# Patient Record
Sex: FEMALE | Race: WHITE | Employment: OTHER | URBAN - METROPOLITAN AREA
[De-identification: names, ages, dates, MRNs, and addresses within clinical notes are randomized per-mention and may not be internally consistent; named-entity substitution may affect disease eponyms.]

---

## 2019-10-16 ENCOUNTER — HOSPITAL ENCOUNTER (OUTPATIENT)
Dept: GENERAL RADIOLOGY | Age: 79
Discharge: HOME | End: 2019-10-18
Payer: COMMERCIAL

## 2019-10-16 ENCOUNTER — HOSPITAL ENCOUNTER (OUTPATIENT)
Age: 79
Discharge: HOME | End: 2019-10-18
Payer: COMMERCIAL

## 2019-10-16 DIAGNOSIS — M54.6 THORACIC BACK PAIN, UNSPECIFIED BACK PAIN LATERALITY, UNSPECIFIED CHRONICITY: ICD-10-CM

## 2019-10-16 DIAGNOSIS — M62.830 MUSCLE SPASM OF BACK: ICD-10-CM

## 2019-10-16 PROCEDURE — 72110 X-RAY EXAM L-2 SPINE 4/>VWS: CPT

## 2019-10-16 PROCEDURE — 72072 X-RAY EXAM THORAC SPINE 3VWS: CPT

## 2020-01-01 ENCOUNTER — APPOINTMENT (OUTPATIENT)
Dept: CT IMAGING | Age: 80
DRG: 177 | End: 2020-01-01
Payer: COMMERCIAL

## 2020-01-01 ENCOUNTER — OUTSIDE SERVICES (OUTPATIENT)
Dept: FAMILY MEDICINE CLINIC | Age: 80
End: 2020-01-01
Payer: COMMERCIAL

## 2020-01-01 ENCOUNTER — APPOINTMENT (OUTPATIENT)
Dept: GENERAL RADIOLOGY | Age: 80
DRG: 177 | End: 2020-01-01
Payer: COMMERCIAL

## 2020-01-01 ENCOUNTER — HOSPITAL ENCOUNTER (INPATIENT)
Age: 80
LOS: 5 days | Discharge: NURSING, REHAB OR LONG-TERM CARE | DRG: 177 | End: 2021-01-04
Attending: EMERGENCY MEDICINE | Admitting: INTERNAL MEDICINE
Payer: COMMERCIAL

## 2020-01-01 DIAGNOSIS — U07.1 ACUTE RESPIRATORY FAILURE DUE TO COVID-19 (HCC): Primary | ICD-10-CM

## 2020-01-01 DIAGNOSIS — R79.1 ELEVATED INR: ICD-10-CM

## 2020-01-01 DIAGNOSIS — E87.0 HYPERNATREMIA: ICD-10-CM

## 2020-01-01 DIAGNOSIS — N28.9 ACUTE RENAL INSUFFICIENCY: ICD-10-CM

## 2020-01-01 DIAGNOSIS — J96.00 ACUTE RESPIRATORY FAILURE DUE TO COVID-19 (HCC): Primary | ICD-10-CM

## 2020-01-01 LAB
ALBUMIN SERPL-MCNC: 3.1 G/DL (ref 3.5–5.2)
ALBUMIN SERPL-MCNC: 3.2 G/DL (ref 3.5–5.2)
ALBUMIN SERPL-MCNC: 3.4 G/DL (ref 3.5–5.2)
ALP BLD-CCNC: 107 U/L (ref 35–104)
ALP BLD-CCNC: 109 U/L (ref 35–104)
ALP BLD-CCNC: 109 U/L (ref 35–104)
ALT SERPL-CCNC: 18 U/L (ref 0–32)
AMORPHOUS: ABNORMAL
ANION GAP SERPL CALCULATED.3IONS-SCNC: 10 MMOL/L (ref 7–16)
ANION GAP SERPL CALCULATED.3IONS-SCNC: 11 MMOL/L (ref 7–16)
ANION GAP SERPL CALCULATED.3IONS-SCNC: 11 MMOL/L (ref 7–16)
AST SERPL-CCNC: 15 U/L (ref 0–31)
AST SERPL-CCNC: 15 U/L (ref 0–31)
AST SERPL-CCNC: 18 U/L (ref 0–31)
B.E.: 3 MMOL/L (ref -3–3)
BACTERIA: ABNORMAL /HPF
BASOPHILS ABSOLUTE: 0.01 E9/L (ref 0–0.2)
BASOPHILS ABSOLUTE: 0.01 E9/L (ref 0–0.2)
BASOPHILS ABSOLUTE: 0.02 E9/L (ref 0–0.2)
BASOPHILS RELATIVE PERCENT: 0.2 % (ref 0–2)
BASOPHILS RELATIVE PERCENT: 0.3 % (ref 0–2)
BASOPHILS RELATIVE PERCENT: 0.4 % (ref 0–2)
BILIRUB SERPL-MCNC: 0.4 MG/DL (ref 0–1.2)
BILIRUB SERPL-MCNC: 0.6 MG/DL (ref 0–1.2)
BILIRUB SERPL-MCNC: 0.6 MG/DL (ref 0–1.2)
BILIRUBIN URINE: NEGATIVE
BLOOD, URINE: ABNORMAL
BUN BLDV-MCNC: 64 MG/DL (ref 8–23)
BUN BLDV-MCNC: 78 MG/DL (ref 8–23)
BUN BLDV-MCNC: 79 MG/DL (ref 8–23)
C-REACTIVE PROTEIN: 16.2 MG/DL (ref 0–0.4)
CALCIUM SERPL-MCNC: 9.5 MG/DL (ref 8.6–10.2)
CALCIUM SERPL-MCNC: 9.8 MG/DL (ref 8.6–10.2)
CALCIUM SERPL-MCNC: 9.8 MG/DL (ref 8.6–10.2)
CHLORIDE BLD-SCNC: 113 MMOL/L (ref 98–107)
CHLORIDE BLD-SCNC: 114 MMOL/L (ref 98–107)
CHLORIDE BLD-SCNC: 115 MMOL/L (ref 98–107)
CLARITY: ABNORMAL
CO2: 28 MMOL/L (ref 22–29)
CO2: 28 MMOL/L (ref 22–29)
CO2: 30 MMOL/L (ref 22–29)
COHB: 0.8 % (ref 0–1.5)
COLOR: YELLOW
CREAT SERPL-MCNC: 1.8 MG/DL (ref 0.5–1)
CREAT SERPL-MCNC: 2.1 MG/DL (ref 0.5–1)
CREAT SERPL-MCNC: 2.2 MG/DL (ref 0.5–1)
CRITICAL: ABNORMAL
D DIMER: 545 NG/ML DDU
DATE ANALYZED: ABNORMAL
DATE OF COLLECTION: ABNORMAL
EKG ATRIAL RATE: 80 BPM
EKG P AXIS: 41 DEGREES
EKG P-R INTERVAL: 174 MS
EKG Q-T INTERVAL: 452 MS
EKG QRS DURATION: 174 MS
EKG QTC CALCULATION (BAZETT): 521 MS
EKG R AXIS: -43 DEGREES
EKG T AXIS: 122 DEGREES
EKG VENTRICULAR RATE: 80 BPM
EOSINOPHILS ABSOLUTE: 0.01 E9/L (ref 0.05–0.5)
EOSINOPHILS ABSOLUTE: 0.15 E9/L (ref 0.05–0.5)
EOSINOPHILS ABSOLUTE: 0.24 E9/L (ref 0.05–0.5)
EOSINOPHILS RELATIVE PERCENT: 0.2 % (ref 0–6)
EOSINOPHILS RELATIVE PERCENT: 2.9 % (ref 0–6)
EOSINOPHILS RELATIVE PERCENT: 6.4 % (ref 0–6)
EPITHELIAL CELLS, UA: ABNORMAL /HPF
FERRITIN: 400 NG/ML
FIBRINOGEN: >700 MG/DL (ref 225–540)
GFR AFRICAN AMERICAN: 26
GFR AFRICAN AMERICAN: 27
GFR AFRICAN AMERICAN: 33
GFR NON-AFRICAN AMERICAN: 21 ML/MIN/1.73
GFR NON-AFRICAN AMERICAN: 23 ML/MIN/1.73
GFR NON-AFRICAN AMERICAN: 27 ML/MIN/1.73
GLUCOSE BLD-MCNC: 128 MG/DL (ref 74–99)
GLUCOSE BLD-MCNC: 142 MG/DL (ref 74–99)
GLUCOSE BLD-MCNC: 164 MG/DL (ref 74–99)
GLUCOSE URINE: NEGATIVE MG/DL
HCO3: 27.3 MMOL/L (ref 22–26)
HCT VFR BLD CALC: 40.8 % (ref 34–48)
HCT VFR BLD CALC: 42.6 % (ref 34–48)
HCT VFR BLD CALC: 42.7 % (ref 34–48)
HEMOGLOBIN: 12.5 G/DL (ref 11.5–15.5)
HEMOGLOBIN: 12.9 G/DL (ref 11.5–15.5)
HEMOGLOBIN: 13.1 G/DL (ref 11.5–15.5)
HHB: 1.8 % (ref 0–5)
IMMATURE GRANULOCYTES #: 0.04 E9/L
IMMATURE GRANULOCYTES #: 0.05 E9/L
IMMATURE GRANULOCYTES #: 0.08 E9/L
IMMATURE GRANULOCYTES %: 1 % (ref 0–5)
IMMATURE GRANULOCYTES %: 1.3 % (ref 0–5)
IMMATURE GRANULOCYTES %: 1.5 % (ref 0–5)
INR BLD: 2.1
KETONES, URINE: NEGATIVE MG/DL
LAB: ABNORMAL
LACTATE DEHYDROGENASE: 167 U/L (ref 135–214)
LACTIC ACID, SEPSIS: 1.3 MMOL/L (ref 0.5–1.9)
LEUKOCYTE ESTERASE, URINE: NEGATIVE
LIPASE: 149 U/L (ref 13–60)
LYMPHOCYTES ABSOLUTE: 0.22 E9/L (ref 1.5–4)
LYMPHOCYTES ABSOLUTE: 0.26 E9/L (ref 1.5–4)
LYMPHOCYTES ABSOLUTE: 0.52 E9/L (ref 1.5–4)
LYMPHOCYTES RELATIVE PERCENT: 10 % (ref 20–42)
LYMPHOCYTES RELATIVE PERCENT: 5.9 % (ref 20–42)
LYMPHOCYTES RELATIVE PERCENT: 6.5 % (ref 20–42)
Lab: ABNORMAL
MAGNESIUM: 3.2 MG/DL (ref 1.6–2.6)
MCH RBC QN AUTO: 30.6 PG (ref 26–35)
MCH RBC QN AUTO: 30.6 PG (ref 26–35)
MCH RBC QN AUTO: 30.9 PG (ref 26–35)
MCHC RBC AUTO-ENTMCNC: 30.2 % (ref 32–34.5)
MCHC RBC AUTO-ENTMCNC: 30.6 % (ref 32–34.5)
MCHC RBC AUTO-ENTMCNC: 30.8 % (ref 32–34.5)
MCV RBC AUTO: 100 FL (ref 80–99.9)
MCV RBC AUTO: 100.5 FL (ref 80–99.9)
MCV RBC AUTO: 101.2 FL (ref 80–99.9)
METER GLUCOSE: 123 MG/DL (ref 74–99)
METHB: 0.2 % (ref 0–1.5)
MODE: ABNORMAL
MONOCYTES ABSOLUTE: 0.24 E9/L (ref 0.1–0.95)
MONOCYTES ABSOLUTE: 0.36 E9/L (ref 0.1–0.95)
MONOCYTES ABSOLUTE: 0.59 E9/L (ref 0.1–0.95)
MONOCYTES RELATIVE PERCENT: 11.3 % (ref 2–12)
MONOCYTES RELATIVE PERCENT: 6 % (ref 2–12)
MONOCYTES RELATIVE PERCENT: 9.6 % (ref 2–12)
NEUTROPHILS ABSOLUTE: 2.86 E9/L (ref 1.8–7.3)
NEUTROPHILS ABSOLUTE: 3.46 E9/L (ref 1.8–7.3)
NEUTROPHILS ABSOLUTE: 3.85 E9/L (ref 1.8–7.3)
NEUTROPHILS RELATIVE PERCENT: 73.9 % (ref 43–80)
NEUTROPHILS RELATIVE PERCENT: 76.5 % (ref 43–80)
NEUTROPHILS RELATIVE PERCENT: 86.1 % (ref 43–80)
NITRITE, URINE: NEGATIVE
O2 CONTENT: 18.6 ML/DL
O2 SATURATION: 98.2 % (ref 92–98.5)
O2HB: 97.2 % (ref 94–97)
OPERATOR ID: 3114
OVALOCYTES: ABNORMAL
PATIENT TEMP: 37 C
PCO2: 40.6 MMHG (ref 35–45)
PDW BLD-RTO: 14.9 FL (ref 11.5–15)
PDW BLD-RTO: 15.3 FL (ref 11.5–15)
PDW BLD-RTO: 15.3 FL (ref 11.5–15)
PH BLOOD GAS: 7.45 (ref 7.35–7.45)
PH UA: 5.5 (ref 5–9)
PLATELET # BLD: 183 E9/L (ref 130–450)
PLATELET # BLD: 184 E9/L (ref 130–450)
PLATELET # BLD: 193 E9/L (ref 130–450)
PMV BLD AUTO: 11.7 FL (ref 7–12)
PMV BLD AUTO: 11.8 FL (ref 7–12)
PMV BLD AUTO: 12 FL (ref 7–12)
PO2: 112.7 MMHG (ref 75–100)
POIKILOCYTES: ABNORMAL
POLYCHROMASIA: ABNORMAL
POTASSIUM REFLEX MAGNESIUM: 3.9 MMOL/L (ref 3.5–5)
POTASSIUM REFLEX MAGNESIUM: 3.9 MMOL/L (ref 3.5–5)
POTASSIUM SERPL-SCNC: 3.3 MMOL/L (ref 3.5–5)
PRO-BNP: 2968 PG/ML (ref 0–450)
PROCALCITONIN: 0.51 NG/ML (ref 0–0.08)
PROTEIN UA: ABNORMAL MG/DL
PROTHROMBIN TIME: 24.4 SEC (ref 9.3–12.4)
RBC # BLD: 4.08 E12/L (ref 3.5–5.5)
RBC # BLD: 4.22 E12/L (ref 3.5–5.5)
RBC # BLD: 4.24 E12/L (ref 3.5–5.5)
RBC UA: ABNORMAL /HPF (ref 0–2)
SARS-COV-2, NAAT: DETECTED
SODIUM BLD-SCNC: 151 MMOL/L (ref 132–146)
SODIUM BLD-SCNC: 153 MMOL/L (ref 132–146)
SODIUM BLD-SCNC: 156 MMOL/L (ref 132–146)
SOURCE, BLOOD GAS: ABNORMAL
SPECIFIC GRAVITY UA: 1.02 (ref 1–1.03)
THB: 13.5 G/DL (ref 11.5–16.5)
TIME ANALYZED: 638
TOTAL PROTEIN: 6.7 G/DL (ref 6.4–8.3)
TOTAL PROTEIN: 7 G/DL (ref 6.4–8.3)
TOTAL PROTEIN: 7.1 G/DL (ref 6.4–8.3)
TROPONIN: 0.03 NG/ML (ref 0–0.03)
TSH SERPL DL<=0.05 MIU/L-ACNC: 0.25 UIU/ML (ref 0.27–4.2)
UROBILINOGEN, URINE: 0.2 E.U./DL
WBC # BLD: 3.7 E9/L (ref 4.5–11.5)
WBC # BLD: 4 E9/L (ref 4.5–11.5)
WBC # BLD: 5.2 E9/L (ref 4.5–11.5)
WBC UA: ABNORMAL /HPF (ref 0–5)

## 2020-01-01 PROCEDURE — 99309 SBSQ NF CARE MODERATE MDM 30: CPT | Performed by: NURSE PRACTITIONER

## 2020-01-01 PROCEDURE — 85610 PROTHROMBIN TIME: CPT

## 2020-01-01 PROCEDURE — 97161 PT EVAL LOW COMPLEX 20 MIN: CPT

## 2020-01-01 PROCEDURE — 2500000003 HC RX 250 WO HCPCS: Performed by: NURSE PRACTITIONER

## 2020-01-01 PROCEDURE — 86140 C-REACTIVE PROTEIN: CPT

## 2020-01-01 PROCEDURE — 36415 COLL VENOUS BLD VENIPUNCTURE: CPT

## 2020-01-01 PROCEDURE — 82962 GLUCOSE BLOOD TEST: CPT

## 2020-01-01 PROCEDURE — 85025 COMPLETE CBC W/AUTO DIFF WBC: CPT

## 2020-01-01 PROCEDURE — 84145 PROCALCITONIN (PCT): CPT

## 2020-01-01 PROCEDURE — 83735 ASSAY OF MAGNESIUM: CPT

## 2020-01-01 PROCEDURE — 74176 CT ABD & PELVIS W/O CONTRAST: CPT

## 2020-01-01 PROCEDURE — 93005 ELECTROCARDIOGRAM TRACING: CPT | Performed by: STUDENT IN AN ORGANIZED HEALTH CARE EDUCATION/TRAINING PROGRAM

## 2020-01-01 PROCEDURE — 83880 ASSAY OF NATRIURETIC PEPTIDE: CPT

## 2020-01-01 PROCEDURE — 82805 BLOOD GASES W/O2 SATURATION: CPT

## 2020-01-01 PROCEDURE — 80053 COMPREHEN METABOLIC PANEL: CPT

## 2020-01-01 PROCEDURE — 71250 CT THORAX DX C-: CPT

## 2020-01-01 PROCEDURE — XW033E5 INTRODUCTION OF REMDESIVIR ANTI-INFECTIVE INTO PERIPHERAL VEIN, PERCUTANEOUS APPROACH, NEW TECHNOLOGY GROUP 5: ICD-10-PCS | Performed by: INTERNAL MEDICINE

## 2020-01-01 PROCEDURE — 96374 THER/PROPH/DIAG INJ IV PUSH: CPT

## 2020-01-01 PROCEDURE — 85384 FIBRINOGEN ACTIVITY: CPT

## 2020-01-01 PROCEDURE — 6370000000 HC RX 637 (ALT 250 FOR IP): Performed by: INTERNAL MEDICINE

## 2020-01-01 PROCEDURE — 99285 EMERGENCY DEPT VISIT HI MDM: CPT

## 2020-01-01 PROCEDURE — 2060000000 HC ICU INTERMEDIATE R&B

## 2020-01-01 PROCEDURE — 85378 FIBRIN DEGRADE SEMIQUANT: CPT

## 2020-01-01 PROCEDURE — 97530 THERAPEUTIC ACTIVITIES: CPT

## 2020-01-01 PROCEDURE — 2700000000 HC OXYGEN THERAPY PER DAY

## 2020-01-01 PROCEDURE — 84443 ASSAY THYROID STIM HORMONE: CPT

## 2020-01-01 PROCEDURE — 2580000003 HC RX 258: Performed by: NURSE PRACTITIONER

## 2020-01-01 PROCEDURE — 2580000003 HC RX 258: Performed by: INTERNAL MEDICINE

## 2020-01-01 PROCEDURE — 6370000000 HC RX 637 (ALT 250 FOR IP): Performed by: NURSE PRACTITIONER

## 2020-01-01 PROCEDURE — 6360000002 HC RX W HCPCS: Performed by: INTERNAL MEDICINE

## 2020-01-01 PROCEDURE — 81001 URINALYSIS AUTO W/SCOPE: CPT

## 2020-01-01 PROCEDURE — 87088 URINE BACTERIA CULTURE: CPT

## 2020-01-01 PROCEDURE — 87040 BLOOD CULTURE FOR BACTERIA: CPT

## 2020-01-01 PROCEDURE — 6370000000 HC RX 637 (ALT 250 FOR IP): Performed by: STUDENT IN AN ORGANIZED HEALTH CARE EDUCATION/TRAINING PROGRAM

## 2020-01-01 PROCEDURE — 6360000002 HC RX W HCPCS: Performed by: STUDENT IN AN ORGANIZED HEALTH CARE EDUCATION/TRAINING PROGRAM

## 2020-01-01 PROCEDURE — 83690 ASSAY OF LIPASE: CPT

## 2020-01-01 PROCEDURE — 83605 ASSAY OF LACTIC ACID: CPT

## 2020-01-01 PROCEDURE — 83615 LACTATE (LD) (LDH) ENZYME: CPT

## 2020-01-01 PROCEDURE — 71045 X-RAY EXAM CHEST 1 VIEW: CPT

## 2020-01-01 PROCEDURE — 51701 INSERT BLADDER CATHETER: CPT

## 2020-01-01 PROCEDURE — 82728 ASSAY OF FERRITIN: CPT

## 2020-01-01 PROCEDURE — U0002 COVID-19 LAB TEST NON-CDC: HCPCS

## 2020-01-01 PROCEDURE — 2580000003 HC RX 258: Performed by: STUDENT IN AN ORGANIZED HEALTH CARE EDUCATION/TRAINING PROGRAM

## 2020-01-01 PROCEDURE — 84484 ASSAY OF TROPONIN QUANT: CPT

## 2020-01-01 PROCEDURE — 6360000002 HC RX W HCPCS: Performed by: NURSE PRACTITIONER

## 2020-01-01 PROCEDURE — 70450 CT HEAD/BRAIN W/O DYE: CPT

## 2020-01-01 RX ORDER — B-COMPLEX WITH VITAMIN C
TABLET ORAL DAILY
COMMUNITY

## 2020-01-01 RX ORDER — HYDROCODONE BITARTRATE AND ACETAMINOPHEN 10; 325 MG/1; MG/1
1 TABLET ORAL EVERY 4 HOURS PRN
COMMUNITY

## 2020-01-01 RX ORDER — PREDNISONE 20 MG/1
20 TABLET ORAL 2 TIMES DAILY
Status: ON HOLD | COMMUNITY
End: 2021-01-01 | Stop reason: HOSPADM

## 2020-01-01 RX ORDER — DOXYCYCLINE HYCLATE 100 MG/1
100 CAPSULE ORAL EVERY 12 HOURS SCHEDULED
Status: DISCONTINUED | OUTPATIENT
Start: 2020-01-01 | End: 2021-01-01 | Stop reason: HOSPADM

## 2020-01-01 RX ORDER — GABAPENTIN 100 MG/1
100 CAPSULE ORAL 2 TIMES DAILY
Status: DISCONTINUED | OUTPATIENT
Start: 2020-01-01 | End: 2021-01-01 | Stop reason: HOSPADM

## 2020-01-01 RX ORDER — M-VIT,TX,IRON,MINS/CALC/FOLIC 27MG-0.4MG
1 TABLET ORAL DAILY
Status: DISCONTINUED | OUTPATIENT
Start: 2020-01-01 | End: 2021-01-01 | Stop reason: HOSPADM

## 2020-01-01 RX ORDER — LENALIDOMIDE 10 MG/1
10 CAPSULE ORAL DAILY
COMMUNITY

## 2020-01-01 RX ORDER — DEXTROSE MONOHYDRATE 50 MG/ML
INJECTION, SOLUTION INTRAVENOUS CONTINUOUS
Status: DISCONTINUED | OUTPATIENT
Start: 2020-01-01 | End: 2021-01-01

## 2020-01-01 RX ORDER — ZINC SULFATE 50(220)MG
50 CAPSULE ORAL DAILY
Status: DISCONTINUED | OUTPATIENT
Start: 2020-01-01 | End: 2021-01-01 | Stop reason: HOSPADM

## 2020-01-01 RX ORDER — ONDANSETRON HYDROCHLORIDE 8 MG/1
8 TABLET, FILM COATED ORAL EVERY 8 HOURS PRN
COMMUNITY

## 2020-01-01 RX ORDER — ASCORBIC ACID 500 MG
500 TABLET ORAL DAILY
Status: DISCONTINUED | OUTPATIENT
Start: 2020-01-01 | End: 2020-01-01

## 2020-01-01 RX ORDER — IPRATROPIUM BROMIDE AND ALBUTEROL SULFATE 2.5; .5 MG/3ML; MG/3ML
1 SOLUTION RESPIRATORY (INHALATION) EVERY 4 HOURS
COMMUNITY

## 2020-01-01 RX ORDER — 0.9 % SODIUM CHLORIDE 0.9 %
30 INTRAVENOUS SOLUTION INTRAVENOUS PRN
Status: DISCONTINUED | OUTPATIENT
Start: 2020-01-01 | End: 2021-01-01 | Stop reason: HOSPADM

## 2020-01-01 RX ORDER — DEXAMETHASONE SODIUM PHOSPHATE 10 MG/ML
6 INJECTION, SOLUTION INTRAMUSCULAR; INTRAVENOUS ONCE
Status: COMPLETED | OUTPATIENT
Start: 2020-01-01 | End: 2020-01-01

## 2020-01-01 RX ORDER — POTASSIUM CHLORIDE 20 MEQ/1
20 TABLET, EXTENDED RELEASE ORAL ONCE
Status: COMPLETED | OUTPATIENT
Start: 2020-01-01 | End: 2020-01-01

## 2020-01-01 RX ORDER — FUROSEMIDE 20 MG/1
40 TABLET ORAL DAILY
COMMUNITY

## 2020-01-01 RX ORDER — M-VIT,TX,IRON,MINS/CALC/FOLIC 27MG-0.4MG
1 TABLET ORAL DAILY
COMMUNITY

## 2020-01-01 RX ORDER — HEPARIN SODIUM 10000 [USP'U]/ML
5000 INJECTION, SOLUTION INTRAVENOUS; SUBCUTANEOUS EVERY 8 HOURS
Status: DISCONTINUED | OUTPATIENT
Start: 2020-01-01 | End: 2020-01-01

## 2020-01-01 RX ORDER — ACETAMINOPHEN 325 MG/1
650 TABLET ORAL EVERY 6 HOURS PRN
Status: DISCONTINUED | OUTPATIENT
Start: 2020-01-01 | End: 2021-01-01 | Stop reason: HOSPADM

## 2020-01-01 RX ORDER — SODIUM CHLORIDE 0.9 % (FLUSH) 0.9 %
10 SYRINGE (ML) INJECTION PRN
Status: DISCONTINUED | OUTPATIENT
Start: 2020-01-01 | End: 2021-01-01 | Stop reason: HOSPADM

## 2020-01-01 RX ORDER — POLYETHYLENE GLYCOL 3350 17 G/17G
17 POWDER, FOR SOLUTION ORAL DAILY PRN
Status: DISCONTINUED | OUTPATIENT
Start: 2020-01-01 | End: 2021-01-01 | Stop reason: HOSPADM

## 2020-01-01 RX ORDER — ACETAMINOPHEN 500 MG
1000 TABLET ORAL ONCE
Status: COMPLETED | OUTPATIENT
Start: 2020-01-01 | End: 2020-01-01

## 2020-01-01 RX ORDER — BENZONATATE 100 MG/1
100 CAPSULE ORAL EVERY 8 HOURS PRN
Status: DISCONTINUED | OUTPATIENT
Start: 2020-01-01 | End: 2021-01-01 | Stop reason: HOSPADM

## 2020-01-01 RX ORDER — GABAPENTIN 100 MG/1
100 CAPSULE ORAL 2 TIMES DAILY
COMMUNITY

## 2020-01-01 RX ORDER — ASCORBIC ACID 500 MG
500 TABLET ORAL 2 TIMES DAILY
Status: DISCONTINUED | OUTPATIENT
Start: 2020-01-01 | End: 2021-01-01 | Stop reason: HOSPADM

## 2020-01-01 RX ORDER — DEXAMETHASONE SODIUM PHOSPHATE 4 MG/ML
6 INJECTION, SOLUTION INTRA-ARTICULAR; INTRALESIONAL; INTRAMUSCULAR; INTRAVENOUS; SOFT TISSUE DAILY
Status: DISCONTINUED | OUTPATIENT
Start: 2020-01-01 | End: 2021-01-01 | Stop reason: HOSPADM

## 2020-01-01 RX ORDER — ACETAMINOPHEN 650 MG/1
650 SUPPOSITORY RECTAL EVERY 6 HOURS PRN
Status: DISCONTINUED | OUTPATIENT
Start: 2020-01-01 | End: 2021-01-01 | Stop reason: HOSPADM

## 2020-01-01 RX ORDER — LENALIDOMIDE 10 MG/1
10 CAPSULE ORAL NIGHTLY
Status: DISCONTINUED | OUTPATIENT
Start: 2020-01-01 | End: 2020-01-01

## 2020-01-01 RX ORDER — SODIUM CHLORIDE 9 MG/ML
INJECTION, SOLUTION INTRAVENOUS CONTINUOUS
Status: DISCONTINUED | OUTPATIENT
Start: 2020-01-01 | End: 2020-01-01

## 2020-01-01 RX ORDER — SODIUM CHLORIDE 450 MG/100ML
INJECTION, SOLUTION INTRAVENOUS ONCE
Status: COMPLETED | OUTPATIENT
Start: 2020-01-01 | End: 2020-01-01

## 2020-01-01 RX ORDER — DEXAMETHASONE SODIUM PHOSPHATE 10 MG/ML
6 INJECTION, SOLUTION INTRAMUSCULAR; INTRAVENOUS DAILY
Status: DISCONTINUED | OUTPATIENT
Start: 2020-01-01 | End: 2020-01-01 | Stop reason: SDUPTHER

## 2020-01-01 RX ORDER — ALBUTEROL SULFATE 90 UG/1
2 AEROSOL, METERED RESPIRATORY (INHALATION) EVERY 6 HOURS
Status: DISCONTINUED | OUTPATIENT
Start: 2020-01-01 | End: 2021-01-01 | Stop reason: HOSPADM

## 2020-01-01 RX ORDER — HYDROCODONE BITARTRATE AND ACETAMINOPHEN 10; 325 MG/1; MG/1
1 TABLET ORAL EVERY 4 HOURS PRN
Status: DISCONTINUED | OUTPATIENT
Start: 2020-01-01 | End: 2021-01-01 | Stop reason: HOSPADM

## 2020-01-01 RX ORDER — SODIUM CHLORIDE 0.9 % (FLUSH) 0.9 %
10 SYRINGE (ML) INJECTION EVERY 12 HOURS SCHEDULED
Status: DISCONTINUED | OUTPATIENT
Start: 2020-01-01 | End: 2021-01-01 | Stop reason: HOSPADM

## 2020-01-01 RX ADMIN — WATER 1 G: 1 INJECTION INTRAMUSCULAR; INTRAVENOUS; SUBCUTANEOUS at 22:19

## 2020-01-01 RX ADMIN — OXYCODONE HYDROCHLORIDE AND ACETAMINOPHEN 500 MG: 500 TABLET ORAL at 22:07

## 2020-01-01 RX ADMIN — APIXABAN 2.5 MG: 2.5 TABLET, FILM COATED ORAL at 10:04

## 2020-01-01 RX ADMIN — REMDESIVIR 200 MG: 100 INJECTION, POWDER, LYOPHILIZED, FOR SOLUTION INTRAVENOUS at 15:58

## 2020-01-01 RX ADMIN — ANORECTAL OINTMENT: 15.7; .44; 24; 20.6 OINTMENT TOPICAL at 22:32

## 2020-01-01 RX ADMIN — APIXABAN 2.5 MG: 2.5 TABLET, FILM COATED ORAL at 22:07

## 2020-01-01 RX ADMIN — APIXABAN 2.5 MG: 2.5 TABLET, FILM COATED ORAL at 23:35

## 2020-01-01 RX ADMIN — SODIUM CHLORIDE: 4.5 INJECTION, SOLUTION INTRAVENOUS at 11:52

## 2020-01-01 RX ADMIN — DEXAMETHASONE SODIUM PHOSPHATE 6 MG: 10 INJECTION, SOLUTION INTRAMUSCULAR; INTRAVENOUS at 06:43

## 2020-01-01 RX ADMIN — DEXAMETHASONE SODIUM PHOSPHATE 6 MG: 4 INJECTION, SOLUTION INTRAMUSCULAR; INTRAVENOUS at 10:04

## 2020-01-01 RX ADMIN — POTASSIUM CHLORIDE 20 MEQ: 20 TABLET, EXTENDED RELEASE ORAL at 11:53

## 2020-01-01 RX ADMIN — HYDROCODONE BITARTRATE AND ACETAMINOPHEN 1 TABLET: 10; 325 TABLET ORAL at 12:49

## 2020-01-01 RX ADMIN — GABAPENTIN 100 MG: 100 CAPSULE ORAL at 22:07

## 2020-01-01 RX ADMIN — DEXTROSE MONOHYDRATE: 50 INJECTION, SOLUTION INTRAVENOUS at 17:11

## 2020-01-01 RX ADMIN — DOXYCYCLINE HYCLATE 100 MG: 100 CAPSULE ORAL at 22:07

## 2020-01-01 RX ADMIN — Medication 10 ML: at 10:05

## 2020-01-01 RX ADMIN — DEXTROSE MONOHYDRATE: 50 INJECTION, SOLUTION INTRAVENOUS at 16:32

## 2020-01-01 RX ADMIN — Medication 10 ML: at 22:19

## 2020-01-01 RX ADMIN — HYDROCODONE BITARTRATE AND ACETAMINOPHEN 1 TABLET: 10; 325 TABLET ORAL at 22:07

## 2020-01-01 RX ADMIN — REMDESIVIR 100 MG: 100 INJECTION, POWDER, LYOPHILIZED, FOR SOLUTION INTRAVENOUS at 14:23

## 2020-01-01 RX ADMIN — ZINC SULFATE 220 MG (50 MG) CAPSULE 50 MG: CAPSULE at 10:04

## 2020-01-01 RX ADMIN — GABAPENTIN 100 MG: 100 CAPSULE ORAL at 23:34

## 2020-01-01 RX ADMIN — HYDROCODONE BITARTRATE AND ACETAMINOPHEN 1 TABLET: 10; 325 TABLET ORAL at 23:34

## 2020-01-01 RX ADMIN — OXYCODONE HYDROCHLORIDE AND ACETAMINOPHEN 500 MG: 500 TABLET ORAL at 10:04

## 2020-01-01 RX ADMIN — GABAPENTIN 100 MG: 100 CAPSULE ORAL at 10:04

## 2020-01-01 RX ADMIN — ACETAMINOPHEN 1000 MG: 500 TABLET ORAL at 06:43

## 2020-01-01 RX ADMIN — MULTIPLE VITAMINS W/ MINERALS TAB 1 TABLET: TAB at 10:04

## 2020-01-01 ASSESSMENT — PAIN DESCRIPTION - DESCRIPTORS
DESCRIPTORS: ACHING
DESCRIPTORS: ACHING

## 2020-01-01 ASSESSMENT — PAIN SCALES - GENERAL
PAINLEVEL_OUTOF10: 0
PAINLEVEL_OUTOF10: 7
PAINLEVEL_OUTOF10: 5

## 2020-01-01 ASSESSMENT — PAIN DESCRIPTION - LOCATION
LOCATION: GENERALIZED

## 2020-01-01 ASSESSMENT — PAIN - FUNCTIONAL ASSESSMENT: PAIN_FUNCTIONAL_ASSESSMENT: PREVENTS OR INTERFERES SOME ACTIVE ACTIVITIES AND ADLS

## 2020-01-01 ASSESSMENT — PAIN DESCRIPTION - PROGRESSION: CLINICAL_PROGRESSION: NOT CHANGED

## 2020-01-01 ASSESSMENT — PAIN DESCRIPTION - PAIN TYPE
TYPE: CHRONIC PAIN
TYPE: CHRONIC PAIN

## 2020-01-01 ASSESSMENT — PAIN DESCRIPTION - FREQUENCY
FREQUENCY: CONTINUOUS
FREQUENCY: CONTINUOUS

## 2020-01-01 ASSESSMENT — PAIN DESCRIPTION - ONSET: ONSET: ON-GOING

## 2020-12-15 PROBLEM — C90.00 MYELOMA (HCC): Status: ACTIVE | Noted: 2020-01-01

## 2020-12-15 PROBLEM — R60.0 LOCALIZED EDEMA: Status: ACTIVE | Noted: 2020-01-01

## 2020-12-15 PROBLEM — I10 ESSENTIAL HYPERTENSION, BENIGN: Status: ACTIVE | Noted: 2020-01-01

## 2020-12-15 PROBLEM — D69.6 THROMBOCYTOPENIA, UNSPECIFIED (HCC): Status: ACTIVE | Noted: 2020-01-01

## 2020-12-15 NOTE — PROGRESS NOTES
12/15/2020    Yvette Stewart  1940    This resident is being seen today for a follow-up evaluation visit. 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 .  She is a 78 y.o. female resident who is being seen today for a follow-up evaluation visit with staff indicating that she has been fairly noncompliant with respect to her chemotherapy agents. She does follow with the department of oncology with Dr. Jabari Marques and indicates that this will further be discussed with her at her next examination which is scheduled for tomorrow. It is of note to mention she had COVID-19 testing completed this morning with which she was negative. Currently at this time she denies any complaints of pain, any headaches, any sore throat, coughing, or shortness of breath. No nausea, vomiting, constipation or diarrhea. No fever, or chills. No recent falls or syncopal events. Objective     Vital Signs: Pressure 120/70 pulse 74 respiration 16 temperature 98.3 oxygen saturation 98% weight of 157 pounds    Physical examination:Skin is essentially warm and dry. HEENT unremarkable. Neck is supple. Heart regular rate and rhythm. No rubs, gallops or murmurs noted. Lungs are clear to auscultation. No evidence of rhonchi, rales, or wheezing. Abdomen is soft, supple and non-tender. Bowel sounds are noted x4 quadrants. No rigidity, guarding or rebound tenderness. Negative Hernandez's, negative McBurney's, negative Srikanth's. Extremities; no true pitting edema. Pulses are adequate. No clubbing  or no cyanosis noted. Neurologically she  is alert and oriented x3. No evidence of paralysis or paresthesias noted.     Diagnoses and all orders for this visit: Multiple myeloma not having achieved remission ([facility] [address]    Essential hypertension, benign    Localized edema    Thrombocytopenia, unspecified (HCC)    Anemia, unspecified type      Plan: Plan of care was discussed with the healthcare team with meds and labs reviewed. Again COVID-19 testing was negative and she is without complaints in this regard. The biggest concern being with respect to her cancer and her not adherence to her medical regimen. She will follow up with Dr. Matti Aparicio tomorrow regarding this issue. Furthermore she will maintain the benefits of her vitamin C in addition to her zinc for prophylactic measures regarding the concern with the recent pandemic. She will also maintain the benefits of isolation for precautionary measures. We will continue forth with her current medical regimen with the plan of care as otherwise indicated at this time and continue to track her intakes, monitor her weights and behaviors, and see her routinely and as needed with further orders forthcoming. MONCHO BARTLETT, APRN - CNP      *Note was creating using voice recognition software.   The document was reviewed however grammatical errors may exist.

## 2020-12-29 NOTE — PROGRESS NOTES
12/29/2020    Yvette Stewart  1940    This resident is being seen today for an acute evaluation visit. 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 She is a 78 y.o. female resident who is being seen today for an acute evaluation visit regarding the recent diagnosis of COVID-19, as of December 20 of 2020. On today's evaluation visit she does not appear to feel well overall. She states that she has been sore all over with generalized myalgias and has complaints of abdominal pain. She does indicate that she had a pretty significant sore throat for the course of several days but indicates that she has been drinking her vinegar water with essential resolution. She is excited that she is actually able to eat and drink. She does state that she has had the loss of taste and smell however she indicates that this has been an issue since a very young age. She denies a current cough or shortness of breath, nausea or vomiting, constipation or diarrhea, fever or chills, recent falls or syncopal events.   Objective     Vital Signs: 120/70 pulse 74 respiration 16 temperature 98.1 O2 98% weight of 157  Pounds Physical examination:Skin is essentially warm and dry. HEENT unremarkable. Neck is supple. Heart regular rate and rhythm. No rubs, gallops or murmurs noted. Lungs are clear to auscultation. No evidence of rhonchi, rales, or wheezing. Abdomen is soft, supple tenderness throughout. Bowel sounds are noted x4 quadrants. No rigidity, guarding or rebound tenderness. Negative Hernandez's, negative McBurney's, negative Srikanth's. Extremities; no true pitting edema. Pulses are adequate. No clubbing  or no cyanosis noted. Neurologically she  is alert and oriented x3. No evidence of paralysis or paresthesias noted.     Diagnoses and all orders for this visit:    COVID-19    Acute viral pharyngitis    Generalized abdominal pain    Myalgia    Essential hypertension, benign Plan: Plan of care was discussed with the healthcare team with meds and labs reviewed. She will continue forth with her current regimen. Of note to mention that this resident has been noncompliant with respect to her medical regimen. Also over the course of the past week she has been asked to go to the hospital setting because staffing was concerned about her status but she did adamantly refuse. This point she will continue forth with the zinc and the vitamin C as she has been fairly compliant with them. She was scheduled to be seen in conjunction with hematology oncology with Dr. Tip Dejesus which has been placed on hold for this week due to her diagnosis of Covid. We will continue with close observation with respect to this resident as I do feel that she has had a significant decline over the course of the past 2 weeks. She has had recommendations per Dr. Tip Dejesus for no further labs here at the facility. I am concerned however that she may become dehydrated given the fact that she had a pretty significant sore throat and was not eating or drinking much. I will therefore continue to monitor her closely especially with respect to her appetite and track her intakes, monitor her weights and behaviors, and see her routinely and as needed with further orders forthcoming. MONCHO BARTLETT, APRN - CNP      *Note was creating using voice recognition software.   The document was reviewed however grammatical errors may exist.

## 2020-12-30 PROBLEM — U07.1 PNEUMONIA DUE TO COVID-19 VIRUS: Status: ACTIVE | Noted: 2020-01-01

## 2020-12-30 PROBLEM — J12.82 PNEUMONIA DUE TO COVID-19 VIRUS: Status: ACTIVE | Noted: 2020-01-01

## 2020-12-30 PROBLEM — E87.0 HYPERNATREMIA: Status: ACTIVE | Noted: 2020-01-01

## 2020-12-30 PROBLEM — E87.6 HYPOKALEMIA: Status: ACTIVE | Noted: 2020-01-01

## 2020-12-30 PROBLEM — I10 ESSENTIAL HYPERTENSION: Status: ACTIVE | Noted: 2020-01-01

## 2020-12-30 PROBLEM — J96.02 ACUTE RESPIRATORY FAILURE WITH HYPOXIA AND HYPERCAPNIA (HCC): Status: ACTIVE | Noted: 2020-01-01

## 2020-12-30 PROBLEM — E83.41 HYPERMAGNESEMIA: Status: ACTIVE | Noted: 2020-01-01

## 2020-12-30 PROBLEM — J96.01 ACUTE RESPIRATORY FAILURE WITH HYPOXIA AND HYPERCAPNIA (HCC): Status: ACTIVE | Noted: 2020-01-01

## 2020-12-30 PROBLEM — R79.89 ELEVATED SERUM CREATININE: Status: ACTIVE | Noted: 2020-01-01

## 2020-12-30 NOTE — CARE COORDINATION
Social Work/Transition of Care:    Pt presents from [address] spoke with [employer] for the facility who stated pt is a bedhold and can return to facility no precert required. Assigned SW/CM will follow.     Electronically signed by Taurus Luque on 07/21/8957 at 12:54 PM

## 2020-12-30 NOTE — ED PROVIDER NOTES
78-year-old female with history of multiple myeloma and HTN coming from assisted living facility for acute respiratory distress and hypoxia with confusion. As per nursing facility patient is usually ANO x3. She has become acutely more altered, alert to self and place only. He states that the patient was hypoxic at the facility. She tested positive for COVID 12/20  She is not usually on oxygen. States that she was saturating at 72%. As per EMS they placed her on a 15 L nonrebreather and have the patient saturating 95%. No alleviating or exacerbating factors. Patient has not taken any medications or measures from the assisted living facility or an route to the facility. Review of systems unable to be obtained as patient is altered at this time. Review of Systems   Unable to perform ROS: Mental status change        Physical Exam  Constitutional:       Appearance: She is well-developed and normal weight. HENT:      Head: Normocephalic. Eyes:      Extraocular Movements: Extraocular movements intact. Neck:      Musculoskeletal: Normal range of motion and neck supple. Cardiovascular:      Rate and Rhythm: Normal rate and regular rhythm. Pulmonary:      Effort: Tachypnea, accessory muscle usage and respiratory distress present. Breath sounds: Examination of the right-lower field reveals decreased breath sounds and rales. Examination of the left-lower field reveals decreased breath sounds and rales. Decreased breath sounds and rales present. Abdominal:      Palpations: Abdomen is soft. Tenderness: There is abdominal tenderness. Comments: Epigastric tenderness to palpation   Musculoskeletal:      Right lower leg: Edema present. Left lower leg: Edema present. Comments: 1+ pitting edema BLE   Skin:     General: Skin is warm and dry. Neurological:      General: No focal deficit present. Mental Status: She is alert. Cranial Nerves: No cranial nerve deficit. - 15.0 fL    Platelets 267 953 - 979 E9/L    MPV 11.7 7.0 - 12.0 fL    Neutrophils % 73.9 43.0 - 80.0 %    Immature Granulocytes % 1.5 0.0 - 5.0 %    Lymphocytes % 10.0 (L) 20.0 - 42.0 %    Monocytes % 11.3 2.0 - 12.0 %    Eosinophils % 2.9 0.0 - 6.0 %    Basophils % 0.4 0.0 - 2.0 %    Neutrophils Absolute 3.85 1.80 - 7.30 E9/L    Immature Granulocytes # 0.08 E9/L    Lymphocytes Absolute 0.52 (L) 1.50 - 4.00 E9/L    Monocytes Absolute 0.59 0.10 - 0.95 E9/L    Eosinophils Absolute 0.15 0.05 - 0.50 E9/L    Basophils Absolute 0.02 0.00 - 0.20 E9/L    Polychromasia 1+     Poikilocytes 1+     Ovalocytes 1+    Comprehensive Metabolic Panel   Result Value Ref Range    Sodium 156 (H) 132 - 146 mmol/L    Potassium 3.3 (L) 3.5 - 5.0 mmol/L    Chloride 115 (H) 98 - 107 mmol/L    CO2 30 (H) 22 - 29 mmol/L    Anion Gap 11 7 - 16 mmol/L    Glucose 128 (H) 74 - 99 mg/dL    BUN 64 (H) 8 - 23 mg/dL    CREATININE 2.1 (H) 0.5 - 1.0 mg/dL    GFR Non-African American 23 >=60 mL/min/1.73    GFR African American 27     Calcium 9.8 8.6 - 10.2 mg/dL    Total Protein 7.0 6.4 - 8.3 g/dL    Alb 3.4 (L) 3.5 - 5.2 g/dL    Total Bilirubin 0.6 0.0 - 1.2 mg/dL    Alkaline Phosphatase 107 (H) 35 - 104 U/L    ALT 18 0 - 32 U/L    AST 15 0 - 31 U/L   Magnesium   Result Value Ref Range    Magnesium 3.2 (H) 1.6 - 2.6 mg/dL   Troponin   Result Value Ref Range    Troponin 0.03 0.00 - 0.03 ng/mL   Lactate, Sepsis   Result Value Ref Range    Lactic Acid, Sepsis 1.3 0.5 - 1.9 mmol/L   Urinalysis, reflex to microscopic   Result Value Ref Range    Color, UA Yellow Straw/Yellow    Clarity, UA SL CLOUDY Clear    Glucose, Ur Negative Negative mg/dL    Bilirubin Urine Negative Negative    Ketones, Urine Negative Negative mg/dL    Specific Gravity, UA 1.020 1.005 - 1.030    Blood, Urine LARGE (A) Negative    pH, UA 5.5 5.0 - 9.0    Protein, UA TRACE Negative mg/dL    Urobilinogen, Urine 0.2 <2.0 E.U./dL    Nitrite, Urine Negative Negative    Leukocyte Esterase, Urine Negative Negative   Protime-INR   Result Value Ref Range    Protime 24.4 (H) 9.3 - 12.4 sec    INR 2.1    Brain Natriuretic Peptide   Result Value Ref Range    Pro-BNP 2,968 (H) 0 - 450 pg/mL   TSH without Reflex   Result Value Ref Range    TSH 0.253 (L) 0.270 - 4.200 uIU/mL   COVID-19   Result Value Ref Range    SARS-CoV-2, NAAT DETECTED (A) Not Detected   Blood Gas, Arterial   Result Value Ref Range    Date Analyzed 20201230     Time Analyzed 0638     Source: Blood Arterial     pH, Blood Gas 7.445 7.350 - 7.450    PCO2 40.6 35.0 - 45.0 mmHg    PO2 112.7 (H) 75.0 - 100.0 mmHg    HCO3 27.3 (H) 22.0 - 26.0 mmol/L    B.E. 3.0 -3.0 - 3.0 mmol/L    O2 Sat 98.2 92.0 - 98.5 %    O2Hb 97.2 (H) 94.0 - 97.0 %    COHb 0.8 0.0 - 1.5 %    MetHb 0.2 0.0 - 1.5 %    O2 Content 18.6 mL/dL    HHb 1.8 0.0 - 5.0 %    tHb (est) 13.5 11.5 - 16.5 g/dL    Mode NRB 15L     Date Of Collection      Time Collected      Pt Temp 37.0 C     ID [id number]     Lab [id number]     Critical(s) Notified .  No Critical Values    Lipase   Result Value Ref Range    Lipase 149 (H) 13 - 60 U/L   Microscopic Urinalysis   Result Value Ref Range    WBC, UA 2-5 0 - 5 /HPF    RBC, UA 10-20 (A) 0 - 2 /HPF    Epithelial Cells, UA MODERATE /HPF    Bacteria, UA FEW (A) None Seen /HPF    Amorphous, UA FEW    Lactate dehydrogenase   Result Value Ref Range     135 - 214 U/L   D-Dimer, Quantitative   Result Value Ref Range    D-Dimer, Quant 545 ng/mL DDU   Fibrinogen   Result Value Ref Range    Fibrinogen >700 (H) 225 - 540 mg/dL   CBC Auto Differential   Result Value Ref Range    WBC 4.0 (L) 4.5 - 11.5 E9/L    RBC 4.24 3.50 - 5.50 E12/L    Hemoglobin 13.1 11.5 - 15.5 g/dL    Hematocrit 42.6 34.0 - 48.0 %    .5 (H) 80.0 - 99.9 fL    MCH 30.9 26.0 - 35.0 pg    MCHC 30.8 (L) 32.0 - 34.5 %    RDW 15.3 (H) 11.5 - 15.0 fL    Platelets 518 719 - 192 E9/L    MPV 11.8 7.0 - 12.0 fL   Comprehensive Metabolic Panel w/ Reflex to MG   Result Value Ref Range Sodium 153 (H) 132 - 146 mmol/L    Potassium reflex Magnesium 3.9 3.5 - 5.0 mmol/L    Chloride 114 (H) 98 - 107 mmol/L    CO2 28 22 - 29 mmol/L    Anion Gap 11 7 - 16 mmol/L    Glucose 164 (H) 74 - 99 mg/dL    BUN 78 (H) 8 - 23 mg/dL    CREATININE 2.2 (H) 0.5 - 1.0 mg/dL    GFR Non-African American 21 >=60 mL/min/1.73    GFR African American 26     Calcium 9.5 8.6 - 10.2 mg/dL    Total Protein 7.1 6.4 - 8.3 g/dL    Alb 3.2 (L) 3.5 - 5.2 g/dL    Total Bilirubin 0.6 0.0 - 1.2 mg/dL    Alkaline Phosphatase 109 (H) 35 - 104 U/L    ALT 18 0 - 32 U/L    AST 15 0 - 31 U/L   POCT Glucose   Result Value Ref Range    Meter Glucose 123 (H) 74 - 99 mg/dL   EKG 12 Lead   Result Value Ref Range    Ventricular Rate 80 BPM    Atrial Rate 80 BPM    P-R Interval 174 ms    QRS Duration 174 ms    Q-T Interval 452 ms    QTc Calculation (Bazett) 521 ms    P Axis 41 degrees    R Axis -43 degrees    T Axis 122 degrees       RADIOLOGY:  CT Head WO Contrast   Final Result   1. There is no acute intracranial abnormality. Specifically, there is no   intracranial hemorrhage. 2. Atrophy and periventricular leukomalacia,   3. Diffuse marrow changes involving the calvarium with multiple tiny punctate   lesions. Multiple myeloma cannot be excluded. Please correlate with   laboratory values. CT CHEST WO CONTRAST   Final Result   1. Multifocal bilateral ground-glass and semi solid pulmonary infiltrates   consistent with pneumonia and worrisome for COVID-19 pneumonia. 2. Diffuse bony demineralization. 3. The upper abdomen is unremarkable. CT ABDOMEN PELVIS WO CONTRAST Additional Contrast? None   Final Result   1. There is no acute intra-abdominal or intrapelvic pathology. Specifically,   there are no findings of bowel obstruction, free air or inflammatory bowel   disease. 2. Multifocal bilateral lower lobe pulmonary infiltrates. 3. Sigmoid diverticulosis. There are no findings of diverticulitis.       XR CHEST PORTABLE   Final Result   Bilateral interstitial scarring/infiltrates as described          EKG   Rate: 80  Rhythm: Sinus  Interpretation: left bundle branch block  Comparison: no previous EKG available      ------------------------- NURSING NOTES AND VITALS REVIEWED ---------------------------  Date / Time Roomed:  12/30/2020  5:36 AM  ED Bed Assignment:  [id number]    The nursing notes within the ED encounter and vital signs as below have been reviewed. Patient Vitals for the past 24 hrs:   BP Temp Temp src Pulse Resp SpO2   12/30/20 1400 (!) 170/78 97 °F (36.1 °C) Oral 63 18 93 %   12/30/20 1157 128/70 98.2 °F (36.8 °C) Oral 76 16 94 %   12/30/20 0923 137/75 98 °F (36.7 °C) Oral 64 16 97 %   12/30/20 0558 121/75 100 °F (37.8 °C) Oral 80 18 94 %       Oxygen Saturation Interpretation: Improved after treatment    ------------------------------------------ PROGRESS NOTES ------------------------------------------  I have spoken with the patient and discussed todays results, in addition to providing specific details for the plan of care and counseling regarding the diagnosis and prognosis. Their questions are answered at this time and they are agreeable with the plan of admission.    --------------------------------- ADDITIONAL PROVIDER NOTES ---------------------------------  Consultations:  See ED course above. This patient's ED course included: a personal history and physicial examination, re-evaluation prior to disposition, multiple bedside re-evaluations, IV medications, cardiac monitoring and continuous pulse oximetry    This patient has remained hemodynamically stable during their ED course. Diagnosis:  1. Acute respiratory failure due to COVID-19 ([address]    2. Hypernatremia    3. Acute renal insufficiency    4. Elevated INR        Disposition:  Patient's disposition: Admit to telemetry  Patient's condition is stable.             Linnette Hull MD  Resident  12/30/20 1701       Linnette Hull MD  Resident  12/30/20

## 2020-12-30 NOTE — PROGRESS NOTES
Spoke with Nurse at [facility]. Pt ended her revlimid on 12/29. Also verified code status and pt is a full code.

## 2020-12-30 NOTE — ED NOTES
Cory Busby pt's POA (son) would like to be updated periodically [phone]. He is planning on being in town this evening.      Aden Trevino RN  12/30/20 1654

## 2020-12-30 NOTE — ED NOTES
[facility] for medications, they will fax them over. Pt was apparently supposed to go to [facility] where she normally goes but EMS brought her here.      Zahira Weber RN  12/30/20 1038

## 2020-12-30 NOTE — PROGRESS NOTES
Nikodanynancy Al was ordered Revlimid which is a nonformulary medication. The patient has indicated that the home supply of this medication will be brought in to the hospital for inpatient use. If the medication has not been administered by 1400 on the following day from the time the order was placed, a pharmacist will follow-up with the nurse of the patient to assess the capability of the patient to bring in the medication. If it is determined that the patient cannot supply the medication and it is not available to be dispensed from the pharmacy, a call will be placed to the ordering provider to discuss alternative options. Thank Senait Hogan Pharm. D 12/30/2020 4:46 PM

## 2020-12-31 NOTE — CARE COORDINATION
Social work / Discharge Planning:       [address] Patient is a bed hold from [facility]. No precert needed for return. N-17 and ambulance form completed. Message left for emergency contact to confirm plan. Awaiting return call.   Electronically signed by LIZ Lin on 12/31/2020 at 10:32 AM

## 2020-12-31 NOTE — DISCHARGE INSTR - COC
Continuity of Care Form    Patient Name: Josh Reaves   :  1940  MRN:  [id number]    Admit date:  2020  Discharge date:  21    Code Status Order: Full Code   Advance Directives:     Admitting Physician:  Will Rizvi MD  PCP: PETE Anthony CNP    Discharging Nurse: [facility] Unit/Room#: 9714/0099-A  Discharging Unit Phone Number: [phone]    Emergency Contact:   Extended Emergency Contact Information  Primary Emergency Contact: [address], [address] [facility] Phone: [phone]  Relation: Brother/Sister   needed? No    Past Surgical History:  History reviewed. No pertinent surgical history. Immunization History: There is no immunization history on file for this patient.     Active Problems:  Patient Active Problem List   Diagnosis Code    Myeloma ([facility] [address] C90.00    Essential hypertension, benign I10    Localized edema R60.0    Thrombocytopenia, unspecified ([facility] [address] D69.6    Pneumonia due to COVID-19 virus U07.1, J12.89    Elevated serum creatinine R79.89    Essential hypertension I10    Hypernatremia E87.0    Hypermagnesemia E83.41    Hypokalemia E87.6    Acute respiratory failure with hypoxia and hypercapnia (HCC) J96.01, J96.02       Isolation/Infection:   Isolation          Droplet Plus        Patient Infection Status     Infection Onset Added Last Indicated Last Indicated By Review Planned Expiration Resolved Resolved By    COVID-19 20 COVID-19 21      Resolved    COVID-19 Rule Out 20 Respiratory Panel, Molecular, with COVID-19 (Restricted: peds pts or suitable admitted adults) (Ordered)   20 Rule-Out Test Resulted          Nurse Assessment:  Last Vital Signs: /68   Pulse 58   Temp 98.2 °F (36.8 °C) (Oral)   Resp 18   SpO2 95%     Last documented pain score (0-10 scale): Pain Level: 5  Last Weight:   Wt Readings from Last 1 Encounters:   No data found for [employer] Mental Status:  oriented, alert, logical, thought processes intact and able to concentrate and follow conversation    IV Access:  - None    Nursing Mobility/ADLs:  Walking   Dependent  Transfer  Dependent  Bathing  Assisted  Dressing  Assisted  Toileting  Assisted  Feeding  Independent  Med Admin  Assisted  Med Delivery   crushed and prefers mixed with pudding    Wound Care Documentation and Therapy:  Wound 12/30/20 Buttocks Right small red open area (Active)   Dressing/Treatment Open to air 12/30/20 2315   Wound Length (cm) 1 cm 12/30/20 1438   Wound Width (cm) 0.5 cm 12/30/20 1438   Wound Depth (cm) 0.1 cm 12/30/20 1438   Wound Surface Area (cm^2) 0.5 cm^2 12/30/20 1438   Wound Volume (cm^3) 0.05 cm^3 12/30/20 1438   Wound Assessment Pink/red 12/30/20 1438   Drainage Amount None 12/30/20 1438   Number of days: 0        Elimination:  Continence:   · Bowel: Yes  · Bladder: No  Urinary Catheter: Removal Date 1/4/21   Colostomy/Ileostomy/Ileal Conduit: No       Date of Last BM: 1/4/21    Intake/Output Summary (Last 24 hours) at 12/31/2020 1033  Last data filed at 12/31/2020 0836  Gross per 24 hour   Intake 240 ml   Output 400 ml   Net -160 ml     I/O last 3 completed shifts:  In: -   Out: 400 [Urine:400]    Safety Concerns: At Risk for Falls    Impairments/Disabilities:      None    Nutrition Therapy:  Current Nutrition Therapy:   - Oral Diet:  Cardiac    Routes of Feeding: Oral  Liquids: No Restrictions  Daily Fluid Restriction: no  Last Modified Barium Swallow with Video (Video Swallowing Test): not done    Treatments at the Time of Hospital Discharge:   Respiratory Treatments: ***  Oxygen Therapy:  is on oxygen at 2 L/min per nasal cannula. Ventilator:    - No ventilator support    Rehab Therapies: Physical Therapy and Occupational Therapy  Weight Bearing Status/Restrictions: No weight bearing restirctions  Other Medical Equipment (for information only, NOT a DME order):     Other Treatments: ***    Patient's personal belongings (please select all that are sent with patient):  None, Glasses    RN SIGNATURE:  Electronically signed by Ren Schreiber RN on 1/4/21 at 4:13 PM EST    CASE MANAGEMENT/SOCIAL WORK SECTION    Inpatient Status Date: ***    Readmission Risk Assessment Score:  Readmission Risk              Risk of Unplanned Readmission:        17           Discharging to Facility/ Agency   · Name: Miguelito Beatty  · Address:[facility]  · Phone:[phone]  · Fax:[phone]    Dialysis Facility (if applicable)   · Name:  · Address:  · Dialysis Schedule:  · Phone:  · Fax:    / signature: Electronically signed by LIZ Spencer on 12/31/20 at 10:34 AM EST    PHYSICIAN SECTION    Prognosis: {Prognosis:9474424078}    Condition at Discharge: Stable    Rehab Potential (if transferring to Rehab): {Prognosis:9433500432}    Recommended Labs or Other Treatments After Discharge: ***    Physician Certification: I certify the above information and transfer of Tatiana Ramirez  is necessary for the continuing treatment of the diagnosis listed and that she requires Intermediate Nursing Care for greater 30 days.      Update Admission H&P: {CHP DME Changes in OHZRS:[id number]}    PHYSICIAN SIGNATURE:  Leeanna Hays MD 1/4/21 8219

## 2020-12-31 NOTE — PROGRESS NOTES
Physical Therapy    Facility/Department: [address] INTERMEDIATE  Initial Assessment    NAME: Sadia Alberts  : 1940  MRN: [id number]    Date of Service: 2020       REQUIRES PT FOLLOW UP: Yes       Patient Diagnosis(es): The primary encounter diagnosis was Acute respiratory failure due to COVID-19 [facility]). Diagnoses of Hypernatremia, Acute renal insufficiency, and Elevated INR were also pertinent to this visit. has a past medical history of Abnormality of plasma protein, Anemia, Asthma, Collapsed vertebra, not elsewhere classified, thoracic region, subsequent encounter for fracture with routine healing, Constipation, Dependence on supplemental oxygen, Difficulty in walking, GERD without esophagitis, Hypertension, Low back pain, Monoclonal gammopathy, Multiple myeloma not having achieved remission ([address] Other disorders of plasma-protein metabolism, not elsewhere classified, Secondary malignant neoplasm of bone ([facility] [address] Thrombocytopenia ([facility] [address] Unspecified fracture of first lumbar vertebra, subsequent encounter for fracture with routine healing, and Unspecified fracture of fourth lumbar vertebra, subsequent encounter for fracture with routine healing.   has no past surgical history on file. Evaluating Therapist: César Mock, PT     Referring Provider:  Dr. Sera Pagan #:  [id number]   DIAGNOSIS:  PNA , COVID     PRECAUTIONS: falls, O2@ 3 LNC, droplet plus     Social:  Pt lives at [facility]    Prior to admission : pt unable to report PLOF. Grossly O x 3 , but poor historian      Initial Evaluation  Date:  2020 Treatment      Short Term/ Long Term   Goals   Was pt agreeable to Eval/treatment? yes      Does pt have pain?  none reported, states she is itchy - R informed      Bed Mobility  Rolling:  Min assist   Supine to sit: min assist   Sit to supine: min assist   Scooting:  Max assist in supine    SBA/CGA    Transfers Sit to stand:   Mod assist   Stand to sit: mod assist   Stand pivot:  NT    CGA/min assist Ambulation     4-6 side steps to [facility]  with  ww  with  Min assist    50  feet with  ww  with  CGA    LE ROM  WFL      LE strength  AAROM WFL      AM- PAC RAW score  12/ 24            Pt is alert and Oriented x  3, grossly      Balance:  Min assist , high fall risk   Edema; B LEs   Endurance: decreased   Bed/Chair alarm: Yes      ASSESSMENT  Pt displays functional ability as noted in the objective portion of this evaluation. Treatment/Education:     Mobility as above. Cues for hand placement with transfers. Increased time required to perform all activity     Pt educated on fall risk,  Safe and proper technique with mobility        Patient response to education:   Pt verbalized understanding Pt demonstrated skill Pt requires further education in this area   x Needs cues   x       Comments:  Pt left  In bed after session, with call light in reach. Rehab potential is Good for reaching above PT goals. Pts/ family goals   1. None stated     Patient and or family understand(s) diagnosis, prognosis, and plan of care. -  Questionable    PLAN  PT care will be provided in accordance with the objectives noted above. Whenever appropriate, clear delegation orders will be provided for nursing staff. Exercises and functional mobility practice will be used as well as appropriate assistive devices or modalities to obtain goals. Patient and family education will also be administered as needed. PLAN OF CARE:    Current Treatment Recommendations     [x] Strengthening     [] ROM   [x] Balance Training   [x] Endurance Training   [x] Transfer Training   [x] Gait Training   [] Stair Training   [x] Positioning   [x] Safety and Education Training   [x] Patient/Caregiver Education   [] HEP  [] Other       Frequency of treatments will be 2-5x/week x 14 days.     Time in: 1050  Time out:  1113       Evaluation Time includes thorough review of current medical information, gathering information on past medical

## 2020-12-31 NOTE — PROGRESS NOTES
Wound care consulted for this Pt admitted with COVID - 19. History includes: Multiple Myeloma and HTN. The Pt has a small open area in the R inner buttock. Heels are intact. Recommend Calmoseptine topically and SOS precautions.

## 2021-01-01 ENCOUNTER — APPOINTMENT (OUTPATIENT)
Dept: GENERAL RADIOLOGY | Age: 81
DRG: 870 | End: 2021-01-01
Payer: COMMERCIAL

## 2021-01-01 ENCOUNTER — OUTSIDE SERVICES (OUTPATIENT)
Dept: FAMILY MEDICINE CLINIC | Age: 81
End: 2021-01-01
Payer: COMMERCIAL

## 2021-01-01 ENCOUNTER — APPOINTMENT (OUTPATIENT)
Dept: CT IMAGING | Age: 81
DRG: 870 | End: 2021-01-01
Payer: COMMERCIAL

## 2021-01-01 ENCOUNTER — ANESTHESIA EVENT (OUTPATIENT)
Dept: ICU | Age: 81
DRG: 870 | End: 2021-01-01
Payer: COMMERCIAL

## 2021-01-01 ENCOUNTER — APPOINTMENT (OUTPATIENT)
Dept: ULTRASOUND IMAGING | Age: 81
DRG: 870 | End: 2021-01-01
Payer: COMMERCIAL

## 2021-01-01 ENCOUNTER — HOSPITAL ENCOUNTER (INPATIENT)
Age: 81
LOS: 10 days | DRG: 870 | End: 2021-07-06
Attending: EMERGENCY MEDICINE | Admitting: HOSPITALIST
Payer: COMMERCIAL

## 2021-01-01 ENCOUNTER — OUTSIDE SERVICES (OUTPATIENT)
Dept: FAMILY MEDICINE CLINIC | Age: 81
End: 2021-01-01

## 2021-01-01 ENCOUNTER — ANESTHESIA (OUTPATIENT)
Dept: ICU | Age: 81
DRG: 870 | End: 2021-01-01
Payer: COMMERCIAL

## 2021-01-01 VITALS
HEART RATE: 55 BPM | SYSTOLIC BLOOD PRESSURE: 136 MMHG | TEMPERATURE: 97.9 F | RESPIRATION RATE: 18 BRPM | WEIGHT: 149.03 LBS | OXYGEN SATURATION: 94 % | DIASTOLIC BLOOD PRESSURE: 63 MMHG

## 2021-01-01 VITALS
OXYGEN SATURATION: 40 % | HEIGHT: 64 IN | SYSTOLIC BLOOD PRESSURE: 109 MMHG | TEMPERATURE: 97.2 F | BODY MASS INDEX: 29.85 KG/M2 | RESPIRATION RATE: 14 BRPM | HEART RATE: 101 BPM | WEIGHT: 174.82 LBS | DIASTOLIC BLOOD PRESSURE: 46 MMHG

## 2021-01-01 DIAGNOSIS — R06.02 SOB (SHORTNESS OF BREATH): ICD-10-CM

## 2021-01-01 DIAGNOSIS — R11.2 NAUSEA AND VOMITING, INTRACTABILITY OF VOMITING NOT SPECIFIED, UNSPECIFIED VOMITING TYPE: ICD-10-CM

## 2021-01-01 DIAGNOSIS — B37.89 YEAST PHARYNGITIS: ICD-10-CM

## 2021-01-01 DIAGNOSIS — Z91.14 NONCOMPLIANCE WITH MEDICATION REGIMEN: Primary | ICD-10-CM

## 2021-01-01 DIAGNOSIS — Z91.14 H/O MEDICATION NONCOMPLIANCE: ICD-10-CM

## 2021-01-01 DIAGNOSIS — I10 ESSENTIAL HYPERTENSION, BENIGN: ICD-10-CM

## 2021-01-01 DIAGNOSIS — R63.4 WEIGHT LOSS: Primary | ICD-10-CM

## 2021-01-01 DIAGNOSIS — S12.112A CLOSED NONDISPLACED ODONTOID FRACTURE WITH TYPE II MORPHOLOGY, INITIAL ENCOUNTER (HCC): ICD-10-CM

## 2021-01-01 DIAGNOSIS — D64.9 ANEMIA, UNSPECIFIED TYPE: ICD-10-CM

## 2021-01-01 DIAGNOSIS — C90.00 MULTIPLE MYELOMA NOT HAVING ACHIEVED REMISSION (HCC): ICD-10-CM

## 2021-01-01 DIAGNOSIS — J96.02 ACUTE RESPIRATORY FAILURE WITH HYPOXIA AND HYPERCAPNIA (HCC): ICD-10-CM

## 2021-01-01 DIAGNOSIS — K21.9 GASTROESOPHAGEAL REFLUX DISEASE WITHOUT ESOPHAGITIS: ICD-10-CM

## 2021-01-01 DIAGNOSIS — J02.8 YEAST PHARYNGITIS: ICD-10-CM
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
## 2021-01-01 DIAGNOSIS — R63.0 DECREASED APPETITE: ICD-10-CM

## 2021-01-01 DIAGNOSIS — M54.50 LUMBAR PAIN: ICD-10-CM

## 2021-01-01 DIAGNOSIS — J18.9 COMMUNITY ACQUIRED PNEUMONIA OF LEFT LUNG, UNSPECIFIED PART OF LUNG: ICD-10-CM

## 2021-01-01 DIAGNOSIS — D69.6 THROMBOCYTOPENIA, UNSPECIFIED (HCC): ICD-10-CM

## 2021-01-01 DIAGNOSIS — U07.1 COVID-19: Primary | ICD-10-CM

## 2021-01-01 DIAGNOSIS — J96.01 ACUTE RESPIRATORY FAILURE WITH HYPOXIA (HCC): Primary | ICD-10-CM

## 2021-01-01 DIAGNOSIS — C90.00 MULTIPLE MYELOMA NOT HAVING ACHIEVED REMISSION (HCC): Primary | ICD-10-CM

## 2021-01-01 DIAGNOSIS — R60.9 PERIPHERAL EDEMA: ICD-10-CM

## 2021-01-01 DIAGNOSIS — J96.01 ACUTE RESPIRATORY FAILURE WITH HYPOXIA AND HYPERCAPNIA (HCC): ICD-10-CM

## 2021-01-01 DIAGNOSIS — K59.00 CONSTIPATION, UNSPECIFIED CONSTIPATION TYPE: Primary | ICD-10-CM

## 2021-01-01 DIAGNOSIS — K59.00 CONSTIPATION, UNSPECIFIED CONSTIPATION TYPE: ICD-10-CM

## 2021-01-01 DIAGNOSIS — U07.1 COVID-19: ICD-10-CM

## 2021-01-01 LAB
(1,3)-BETA-D-GLUCAN (FUNGITELL) INTERPRETATION: NEGATIVE
(1,3)-BETA-D-GLUCAN (FUNGITELL): <31 PG/ML
AADO2: 165.3 MMHG
AADO2: 269.9 MMHG
AADO2: 301.2 MMHG
AADO2: 543.6 MMHG
AADO2: 550.3 MMHG
AADO2: 560.9 MMHG
AADO2: 561.4 MMHG
AADO2: 566.5 MMHG
AADO2: 576.9 MMHG
AADO2: 580.4 MMHG
AADO2: 589.2 MMHG
AADO2: 589.5 MMHG
AADO2: 597.5 MMHG
AADO2: 602 MMHG
AADO2: 606.5 MMHG
AADO2: 618.6 MMHG
ABO/RH: NORMAL
ABO/RH: NORMAL
ACANTHOCYTES: ABNORMAL
ACANTHOCYTES: ABNORMAL
ACINETOBACTER BAUMANNII BY PCR: NOT DETECTED
ADDENDUM ELECTROPHORESIS URINE RANDOM: NORMAL
AFB SMEAR: NORMAL
ALBUMIN SERPL-MCNC: 2 G/DL (ref 3.5–5.2)
ALBUMIN SERPL-MCNC: 2.1 G/DL (ref 3.5–5.2)
ALBUMIN SERPL-MCNC: 2.3 G/DL (ref 3.5–5.2)
ALBUMIN SERPL-MCNC: 2.4 G/DL (ref 3.5–4.7)
ALBUMIN SERPL-MCNC: 2.4 G/DL (ref 3.5–5.2)
ALBUMIN SERPL-MCNC: 2.6 G/DL (ref 3.5–5.2)
ALBUMIN SERPL-MCNC: 2.8 G/DL (ref 3.5–5.2)
ALBUMIN SERPL-MCNC: 2.8 G/DL (ref 3.5–5.2)
ALBUMIN SERPL-MCNC: 2.9 G/DL (ref 3.5–5.2)
ALBUMIN SERPL-MCNC: 2.9 G/DL (ref 3.5–5.2)
ALBUMIN SERPL-MCNC: 3 G/DL (ref 3.5–5.2)
ALBUMIN SERPL-MCNC: 3.1 G/DL (ref 3.5–5.2)
ALBUMIN SERPL-MCNC: 3.2 G/DL (ref 3.5–5.2)
ALBUMIN SERPL-MCNC: 3.5 G/DL (ref 3.5–5.2)
ALP BLD-CCNC: 100 U/L (ref 35–104)
ALP BLD-CCNC: 103 U/L (ref 35–104)
ALP BLD-CCNC: 116 U/L (ref 35–104)
ALP BLD-CCNC: 38 U/L (ref 35–104)
ALP BLD-CCNC: 44 U/L (ref 35–104)
ALP BLD-CCNC: 48 U/L (ref 35–104)
ALP BLD-CCNC: 53 U/L (ref 35–104)
ALP BLD-CCNC: 58 U/L (ref 35–104)
ALP BLD-CCNC: 58 U/L (ref 35–104)
ALP BLD-CCNC: 67 U/L (ref 35–104)
ALP BLD-CCNC: 80 U/L (ref 35–104)
ALP BLD-CCNC: 85 U/L (ref 35–104)
ALP BLD-CCNC: 91 U/L (ref 35–104)
ALP BLD-CCNC: 92 U/L (ref 35–104)
ALP BLD-CCNC: 93 U/L (ref 35–104)
ALP BLD-CCNC: 94 U/L (ref 35–104)
ALPHA-1-GLOBULIN: 0.6 G/DL (ref 0.2–0.4)
ALPHA-2-GLOBULIN: 1.1 G/FL (ref 0.5–1)
ALT SERPL-CCNC: 121 U/L (ref 0–32)
ALT SERPL-CCNC: 17 U/L (ref 0–32)
ALT SERPL-CCNC: 171 U/L (ref 0–32)
ALT SERPL-CCNC: 18 U/L (ref 0–32)
ALT SERPL-CCNC: 21 U/L (ref 0–32)
ALT SERPL-CCNC: 22 U/L (ref 0–32)
ALT SERPL-CCNC: 23 U/L (ref 0–32)
ALT SERPL-CCNC: 25 U/L (ref 0–32)
ALT SERPL-CCNC: 25 U/L (ref 0–32)
ALT SERPL-CCNC: 279 U/L (ref 0–32)
ALT SERPL-CCNC: 553 U/L (ref 0–32)
ALT SERPL-CCNC: 661 U/L (ref 0–32)
ALT SERPL-CCNC: 785 U/L (ref 0–32)
ALT SERPL-CCNC: 80 U/L (ref 0–32)
ALT SERPL-CCNC: 89 U/L (ref 0–32)
ALT SERPL-CCNC: 99 U/L (ref 0–32)
AMMONIA: 36 UMOL/L (ref 11–51)
ANION GAP SERPL CALCULATED.3IONS-SCNC: 10 MMOL/L (ref 7–16)
ANION GAP SERPL CALCULATED.3IONS-SCNC: 11 MMOL/L (ref 7–16)
ANION GAP SERPL CALCULATED.3IONS-SCNC: 13 MMOL/L (ref 7–16)
ANION GAP SERPL CALCULATED.3IONS-SCNC: 13 MMOL/L (ref 7–16)
ANION GAP SERPL CALCULATED.3IONS-SCNC: 14 MMOL/L (ref 7–16)
ANION GAP SERPL CALCULATED.3IONS-SCNC: 15 MMOL/L (ref 7–16)
ANION GAP SERPL CALCULATED.3IONS-SCNC: 16 MMOL/L (ref 7–16)
ANION GAP SERPL CALCULATED.3IONS-SCNC: 17 MMOL/L (ref 7–16)
ANION GAP SERPL CALCULATED.3IONS-SCNC: 18 MMOL/L (ref 7–16)
ANION GAP SERPL CALCULATED.3IONS-SCNC: 19 MMOL/L (ref 7–16)
ANION GAP SERPL CALCULATED.3IONS-SCNC: 20 MMOL/L (ref 7–16)
ANION GAP SERPL CALCULATED.3IONS-SCNC: 21 MMOL/L (ref 7–16)
ANION GAP SERPL CALCULATED.3IONS-SCNC: 21 MMOL/L (ref 7–16)
ANION GAP SERPL CALCULATED.3IONS-SCNC: 7 MMOL/L (ref 7–16)
ANION GAP SERPL CALCULATED.3IONS-SCNC: 7 MMOL/L (ref 7–16)
ANION GAP SERPL CALCULATED.3IONS-SCNC: 8 MMOL/L (ref 7–16)
ANISOCYTOSIS: ABNORMAL
ANTIBODY IDENTIFICATION: NORMAL
ANTIBODY SCREEN: NORMAL
ANTIBODY SCREEN: NORMAL
APPEARANCE FLUID: NORMAL
APTT: 25.3 SEC (ref 24.5–35.1)
APTT: 26.1 SEC (ref 24.5–35.1)
APTT: 27 SEC (ref 24.5–35.1)
APTT: 27.7 SEC (ref 24.5–35.1)
APTT: 28.1 SEC (ref 24.5–35.1)
APTT: 28.9 SEC (ref 24.5–35.1)
APTT: 31 SEC (ref 24.5–35.1)
APTT: 45.7 SEC (ref 24.5–35.1)
APTT: 47.3 SEC (ref 24.5–35.1)
APTT: 53 SEC (ref 24.5–35.1)
APTT: 53 SEC (ref 24.5–35.1)
APTT: 54.2 SEC (ref 24.5–35.1)
APTT: 63 SEC (ref 24.5–35.1)
AST SERPL-CCNC: 10 U/L (ref 0–31)
AST SERPL-CCNC: 14 U/L (ref 0–31)
AST SERPL-CCNC: 1530 U/L (ref 0–31)
AST SERPL-CCNC: 16 U/L (ref 0–31)
AST SERPL-CCNC: 17 U/L (ref 0–31)
AST SERPL-CCNC: 20 U/L (ref 0–31)
AST SERPL-CCNC: 21 U/L (ref 0–31)
AST SERPL-CCNC: 31 U/L (ref 0–31)
AST SERPL-CCNC: 32 U/L (ref 0–31)
AST SERPL-CCNC: 36 U/L (ref 0–31)
AST SERPL-CCNC: 401 U/L (ref 0–31)
AST SERPL-CCNC: 46 U/L (ref 0–31)
AST SERPL-CCNC: 51 U/L (ref 0–31)
AST SERPL-CCNC: 63 U/L (ref 0–31)
AST SERPL-CCNC: 65 U/L (ref 0–31)
AST SERPL-CCNC: 988 U/L (ref 0–31)
B.E.: -0.3 MMOL/L (ref -3–3)
B.E.: -0.5 MMOL/L
B.E.: -0.5 MMOL/L (ref -3–3)
B.E.: -0.8 MMOL/L (ref -3–0)
B.E.: -1.5 MMOL/L (ref -3–3)
B.E.: -10.3 MMOL/L (ref -3–3)
B.E.: -10.9 MMOL/L (ref -3–3)
B.E.: -12.7 MMOL/L (ref -3–3)
B.E.: -2.1 MMOL/L (ref -3–3)
B.E.: -2.3 MMOL/L (ref -3–3)
B.E.: -3.6 MMOL/L (ref -3–3)
B.E.: -4 MMOL/L (ref -3–3)
B.E.: -5 MMOL/L (ref -3–3)
B.E.: -7.5 MMOL/L (ref -3–3)
B.E.: -7.6 MMOL/L (ref -3–3)
B.E.: -8.4 MMOL/L (ref -3–3)
B.E.: 0.7 MMOL/L (ref -3–3)
B.E.: 1.1 MMOL/L (ref -3–3)
BACTERIA: ABNORMAL /HPF
BASO FLUID: 0 %
BASOPHILIC STIPPLING: ABNORMAL
BASOPHILS ABSOLUTE: 0 E9/L (ref 0–0.2)
BASOPHILS ABSOLUTE: 0.01 E9/L (ref 0–0.2)
BASOPHILS ABSOLUTE: 0.02 E9/L (ref 0–0.2)
BASOPHILS ABSOLUTE: 0.02 E9/L (ref 0–0.2)
BASOPHILS ABSOLUTE: 0.03 E9/L (ref 0–0.2)
BASOPHILS ABSOLUTE: 0.03 E9/L (ref 0–0.2)
BASOPHILS RELATIVE PERCENT: 0 % (ref 0–2)
BASOPHILS RELATIVE PERCENT: 0.1 % (ref 0–2)
BASOPHILS RELATIVE PERCENT: 0.2 % (ref 0–2)
BASOPHILS RELATIVE PERCENT: 0.3 % (ref 0–2)
BASOPHILS RELATIVE PERCENT: 0.3 % (ref 0–2)
BASOPHILS RELATIVE PERCENT: 0.6 % (ref 0–2)
BASOPHILS RELATIVE PERCENT: 0.7 % (ref 0–2)
BASOPHILS RELATIVE PERCENT: 0.9 % (ref 0–2)
BASOPHILS RELATIVE PERCENT: 1.1 % (ref 0–2)
BASOPHILS RELATIVE PERCENT: 1.3 % (ref 0–2)
BETA GLOBULIN: 0.6 G/DL (ref 0.8–1.3)
BETA-HYDROXYBUTYRATE: 0.21 MMOL/L (ref 0.02–0.27)
BILIRUB SERPL-MCNC: 0.2 MG/DL (ref 0–1.2)
BILIRUB SERPL-MCNC: 0.4 MG/DL (ref 0–1.2)
BILIRUB SERPL-MCNC: 0.4 MG/DL (ref 0–1.2)
BILIRUB SERPL-MCNC: 0.5 MG/DL (ref 0–1.2)
BILIRUB SERPL-MCNC: 0.7 MG/DL (ref 0–1.2)
BILIRUB SERPL-MCNC: 1.2 MG/DL (ref 0–1.2)
BILIRUB SERPL-MCNC: 1.9 MG/DL (ref 0–1.2)
BILIRUB SERPL-MCNC: 2.5 MG/DL (ref 0–1.2)
BILIRUB SERPL-MCNC: 2.5 MG/DL (ref 0–1.2)
BILIRUB SERPL-MCNC: 4 MG/DL (ref 0–1.2)
BILIRUB SERPL-MCNC: 4.8 MG/DL (ref 0–1.2)
BILIRUB SERPL-MCNC: 4.8 MG/DL (ref 0–1.2)
BILIRUB SERPL-MCNC: 6.2 MG/DL (ref 0–1.2)
BILIRUB SERPL-MCNC: <0.2 MG/DL (ref 0–1.2)
BILIRUBIN DIRECT: 0.3 MG/DL (ref 0–0.3)
BILIRUBIN DIRECT: 0.4 MG/DL (ref 0–0.3)
BILIRUBIN DIRECT: 0.8 MG/DL (ref 0–0.3)
BILIRUBIN DIRECT: 1.3 MG/DL (ref 0–0.3)
BILIRUBIN DIRECT: 1.8 MG/DL (ref 0–0.3)
BILIRUBIN DIRECT: 1.9 MG/DL (ref 0–0.3)
BILIRUBIN DIRECT: 3.3 MG/DL (ref 0–0.3)
BILIRUBIN DIRECT: 3.9 MG/DL (ref 0–0.3)
BILIRUBIN DIRECT: 4.1 MG/DL (ref 0–0.3)
BILIRUBIN DIRECT: 5.5 MG/DL (ref 0–0.3)
BILIRUBIN URINE: NEGATIVE
BILIRUBIN, INDIRECT: 0.1 MG/DL (ref 0–1)
BILIRUBIN, INDIRECT: 0.1 MG/DL (ref 0–1)
BILIRUBIN, INDIRECT: 0.4 MG/DL (ref 0–1)
BILIRUBIN, INDIRECT: 0.6 MG/DL (ref 0–1)
BILIRUBIN, INDIRECT: 0.6 MG/DL (ref 0–1)
BILIRUBIN, INDIRECT: 0.7 MG/DL (ref 0–1)
BILIRUBIN, INDIRECT: 0.9 MG/DL (ref 0–1)
BLASTS RELATIVE PERCENT: 0.9 % (ref 0–0)
BLOOD BANK DISPENSE STATUS: NORMAL
BLOOD BANK PRODUCT CODE: NORMAL
BLOOD CULTURE, ROUTINE: ABNORMAL
BLOOD CULTURE, ROUTINE: ABNORMAL
BLOOD CULTURE, ROUTINE: NORMAL
BLOOD, URINE: ABNORMAL
BOTTLE TYPE: ABNORMAL
BPU ID: NORMAL
BUN BLDV-MCNC: 101 MG/DL (ref 6–23)
BUN BLDV-MCNC: 101 MG/DL (ref 6–23)
BUN BLDV-MCNC: 104 MG/DL (ref 6–23)
BUN BLDV-MCNC: 107 MG/DL (ref 6–23)
BUN BLDV-MCNC: 109 MG/DL (ref 6–23)
BUN BLDV-MCNC: 110 MG/DL (ref 6–23)
BUN BLDV-MCNC: 112 MG/DL (ref 6–23)
BUN BLDV-MCNC: 33 MG/DL (ref 6–23)
BUN BLDV-MCNC: 34 MG/DL (ref 6–23)
BUN BLDV-MCNC: 36 MG/DL (ref 6–23)
BUN BLDV-MCNC: 40 MG/DL (ref 6–23)
BUN BLDV-MCNC: 40 MG/DL (ref 6–23)
BUN BLDV-MCNC: 42 MG/DL (ref 6–23)
BUN BLDV-MCNC: 48 MG/DL (ref 8–23)
BUN BLDV-MCNC: 49 MG/DL (ref 8–23)
BUN BLDV-MCNC: 50 MG/DL (ref 8–23)
BUN BLDV-MCNC: 51 MG/DL (ref 6–23)
BUN BLDV-MCNC: 52 MG/DL (ref 6–23)
BUN BLDV-MCNC: 52 MG/DL (ref 6–23)
BUN BLDV-MCNC: 57 MG/DL (ref 6–23)
BUN BLDV-MCNC: 59 MG/DL (ref 6–23)
BUN BLDV-MCNC: 59 MG/DL (ref 6–23)
BUN BLDV-MCNC: 60 MG/DL (ref 6–23)
BUN BLDV-MCNC: 61 MG/DL (ref 6–23)
BUN BLDV-MCNC: 61 MG/DL (ref 8–23)
BUN BLDV-MCNC: 62 MG/DL (ref 6–23)
BUN BLDV-MCNC: 67 MG/DL (ref 6–23)
BUN BLDV-MCNC: 69 MG/DL (ref 6–23)
BUN BLDV-MCNC: 71 MG/DL (ref 6–23)
BUN BLDV-MCNC: 71 MG/DL (ref 6–23)
BUN BLDV-MCNC: 76 MG/DL (ref 6–23)
BUN BLDV-MCNC: 78 MG/DL (ref 6–23)
BUN BLDV-MCNC: 80 MG/DL (ref 6–23)
BUN BLDV-MCNC: 81 MG/DL (ref 6–23)
BUN BLDV-MCNC: 86 MG/DL (ref 6–23)
BUN BLDV-MCNC: 86 MG/DL (ref 6–23)
BUN BLDV-MCNC: 88 MG/DL (ref 6–23)
BUN BLDV-MCNC: 89 MG/DL (ref 6–23)
BUN BLDV-MCNC: 89 MG/DL (ref 6–23)
BUN BLDV-MCNC: 93 MG/DL (ref 6–23)
BUN BLDV-MCNC: 97 MG/DL (ref 6–23)
BUN BLDV-MCNC: 98 MG/DL (ref 6–23)
BUN BLDV-MCNC: 99 MG/DL (ref 6–23)
BURR CELLS: ABNORMAL
C ANTIGEN: NORMAL
C-REACTIVE PROTEIN: 49.3 MG/DL (ref 0–0.4)
C-REACTIVE PROTEIN: 67 MG/DL (ref 0–0.4)
CALCIUM IONIZED: 0.91 MMOL/L (ref 1.15–1.33)
CALCIUM IONIZED: 1.05 MMOL/L (ref 1.15–1.33)
CALCIUM IONIZED: 1.07 MMOL/L (ref 1.15–1.33)
CALCIUM IONIZED: 1.12 MMOL/L (ref 1.15–1.33)
CALCIUM IONIZED: 1.14 MMOL/L (ref 1.15–1.33)
CALCIUM IONIZED: 1.14 MMOL/L (ref 1.15–1.33)
CALCIUM IONIZED: 1.2 MMOL/L (ref 1.15–1.33)
CALCIUM SERPL-MCNC: 7.1 MG/DL (ref 8.6–10.2)
CALCIUM SERPL-MCNC: 7.2 MG/DL (ref 8.6–10.2)
CALCIUM SERPL-MCNC: 7.5 MG/DL (ref 8.6–10.2)
CALCIUM SERPL-MCNC: 7.5 MG/DL (ref 8.6–10.2)
CALCIUM SERPL-MCNC: 7.6 MG/DL (ref 8.6–10.2)
CALCIUM SERPL-MCNC: 7.7 MG/DL (ref 8.6–10.2)
CALCIUM SERPL-MCNC: 7.8 MG/DL (ref 8.6–10.2)
CALCIUM SERPL-MCNC: 7.9 MG/DL (ref 8.6–10.2)
CALCIUM SERPL-MCNC: 7.9 MG/DL (ref 8.6–10.2)
CALCIUM SERPL-MCNC: 8.1 MG/DL (ref 8.6–10.2)
CALCIUM SERPL-MCNC: 8.2 MG/DL (ref 8.6–10.2)
CALCIUM SERPL-MCNC: 8.3 MG/DL (ref 8.6–10.2)
CALCIUM SERPL-MCNC: 8.4 MG/DL (ref 8.6–10.2)
CALCIUM SERPL-MCNC: 8.5 MG/DL (ref 8.6–10.2)
CALCIUM SERPL-MCNC: 8.6 MG/DL (ref 8.6–10.2)
CALCIUM SERPL-MCNC: 8.6 MG/DL (ref 8.6–10.2)
CALCIUM SERPL-MCNC: 8.7 MG/DL (ref 8.6–10.2)
CALCIUM SERPL-MCNC: 8.8 MG/DL (ref 8.6–10.2)
CALCIUM SERPL-MCNC: 9.7 MG/DL (ref 8.6–10.2)
CALCIUM SERPL-MCNC: 9.8 MG/DL (ref 8.6–10.2)
CANDIDA ALBICANS BY PCR: NOT DETECTED
CANDIDA GLABRATA BY PCR: NOT DETECTED
CANDIDA KRUSEI BY PCR: NOT DETECTED
CANDIDA PARAPSILOSIS BY PCR: NOT DETECTED
CANDIDA TROPICALIS BY PCR: NOT DETECTED
CELL COUNT FLUID TYPE: NORMAL
CHLORIDE BLD-SCNC: 100 MMOL/L (ref 98–107)
CHLORIDE BLD-SCNC: 100 MMOL/L (ref 98–107)
CHLORIDE BLD-SCNC: 101 MMOL/L (ref 98–107)
CHLORIDE BLD-SCNC: 102 MMOL/L (ref 98–107)
CHLORIDE BLD-SCNC: 104 MMOL/L (ref 98–107)
CHLORIDE BLD-SCNC: 106 MMOL/L (ref 98–107)
CHLORIDE BLD-SCNC: 107 MMOL/L (ref 98–107)
CHLORIDE BLD-SCNC: 108 MMOL/L (ref 98–107)
CHLORIDE BLD-SCNC: 108 MMOL/L (ref 98–107)
CHLORIDE BLD-SCNC: 110 MMOL/L (ref 98–107)
CHLORIDE BLD-SCNC: 110 MMOL/L (ref 98–107)
CHLORIDE BLD-SCNC: 111 MMOL/L (ref 98–107)
CHLORIDE BLD-SCNC: 112 MMOL/L (ref 98–107)
CHLORIDE BLD-SCNC: 93 MMOL/L (ref 98–107)
CHLORIDE BLD-SCNC: 94 MMOL/L (ref 98–107)
CHLORIDE BLD-SCNC: 95 MMOL/L (ref 98–107)
CHLORIDE BLD-SCNC: 96 MMOL/L (ref 98–107)
CHLORIDE BLD-SCNC: 97 MMOL/L (ref 98–107)
CHLORIDE BLD-SCNC: 98 MMOL/L (ref 98–107)
CHLORIDE BLD-SCNC: 99 MMOL/L (ref 98–107)
CHLORIDE URINE RANDOM: 23 MMOL/L
CHLORIDE URINE RANDOM: 70 MMOL/L
CK MB: 1.6 NG/ML (ref 0–4.3)
CLARITY: ABNORMAL
CO2: 15 MMOL/L (ref 22–29)
CO2: 17 MMOL/L (ref 22–29)
CO2: 19 MMOL/L (ref 22–29)
CO2: 19 MMOL/L (ref 22–29)
CO2: 20 MMOL/L (ref 22–29)
CO2: 21 MMOL/L (ref 22–29)
CO2: 22 MMOL/L (ref 22–29)
CO2: 23 MMOL/L (ref 22–29)
CO2: 24 MMOL/L (ref 22–29)
CO2: 25 MMOL/L (ref 22–29)
CO2: 26 MMOL/L (ref 22–29)
CO2: 26 MMOL/L (ref 22–29)
CO2: 27 MMOL/L (ref 22–29)
CO2: 27 MMOL/L (ref 22–29)
CO2: 28 MMOL/L (ref 22–29)
COARSE CASTS, UA: ABNORMAL /LPF (ref 0–2)
COHB: 0 % (ref 0–1.5)
COHB: 0.1 % (ref 0–1.5)
COHB: 0.2 % (ref 0–1.5)
COHB: 0.3 % (ref 0–1.5)
COHB: 0.5 % (ref 0–1.5)
COLOR FLUID: NORMAL
COLOR: YELLOW
CORTISOL TOTAL: 29.35 MCG/DL (ref 2.68–18.4)
CREAT SERPL-MCNC: 1.1 MG/DL (ref 0.5–1)
CREAT SERPL-MCNC: 1.1 MG/DL (ref 0.5–1)
CREAT SERPL-MCNC: 1.2 MG/DL (ref 0.5–1)
CREAT SERPL-MCNC: 1.2 MG/DL (ref 0.5–1)
CREAT SERPL-MCNC: 1.3 MG/DL (ref 0.5–1)
CREAT SERPL-MCNC: 1.3 MG/DL (ref 0.5–1)
CREAT SERPL-MCNC: 1.4 MG/DL (ref 0.5–1)
CREAT SERPL-MCNC: 1.7 MG/DL (ref 0.5–1)
CREAT SERPL-MCNC: 1.8 MG/DL (ref 0.5–1)
CREAT SERPL-MCNC: 1.9 MG/DL (ref 0.5–1)
CREAT SERPL-MCNC: 1.9 MG/DL (ref 0.5–1)
CREAT SERPL-MCNC: 2 MG/DL (ref 0.5–1)
CREAT SERPL-MCNC: 2 MG/DL (ref 0.5–1)
CREAT SERPL-MCNC: 2.2 MG/DL (ref 0.5–1)
CREAT SERPL-MCNC: 2.3 MG/DL (ref 0.5–1)
CREAT SERPL-MCNC: 2.3 MG/DL (ref 0.5–1)
CREAT SERPL-MCNC: 2.5 MG/DL (ref 0.5–1)
CREAT SERPL-MCNC: 2.6 MG/DL (ref 0.5–1)
CREAT SERPL-MCNC: 2.7 MG/DL (ref 0.5–1)
CREAT SERPL-MCNC: 2.7 MG/DL (ref 0.5–1)
CREAT SERPL-MCNC: 2.9 MG/DL (ref 0.5–1)
CREAT SERPL-MCNC: 2.9 MG/DL (ref 0.5–1)
CREAT SERPL-MCNC: 3.1 MG/DL (ref 0.5–1)
CREAT SERPL-MCNC: 3.3 MG/DL (ref 0.5–1)
CREAT SERPL-MCNC: 3.3 MG/DL (ref 0.5–1)
CREAT SERPL-MCNC: 3.4 MG/DL (ref 0.5–1)
CREAT SERPL-MCNC: 3.5 MG/DL (ref 0.5–1)
CREAT SERPL-MCNC: 3.5 MG/DL (ref 0.5–1)
CREAT SERPL-MCNC: 3.7 MG/DL (ref 0.5–1)
CREAT SERPL-MCNC: 3.8 MG/DL (ref 0.5–1)
CREAT SERPL-MCNC: 3.9 MG/DL (ref 0.5–1)
CREAT SERPL-MCNC: 4 MG/DL (ref 0.5–1)
CREATININE URINE: 23 MG/DL (ref 29–226)
CREATININE URINE: 92 MG/DL (ref 29–226)
CRITICAL: ABNORMAL
CULTURE, BLOOD 2: ABNORMAL
CULTURE, BLOOD 2: NORMAL
CULTURE, RESPIRATORY: NORMAL
CULTURE, RESPIRATORY: NORMAL
D DIMER: 1877 NG/ML DDU
D DIMER: 2468 NG/ML DDU
DAT POLYSPECIFIC: NORMAL
DAT POLYSPECIFIC: NORMAL
DATE ANALYZED: ABNORMAL
DATE OF COLLECTION: ABNORMAL
DELIVERY SYSTEMS: NORMAL
DESCRIPTION BLOOD BANK: NORMAL
DEVICE: NORMAL
DIGOXIN LEVEL: 0.9 NG/ML (ref 0.8–2)
DIGOXIN LEVEL: 1.9 NG/ML (ref 0.8–2)
DOHLE BODIES: ABNORMAL
DR. NOTIFY: NORMAL
DR. NOTIFY: NORMAL
EKG ATRIAL RATE: 109 BPM
EKG ATRIAL RATE: 153 BPM
EKG ATRIAL RATE: 159 BPM
EKG ATRIAL RATE: 258 BPM
EKG ATRIAL RATE: 72 BPM
EKG ATRIAL RATE: 88 BPM
EKG ATRIAL RATE: 94 BPM
EKG P AXIS: 61 DEGREES
EKG P AXIS: 73 DEGREES
EKG P-R INTERVAL: 164 MS
EKG P-R INTERVAL: 166 MS
EKG Q-T INTERVAL: 320 MS
EKG Q-T INTERVAL: 374 MS
EKG Q-T INTERVAL: 380 MS
EKG Q-T INTERVAL: 398 MS
EKG Q-T INTERVAL: 400 MS
EKG Q-T INTERVAL: 426 MS
EKG Q-T INTERVAL: 492 MS
EKG QRS DURATION: 110 MS
EKG QRS DURATION: 164 MS
EKG QRS DURATION: 166 MS
EKG QRS DURATION: 168 MS
EKG QRS DURATION: 170 MS
EKG QRS DURATION: 170 MS
EKG QRS DURATION: 176 MS
EKG QTC CALCULATION (BAZETT): 435 MS
EKG QTC CALCULATION (BAZETT): 497 MS
EKG QTC CALCULATION (BAZETT): 510 MS
EKG QTC CALCULATION (BAZETT): 511 MS
EKG QTC CALCULATION (BAZETT): 541 MS
EKG QTC CALCULATION (BAZETT): 546 MS
EKG QTC CALCULATION (BAZETT): 550 MS
EKG R AXIS: -16 DEGREES
EKG R AXIS: -23 DEGREES
EKG R AXIS: -25 DEGREES
EKG R AXIS: -26 DEGREES
EKG R AXIS: -41 DEGREES
EKG R AXIS: -43 DEGREES
EKG R AXIS: 56 DEGREES
EKG T AXIS: -34 DEGREES
EKG T AXIS: 125 DEGREES
EKG T AXIS: 137 DEGREES
EKG T AXIS: 142 DEGREES
EKG T AXIS: 142 DEGREES
EKG T AXIS: 146 DEGREES
EKG T AXIS: 147 DEGREES
EKG VENTRICULAR RATE: 109 BPM
EKG VENTRICULAR RATE: 111 BPM
EKG VENTRICULAR RATE: 112 BPM
EKG VENTRICULAR RATE: 130 BPM
EKG VENTRICULAR RATE: 153 BPM
EKG VENTRICULAR RATE: 47 BPM
EKG VENTRICULAR RATE: 82 BPM
ELECTROPHORESIS: ABNORMAL
ENTEROBACTER CLOACAE COMPLEX BY PCR: NOT DETECTED
ENTEROBACTERALES BY PCR: NOT DETECTED
ENTEROCOCCUS BY PCR: NOT DETECTED
EOSINOPHIL FLUID: 0 %
EOSINOPHILS ABSOLUTE: 0 E9/L (ref 0.05–0.5)
EOSINOPHILS ABSOLUTE: 0.01 E9/L (ref 0.05–0.5)
EOSINOPHILS ABSOLUTE: 0.01 E9/L (ref 0.05–0.5)
EOSINOPHILS ABSOLUTE: 0.06 E9/L (ref 0.05–0.5)
EOSINOPHILS ABSOLUTE: 0.24 E9/L (ref 0.05–0.5)
EOSINOPHILS RELATIVE PERCENT: 0 % (ref 0–6)
EOSINOPHILS RELATIVE PERCENT: 0.1 % (ref 0–6)
EOSINOPHILS RELATIVE PERCENT: 0.2 % (ref 0–6)
EOSINOPHILS RELATIVE PERCENT: 0.3 % (ref 0–6)
EOSINOPHILS RELATIVE PERCENT: 1.6 % (ref 0–6)
EOSINOPHILS RELATIVE PERCENT: 7.9 % (ref 0–6)
ESCHERICHIA COLI BY PCR: NOT DETECTED
FIBRINOGEN: >700 MG/DL (ref 225–540)
FIO2 ARTERIAL: 100
FIO2: 100 %
FIO2: 40 %
FIO2: 60 %
FIO2: 60 %
FIO2: 95 %
FOLATE: >20 NG/ML (ref 4.8–24.2)
GAMMA GLOBULIN: 0.3 G/DL (ref 0.7–1.6)
GFR AFRICAN AMERICAN: 13
GFR AFRICAN AMERICAN: 14
GFR AFRICAN AMERICAN: 14
GFR AFRICAN AMERICAN: 15
GFR AFRICAN AMERICAN: 15
GFR AFRICAN AMERICAN: 16
GFR AFRICAN AMERICAN: 17
GFR AFRICAN AMERICAN: 19
GFR AFRICAN AMERICAN: 19
GFR AFRICAN AMERICAN: 20
GFR AFRICAN AMERICAN: 20
GFR AFRICAN AMERICAN: 21
GFR AFRICAN AMERICAN: 22
GFR AFRICAN AMERICAN: 25
GFR AFRICAN AMERICAN: 25
GFR AFRICAN AMERICAN: 26
GFR AFRICAN AMERICAN: 29
GFR AFRICAN AMERICAN: 29
GFR AFRICAN AMERICAN: 31
GFR AFRICAN AMERICAN: 31
GFR AFRICAN AMERICAN: 33
GFR AFRICAN AMERICAN: 35
GFR AFRICAN AMERICAN: 43
GFR AFRICAN AMERICAN: 43
GFR AFRICAN AMERICAN: 44
GFR AFRICAN AMERICAN: 47
GFR AFRICAN AMERICAN: 47
GFR AFRICAN AMERICAN: 52
GFR AFRICAN AMERICAN: 52
GFR AFRICAN AMERICAN: 57
GFR AFRICAN AMERICAN: 57
GFR NON-AFRICAN AMERICAN: 11 ML/MIN/1.73
GFR NON-AFRICAN AMERICAN: 12 ML/MIN/1.73
GFR NON-AFRICAN AMERICAN: 13 ML/MIN/1.73
GFR NON-AFRICAN AMERICAN: 14 ML/MIN/1.73
GFR NON-AFRICAN AMERICAN: 15 ML/MIN/1.73
GFR NON-AFRICAN AMERICAN: 15 ML/MIN/1.73
GFR NON-AFRICAN AMERICAN: 17 ML/MIN/1.73
GFR NON-AFRICAN AMERICAN: 17 ML/MIN/1.73
GFR NON-AFRICAN AMERICAN: 18 ML/MIN/1.73
GFR NON-AFRICAN AMERICAN: 20 ML/MIN/1.73
GFR NON-AFRICAN AMERICAN: 20 ML/MIN/1.73
GFR NON-AFRICAN AMERICAN: 21 ML/MIN/1.73
GFR NON-AFRICAN AMERICAN: 24 ML/MIN/1.73
GFR NON-AFRICAN AMERICAN: 24 ML/MIN/1.73
GFR NON-AFRICAN AMERICAN: 25 ML/MIN/1.73
GFR NON-AFRICAN AMERICAN: 25 ML/MIN/1.73
GFR NON-AFRICAN AMERICAN: 27 ML/MIN/1.73
GFR NON-AFRICAN AMERICAN: 29 ML/MIN/1.73
GFR NON-AFRICAN AMERICAN: 36 ML/MIN/1.73
GFR NON-AFRICAN AMERICAN: 39 ML/MIN/1.73
GFR NON-AFRICAN AMERICAN: 39 ML/MIN/1.73
GFR NON-AFRICAN AMERICAN: 43 ML/MIN/1.73
GFR NON-AFRICAN AMERICAN: 43 ML/MIN/1.73
GFR NON-AFRICAN AMERICAN: 47 ML/MIN/1.73
GFR NON-AFRICAN AMERICAN: 47 ML/MIN/1.73
GLUCOSE BLD-MCNC: 100 MG/DL (ref 74–99)
GLUCOSE BLD-MCNC: 103 MG/DL (ref 74–99)
GLUCOSE BLD-MCNC: 120 MG/DL (ref 74–99)
GLUCOSE BLD-MCNC: 123 MG/DL (ref 74–99)
GLUCOSE BLD-MCNC: 125 MG/DL (ref 74–99)
GLUCOSE BLD-MCNC: 138 MG/DL (ref 74–99)
GLUCOSE BLD-MCNC: 141 MG/DL (ref 74–99)
GLUCOSE BLD-MCNC: 144 MG/DL (ref 74–99)
GLUCOSE BLD-MCNC: 145 MG/DL (ref 74–99)
GLUCOSE BLD-MCNC: 145 MG/DL (ref 74–99)
GLUCOSE BLD-MCNC: 147 MG/DL (ref 74–99)
GLUCOSE BLD-MCNC: 151 MG/DL (ref 74–99)
GLUCOSE BLD-MCNC: 159 MG/DL (ref 74–99)
GLUCOSE BLD-MCNC: 160 MG/DL (ref 74–99)
GLUCOSE BLD-MCNC: 162 MG/DL (ref 74–99)
GLUCOSE BLD-MCNC: 164 MG/DL (ref 74–99)
GLUCOSE BLD-MCNC: 166 MG/DL (ref 74–99)
GLUCOSE BLD-MCNC: 167 MG/DL (ref 74–99)
GLUCOSE BLD-MCNC: 169 MG/DL (ref 74–99)
GLUCOSE BLD-MCNC: 170 MG/DL (ref 74–99)
GLUCOSE BLD-MCNC: 172 MG/DL (ref 74–99)
GLUCOSE BLD-MCNC: 173 MG/DL (ref 74–99)
GLUCOSE BLD-MCNC: 174 MG/DL (ref 74–99)
GLUCOSE BLD-MCNC: 176 MG/DL (ref 74–99)
GLUCOSE BLD-MCNC: 176 MG/DL (ref 74–99)
GLUCOSE BLD-MCNC: 177 MG/DL (ref 74–99)
GLUCOSE BLD-MCNC: 178 MG/DL (ref 74–99)
GLUCOSE BLD-MCNC: 180 MG/DL (ref 74–99)
GLUCOSE BLD-MCNC: 180 MG/DL (ref 74–99)
GLUCOSE BLD-MCNC: 184 MG/DL (ref 74–99)
GLUCOSE BLD-MCNC: 187 MG/DL (ref 74–99)
GLUCOSE BLD-MCNC: 188 MG/DL (ref 74–99)
GLUCOSE BLD-MCNC: 189 MG/DL (ref 74–99)
GLUCOSE BLD-MCNC: 189 MG/DL (ref 74–99)
GLUCOSE BLD-MCNC: 190 MG/DL (ref 74–99)
GLUCOSE BLD-MCNC: 194 MG/DL (ref 74–99)
GLUCOSE BLD-MCNC: 197 MG/DL (ref 74–99)
GLUCOSE BLD-MCNC: 200 MG/DL (ref 74–99)
GLUCOSE BLD-MCNC: 212 MG/DL (ref 74–99)
GLUCOSE BLD-MCNC: 229 MG/DL (ref 74–99)
GLUCOSE BLD-MCNC: 230 MG/DL (ref 74–99)
GLUCOSE BLD-MCNC: 230 MG/DL (ref 74–99)
GLUCOSE BLD-MCNC: 234 MG/DL (ref 74–99)
GLUCOSE BLD-MCNC: 236 MG/DL (ref 74–99)
GLUCOSE BLD-MCNC: 241 MG/DL (ref 74–99)
GLUCOSE URINE: NEGATIVE MG/DL
HAEMOPHILUS INFLUENZAE BY PCR: NOT DETECTED
HAPTOGLOBIN: 452 MG/DL (ref 30–200)
HAPTOGLOBIN: 538 MG/DL (ref 30–200)
HAV IGM SER IA-ACNC: NORMAL
HBV SURFACE AB TITR SER: NORMAL {TITER}
HCO3 ARTERIAL: 23.7 MMOL/L (ref 22–26)
HCO3: 14.2 MMOL/L (ref 22–26)
HCO3: 14.4 MMOL/L (ref 22–26)
HCO3: 14.8 MMOL/L (ref 22–26)
HCO3: 15.1 MMOL/L (ref 22–26)
HCO3: 17.9 MMOL/L (ref 22–26)
HCO3: 18.6 MMOL/L (ref 22–26)
HCO3: 18.8 MMOL/L (ref 22–26)
HCO3: 21.1 MMOL/L (ref 22–26)
HCO3: 21.7 MMOL/L (ref 22–26)
HCO3: 21.8 MMOL/L (ref 22–26)
HCO3: 22.6 MMOL/L (ref 22–26)
HCO3: 23.3 MMOL/L (ref 22–26)
HCO3: 23.6 MMOL/L (ref 22–26)
HCO3: 23.9 MMOL/L
HCO3: 24.3 MMOL/L (ref 22–26)
HCO3: 24.6 MMOL/L (ref 22–26)
HCO3: 26.8 MMOL/L (ref 22–26)
HCT VFR BLD CALC: 21.1 % (ref 34–48)
HCT VFR BLD CALC: 21.9 % (ref 34–48)
HCT VFR BLD CALC: 23.6 % (ref 34–48)
HCT VFR BLD CALC: 23.8 % (ref 34–48)
HCT VFR BLD CALC: 24.3 % (ref 34–48)
HCT VFR BLD CALC: 24.3 % (ref 34–48)
HCT VFR BLD CALC: 24.4 % (ref 34–48)
HCT VFR BLD CALC: 24.9 % (ref 34–48)
HCT VFR BLD CALC: 25 % (ref 34–48)
HCT VFR BLD CALC: 25.1 % (ref 34–48)
HCT VFR BLD CALC: 25.2 % (ref 34–48)
HCT VFR BLD CALC: 25.4 % (ref 34–48)
HCT VFR BLD CALC: 25.7 % (ref 34–48)
HCT VFR BLD CALC: 26.2 % (ref 34–48)
HCT VFR BLD CALC: 26.3 % (ref 34–48)
HCT VFR BLD CALC: 26.4 % (ref 34–48)
HCT VFR BLD CALC: 26.5 % (ref 34–48)
HCT VFR BLD CALC: 26.7 % (ref 34–48)
HCT VFR BLD CALC: 26.8 % (ref 34–48)
HCT VFR BLD CALC: 26.9 % (ref 34–48)
HCT VFR BLD CALC: 29.3 % (ref 34–48)
HCT VFR BLD CALC: 30.1 % (ref 34–48)
HCT VFR BLD CALC: 32 % (ref 34–48)
HCT VFR BLD CALC: 33.5 % (ref 34–48)
HCT VFR BLD CALC: 34 % (ref 34–48)
HCT VFR BLD CALC: 37.3 % (ref 34–48)
HCT VFR BLD CALC: 39 % (ref 34–48)
HCT VFR BLD CALC: 39 % (ref 34–48)
HCT VFR BLD CALC: 39.6 % (ref 34–48)
HEMOGLOBIN: 10.3 G/DL (ref 11.5–15.5)
HEMOGLOBIN: 10.7 G/DL (ref 11.5–15.5)
HEMOGLOBIN: 10.9 G/DL (ref 11.5–15.5)
HEMOGLOBIN: 11.7 G/DL (ref 11.5–15.5)
HEMOGLOBIN: 11.8 G/DL (ref 11.5–15.5)
HEMOGLOBIN: 11.8 G/DL (ref 11.5–15.5)
HEMOGLOBIN: 12.1 G/DL (ref 11.5–15.5)
HEMOGLOBIN: 6.9 G/DL (ref 11.5–15.5)
HEMOGLOBIN: 7.1 G/DL (ref 11.5–15.5)
HEMOGLOBIN: 8.1 G/DL (ref 11.5–15.5)
HEMOGLOBIN: 8.2 G/DL (ref 11.5–15.5)
HEMOGLOBIN: 8.2 G/DL (ref 11.5–15.5)
HEMOGLOBIN: 8.3 G/DL (ref 11.5–15.5)
HEMOGLOBIN: 8.4 G/DL (ref 11.5–15.5)
HEMOGLOBIN: 8.5 G/DL (ref 11.5–15.5)
HEMOGLOBIN: 8.6 G/DL (ref 11.5–15.5)
HEMOGLOBIN: 8.7 G/DL (ref 11.5–15.5)
HEMOGLOBIN: 8.9 G/DL (ref 11.5–15.5)
HEMOGLOBIN: 9 G/DL (ref 11.5–15.5)
HEMOGLOBIN: 9.2 G/DL (ref 11.5–15.5)
HEMOGLOBIN: 9.3 G/DL (ref 11.5–15.5)
HEMOGLOBIN: 9.5 G/DL (ref 11.5–15.5)
HEPARIN PF4 ANTIBODY: 0.03 OD
HEPATITIS B CORE IGM ANTIBODY: REACTIVE
HEPATITIS B SURFACE ANTIGEN INTERPRETATION: NORMAL
HEPATITIS C ANTIBODY INTERPRETATION: NORMAL
HHB: 10.2 % (ref 0–5)
HHB: 10.2 % (ref 0–5)
HHB: 14 % (ref 0–5)
HHB: 14.3 % (ref 0–5)
HHB: 19.7 % (ref 0–5)
HHB: 2.7 % (ref 0–5)
HHB: 3.4 % (ref 0–5)
HHB: 3.7 % (ref 0–5)
HHB: 35.1 % (ref 0–5)
HHB: 4.5 % (ref 0–5)
HHB: 6.6 % (ref 0–5)
HHB: 7.2 % (ref 0–5)
HHB: 8 %
HHB: 8.6 % (ref 0–5)
HHB: 8.9 % (ref 0–5)
HHB: 9.6 % (ref 0–5)
HHB: 9.9 % (ref 0–5)
HYPOCHROMIA: ABNORMAL
IGA: 46 MG/DL (ref 70–400)
IGG: 205 MG/DL (ref 700–1600)
IGM: 88 MG/DL (ref 40–230)
IMMATURE GRANULOCYTES #: 0.04 E9/L
IMMATURE GRANULOCYTES #: 0.05 E9/L
IMMATURE GRANULOCYTES #: 0.06 E9/L
IMMATURE GRANULOCYTES #: 0.19 E9/L
IMMATURE GRANULOCYTES #: 0.29 E9/L
IMMATURE GRANULOCYTES #: 0.44 E9/L
IMMATURE GRANULOCYTES %: 0.8 % (ref 0–5)
IMMATURE GRANULOCYTES %: 1.2 % (ref 0–5)
IMMATURE GRANULOCYTES %: 1.3 % (ref 0–5)
IMMATURE GRANULOCYTES %: 1.6 % (ref 0–5)
IMMATURE GRANULOCYTES %: 1.7 % (ref 0–5)
IMMATURE GRANULOCYTES %: 2.9 % (ref 0–5)
IMMATURE RETIC FRACT: 2.5 % (ref 3–15.9)
IMMUNOFIXATION RESULT, SERUM: NORMAL
IMMUNOFIXATION URINE: NORMAL
INR BLD: 1.5
INR BLD: 1.6
INR BLD: 1.6
INR BLD: 1.9
INR BLD: 2
INR BLD: 2.3
INR BLD: 2.9
INR BLD: 3.2
INR BLD: 3.8
KETONES, URINE: NEGATIVE MG/DL
KLEBSIELLA OXYTOCA BY PCR: NOT DETECTED
KLEBSIELLA PNEUMONIAE GROUP BY PCR: NOT DETECTED
L. PNEUMOPHILA SEROGP 1 UR AG: NORMAL
LAB: ABNORMAL
LACTATE DEHYDROGENASE: 979 U/L (ref 135–214)
LACTATE DEHYDROGENASE: >2500 U/L (ref 135–214)
LACTIC ACID: 1.8 MMOL/L (ref 0.5–2.2)
LACTIC ACID: 3.4 MMOL/L (ref 0.5–2.2)
LACTIC ACID: 3.4 MMOL/L (ref 0.5–2.2)
LACTIC ACID: 3.6 MMOL/L (ref 0.5–2.2)
LACTIC ACID: 4.2 MMOL/L (ref 0.5–2.2)
LACTIC ACID: 5.8 MMOL/L (ref 0.5–2.2)
LEUKOCYTE ESTERASE, URINE: NEGATIVE
LIPASE: 8 U/L (ref 13–60)
LISTERIA MONOCYTOGENES BY PCR: NOT DETECTED
LV EF: 58 %
LVEF MODALITY: NORMAL
LYMPHOCYTES ABSOLUTE: 0 E9/L (ref 1.5–4)
LYMPHOCYTES ABSOLUTE: 0.04 E9/L (ref 1.5–4)
LYMPHOCYTES ABSOLUTE: 0.06 E9/L (ref 1.5–4)
LYMPHOCYTES ABSOLUTE: 0.06 E9/L (ref 1.5–4)
LYMPHOCYTES ABSOLUTE: 0.08 E9/L (ref 1.5–4)
LYMPHOCYTES ABSOLUTE: 0.09 E9/L (ref 1.5–4)
LYMPHOCYTES ABSOLUTE: 0.1 E9/L (ref 1.5–4)
LYMPHOCYTES ABSOLUTE: 0.11 E9/L (ref 1.5–4)
LYMPHOCYTES ABSOLUTE: 0.12 E9/L (ref 1.5–4)
LYMPHOCYTES ABSOLUTE: 0.12 E9/L (ref 1.5–4)
LYMPHOCYTES ABSOLUTE: 0.14 E9/L (ref 1.5–4)
LYMPHOCYTES ABSOLUTE: 0.15 E9/L (ref 1.5–4)
LYMPHOCYTES ABSOLUTE: 0.16 E9/L (ref 1.5–4)
LYMPHOCYTES ABSOLUTE: 0.27 E9/L (ref 1.5–4)
LYMPHOCYTES ABSOLUTE: 0.27 E9/L (ref 1.5–4)
LYMPHOCYTES ABSOLUTE: 0.41 E9/L (ref 1.5–4)
LYMPHOCYTES ABSOLUTE: 0.51 E9/L (ref 1.5–4)
LYMPHOCYTES ABSOLUTE: 0.57 E9/L (ref 1.5–4)
LYMPHOCYTES RELATIVE PERCENT: 0.4 % (ref 20–42)
LYMPHOCYTES RELATIVE PERCENT: 0.4 % (ref 20–42)
LYMPHOCYTES RELATIVE PERCENT: 0.5 % (ref 20–42)
LYMPHOCYTES RELATIVE PERCENT: 0.7 % (ref 20–42)
LYMPHOCYTES RELATIVE PERCENT: 0.8 % (ref 20–42)
LYMPHOCYTES RELATIVE PERCENT: 0.9 % (ref 20–42)
LYMPHOCYTES RELATIVE PERCENT: 1 % (ref 20–42)
LYMPHOCYTES RELATIVE PERCENT: 1.2 % (ref 20–42)
LYMPHOCYTES RELATIVE PERCENT: 1.7 % (ref 20–42)
LYMPHOCYTES RELATIVE PERCENT: 1.7 % (ref 20–42)
LYMPHOCYTES RELATIVE PERCENT: 10.5 % (ref 20–42)
LYMPHOCYTES RELATIVE PERCENT: 10.9 % (ref 20–42)
LYMPHOCYTES RELATIVE PERCENT: 5 % (ref 20–42)
LYMPHOCYTES RELATIVE PERCENT: 5 % (ref 20–42)
LYMPHOCYTES RELATIVE PERCENT: 8.9 % (ref 20–42)
LYMPHOCYTES RELATIVE PERCENT: 9.6 % (ref 20–42)
LYMPHOCYTES, BODY FLUID: 54 %
Lab: ABNORMAL
MAGNESIUM: 2.1 MG/DL (ref 1.6–2.6)
MAGNESIUM: 2.1 MG/DL (ref 1.6–2.6)
MAGNESIUM: 2.2 MG/DL (ref 1.6–2.6)
MAGNESIUM: 2.3 MG/DL (ref 1.6–2.6)
MAGNESIUM: 2.4 MG/DL (ref 1.6–2.6)
MAGNESIUM: 2.5 MG/DL (ref 1.6–2.6)
MAGNESIUM: 2.6 MG/DL (ref 1.6–2.6)
MAGNESIUM: 2.7 MG/DL (ref 1.6–2.6)
MAGNESIUM: 2.7 MG/DL (ref 1.6–2.6)
MCH RBC QN AUTO: 29.9 PG (ref 26–35)
MCH RBC QN AUTO: 30 PG (ref 26–35)
MCH RBC QN AUTO: 30.1 PG (ref 26–35)
MCH RBC QN AUTO: 30.1 PG (ref 26–35)
MCH RBC QN AUTO: 30.2 PG (ref 26–35)
MCH RBC QN AUTO: 30.2 PG (ref 26–35)
MCH RBC QN AUTO: 30.3 PG (ref 26–35)
MCH RBC QN AUTO: 30.3 PG (ref 26–35)
MCH RBC QN AUTO: 30.5 PG (ref 26–35)
MCH RBC QN AUTO: 30.5 PG (ref 26–35)
MCH RBC QN AUTO: 30.6 PG (ref 26–35)
MCH RBC QN AUTO: 30.7 PG (ref 26–35)
MCH RBC QN AUTO: 30.7 PG (ref 26–35)
MCH RBC QN AUTO: 30.8 PG (ref 26–35)
MCH RBC QN AUTO: 30.9 PG (ref 26–35)
MCH RBC QN AUTO: 31 PG (ref 26–35)
MCH RBC QN AUTO: 31.1 PG (ref 26–35)
MCH RBC QN AUTO: 31.2 PG (ref 26–35)
MCH RBC QN AUTO: 31.2 PG (ref 26–35)
MCH RBC QN AUTO: 31.4 PG (ref 26–35)
MCHC RBC AUTO-ENTMCNC: 30.3 % (ref 32–34.5)
MCHC RBC AUTO-ENTMCNC: 30.3 % (ref 32–34.5)
MCHC RBC AUTO-ENTMCNC: 30.6 % (ref 32–34.5)
MCHC RBC AUTO-ENTMCNC: 31.4 % (ref 32–34.5)
MCHC RBC AUTO-ENTMCNC: 31.5 % (ref 32–34.5)
MCHC RBC AUTO-ENTMCNC: 31.6 % (ref 32–34.5)
MCHC RBC AUTO-ENTMCNC: 31.7 % (ref 32–34.5)
MCHC RBC AUTO-ENTMCNC: 31.9 % (ref 32–34.5)
MCHC RBC AUTO-ENTMCNC: 32.1 % (ref 32–34.5)
MCHC RBC AUTO-ENTMCNC: 32.2 % (ref 32–34.5)
MCHC RBC AUTO-ENTMCNC: 32.4 % (ref 32–34.5)
MCHC RBC AUTO-ENTMCNC: 32.7 % (ref 32–34.5)
MCHC RBC AUTO-ENTMCNC: 33.1 % (ref 32–34.5)
MCHC RBC AUTO-ENTMCNC: 33.2 % (ref 32–34.5)
MCHC RBC AUTO-ENTMCNC: 33.5 % (ref 32–34.5)
MCHC RBC AUTO-ENTMCNC: 33.6 % (ref 32–34.5)
MCHC RBC AUTO-ENTMCNC: 33.7 % (ref 32–34.5)
MCHC RBC AUTO-ENTMCNC: 33.9 % (ref 32–34.5)
MCHC RBC AUTO-ENTMCNC: 34 % (ref 32–34.5)
MCHC RBC AUTO-ENTMCNC: 34.1 % (ref 32–34.5)
MCHC RBC AUTO-ENTMCNC: 34.3 % (ref 32–34.5)
MCHC RBC AUTO-ENTMCNC: 34.3 % (ref 32–34.5)
MCHC RBC AUTO-ENTMCNC: 34.6 % (ref 32–34.5)
MCHC RBC AUTO-ENTMCNC: 34.8 % (ref 32–34.5)
MCHC RBC AUTO-ENTMCNC: 34.8 % (ref 32–34.5)
MCHC RBC AUTO-ENTMCNC: 34.9 % (ref 32–34.5)
MCV RBC AUTO: 87.8 FL (ref 80–99.9)
MCV RBC AUTO: 88.1 FL (ref 80–99.9)
MCV RBC AUTO: 88.4 FL (ref 80–99.9)
MCV RBC AUTO: 88.5 FL (ref 80–99.9)
MCV RBC AUTO: 88.6 FL (ref 80–99.9)
MCV RBC AUTO: 89.1 FL (ref 80–99.9)
MCV RBC AUTO: 89.6 FL (ref 80–99.9)
MCV RBC AUTO: 89.6 FL (ref 80–99.9)
MCV RBC AUTO: 90.1 FL (ref 80–99.9)
MCV RBC AUTO: 90.4 FL (ref 80–99.9)
MCV RBC AUTO: 90.6 FL (ref 80–99.9)
MCV RBC AUTO: 91.2 FL (ref 80–99.9)
MCV RBC AUTO: 91.3 FL (ref 80–99.9)
MCV RBC AUTO: 91.4 FL (ref 80–99.9)
MCV RBC AUTO: 95 FL (ref 80–99.9)
MCV RBC AUTO: 95.2 FL (ref 80–99.9)
MCV RBC AUTO: 96.3 FL (ref 80–99.9)
MCV RBC AUTO: 96.4 FL (ref 80–99.9)
MCV RBC AUTO: 97.1 FL (ref 80–99.9)
MCV RBC AUTO: 97.3 FL (ref 80–99.9)
MCV RBC AUTO: 99.3 FL (ref 80–99.9)
MCV RBC AUTO: 99.3 FL (ref 80–99.9)
MCV RBC AUTO: 99.5 FL (ref 80–99.9)
MCV RBC AUTO: 99.5 FL (ref 80–99.9)
MCV RBC AUTO: 99.7 FL (ref 80–99.9)
MCV RBC AUTO: 99.7 FL (ref 80–99.9)
METAMYELOCYTES RELATIVE PERCENT: 0.9 % (ref 0–1)
METAMYELOCYTES RELATIVE PERCENT: 1.7 % (ref 0–1)
METAMYELOCYTES RELATIVE PERCENT: 10 % (ref 0–1)
METAMYELOCYTES RELATIVE PERCENT: 14 % (ref 0–1)
METAMYELOCYTES RELATIVE PERCENT: 2.6 % (ref 0–1)
METAMYELOCYTES RELATIVE PERCENT: 3.5 % (ref 0–1)
METER GLUCOSE: 112 MG/DL (ref 74–99)
METER GLUCOSE: 114 MG/DL (ref 74–99)
METER GLUCOSE: 117 MG/DL (ref 74–99)
METER GLUCOSE: 141 MG/DL (ref 74–99)
METER GLUCOSE: 143 MG/DL (ref 74–99)
METER GLUCOSE: 148 MG/DL (ref 74–99)
METER GLUCOSE: 151 MG/DL (ref 74–99)
METER GLUCOSE: 156 MG/DL (ref 74–99)
METER GLUCOSE: 156 MG/DL (ref 74–99)
METER GLUCOSE: 158 MG/DL (ref 74–99)
METER GLUCOSE: 160 MG/DL (ref 74–99)
METER GLUCOSE: 160 MG/DL (ref 74–99)
METER GLUCOSE: 161 MG/DL (ref 74–99)
METER GLUCOSE: 167 MG/DL (ref 74–99)
METER GLUCOSE: 170 MG/DL (ref 74–99)
METER GLUCOSE: 172 MG/DL (ref 74–99)
METER GLUCOSE: 175 MG/DL (ref 74–99)
METER GLUCOSE: 175 MG/DL (ref 74–99)
METER GLUCOSE: 177 MG/DL (ref 74–99)
METER GLUCOSE: 181 MG/DL (ref 74–99)
METER GLUCOSE: 187 MG/DL (ref 74–99)
METER GLUCOSE: 187 MG/DL (ref 74–99)
METER GLUCOSE: 191 MG/DL (ref 74–99)
METER GLUCOSE: 192 MG/DL (ref 74–99)
METER GLUCOSE: 198 MG/DL (ref 74–99)
METER GLUCOSE: 199 MG/DL (ref 74–99)
METER GLUCOSE: 207 MG/DL (ref 74–99)
METER GLUCOSE: 209 MG/DL (ref 74–99)
METER GLUCOSE: 209 MG/DL (ref 74–99)
METER GLUCOSE: 217 MG/DL (ref 74–99)
METER GLUCOSE: 222 MG/DL (ref 74–99)
METER GLUCOSE: 223 MG/DL (ref 74–99)
METER GLUCOSE: 223 MG/DL (ref 74–99)
METER GLUCOSE: 226 MG/DL (ref 74–99)
METER GLUCOSE: 227 MG/DL (ref 74–99)
METER GLUCOSE: 245 MG/DL (ref 74–99)
METER GLUCOSE: 251 MG/DL (ref 74–99)
METHB: 0.1 % (ref 0–1.5)
METHB: 0.2 % (ref 0–1.5)
METHB: 0.3 % (ref 0–1.5)
METHB: 0.4 % (ref 0–1.5)
METHB: 0.5 % (ref 0–1.5)
METHB: 0.6 % (ref 0–1.5)
METHB: 0.7 % (ref 0–1.5)
METHICILLIN RESISTANCE MECA/C  BY PCR: DETECTED
MICROALBUMIN UR-MCNC: 156.5 MG/L
MICROALBUMIN/CREAT UR-RTO: 680.4 (ref 0–30)
MODE: ABNORMAL
MODE: AC
MONOCYTE, FLUID: 6 %
MONOCYTES ABSOLUTE: 0 E9/L (ref 0.1–0.95)
MONOCYTES ABSOLUTE: 0.05 E9/L (ref 0.1–0.95)
MONOCYTES ABSOLUTE: 0.06 E9/L (ref 0.1–0.95)
MONOCYTES ABSOLUTE: 0.1 E9/L (ref 0.1–0.95)
MONOCYTES ABSOLUTE: 0.12 E9/L (ref 0.1–0.95)
MONOCYTES ABSOLUTE: 0.13 E9/L (ref 0.1–0.95)
MONOCYTES ABSOLUTE: 0.14 E9/L (ref 0.1–0.95)
MONOCYTES ABSOLUTE: 0.15 E9/L (ref 0.1–0.95)
MONOCYTES ABSOLUTE: 0.17 E9/L (ref 0.1–0.95)
MONOCYTES ABSOLUTE: 0.17 E9/L (ref 0.1–0.95)
MONOCYTES ABSOLUTE: 0.24 E9/L (ref 0.1–0.95)
MONOCYTES ABSOLUTE: 0.25 E9/L (ref 0.1–0.95)
MONOCYTES ABSOLUTE: 0.27 E9/L (ref 0.1–0.95)
MONOCYTES ABSOLUTE: 0.29 E9/L (ref 0.1–0.95)
MONOCYTES ABSOLUTE: 0.34 E9/L (ref 0.1–0.95)
MONOCYTES ABSOLUTE: 0.35 E9/L (ref 0.1–0.95)
MONOCYTES ABSOLUTE: 0.38 E9/L (ref 0.1–0.95)
MONOCYTES ABSOLUTE: 0.4 E9/L (ref 0.1–0.95)
MONOCYTES ABSOLUTE: 0.49 E9/L (ref 0.1–0.95)
MONOCYTES ABSOLUTE: 0.59 E9/L (ref 0.1–0.95)
MONOCYTES ABSOLUTE: 1.78 E9/L (ref 0.1–0.95)
MONOCYTES RELATIVE PERCENT: 0.9 % (ref 2–12)
MONOCYTES RELATIVE PERCENT: 1 % (ref 2–12)
MONOCYTES RELATIVE PERCENT: 1 % (ref 2–12)
MONOCYTES RELATIVE PERCENT: 1.1 % (ref 2–12)
MONOCYTES RELATIVE PERCENT: 1.2 % (ref 2–12)
MONOCYTES RELATIVE PERCENT: 1.5 % (ref 2–12)
MONOCYTES RELATIVE PERCENT: 1.6 % (ref 2–12)
MONOCYTES RELATIVE PERCENT: 1.7 % (ref 2–12)
MONOCYTES RELATIVE PERCENT: 1.8 % (ref 2–12)
MONOCYTES RELATIVE PERCENT: 1.9 % (ref 2–12)
MONOCYTES RELATIVE PERCENT: 2 % (ref 2–12)
MONOCYTES RELATIVE PERCENT: 2.1 % (ref 2–12)
MONOCYTES RELATIVE PERCENT: 2.6 % (ref 2–12)
MONOCYTES RELATIVE PERCENT: 2.6 % (ref 2–12)
MONOCYTES RELATIVE PERCENT: 25.2 % (ref 2–12)
MONOCYTES RELATIVE PERCENT: 3.5 % (ref 2–12)
MONOCYTES RELATIVE PERCENT: 5.4 % (ref 2–12)
MONOCYTES RELATIVE PERCENT: 6.4 % (ref 2–12)
MONOCYTES RELATIVE PERCENT: 7.9 % (ref 2–12)
MONOCYTES RELATIVE PERCENT: 8.2 % (ref 2–12)
MONOCYTES RELATIVE PERCENT: 8.7 % (ref 2–12)
MONOCYTES RELATIVE PERCENT: 9.3 % (ref 2–12)
MYELOCYTE PERCENT: 0.9 % (ref 0–0)
MYELOCYTE PERCENT: 0.9 % (ref 0–0)
MYELOCYTE PERCENT: 1 % (ref 0–0)
MYELOCYTE PERCENT: 7 % (ref 0–0)
NEISSERIA MENINGITIDIS BY PCR: NOT DETECTED
NEUTROPHIL, FLUID: 40 %
NEUTROPHILS ABSOLUTE: 11.06 E9/L (ref 1.8–7.3)
NEUTROPHILS ABSOLUTE: 11.66 E9/L (ref 1.8–7.3)
NEUTROPHILS ABSOLUTE: 11.93 E9/L (ref 1.8–7.3)
NEUTROPHILS ABSOLUTE: 12.08 E9/L (ref 1.8–7.3)
NEUTROPHILS ABSOLUTE: 13.17 E9/L (ref 1.8–7.3)
NEUTROPHILS ABSOLUTE: 13.29 E9/L (ref 1.8–7.3)
NEUTROPHILS ABSOLUTE: 14.21 E9/L (ref 1.8–7.3)
NEUTROPHILS ABSOLUTE: 14.31 E9/L (ref 1.8–7.3)
NEUTROPHILS ABSOLUTE: 14.39 E9/L (ref 1.8–7.3)
NEUTROPHILS ABSOLUTE: 14.75 E9/L (ref 1.8–7.3)
NEUTROPHILS ABSOLUTE: 15.15 E9/L (ref 1.8–7.3)
NEUTROPHILS ABSOLUTE: 15.15 E9/L (ref 1.8–7.3)
NEUTROPHILS ABSOLUTE: 16.29 E9/L (ref 1.8–7.3)
NEUTROPHILS ABSOLUTE: 16.8 E9/L (ref 1.8–7.3)
NEUTROPHILS ABSOLUTE: 2.24 E9/L (ref 1.8–7.3)
NEUTROPHILS ABSOLUTE: 2.51 E9/L (ref 1.8–7.3)
NEUTROPHILS ABSOLUTE: 2.78 E9/L (ref 1.8–7.3)
NEUTROPHILS ABSOLUTE: 2.9 E9/L (ref 1.8–7.3)
NEUTROPHILS ABSOLUTE: 3.36 E9/L (ref 1.8–7.3)
NEUTROPHILS ABSOLUTE: 3.72 E9/L (ref 1.8–7.3)
NEUTROPHILS ABSOLUTE: 3.87 E9/L (ref 1.8–7.3)
NEUTROPHILS ABSOLUTE: 4.95 E9/L (ref 1.8–7.3)
NEUTROPHILS ABSOLUTE: 5.33 E9/L (ref 1.8–7.3)
NEUTROPHILS ABSOLUTE: 6.01 E9/L (ref 1.8–7.3)
NEUTROPHILS ABSOLUTE: 6.17 E9/L (ref 1.8–7.3)
NEUTROPHILS RELATIVE PERCENT: 100 % (ref 43–80)
NEUTROPHILS RELATIVE PERCENT: 72 % (ref 43–80)
NEUTROPHILS RELATIVE PERCENT: 73.7 % (ref 43–80)
NEUTROPHILS RELATIVE PERCENT: 73.9 % (ref 43–80)
NEUTROPHILS RELATIVE PERCENT: 76.9 % (ref 43–80)
NEUTROPHILS RELATIVE PERCENT: 79.7 % (ref 43–80)
NEUTROPHILS RELATIVE PERCENT: 83 % (ref 43–80)
NEUTROPHILS RELATIVE PERCENT: 87 % (ref 43–80)
NEUTROPHILS RELATIVE PERCENT: 87 % (ref 43–80)
NEUTROPHILS RELATIVE PERCENT: 87.7 % (ref 43–80)
NEUTROPHILS RELATIVE PERCENT: 93.9 % (ref 43–80)
NEUTROPHILS RELATIVE PERCENT: 94.6 % (ref 43–80)
NEUTROPHILS RELATIVE PERCENT: 94.8 % (ref 43–80)
NEUTROPHILS RELATIVE PERCENT: 96.2 % (ref 43–80)
NEUTROPHILS RELATIVE PERCENT: 96.5 % (ref 43–80)
NEUTROPHILS RELATIVE PERCENT: 96.5 % (ref 43–80)
NEUTROPHILS RELATIVE PERCENT: 96.8 % (ref 43–80)
NEUTROPHILS RELATIVE PERCENT: 97.4 % (ref 43–80)
NEUTROPHILS RELATIVE PERCENT: 98.3 % (ref 43–80)
NEUTROPHILS RELATIVE PERCENT: 98.3 % (ref 43–80)
NEUTROPHILS RELATIVE PERCENT: 99.1 % (ref 43–80)
NITRITE, URINE: NEGATIVE
NUCLEATED CELLS FLUID: 400 /UL
NUCLEATED RED BLOOD CELLS: 0.9 /100 WBC
NUCLEATED RED BLOOD CELLS: 1 /100 WBC
NUCLEATED RED BLOOD CELLS: 1.7 /100 WBC
O2 CONTENT: 10.2 ML/DL
O2 CONTENT: 10.3 ML/DL
O2 CONTENT: 10.9 ML/DL
O2 CONTENT: 11 ML/DL
O2 CONTENT: 11.5 ML/DL
O2 CONTENT: 11.6 ML/DL
O2 CONTENT: 11.9 ML/DL
O2 CONTENT: 12.6 ML/DL
O2 CONTENT: 13.1 ML/DL
O2 CONTENT: 13.1 ML/DL
O2 CONTENT: 13.4 ML/DL
O2 CONTENT: 13.6 ML/DL
O2 CONTENT: 14.2 ML/DL
O2 CONTENT: 14.2 ML/DL
O2 CONTENT: 14.6 ML/DL
O2 CONTENT: 9.1 ML/DL
O2 SATURATION: 64.7 % (ref 92–98.5)
O2 SATURATION: 80.2 % (ref 92–98.5)
O2 SATURATION: 85.6 % (ref 92–98.5)
O2 SATURATION: 85.9 % (ref 92–98.5)
O2 SATURATION: 89.7 % (ref 92–98.5)
O2 SATURATION: 89.7 % (ref 92–98.5)
O2 SATURATION: 90 % (ref 92–98.5)
O2 SATURATION: 90.4 % (ref 92–98.5)
O2 SATURATION: 91 % (ref 92–98.5)
O2 SATURATION: 91.3 % (ref 92–98.5)
O2 SATURATION: 92 %
O2 SATURATION: 92.8 % (ref 92–98.5)
O2 SATURATION: 93.4 % (ref 92–98.5)
O2 SATURATION: 94.1 % (ref 92–98.5)
O2 SATURATION: 95.4 % (ref 92–98.5)
O2 SATURATION: 96.3 % (ref 92–98.5)
O2 SATURATION: 96.6 % (ref 92–98.5)
O2 SATURATION: 97.3 % (ref 92–98.5)
O2HB: 64.4 % (ref 94–97)
O2HB: 79.9 % (ref 94–97)
O2HB: 85 % (ref 94–97)
O2HB: 85.2 % (ref 94–97)
O2HB: 89 % (ref 94–97)
O2HB: 89.2 % (ref 94–97)
O2HB: 89.4 % (ref 94–97)
O2HB: 90 % (ref 94–97)
O2HB: 90.1 % (ref 94–97)
O2HB: 90.5 % (ref 94–97)
O2HB: 91.5 %
O2HB: 92.4 % (ref 94–97)
O2HB: 92.9 % (ref 94–97)
O2HB: 94.3 % (ref 94–97)
O2HB: 95.9 % (ref 94–97)
O2HB: 96.3 % (ref 94–97)
O2HB: 96.6 % (ref 94–97)
OPERATOR ID: 1741
OPERATOR ID: 187
OPERATOR ID: 1874
OPERATOR ID: 2260
OPERATOR ID: 2577
OPERATOR ID: 2593
OPERATOR ID: 2962
OPERATOR ID: 319
OPERATOR ID: 359
OPERATOR ID: 359
OPERATOR ID: 366
OPERATOR ID: 659
OPERATOR ID: 7874
OPERATOR ID: ABNORMAL
ORDER NUMBER: ABNORMAL
ORGANISM: ABNORMAL
OSMOLALITY URINE: 476 MOSM/KG (ref 300–900)
OSMOLALITY: 316 MOSM/KG (ref 285–310)
OVALOCYTES: ABNORMAL
PATHOLOGIST REVIEW: NORMAL
PATHOLOGIST REVIEW: NORMAL
PATIENT TEMP: 37
PATIENT TEMP: 37 C
PCO2 (TEMP CORRECTED): 37.5 MMHG (ref 35–45)
PCO2: 22.8 MMHG (ref 35–45)
PCO2: 26.8 MMHG (ref 35–45)
PCO2: 27.1 MMHG (ref 35–45)
PCO2: 30.5 MMHG (ref 35–45)
PCO2: 31.2 MMHG (ref 35–45)
PCO2: 31.3 MMHG (ref 35–45)
PCO2: 33.8 MMHG (ref 35–45)
PCO2: 36.1 MMHG (ref 35–45)
PCO2: 36.1 MMHG (ref 35–45)
PCO2: 36.2 MMHG (ref 35–45)
PCO2: 38.3 MMHG (ref 40–52)
PCO2: 39.4 MMHG (ref 35–45)
PCO2: 40.9 MMHG (ref 35–45)
PCO2: 44.4 MMHG (ref 35–45)
PCO2: 48.6 MMHG (ref 35–45)
PCO2: 49.9 MMHG (ref 35–45)
PCO2: 58.1 MMHG (ref 35–45)
PDW BLD-RTO: 14.6 FL (ref 11.5–15)
PDW BLD-RTO: 14.6 FL (ref 11.5–15)
PDW BLD-RTO: 14.7 FL (ref 11.5–15)
PDW BLD-RTO: 14.9 FL (ref 11.5–15)
PDW BLD-RTO: 15.8 FL (ref 11.5–15)
PDW BLD-RTO: 15.9 FL (ref 11.5–15)
PDW BLD-RTO: 15.9 FL (ref 11.5–15)
PDW BLD-RTO: 16 FL (ref 11.5–15)
PDW BLD-RTO: 16 FL (ref 11.5–15)
PDW BLD-RTO: 16.1 FL (ref 11.5–15)
PDW BLD-RTO: 16.2 FL (ref 11.5–15)
PDW BLD-RTO: 16.3 FL (ref 11.5–15)
PDW BLD-RTO: 16.4 FL (ref 11.5–15)
PDW BLD-RTO: 16.6 FL (ref 11.5–15)
PDW BLD-RTO: 16.7 FL (ref 11.5–15)
PEEP/CPAP: 10 CMH2O
PEEP/CPAP: 12 CMH2O
PEEP/CPAP: 6 CMH2O
PEEP/CPAP: 6 CMH2O
PEEP/CPAP: 8 CMH2O
PFO2: 0.38 MMHG/%
PFO2: 0.51 MMHG/%
PFO2: 0.58 MMHG/%
PFO2: 0.59 MMHG/%
PFO2: 0.62 MMHG/%
PFO2: 0.63 MMHG/%
PFO2: 0.65 MMHG/%
PFO2: 0.69 MMHG/%
PFO2: 0.82 MMHG/%
PFO2: 0.91 MMHG/%
PFO2: 1 MMHG/%
PFO2: 1.16 MMHG/%
PFO2: 1.76 MMHG/%
PFO2: 1.85 MMHG/%
PH (TEMPERATURE CORRECTED): 7.41 (ref 7.35–7.45)
PH BLOOD GAS: 7.18 (ref 7.35–7.45)
PH BLOOD GAS: 7.21 (ref 7.35–7.45)
PH BLOOD GAS: 7.24 (ref 7.35–7.45)
PH BLOOD GAS: 7.29 (ref 7.35–7.45)
PH BLOOD GAS: 7.3 (ref 7.35–7.45)
PH BLOOD GAS: 7.31 (ref 7.35–7.45)
PH BLOOD GAS: 7.34 (ref 7.35–7.45)
PH BLOOD GAS: 7.36 (ref 7.35–7.45)
PH BLOOD GAS: 7.38 (ref 7.35–7.45)
PH BLOOD GAS: 7.41 (ref 7.3–7.42)
PH BLOOD GAS: 7.43 (ref 7.35–7.45)
PH BLOOD GAS: 7.43 (ref 7.35–7.45)
PH BLOOD GAS: 7.44 (ref 7.35–7.45)
PH BLOOD GAS: 7.45 (ref 7.35–7.45)
PH BLOOD GAS: 7.46 (ref 7.35–7.45)
PH UA: 5.5 (ref 5–9)
PHOSPHORUS: 3.7 MG/DL (ref 2.5–4.5)
PHOSPHORUS: 4.1 MG/DL (ref 2.5–4.5)
PHOSPHORUS: 4.9 MG/DL (ref 2.5–4.5)
PHOSPHORUS: 4.9 MG/DL (ref 2.5–4.5)
PHOSPHORUS: 5 MG/DL (ref 2.5–4.5)
PHOSPHORUS: 5.1 MG/DL (ref 2.5–4.5)
PHOSPHORUS: 5.6 MG/DL (ref 2.5–4.5)
PHOSPHORUS: 6.1 MG/DL (ref 2.5–4.5)
PHOSPHORUS: 6.3 MG/DL (ref 2.5–4.5)
PHOSPHORUS: 6.3 MG/DL (ref 2.5–4.5)
PHOSPHORUS: 6.5 MG/DL (ref 2.5–4.5)
PHOSPHORUS: 7 MG/DL (ref 2.5–4.5)
PHOSPHORUS: 8.4 MG/DL (ref 2.5–4.5)
PHOSPHORUS: 8.5 MG/DL (ref 2.5–4.5)
PLATELET # BLD: 107 E9/L (ref 130–450)
PLATELET # BLD: 116 E9/L (ref 130–450)
PLATELET # BLD: 135 E9/L (ref 130–450)
PLATELET # BLD: 142 E9/L (ref 130–450)
PLATELET # BLD: 15 E9/L (ref 130–450)
PLATELET # BLD: 155 E9/L (ref 130–450)
PLATELET # BLD: 174 E9/L (ref 130–450)
PLATELET # BLD: 188 E9/L (ref 130–450)
PLATELET # BLD: 189 E9/L (ref 130–450)
PLATELET # BLD: 19 E9/L (ref 130–450)
PLATELET # BLD: 20 E9/L (ref 130–450)
PLATELET # BLD: 23 E9/L (ref 130–450)
PLATELET # BLD: 247 E9/L (ref 130–450)
PLATELET # BLD: 25 E9/L (ref 130–450)
PLATELET # BLD: 26 E9/L (ref 130–450)
PLATELET # BLD: 27 E9/L (ref 130–450)
PLATELET # BLD: 27 E9/L (ref 130–450)
PLATELET # BLD: 30 E9/L (ref 130–450)
PLATELET # BLD: 31 E9/L (ref 130–450)
PLATELET # BLD: 34 E9/L (ref 130–450)
PLATELET # BLD: 35 E9/L (ref 130–450)
PLATELET # BLD: 38 E9/L (ref 130–450)
PLATELET # BLD: 38 E9/L (ref 130–450)
PLATELET # BLD: 51 E9/L (ref 130–450)
PLATELET # BLD: 54 E9/L (ref 130–450)
PLATELET # BLD: 88 E9/L (ref 130–450)
PLATELET CONFIRMATION: NORMAL
PMV BLD AUTO: 10.1 FL (ref 7–12)
PMV BLD AUTO: 11.2 FL (ref 7–12)
PMV BLD AUTO: 11.3 FL (ref 7–12)
PMV BLD AUTO: 11.5 FL (ref 7–12)
PMV BLD AUTO: 11.6 FL (ref 7–12)
PMV BLD AUTO: 11.7 FL (ref 7–12)
PMV BLD AUTO: 11.9 FL (ref 7–12)
PMV BLD AUTO: 11.9 FL (ref 7–12)
PMV BLD AUTO: 12 FL (ref 7–12)
PMV BLD AUTO: 12.3 FL (ref 7–12)
PMV BLD AUTO: 12.3 FL (ref 7–12)
PMV BLD AUTO: 12.5 FL (ref 7–12)
PMV BLD AUTO: 12.9 FL (ref 7–12)
PMV BLD AUTO: ABNORMAL FL (ref 7–12)
PO2 (TEMP CORRECTED): 70.3 MMHG (ref 60–80)
PO2: 100 MMHG (ref 75–100)
PO2: 105.7 MMHG (ref 75–100)
PO2: 38.1 MMHG (ref 75–100)
PO2: 51 MMHG (ref 75–100)
PO2: 57.7 MMHG (ref 75–100)
PO2: 59.2 MMHG (ref 75–100)
PO2: 60.2 MMHG (ref 75–100)
PO2: 61.9 MMHG (ref 75–100)
PO2: 62.4 MMHG (ref 75–100)
PO2: 62.5 MMHG (ref 75–100)
PO2: 62.7 MMHG (ref 75–100)
PO2: 63.4 MMHG (ref 75–100)
PO2: 68.5 MMHG (ref 75–100)
PO2: 69.5 MMHG (ref 30–50)
PO2: 74 MMHG (ref 75–100)
PO2: 82.1 MMHG (ref 75–100)
PO2: 90.9 MMHG (ref 75–100)
POIKILOCYTES: ABNORMAL
POLYCHROMASIA: ABNORMAL
POSITIVE END EXP PRESS: 12 CMH2O
POTASSIUM REFLEX MAGNESIUM: 3.7 MMOL/L (ref 3.5–5)
POTASSIUM REFLEX MAGNESIUM: 4.2 MMOL/L (ref 3.5–5)
POTASSIUM REFLEX MAGNESIUM: 4.5 MMOL/L (ref 3.5–5)
POTASSIUM REFLEX MAGNESIUM: 4.6 MMOL/L (ref 3.5–5)
POTASSIUM REFLEX MAGNESIUM: 4.9 MMOL/L (ref 3.5–5)
POTASSIUM REFLEX MAGNESIUM: 5 MMOL/L (ref 3.5–5)
POTASSIUM SERPL-SCNC: 3.7 MMOL/L (ref 3.5–5)
POTASSIUM SERPL-SCNC: 3.8 MMOL/L (ref 3.5–5)
POTASSIUM SERPL-SCNC: 3.9 MMOL/L (ref 3.5–5)
POTASSIUM SERPL-SCNC: 4 MMOL/L (ref 3.5–5)
POTASSIUM SERPL-SCNC: 4.1 MMOL/L (ref 3.5–5)
POTASSIUM SERPL-SCNC: 4.2 MMOL/L (ref 3.5–5)
POTASSIUM SERPL-SCNC: 4.3 MMOL/L (ref 3.5–5)
POTASSIUM SERPL-SCNC: 4.4 MMOL/L (ref 3.5–5)
POTASSIUM SERPL-SCNC: 4.4 MMOL/L (ref 3.5–5)
POTASSIUM SERPL-SCNC: 4.5 MMOL/L (ref 3.5–5)
POTASSIUM SERPL-SCNC: 4.6 MMOL/L (ref 3.5–5)
POTASSIUM SERPL-SCNC: 4.7 MMOL/L (ref 3.5–5)
POTASSIUM SERPL-SCNC: 4.72 MMOL/L (ref 3.3–5.1)
POTASSIUM SERPL-SCNC: 4.8 MMOL/L (ref 3.5–5)
POTASSIUM SERPL-SCNC: 4.9 MMOL/L (ref 3.5–5)
POTASSIUM SERPL-SCNC: 5 MMOL/L (ref 3.5–5)
POTASSIUM SERPL-SCNC: 5.1 MMOL/L (ref 3.5–5)
POTASSIUM SERPL-SCNC: 5.2 MMOL/L (ref 3.5–5)
POTASSIUM SERPL-SCNC: 5.2 MMOL/L (ref 3.5–5)
POTASSIUM SERPL-SCNC: 5.4 MMOL/L (ref 3.5–5)
POTASSIUM SERPL-SCNC: 5.7 MMOL/L (ref 3.5–5)
POTASSIUM SERPL-SCNC: 5.9 MMOL/L (ref 3.5–5)
POTASSIUM SERPL-SCNC: 5.9 MMOL/L (ref 3.5–5)
POTASSIUM SERPL-SCNC: 6.1 MMOL/L (ref 3.5–5)
POTASSIUM, UR: 22.8 MMOL/L
POTASSIUM, UR: 47.3 MMOL/L
PREALBUMIN: 5 MG/DL (ref 20–40)
PRO-BNP: 8580 PG/ML (ref 0–450)
PROCALCITONIN: 13.99 NG/ML (ref 0–0.08)
PROCALCITONIN: >100 NG/ML (ref 0–0.08)
PROTEIN UA: 100 MG/DL
PROTEUS SPECIES BY PCR: NOT DETECTED
PROTHROMBIN TIME: 16.8 SEC (ref 9.3–12.4)
PROTHROMBIN TIME: 17.2 SEC (ref 9.3–12.4)
PROTHROMBIN TIME: 17.8 SEC (ref 9.3–12.4)
PROTHROMBIN TIME: 21.1 SEC (ref 9.3–12.4)
PROTHROMBIN TIME: 22.1 SEC (ref 9.3–12.4)
PROTHROMBIN TIME: 24.5 SEC (ref 9.3–12.4)
PROTHROMBIN TIME: 31.3 SEC (ref 9.3–12.4)
PROTHROMBIN TIME: 35.9 SEC (ref 9.3–12.4)
PROTHROMBIN TIME: 42.2 SEC (ref 9.3–12.4)
PS: 12 CMH20
PS: 12 CMH20
PSEUDOMONAS AERUGINOSA BY PCR: NOT DETECTED
RBC # BLD: 2.22 E12/L (ref 3.5–5.5)
RBC # BLD: 2.3 E12/L (ref 3.5–5.5)
RBC # BLD: 2.69 E12/L (ref 3.5–5.5)
RBC # BLD: 2.71 E12/L (ref 3.5–5.5)
RBC # BLD: 2.74 E12/L (ref 3.5–5.5)
RBC # BLD: 2.74 E12/L (ref 3.5–5.5)
RBC # BLD: 2.75 E12/L (ref 3.5–5.5)
RBC # BLD: 2.77 E12/L (ref 3.5–5.5)
RBC # BLD: 2.78 E12/L (ref 3.5–5.5)
RBC # BLD: 2.78 E12/L (ref 3.5–5.5)
RBC # BLD: 2.82 E12/L (ref 3.5–5.5)
RBC # BLD: 2.87 E12/L (ref 3.5–5.5)
RBC # BLD: 2.87 E12/L (ref 3.5–5.5)
RBC # BLD: 2.9 E12/L (ref 3.5–5.5)
RBC # BLD: 2.9 E12/L (ref 3.5–5.5)
RBC # BLD: 2.91 E12/L (ref 3.5–5.5)
RBC # BLD: 2.99 E12/L (ref 3.5–5.5)
RBC # BLD: 3.01 E12/L (ref 3.5–5.5)
RBC # BLD: 3.03 E12/L (ref 3.5–5.5)
RBC # BLD: 3.32 E12/L (ref 3.5–5.5)
RBC # BLD: 3.45 E12/L (ref 3.5–5.5)
RBC # BLD: 3.53 E12/L (ref 3.5–5.5)
RBC # BLD: 3.75 E12/L (ref 3.5–5.5)
RBC # BLD: 3.91 E12/L (ref 3.5–5.5)
RBC # BLD: 3.92 E12/L (ref 3.5–5.5)
RBC # BLD: 3.97 E12/L (ref 3.5–5.5)
RBC FLUID: 2215 /UL
RBC UA: ABNORMAL /HPF (ref 0–2)
RESPIRATORY RATE: 24 B/MIN
RETIC HGB EQUIVALENT: 29.2 PG (ref 28.2–36.6)
RETICULOCYTE ABSOLUTE COUNT: 0.01 E12/L
RETICULOCYTE COUNT PCT: 0.3 % (ref 0.4–1.9)
RI(T): 10.06
RI(T): 10.17
RI(T): 11.89
RI(T): 16.24
RI(T): 2.23
RI(T): 2.55
RI(T): 4.33
RI(T): 5.44
RI(T): 6.17
RI(T): 6.9
RI(T): 8.2
RI(T): 8.89
RI(T): 9.23
RI(T): 9.4
RI(T): 9.45
RI(T): 942 %
RR MECHANICAL: 14 B/MIN
RR MECHANICAL: 16 B/MIN
RR MECHANICAL: 24 B/MIN
SARS-COV-2, NAAT: NOT DETECTED
SCHISTOCYTES: ABNORMAL
SERRATIA MARCESCENS BY PCR: NOT DETECTED
SMEAR, RESPIRATORY: NORMAL
SMEAR, RESPIRATORY: NORMAL
SODIUM BLD-SCNC: 132 MMOL/L (ref 132–146)
SODIUM BLD-SCNC: 133 MMOL/L (ref 132–146)
SODIUM BLD-SCNC: 134 MMOL/L (ref 132–146)
SODIUM BLD-SCNC: 135 MMOL/L (ref 132–146)
SODIUM BLD-SCNC: 136 MMOL/L (ref 132–146)
SODIUM BLD-SCNC: 136 MMOL/L (ref 132–146)
SODIUM BLD-SCNC: 137 MMOL/L (ref 132–146)
SODIUM BLD-SCNC: 138 MMOL/L (ref 132–146)
SODIUM BLD-SCNC: 139 MMOL/L (ref 132–146)
SODIUM BLD-SCNC: 140 MMOL/L (ref 132–146)
SODIUM BLD-SCNC: 140 MMOL/L (ref 132–146)
SODIUM BLD-SCNC: 141 MMOL/L (ref 132–146)
SODIUM BLD-SCNC: 141 MMOL/L (ref 132–146)
SODIUM BLD-SCNC: 142 MMOL/L (ref 132–146)
SODIUM BLD-SCNC: 143 MMOL/L (ref 132–146)
SODIUM BLD-SCNC: 144 MMOL/L (ref 132–146)
SODIUM BLD-SCNC: 144 MMOL/L (ref 132–146)
SODIUM BLD-SCNC: 145 MMOL/L (ref 132–146)
SODIUM BLD-SCNC: 145 MMOL/L (ref 132–146)
SODIUM BLD-SCNC: 146 MMOL/L (ref 132–146)
SODIUM URINE: 22 MMOL/L
SODIUM URINE: 72 MMOL/L
SOURCE OF BLOOD CULTURE: ABNORMAL
SOURCE, BLOOD GAS: ABNORMAL
SOURCE, BLOOD GAS: NORMAL
SPECIFIC GRAVITY UA: 1.02 (ref 1–1.03)
STAPHYLOCOCCUS AUREUS BY PCR: NOT DETECTED
STAPHYLOCOCCUS SPECIES BY PCR: DETECTED
STREP PNEUMONIAE ANTIGEN, URINE: NORMAL
STREPTOCOCCUS AGALACTIAE BY PCR: NOT DETECTED
STREPTOCOCCUS PNEUMONIAE BY PCR: NOT DETECTED
STREPTOCOCCUS PYOGENES  BY PCR: NOT DETECTED
STREPTOCOCCUS SPECIES BY PCR: NOT DETECTED
T3 FREE: 1.1 PG/ML (ref 2–4.4)
T3 TOTAL: 42.34 NG/DL (ref 80–200)
T4 FREE: 0.93 NG/DL (ref 0.93–1.7)
T4 FREE: 0.95 NG/DL (ref 0.93–1.7)
TARGET CELLS: ABNORMAL
TEAR DROP CELLS: ABNORMAL
THB: 10 G/DL (ref 11.5–16.5)
THB: 10 G/DL (ref 11.5–16.5)
THB: 10.3 G/DL (ref 11.5–16.5)
THB: 10.4 G/DL (ref 11.5–16.5)
THB: 10.6 G/DL (ref 11.5–16.5)
THB: 10.9 G/DL (ref 11.5–16.5)
THB: 11.5 G/DL (ref 11.5–16.5)
THB: 12.3 G/DL (ref 11.5–16.5)
THB: 8.1 G/DL (ref 11.5–16.5)
THB: 8.1 G/DL (ref 11.5–16.5)
THB: 8.5 G/DL (ref 11.5–16.5)
THB: 8.7 G/DL (ref 11.5–16.5)
THB: 9.3 G/DL (ref 11.5–16.5)
THB: 9.6 G/DL (ref 11.5–16.5)
THB: 9.6 G/DL (ref 11.5–16.5)
THB: 9.7 G/DL (ref 11.5–16.5)
THB: 9.9 G/DL (ref 11.5–16.5)
TIDAL VOLUME: 350 ML
TIME ANALYZED: 1300
TIME ANALYZED: 1330
TIME ANALYZED: 144
TIME ANALYZED: 1610
TIME ANALYZED: 1656
TIME ANALYZED: 1725
TIME ANALYZED: 1931
TIME ANALYZED: 2133
TIME ANALYZED: 2157
TIME ANALYZED: 2248
TIME ANALYZED: 2306
TIME ANALYZED: 454
TIME ANALYZED: 508
TIME ANALYZED: 514
TIME ANALYZED: 515
TIME ANALYZED: 540
TIME ANALYZED: 914
TOTAL CK: 132 U/L (ref 20–180)
TOTAL CK: 57 U/L (ref 20–180)
TOTAL PROTEIN: 4.3 G/DL (ref 6.4–8.3)
TOTAL PROTEIN: 4.6 G/DL (ref 6.4–8.3)
TOTAL PROTEIN: 4.6 G/DL (ref 6.4–8.3)
TOTAL PROTEIN: 5 G/DL (ref 6.4–8.3)
TOTAL PROTEIN: 5 G/DL (ref 6.4–8.3)
TOTAL PROTEIN: 5.1 G/DL (ref 6.4–8.3)
TOTAL PROTEIN: 5.3 G/DL (ref 6.4–8.3)
TOTAL PROTEIN: 5.4 G/DL (ref 6.4–8.3)
TOTAL PROTEIN: 5.5 G/DL (ref 6.4–8.3)
TOTAL PROTEIN: 5.5 G/DL (ref 6.4–8.3)
TOTAL PROTEIN: 5.6 G/DL (ref 6.4–8.3)
TOTAL PROTEIN: 6 G/DL (ref 6.4–8.3)
TOTAL PROTEIN: 6 G/DL (ref 6.4–8.3)
TOTAL PROTEIN: 6.1 G/DL (ref 6.4–8.3)
TOTAL PROTEIN: 6.2 G/DL (ref 6.4–8.3)
TOXIC GRANULATION: ABNORMAL
TROPONIN, HIGH SENSITIVITY: 104 NG/L (ref 0–9)
TROPONIN, HIGH SENSITIVITY: 134 NG/L (ref 0–9)
TROPONIN, HIGH SENSITIVITY: 143 NG/L (ref 0–9)
TROPONIN, HIGH SENSITIVITY: 66 NG/L (ref 0–9)
TROPONIN, HIGH SENSITIVITY: 91 NG/L (ref 0–9)
TSH SERPL DL<=0.05 MIU/L-ACNC: 0.13 UIU/ML (ref 0.27–4.2)
TSH SERPL DL<=0.05 MIU/L-ACNC: 0.15 UIU/ML (ref 0.27–4.2)
UREA NITROGEN, UR: 862 MG/DL (ref 800–1666)
URINE CULTURE, ROUTINE: NORMAL
URINE CULTURE, ROUTINE: NORMAL
UROBILINOGEN, URINE: 0.2 E.U./DL
VACUOLATED NEUTROPHILS: ABNORMAL
VANCOMYCIN RANDOM: 17.3 MCG/ML (ref 5–40)
VANCOMYCIN RANDOM: 18.8 MCG/ML (ref 5–40)
VANCOMYCIN RANDOM: 20.4 MCG/ML (ref 5–40)
VANCOMYCIN RANDOM: 21.5 MCG/ML (ref 5–40)
VANCOMYCIN RANDOM: 22.6 MCG/ML (ref 5–40)
VANCOMYCIN RANDOM: 22.7 MCG/ML (ref 5–40)
VANCOMYCIN RANDOM: 23.2 MCG/ML (ref 5–40)
VANCOMYCIN TROUGH: 31.6 MCG/ML (ref 5–16)
VITAMIN B-12: >2000 PG/ML (ref 211–946)
VT MECHANICAL: 350 ML
VT MECHANICAL: 400 ML
WBC # BLD: 11.4 E9/L (ref 4.5–11.5)
WBC # BLD: 11.9 E9/L (ref 4.5–11.5)
WBC # BLD: 12.2 E9/L (ref 4.5–11.5)
WBC # BLD: 12.3 E9/L (ref 4.5–11.5)
WBC # BLD: 13.3 E9/L (ref 4.5–11.5)
WBC # BLD: 13.7 E9/L (ref 4.5–11.5)
WBC # BLD: 14.5 E9/L (ref 4.5–11.5)
WBC # BLD: 14.9 E9/L (ref 4.5–11.5)
WBC # BLD: 14.9 E9/L (ref 4.5–11.5)
WBC # BLD: 15.1 E9/L (ref 4.5–11.5)
WBC # BLD: 15.3 E9/L (ref 4.5–11.5)
WBC # BLD: 15.3 E9/L (ref 4.5–11.5)
WBC # BLD: 16.8 E9/L (ref 4.5–11.5)
WBC # BLD: 16.9 E9/L (ref 4.5–11.5)
WBC # BLD: 2.7 E9/L (ref 4.5–11.5)
WBC # BLD: 3 E9/L (ref 4.5–11.5)
WBC # BLD: 3.2 E9/L (ref 4.5–11.5)
WBC # BLD: 3.5 E9/L (ref 4.5–11.5)
WBC # BLD: 3.8 E9/L (ref 4.5–11.5)
WBC # BLD: 3.8 E9/L (ref 4.5–11.5)
WBC # BLD: 3.9 E9/L (ref 4.5–11.5)
WBC # BLD: 4.9 E9/L (ref 4.5–11.5)
WBC # BLD: 6 E9/L (ref 4.5–11.5)
WBC # BLD: 6.3 E9/L (ref 4.5–11.5)
WBC # BLD: 6.6 E9/L (ref 4.5–11.5)
WBC # BLD: 7.1 E9/L (ref 4.5–11.5)
WBC UA: ABNORMAL /HPF (ref 0–5)

## 2021-01-01 PROCEDURE — 86902 BLOOD TYPE ANTIGEN DONOR EA: CPT

## 2021-01-01 PROCEDURE — 82044 UR ALBUMIN SEMIQUANTITATIVE: CPT

## 2021-01-01 PROCEDURE — 90935 HEMODIALYSIS ONE EVALUATION: CPT

## 2021-01-01 PROCEDURE — 2580000003 HC RX 258: Performed by: STUDENT IN AN ORGANIZED HEALTH CARE EDUCATION/TRAINING PROGRAM

## 2021-01-01 PROCEDURE — 93005 ELECTROCARDIOGRAM TRACING: CPT | Performed by: STUDENT IN AN ORGANIZED HEALTH CARE EDUCATION/TRAINING PROGRAM

## 2021-01-01 PROCEDURE — 6370000000 HC RX 637 (ALT 250 FOR IP): Performed by: INTERNAL MEDICINE

## 2021-01-01 PROCEDURE — 82550 ASSAY OF CK (CPK): CPT

## 2021-01-01 PROCEDURE — 2500000003 HC RX 250 WO HCPCS: Performed by: STUDENT IN AN ORGANIZED HEALTH CARE EDUCATION/TRAINING PROGRAM

## 2021-01-01 PROCEDURE — 2580000003 HC RX 258: Performed by: INTERNAL MEDICINE

## 2021-01-01 PROCEDURE — 6360000002 HC RX W HCPCS: Performed by: INTERNAL MEDICINE

## 2021-01-01 PROCEDURE — 71045 X-RAY EXAM CHEST 1 VIEW: CPT

## 2021-01-01 PROCEDURE — 85025 COMPLETE CBC W/AUTO DIFF WBC: CPT

## 2021-01-01 PROCEDURE — 6360000002 HC RX W HCPCS: Performed by: STUDENT IN AN ORGANIZED HEALTH CARE EDUCATION/TRAINING PROGRAM

## 2021-01-01 PROCEDURE — 6360000002 HC RX W HCPCS: Performed by: NURSE PRACTITIONER

## 2021-01-01 PROCEDURE — 83615 LACTATE (LD) (LDH) ENZYME: CPT

## 2021-01-01 PROCEDURE — 85014 HEMATOCRIT: CPT

## 2021-01-01 PROCEDURE — 36415 COLL VENOUS BLD VENIPUNCTURE: CPT

## 2021-01-01 PROCEDURE — 80048 BASIC METABOLIC PNL TOTAL CA: CPT

## 2021-01-01 PROCEDURE — 2500000003 HC RX 250 WO HCPCS

## 2021-01-01 PROCEDURE — 80202 ASSAY OF VANCOMYCIN: CPT

## 2021-01-01 PROCEDURE — 93010 ELECTROCARDIOGRAM REPORT: CPT | Performed by: INTERNAL MEDICINE

## 2021-01-01 PROCEDURE — 85045 AUTOMATED RETICULOCYTE COUNT: CPT

## 2021-01-01 PROCEDURE — 86880 COOMBS TEST DIRECT: CPT

## 2021-01-01 PROCEDURE — 36556 INSERT NON-TUNNEL CV CATH: CPT

## 2021-01-01 PROCEDURE — 99223 1ST HOSP IP/OBS HIGH 75: CPT | Performed by: INTERNAL MEDICINE

## 2021-01-01 PROCEDURE — 94003 VENT MGMT INPAT SUBQ DAY: CPT

## 2021-01-01 PROCEDURE — 6370000000 HC RX 637 (ALT 250 FOR IP): Performed by: HOSPITALIST

## 2021-01-01 PROCEDURE — 83735 ASSAY OF MAGNESIUM: CPT

## 2021-01-01 PROCEDURE — 6370000000 HC RX 637 (ALT 250 FOR IP): Performed by: NURSE PRACTITIONER

## 2021-01-01 PROCEDURE — 87040 BLOOD CULTURE FOR BACTERIA: CPT

## 2021-01-01 PROCEDURE — 82962 GLUCOSE BLOOD TEST: CPT

## 2021-01-01 PROCEDURE — 6360000002 HC RX W HCPCS

## 2021-01-01 PROCEDURE — 85610 PROTHROMBIN TIME: CPT

## 2021-01-01 PROCEDURE — 99233 SBSQ HOSP IP/OBS HIGH 50: CPT | Performed by: INTERNAL MEDICINE

## 2021-01-01 PROCEDURE — 93970 EXTREMITY STUDY: CPT

## 2021-01-01 PROCEDURE — 2500000003 HC RX 250 WO HCPCS: Performed by: INTERNAL MEDICINE

## 2021-01-01 PROCEDURE — 84100 ASSAY OF PHOSPHORUS: CPT

## 2021-01-01 PROCEDURE — 85378 FIBRIN DEGRADE SEMIQUANT: CPT

## 2021-01-01 PROCEDURE — 31500 INSERT EMERGENCY AIRWAY: CPT

## 2021-01-01 PROCEDURE — 86901 BLOOD TYPING SEROLOGIC RH(D): CPT

## 2021-01-01 PROCEDURE — 82330 ASSAY OF CALCIUM: CPT

## 2021-01-01 PROCEDURE — 94664 DEMO&/EVAL PT USE INHALER: CPT

## 2021-01-01 PROCEDURE — 86922 COMPATIBILITY TEST ANTIGLOB: CPT

## 2021-01-01 PROCEDURE — 2580000003 HC RX 258: Performed by: NURSE PRACTITIONER

## 2021-01-01 PROCEDURE — 94660 CPAP INITIATION&MGMT: CPT

## 2021-01-01 PROCEDURE — 2580000003 HC RX 258: Performed by: HOSPITALIST

## 2021-01-01 PROCEDURE — 2000000000 HC ICU R&B

## 2021-01-01 PROCEDURE — 87088 URINE BACTERIA CULTURE: CPT

## 2021-01-01 PROCEDURE — 99309 SBSQ NF CARE MODERATE MDM 30: CPT | Performed by: NURSE PRACTITIONER

## 2021-01-01 PROCEDURE — 86334 IMMUNOFIX E-PHORESIS SERUM: CPT

## 2021-01-01 PROCEDURE — 83010 ASSAY OF HAPTOGLOBIN QUANT: CPT

## 2021-01-01 PROCEDURE — 93005 ELECTROCARDIOGRAM TRACING: CPT | Performed by: INTERNAL MEDICINE

## 2021-01-01 PROCEDURE — 86140 C-REACTIVE PROTEIN: CPT

## 2021-01-01 PROCEDURE — 83605 ASSAY OF LACTIC ACID: CPT

## 2021-01-01 PROCEDURE — 82570 ASSAY OF URINE CREATININE: CPT

## 2021-01-01 PROCEDURE — 85730 THROMBOPLASTIN TIME PARTIAL: CPT

## 2021-01-01 PROCEDURE — 80076 HEPATIC FUNCTION PANEL: CPT

## 2021-01-01 PROCEDURE — 86850 RBC ANTIBODY SCREEN: CPT

## 2021-01-01 PROCEDURE — 82803 BLOOD GASES ANY COMBINATION: CPT

## 2021-01-01 PROCEDURE — 93005 ELECTROCARDIOGRAM TRACING: CPT | Performed by: NURSE PRACTITIONER

## 2021-01-01 PROCEDURE — P9047 ALBUMIN (HUMAN), 25%, 50ML: HCPCS | Performed by: NURSE PRACTITIONER

## 2021-01-01 PROCEDURE — 85018 HEMOGLOBIN: CPT

## 2021-01-01 PROCEDURE — 31622 DX BRONCHOSCOPE/WASH: CPT | Performed by: INTERNAL MEDICINE

## 2021-01-01 PROCEDURE — 6370000000 HC RX 637 (ALT 250 FOR IP): Performed by: STUDENT IN AN ORGANIZED HEALTH CARE EDUCATION/TRAINING PROGRAM

## 2021-01-01 PROCEDURE — 93306 TTE W/DOPPLER COMPLETE: CPT

## 2021-01-01 PROCEDURE — 85384 FIBRINOGEN ACTIVITY: CPT

## 2021-01-01 PROCEDURE — 82805 BLOOD GASES W/O2 SATURATION: CPT

## 2021-01-01 PROCEDURE — 51702 INSERT TEMP BLADDER CATH: CPT

## 2021-01-01 PROCEDURE — 76770 US EXAM ABDO BACK WALL COMP: CPT

## 2021-01-01 PROCEDURE — 74018 RADEX ABDOMEN 1 VIEW: CPT

## 2021-01-01 PROCEDURE — 86870 RBC ANTIBODY IDENTIFICATION: CPT

## 2021-01-01 PROCEDURE — 84300 ASSAY OF URINE SODIUM: CPT

## 2021-01-01 PROCEDURE — 99222 1ST HOSP IP/OBS MODERATE 55: CPT | Performed by: SURGERY

## 2021-01-01 PROCEDURE — 82607 VITAMIN B-12: CPT

## 2021-01-01 PROCEDURE — 94640 AIRWAY INHALATION TREATMENT: CPT

## 2021-01-01 PROCEDURE — 36430 TRANSFUSION BLD/BLD COMPNT: CPT

## 2021-01-01 PROCEDURE — 0BDJ8ZX EXTRACTION OF LEFT LOWER LUNG LOBE, VIA NATURAL OR ARTIFICIAL OPENING ENDOSCOPIC, DIAGNOSTIC: ICD-10-PCS | Performed by: INTERNAL MEDICINE

## 2021-01-01 PROCEDURE — 82436 ASSAY OF URINE CHLORIDE: CPT

## 2021-01-01 PROCEDURE — 2580000003 HC RX 258: Performed by: PHARMACIST

## 2021-01-01 PROCEDURE — APPSS60 APP SPLIT SHARED TIME 46-60 MINUTES: Performed by: PHYSICIAN ASSISTANT

## 2021-01-01 PROCEDURE — 94760 N-INVAS EAR/PLS OXIMETRY 1: CPT

## 2021-01-01 PROCEDURE — 87205 SMEAR GRAM STAIN: CPT

## 2021-01-01 PROCEDURE — P9047 ALBUMIN (HUMAN), 25%, 50ML: HCPCS

## 2021-01-01 PROCEDURE — 37799 UNLISTED PX VASCULAR SURGERY: CPT

## 2021-01-01 PROCEDURE — 93971 EXTREMITY STUDY: CPT | Performed by: RADIOLOGY

## 2021-01-01 PROCEDURE — 86022 PLATELET ANTIBODIES: CPT

## 2021-01-01 PROCEDURE — 84133 ASSAY OF URINE POTASSIUM: CPT

## 2021-01-01 PROCEDURE — 83880 ASSAY OF NATRIURETIC PEPTIDE: CPT

## 2021-01-01 PROCEDURE — 72125 CT NECK SPINE W/O DYE: CPT

## 2021-01-01 PROCEDURE — 96365 THER/PROPH/DIAG IV INF INIT: CPT

## 2021-01-01 PROCEDURE — 31646 BRNCHSC W/THER ASPIR SBSQ: CPT

## 2021-01-01 PROCEDURE — C9113 INJ PANTOPRAZOLE SODIUM, VIA: HCPCS | Performed by: INTERNAL MEDICINE

## 2021-01-01 PROCEDURE — 6370000000 HC RX 637 (ALT 250 FOR IP)

## 2021-01-01 PROCEDURE — 87541 LEGION PNEUMO DNA AMP PROB: CPT

## 2021-01-01 PROCEDURE — 85027 COMPLETE CBC AUTOMATED: CPT

## 2021-01-01 PROCEDURE — 80162 ASSAY OF DIGOXIN TOTAL: CPT

## 2021-01-01 PROCEDURE — 80053 COMPREHEN METABOLIC PANEL: CPT

## 2021-01-01 PROCEDURE — 2500000003 HC RX 250 WO HCPCS: Performed by: NURSE PRACTITIONER

## 2021-01-01 PROCEDURE — 84165 PROTEIN E-PHORESIS SERUM: CPT

## 2021-01-01 PROCEDURE — 84484 ASSAY OF TROPONIN QUANT: CPT

## 2021-01-01 PROCEDURE — 5A1D70Z PERFORMANCE OF URINARY FILTRATION, INTERMITTENT, LESS THAN 6 HOURS PER DAY: ICD-10-PCS | Performed by: INTERNAL MEDICINE

## 2021-01-01 PROCEDURE — 99232 SBSQ HOSP IP/OBS MODERATE 35: CPT | Performed by: SURGERY

## 2021-01-01 PROCEDURE — 99291 CRITICAL CARE FIRST HOUR: CPT | Performed by: INTERNAL MEDICINE

## 2021-01-01 PROCEDURE — 87070 CULTURE OTHR SPECIMN AEROBIC: CPT

## 2021-01-01 PROCEDURE — 87798 DETECT AGENT NOS DNA AMP: CPT

## 2021-01-01 PROCEDURE — 87077 CULTURE AEROBIC IDENTIFY: CPT

## 2021-01-01 PROCEDURE — 82533 TOTAL CORTISOL: CPT

## 2021-01-01 PROCEDURE — 06HY33Z INSERTION OF INFUSION DEVICE INTO LOWER VEIN, PERCUTANEOUS APPROACH: ICD-10-PCS | Performed by: STUDENT IN AN ORGANIZED HEALTH CARE EDUCATION/TRAINING PROGRAM

## 2021-01-01 PROCEDURE — 86905 BLOOD TYPING RBC ANTIGENS: CPT

## 2021-01-01 PROCEDURE — 89051 BODY FLUID CELL COUNT: CPT

## 2021-01-01 PROCEDURE — 87081 CULTURE SCREEN ONLY: CPT

## 2021-01-01 PROCEDURE — P9073 PLATELETS PHERESIS PATH REDU: HCPCS

## 2021-01-01 PROCEDURE — 84145 PROCALCITONIN (PCT): CPT

## 2021-01-01 PROCEDURE — 2700000000 HC OXYGEN THERAPY PER DAY

## 2021-01-01 PROCEDURE — 71250 CT THORAX DX C-: CPT

## 2021-01-01 PROCEDURE — 99285 EMERGENCY DEPT VISIT HI MDM: CPT

## 2021-01-01 PROCEDURE — 70450 CT HEAD/BRAIN W/O DYE: CPT

## 2021-01-01 PROCEDURE — 86706 HEP B SURFACE ANTIBODY: CPT

## 2021-01-01 PROCEDURE — 84134 ASSAY OF PREALBUMIN: CPT

## 2021-01-01 PROCEDURE — 88112 CYTOPATH CELL ENHANCE TECH: CPT

## 2021-01-01 PROCEDURE — 84439 ASSAY OF FREE THYROXINE: CPT

## 2021-01-01 PROCEDURE — 82140 ASSAY OF AMMONIA: CPT

## 2021-01-01 PROCEDURE — 84132 ASSAY OF SERUM POTASSIUM: CPT

## 2021-01-01 PROCEDURE — 82746 ASSAY OF FOLIC ACID SERUM: CPT

## 2021-01-01 PROCEDURE — 80074 ACUTE HEPATITIS PANEL: CPT

## 2021-01-01 PROCEDURE — 51798 US URINE CAPACITY MEASURE: CPT

## 2021-01-01 PROCEDURE — 87186 SC STD MICRODIL/AGAR DIL: CPT

## 2021-01-01 PROCEDURE — 87206 SMEAR FLUORESCENT/ACID STAI: CPT

## 2021-01-01 PROCEDURE — 84443 ASSAY THYROID STIM HORMONE: CPT

## 2021-01-01 PROCEDURE — P9016 RBC LEUKOCYTES REDUCED: HCPCS

## 2021-01-01 PROCEDURE — 96361 HYDRATE IV INFUSION ADD-ON: CPT

## 2021-01-01 PROCEDURE — 2060000000 HC ICU INTERMEDIATE R&B

## 2021-01-01 PROCEDURE — 83935 ASSAY OF URINE OSMOLALITY: CPT

## 2021-01-01 PROCEDURE — 99222 1ST HOSP IP/OBS MODERATE 55: CPT | Performed by: NEUROLOGICAL SURGERY

## 2021-01-01 PROCEDURE — 36592 COLLECT BLOOD FROM PICC: CPT

## 2021-01-01 PROCEDURE — 87449 NOS EACH ORGANISM AG IA: CPT

## 2021-01-01 PROCEDURE — 94002 VENT MGMT INPAT INIT DAY: CPT

## 2021-01-01 PROCEDURE — 86900 BLOOD TYPING SEROLOGIC ABO: CPT

## 2021-01-01 PROCEDURE — 82553 CREATINE MB FRACTION: CPT

## 2021-01-01 PROCEDURE — 82784 ASSAY IGA/IGD/IGG/IGM EACH: CPT

## 2021-01-01 PROCEDURE — 86971 RBC PRETX INCUBATJ W/ENZYMES: CPT

## 2021-01-01 PROCEDURE — 84480 ASSAY TRIIODOTHYRONINE (T3): CPT

## 2021-01-01 PROCEDURE — 99222 1ST HOSP IP/OBS MODERATE 55: CPT | Performed by: FAMILY MEDICINE

## 2021-01-01 PROCEDURE — 36620 INSERTION CATHETER ARTERY: CPT

## 2021-01-01 PROCEDURE — 36600 WITHDRAWAL OF ARTERIAL BLOOD: CPT

## 2021-01-01 PROCEDURE — 6360000002 HC RX W HCPCS: Performed by: PHARMACIST

## 2021-01-01 PROCEDURE — 87635 SARS-COV-2 COVID-19 AMP PRB: CPT

## 2021-01-01 PROCEDURE — 99222 1ST HOSP IP/OBS MODERATE 55: CPT | Performed by: OTOLARYNGOLOGY

## 2021-01-01 PROCEDURE — 0BH18EZ INSERTION OF ENDOTRACHEAL AIRWAY INTO TRACHEA, VIA NATURAL OR ARTIFICIAL OPENING ENDOSCOPIC: ICD-10-PCS | Performed by: HOSPITALIST

## 2021-01-01 PROCEDURE — 83930 ASSAY OF BLOOD OSMOLALITY: CPT

## 2021-01-01 PROCEDURE — 84481 FREE ASSAY (FT-3): CPT

## 2021-01-01 PROCEDURE — 87102 FUNGUS ISOLATION CULTURE: CPT

## 2021-01-01 PROCEDURE — 83690 ASSAY OF LIPASE: CPT

## 2021-01-01 PROCEDURE — 81001 URINALYSIS AUTO W/SCOPE: CPT

## 2021-01-01 PROCEDURE — 5A1955Z RESPIRATORY VENTILATION, GREATER THAN 96 CONSECUTIVE HOURS: ICD-10-PCS | Performed by: HOSPITALIST

## 2021-01-01 PROCEDURE — 84540 ASSAY OF URINE/UREA-N: CPT

## 2021-01-01 PROCEDURE — 84166 PROTEIN E-PHORESIS/URINE/CSF: CPT

## 2021-01-01 PROCEDURE — 82010 KETONE BODYS QUAN: CPT

## 2021-01-01 RX ORDER — 0.9 % SODIUM CHLORIDE 0.9 %
500 INTRAVENOUS SOLUTION INTRAVENOUS ONCE
Status: COMPLETED | OUTPATIENT
Start: 2021-01-01 | End: 2021-01-01

## 2021-01-01 RX ORDER — DIGOXIN 0.25 MG/ML
125 INJECTION INTRAMUSCULAR; INTRAVENOUS ONCE
Status: COMPLETED | OUTPATIENT
Start: 2021-01-01 | End: 2021-01-01

## 2021-01-01 RX ORDER — HEPARIN SODIUM 1000 [USP'U]/ML
4000 INJECTION, SOLUTION INTRAVENOUS; SUBCUTANEOUS ONCE
Status: COMPLETED | OUTPATIENT
Start: 2021-01-01 | End: 2021-01-01

## 2021-01-01 RX ORDER — FUROSEMIDE 20 MG/1
20 TABLET ORAL DAILY
Status: DISCONTINUED | OUTPATIENT
Start: 2021-01-01 | End: 2021-01-01

## 2021-01-01 RX ORDER — 0.9 % SODIUM CHLORIDE 0.9 %
30 INTRAVENOUS SOLUTION INTRAVENOUS ONCE
Status: COMPLETED | OUTPATIENT
Start: 2021-01-01 | End: 2021-01-01

## 2021-01-01 RX ORDER — DIGOXIN 0.25 MG/ML
500 INJECTION INTRAMUSCULAR; INTRAVENOUS ONCE
Status: COMPLETED | OUTPATIENT
Start: 2021-01-01 | End: 2021-01-01

## 2021-01-01 RX ORDER — SODIUM CHLORIDE 9 MG/ML
INJECTION, SOLUTION INTRAVENOUS PRN
Status: DISCONTINUED | OUTPATIENT
Start: 2021-01-01 | End: 2021-01-01

## 2021-01-01 RX ORDER — GUAIFENESIN/DEXTROMETHORPHAN 100-10MG/5
5 SYRUP ORAL EVERY 4 HOURS PRN
Status: DISCONTINUED | OUTPATIENT
Start: 2021-01-01 | End: 2021-01-01 | Stop reason: HOSPADM

## 2021-01-01 RX ORDER — SODIUM CHLORIDE 9 MG/ML
INJECTION, SOLUTION INTRAVENOUS CONTINUOUS
Status: DISCONTINUED | OUTPATIENT
Start: 2021-01-01 | End: 2021-01-01

## 2021-01-01 RX ORDER — GABAPENTIN 100 MG/1
100 CAPSULE ORAL 2 TIMES DAILY
Status: DISCONTINUED | OUTPATIENT
Start: 2021-01-01 | End: 2021-01-01

## 2021-01-01 RX ORDER — AMLODIPINE BESYLATE 10 MG/1
10 TABLET ORAL DAILY
Status: DISCONTINUED | OUTPATIENT
Start: 2021-01-01 | End: 2021-01-01

## 2021-01-01 RX ORDER — LENALIDOMIDE 10 MG/1
10 CAPSULE ORAL DAILY
Status: DISCONTINUED | OUTPATIENT
Start: 2021-01-01 | End: 2021-01-01

## 2021-01-01 RX ORDER — AMLODIPINE BESYLATE 10 MG/1
10 TABLET ORAL DAILY
Qty: 30 TABLET | Refills: 3 | Status: SHIPPED | OUTPATIENT
Start: 2021-01-01

## 2021-01-01 RX ORDER — MIDODRINE HYDROCHLORIDE 10 MG/1
10 TABLET ORAL
Status: DISCONTINUED | OUTPATIENT
Start: 2021-01-01 | End: 2021-01-01

## 2021-01-01 RX ORDER — IPRATROPIUM BROMIDE AND ALBUTEROL SULFATE 2.5; .5 MG/3ML; MG/3ML
1 SOLUTION RESPIRATORY (INHALATION)
Status: DISCONTINUED | OUTPATIENT
Start: 2021-01-01 | End: 2021-01-01 | Stop reason: SDUPTHER

## 2021-01-01 RX ORDER — SODIUM CHLORIDE 9 MG/ML
25 INJECTION, SOLUTION INTRAVENOUS PRN
Status: DISCONTINUED | OUTPATIENT
Start: 2021-01-01 | End: 2021-07-07 | Stop reason: HOSPADM

## 2021-01-01 RX ORDER — AMLODIPINE BESYLATE 10 MG/1
10 TABLET ORAL DAILY
Status: DISCONTINUED | OUTPATIENT
Start: 2021-01-01 | End: 2021-01-01 | Stop reason: HOSPADM

## 2021-01-01 RX ORDER — SUCCINYLCHOLINE/SOD CL,ISO/PF 100 MG/5ML
SYRINGE (ML) INTRAVENOUS PRN
Status: DISCONTINUED | OUTPATIENT
Start: 2021-01-01 | End: 2021-01-01 | Stop reason: HOSPADM

## 2021-01-01 RX ORDER — NICOTINE POLACRILEX 4 MG
15 LOZENGE BUCCAL PRN
Status: DISCONTINUED | OUTPATIENT
Start: 2021-01-01 | End: 2021-01-01

## 2021-01-01 RX ORDER — GLYCOPYRROLATE 0.2 MG/ML
0.2 INJECTION INTRAMUSCULAR; INTRAVENOUS EVERY 4 HOURS PRN
Status: DISCONTINUED | OUTPATIENT
Start: 2021-01-01 | End: 2021-07-07 | Stop reason: HOSPADM

## 2021-01-01 RX ORDER — FENTANYL CITRATE 50 UG/ML
25 INJECTION, SOLUTION INTRAMUSCULAR; INTRAVENOUS EVERY 10 MIN PRN
Status: DISCONTINUED | OUTPATIENT
Start: 2021-01-01 | End: 2021-07-07 | Stop reason: HOSPADM

## 2021-01-01 RX ORDER — DIPHENHYDRAMINE HYDROCHLORIDE 50 MG/ML
25 INJECTION INTRAMUSCULAR; INTRAVENOUS ONCE
Status: COMPLETED | OUTPATIENT
Start: 2021-01-01 | End: 2021-01-01

## 2021-01-01 RX ORDER — MIRTAZAPINE 15 MG/1
7.5 TABLET, FILM COATED ORAL NIGHTLY
COMMUNITY

## 2021-01-01 RX ORDER — FLUCONAZOLE 2 MG/ML
400 INJECTION, SOLUTION INTRAVENOUS EVERY 24 HOURS
Status: DISCONTINUED | OUTPATIENT
Start: 2021-01-01 | End: 2021-01-01

## 2021-01-01 RX ORDER — BENZONATATE 100 MG/1
100 CAPSULE ORAL 3 TIMES DAILY PRN
COMMUNITY

## 2021-01-01 RX ORDER — DEXTROSE MONOHYDRATE 25 G/50ML
12.5 INJECTION, SOLUTION INTRAVENOUS PRN
Status: DISCONTINUED | OUTPATIENT
Start: 2021-01-01 | End: 2021-01-01

## 2021-01-01 RX ORDER — IPRATROPIUM BROMIDE AND ALBUTEROL SULFATE 2.5; .5 MG/3ML; MG/3ML
1 SOLUTION RESPIRATORY (INHALATION) EVERY 4 HOURS
Status: DISCONTINUED | OUTPATIENT
Start: 2021-01-01 | End: 2021-01-01 | Stop reason: SDUPTHER

## 2021-01-01 RX ORDER — FLUCONAZOLE 2 MG/ML
400 INJECTION, SOLUTION INTRAVENOUS ONCE
Status: COMPLETED | OUTPATIENT
Start: 2021-01-01 | End: 2021-01-01

## 2021-01-01 RX ORDER — ETOMIDATE 2 MG/ML
INJECTION INTRAVENOUS
Status: COMPLETED
Start: 2021-01-01 | End: 2021-01-01

## 2021-01-01 RX ORDER — POLYETHYLENE GLYCOL 3350 17 G/17G
17 POWDER, FOR SOLUTION ORAL DAILY PRN
Status: DISCONTINUED | OUTPATIENT
Start: 2021-01-01 | End: 2021-01-01

## 2021-01-01 RX ORDER — DOCUSATE SODIUM 100 MG/1
100 CAPSULE, LIQUID FILLED ORAL 2 TIMES DAILY PRN
COMMUNITY

## 2021-01-01 RX ORDER — DIGOXIN 125 MCG
125 TABLET ORAL ONCE
Status: DISCONTINUED | OUTPATIENT
Start: 2021-01-01 | End: 2021-01-01

## 2021-01-01 RX ORDER — LORAZEPAM 2 MG/ML
0.5 INJECTION INTRAMUSCULAR
Status: DISCONTINUED | OUTPATIENT
Start: 2021-01-01 | End: 2021-07-07 | Stop reason: HOSPADM

## 2021-01-01 RX ORDER — ALBUMIN (HUMAN) 12.5 G/50ML
25 SOLUTION INTRAVENOUS EVERY 6 HOURS
Status: COMPLETED | OUTPATIENT
Start: 2021-01-01 | End: 2021-01-01

## 2021-01-01 RX ORDER — M-VIT,TX,IRON,MINS/CALC/FOLIC 27MG-0.4MG
1 TABLET ORAL DAILY
Status: DISCONTINUED | OUTPATIENT
Start: 2021-01-01 | End: 2021-01-01

## 2021-01-01 RX ORDER — METHYLPREDNISOLONE SODIUM SUCCINATE 40 MG/ML
40 INJECTION, POWDER, LYOPHILIZED, FOR SOLUTION INTRAMUSCULAR; INTRAVENOUS ONCE
Status: COMPLETED | OUTPATIENT
Start: 2021-01-01 | End: 2021-01-01

## 2021-01-01 RX ORDER — DEXTROSE MONOHYDRATE 25 G/50ML
12.5 INJECTION, SOLUTION INTRAVENOUS ONCE
Status: COMPLETED | OUTPATIENT
Start: 2021-01-01 | End: 2021-01-01

## 2021-01-01 RX ORDER — SODIUM CHLORIDE 9 MG/ML
10 INJECTION INTRAVENOUS DAILY
Status: DISCONTINUED | OUTPATIENT
Start: 2021-01-01 | End: 2021-01-01

## 2021-01-01 RX ORDER — HYDROCODONE BITARTRATE AND ACETAMINOPHEN 10; 325 MG/1; MG/1
1 TABLET ORAL EVERY 4 HOURS PRN
Status: DISCONTINUED | OUTPATIENT
Start: 2021-01-01 | End: 2021-01-01

## 2021-01-01 RX ORDER — SODIUM CHLORIDE, SODIUM LACTATE, POTASSIUM CHLORIDE, AND CALCIUM CHLORIDE .6; .31; .03; .02 G/100ML; G/100ML; G/100ML; G/100ML
1000 INJECTION, SOLUTION INTRAVENOUS ONCE
Status: COMPLETED | OUTPATIENT
Start: 2021-01-01 | End: 2021-01-01

## 2021-01-01 RX ORDER — HEPARIN SODIUM 1000 [USP'U]/ML
4000 INJECTION, SOLUTION INTRAVENOUS; SUBCUTANEOUS PRN
Status: DISCONTINUED | OUTPATIENT
Start: 2021-01-01 | End: 2021-01-01

## 2021-01-01 RX ORDER — DIGOXIN 0.25 MG/ML
250 INJECTION INTRAMUSCULAR; INTRAVENOUS ONCE
Status: DISCONTINUED | OUTPATIENT
Start: 2021-01-01 | End: 2021-01-01

## 2021-01-01 RX ORDER — MINERAL OIL AND WHITE PETROLATUM 150; 830 MG/G; MG/G
OINTMENT OPHTHALMIC 2 TIMES DAILY
Status: DISCONTINUED | OUTPATIENT
Start: 2021-01-01 | End: 2021-07-07 | Stop reason: HOSPADM

## 2021-01-01 RX ORDER — CHLORHEXIDINE GLUCONATE 0.12 MG/ML
15 RINSE ORAL 2 TIMES DAILY
Status: DISCONTINUED | OUTPATIENT
Start: 2021-01-01 | End: 2021-01-01

## 2021-01-01 RX ORDER — ANTICOAGULANT SODIUM CITRATE SOLUTION 4 G/100ML
3 SOLUTION INTRAVENOUS PRN
Status: DISCONTINUED | OUTPATIENT
Start: 2021-01-01 | End: 2021-01-01

## 2021-01-01 RX ORDER — BENZONATATE 100 MG/1
100 CAPSULE ORAL EVERY 8 HOURS PRN
Qty: 21 CAPSULE | Refills: 0 | Status: SHIPPED | OUTPATIENT
Start: 2021-01-01 | End: 2021-01-01

## 2021-01-01 RX ORDER — MIDAZOLAM HYDROCHLORIDE 2 MG/2ML
2 INJECTION, SOLUTION INTRAMUSCULAR; INTRAVENOUS EVERY 4 HOURS PRN
Status: DISCONTINUED | OUTPATIENT
Start: 2021-01-01 | End: 2021-07-07 | Stop reason: HOSPADM

## 2021-01-01 RX ORDER — ETOMIDATE 2 MG/ML
INJECTION INTRAVENOUS PRN
Status: DISCONTINUED | OUTPATIENT
Start: 2021-01-01 | End: 2021-01-01 | Stop reason: HOSPADM

## 2021-01-01 RX ORDER — ONDANSETRON 4 MG/1
8 TABLET, FILM COATED ORAL EVERY 8 HOURS PRN
Status: DISCONTINUED | OUTPATIENT
Start: 2021-01-01 | End: 2021-01-01

## 2021-01-01 RX ORDER — DEXAMETHASONE 2 MG/1
2 TABLET ORAL
Qty: 5 TABLET | Refills: 0 | Status: SHIPPED | OUTPATIENT
Start: 2021-01-01 | End: 2021-01-01

## 2021-01-01 RX ORDER — IPRATROPIUM BROMIDE AND ALBUTEROL SULFATE 2.5; .5 MG/3ML; MG/3ML
1 SOLUTION RESPIRATORY (INHALATION)
Status: COMPLETED | OUTPATIENT
Start: 2021-01-01 | End: 2021-01-01

## 2021-01-01 RX ORDER — DEXTROSE MONOHYDRATE 25 G/50ML
12.5 INJECTION, SOLUTION INTRAVENOUS ONCE
Status: DISCONTINUED | OUTPATIENT
Start: 2021-01-01 | End: 2021-01-01

## 2021-01-01 RX ORDER — IPRATROPIUM BROMIDE AND ALBUTEROL SULFATE 2.5; .5 MG/3ML; MG/3ML
1 SOLUTION RESPIRATORY (INHALATION)
Status: DISCONTINUED | OUTPATIENT
Start: 2021-01-01 | End: 2021-01-01

## 2021-01-01 RX ORDER — ALBUMIN (HUMAN) 12.5 G/50ML
SOLUTION INTRAVENOUS
Status: COMPLETED
Start: 2021-01-01 | End: 2021-01-01

## 2021-01-01 RX ORDER — HEPARIN SODIUM 1000 [USP'U]/ML
2000 INJECTION, SOLUTION INTRAVENOUS; SUBCUTANEOUS PRN
Status: DISCONTINUED | OUTPATIENT
Start: 2021-01-01 | End: 2021-01-01

## 2021-01-01 RX ORDER — SODIUM CHLORIDE 0.9 % (FLUSH) 0.9 %
5-40 SYRINGE (ML) INJECTION EVERY 12 HOURS SCHEDULED
Status: DISCONTINUED | OUTPATIENT
Start: 2021-01-01 | End: 2021-07-07 | Stop reason: HOSPADM

## 2021-01-01 RX ORDER — ACETAMINOPHEN 325 MG/1
650 TABLET ORAL EVERY 6 HOURS PRN
Status: DISCONTINUED | OUTPATIENT
Start: 2021-01-01 | End: 2021-07-07 | Stop reason: HOSPADM

## 2021-01-01 RX ORDER — HEPARIN SODIUM 10000 [USP'U]/100ML
5-30 INJECTION, SOLUTION INTRAVENOUS CONTINUOUS
Status: DISCONTINUED | OUTPATIENT
Start: 2021-01-01 | End: 2021-01-01

## 2021-01-01 RX ORDER — LIDOCAINE HYDROCHLORIDE 10 MG/ML
INJECTION, SOLUTION EPIDURAL; INFILTRATION; INTRACAUDAL; PERINEURAL
Status: COMPLETED
Start: 2021-01-01 | End: 2021-01-01

## 2021-01-01 RX ORDER — AZITHROMYCIN 250 MG/1
500 TABLET, FILM COATED ORAL EVERY 24 HOURS
Status: DISCONTINUED | OUTPATIENT
Start: 2021-01-01 | End: 2021-01-01

## 2021-01-01 RX ORDER — ACETAMINOPHEN 650 MG/1
650 SUPPOSITORY RECTAL EVERY 6 HOURS PRN
Status: DISCONTINUED | OUTPATIENT
Start: 2021-01-01 | End: 2021-07-07 | Stop reason: HOSPADM

## 2021-01-01 RX ORDER — SODIUM CHLORIDE 0.9 % (FLUSH) 0.9 %
5-40 SYRINGE (ML) INJECTION PRN
Status: DISCONTINUED | OUTPATIENT
Start: 2021-01-01 | End: 2021-07-07 | Stop reason: HOSPADM

## 2021-01-01 RX ORDER — 0.9 % SODIUM CHLORIDE 0.9 %
1000 INTRAVENOUS SOLUTION INTRAVENOUS ONCE
Status: COMPLETED | OUTPATIENT
Start: 2021-01-01 | End: 2021-01-01

## 2021-01-01 RX ORDER — ALBUMIN (HUMAN) 12.5 G/50ML
25 SOLUTION INTRAVENOUS ONCE
Status: COMPLETED | OUTPATIENT
Start: 2021-01-01 | End: 2021-01-01

## 2021-01-01 RX ORDER — IVERMECTIN 3 MG/1
200 TABLET ORAL ONCE
Status: COMPLETED | OUTPATIENT
Start: 2021-01-01 | End: 2021-01-01

## 2021-01-01 RX ORDER — PANTOPRAZOLE SODIUM 40 MG/10ML
40 INJECTION, POWDER, LYOPHILIZED, FOR SOLUTION INTRAVENOUS DAILY
Status: DISCONTINUED | OUTPATIENT
Start: 2021-01-01 | End: 2021-01-01

## 2021-01-01 RX ORDER — POTASSIUM CHLORIDE 29.8 MG/ML
20 INJECTION INTRAVENOUS ONCE
Status: COMPLETED | OUTPATIENT
Start: 2021-01-01 | End: 2021-01-01

## 2021-01-01 RX ORDER — DEXTROSE MONOHYDRATE 50 MG/ML
100 INJECTION, SOLUTION INTRAVENOUS PRN
Status: DISCONTINUED | OUTPATIENT
Start: 2021-01-01 | End: 2021-01-01

## 2021-01-01 RX ORDER — SENNA PLUS 8.6 MG/1
1 TABLET ORAL NIGHTLY
Status: DISCONTINUED | OUTPATIENT
Start: 2021-01-01 | End: 2021-01-01

## 2021-01-01 RX ORDER — GUAIFENESIN/DEXTROMETHORPHAN 100-10MG/5
5 SYRUP ORAL EVERY 4 HOURS PRN
Qty: 120 ML | Refills: 0 | Status: SHIPPED | OUTPATIENT
Start: 2021-01-01 | End: 2021-01-01

## 2021-01-01 RX ORDER — SUCCINYLCHOLINE CHLORIDE 20 MG/ML
INJECTION INTRAMUSCULAR; INTRAVENOUS
Status: COMPLETED
Start: 2021-01-01 | End: 2021-01-01

## 2021-01-01 RX ORDER — ASCORBIC ACID 500 MG
1000 TABLET ORAL DAILY
Status: DISCONTINUED | OUTPATIENT
Start: 2021-01-01 | End: 2021-01-01

## 2021-01-01 RX ORDER — DIGOXIN 0.25 MG/ML
INJECTION INTRAMUSCULAR; INTRAVENOUS
Status: COMPLETED
Start: 2021-01-01 | End: 2021-01-01

## 2021-01-01 RX ADMIN — MINERAL OIL AND PETROLATUM: 150; 830 OINTMENT OPHTHALMIC at 20:40

## 2021-01-01 RX ADMIN — SODIUM BICARBONATE: 84 INJECTION, SOLUTION INTRAVENOUS at 13:46

## 2021-01-01 RX ADMIN — MUPIROCIN: 20 OINTMENT TOPICAL at 21:30

## 2021-01-01 RX ADMIN — Medication 10 ML: at 01:29

## 2021-01-01 RX ADMIN — PANTOPRAZOLE SODIUM 40 MG: 40 INJECTION, POWDER, FOR SOLUTION INTRAVENOUS at 07:39

## 2021-01-01 RX ADMIN — IVERMECTIN 16.5 MG: 3 TABLET ORAL at 04:12

## 2021-01-01 RX ADMIN — GABAPENTIN 100 MG: 100 CAPSULE ORAL at 21:52

## 2021-01-01 RX ADMIN — PIPERACILLIN AND TAZOBACTAM 3375 MG: 3; .375 INJECTION, POWDER, LYOPHILIZED, FOR SOLUTION INTRAVENOUS at 09:02

## 2021-01-01 RX ADMIN — Medication 1 TABLET: at 07:37

## 2021-01-01 RX ADMIN — POLYETHYLENE GLYCOL 3350 17 G: 17 POWDER, FOR SOLUTION ORAL at 10:39

## 2021-01-01 RX ADMIN — Medication 4 MCG/MIN: at 08:37

## 2021-01-01 RX ADMIN — HYDROCORTISONE SODIUM SUCCINATE 100 MG: 100 INJECTION, POWDER, FOR SOLUTION INTRAMUSCULAR; INTRAVENOUS at 23:59

## 2021-01-01 RX ADMIN — Medication 140 MG: at 21:08

## 2021-01-01 RX ADMIN — Medication 10 ML: at 20:01

## 2021-01-01 RX ADMIN — MINERAL OIL AND PETROLATUM: 150; 830 OINTMENT OPHTHALMIC at 09:13

## 2021-01-01 RX ADMIN — IPRATROPIUM BROMIDE AND ALBUTEROL SULFATE 1 AMPULE: 2.5; .5 SOLUTION RESPIRATORY (INHALATION) at 15:51

## 2021-01-01 RX ADMIN — ALBUMIN (HUMAN) 25 G: 0.25 INJECTION, SOLUTION INTRAVENOUS at 20:01

## 2021-01-01 RX ADMIN — GABAPENTIN 100 MG: 100 CAPSULE ORAL at 10:03

## 2021-01-01 RX ADMIN — Medication 175 MCG/HR: at 22:33

## 2021-01-01 RX ADMIN — PANTOPRAZOLE SODIUM 40 MG: 40 INJECTION, POWDER, FOR SOLUTION INTRAVENOUS at 09:03

## 2021-01-01 RX ADMIN — INSULIN LISPRO 2 UNITS: 100 INJECTION, SOLUTION INTRAVENOUS; SUBCUTANEOUS at 21:55

## 2021-01-01 RX ADMIN — GLYCOPYRROLATE 0.2 MG: 0.2 INJECTION INTRAMUSCULAR; INTRAVENOUS at 15:40

## 2021-01-01 RX ADMIN — INSULIN LISPRO 2 UNITS: 100 INJECTION, SOLUTION INTRAVENOUS; SUBCUTANEOUS at 06:30

## 2021-01-01 RX ADMIN — LORAZEPAM 0.5 MG: 2 INJECTION INTRAMUSCULAR; INTRAVENOUS at 23:44

## 2021-01-01 RX ADMIN — ALBUMIN (HUMAN) 25 G: 0.25 INJECTION, SOLUTION INTRAVENOUS at 06:37

## 2021-01-01 RX ADMIN — MUPIROCIN: 20 OINTMENT TOPICAL at 12:56

## 2021-01-01 RX ADMIN — Medication 10 ML: at 08:40

## 2021-01-01 RX ADMIN — MIDAZOLAM 2 MG: 1 INJECTION INTRAMUSCULAR; INTRAVENOUS at 23:41

## 2021-01-01 RX ADMIN — ACETAMINOPHEN 650 MG: 325 TABLET ORAL at 04:03

## 2021-01-01 RX ADMIN — APIXABAN 2.5 MG: 2.5 TABLET, FILM COATED ORAL at 20:27

## 2021-01-01 RX ADMIN — DOXYCYCLINE HYCLATE 100 MG: 100 CAPSULE ORAL at 21:23

## 2021-01-01 RX ADMIN — HYDROCORTISONE SODIUM SUCCINATE 50 MG: 100 INJECTION, POWDER, FOR SOLUTION INTRAMUSCULAR; INTRAVENOUS at 00:00

## 2021-01-01 RX ADMIN — MINERAL OIL AND PETROLATUM: 150; 830 OINTMENT OPHTHALMIC at 08:41

## 2021-01-01 RX ADMIN — DOXYCYCLINE HYCLATE 100 MG: 100 CAPSULE ORAL at 09:50

## 2021-01-01 RX ADMIN — PIPERACILLIN AND TAZOBACTAM 3375 MG: 3; .375 INJECTION, POWDER, LYOPHILIZED, FOR SOLUTION INTRAVENOUS at 08:40

## 2021-01-01 RX ADMIN — SODIUM CHLORIDE: 9 INJECTION, SOLUTION INTRAVENOUS at 01:28

## 2021-01-01 RX ADMIN — PIPERACILLIN AND TAZOBACTAM 3375 MG: 3; .375 INJECTION, POWDER, LYOPHILIZED, FOR SOLUTION INTRAVENOUS at 21:37

## 2021-01-01 RX ADMIN — DIGOXIN 125 MCG: 0.25 INJECTION INTRAMUSCULAR; INTRAVENOUS at 21:31

## 2021-01-01 RX ADMIN — MINERAL OIL AND PETROLATUM: 150; 830 OINTMENT OPHTHALMIC at 07:57

## 2021-01-01 RX ADMIN — REMDESIVIR 100 MG: 100 INJECTION, POWDER, LYOPHILIZED, FOR SOLUTION INTRAVENOUS at 15:06

## 2021-01-01 RX ADMIN — CHLORHEXIDINE GLUCONATE 15 ML: 1.2 RINSE ORAL at 08:28

## 2021-01-01 RX ADMIN — APIXABAN 2.5 MG: 2.5 TABLET, FILM COATED ORAL at 10:03

## 2021-01-01 RX ADMIN — Medication 10 ML: at 09:15

## 2021-01-01 RX ADMIN — DIGOXIN 500 MCG: 250 INJECTION, SOLUTION INTRAMUSCULAR; INTRAVENOUS; PARENTERAL at 11:43

## 2021-01-01 RX ADMIN — LORAZEPAM 0.5 MG: 2 INJECTION INTRAMUSCULAR; INTRAVENOUS at 15:20

## 2021-01-01 RX ADMIN — DOXYCYCLINE HYCLATE 100 MG: 100 CAPSULE ORAL at 20:26

## 2021-01-01 RX ADMIN — HYDROCODONE BITARTRATE AND ACETAMINOPHEN 1 TABLET: 10; 325 TABLET ORAL at 10:21

## 2021-01-01 RX ADMIN — DIPHENHYDRAMINE HYDROCHLORIDE 25 MG: 50 INJECTION, SOLUTION INTRAMUSCULAR; INTRAVENOUS at 14:38

## 2021-01-01 RX ADMIN — IPRATROPIUM BROMIDE AND ALBUTEROL SULFATE 1 AMPULE: 2.5; .5 SOLUTION RESPIRATORY (INHALATION) at 08:47

## 2021-01-01 RX ADMIN — IPRATROPIUM BROMIDE AND ALBUTEROL SULFATE 1 AMPULE: .5; 3 SOLUTION RESPIRATORY (INHALATION) at 11:38

## 2021-01-01 RX ADMIN — SENNOSIDES 8.6 MG: 8.6 TABLET, FILM COATED ORAL at 20:39

## 2021-01-01 RX ADMIN — IPRATROPIUM BROMIDE AND ALBUTEROL SULFATE 1 AMPULE: .5; 3 SOLUTION RESPIRATORY (INHALATION) at 10:08

## 2021-01-01 RX ADMIN — IPRATROPIUM BROMIDE AND ALBUTEROL SULFATE 1 AMPULE: 2.5; .5 SOLUTION RESPIRATORY (INHALATION) at 21:01

## 2021-01-01 RX ADMIN — OXYCODONE HYDROCHLORIDE AND ACETAMINOPHEN 500 MG: 500 TABLET ORAL at 10:26

## 2021-01-01 RX ADMIN — HYDROCORTISONE SODIUM SUCCINATE 100 MG: 100 INJECTION, POWDER, FOR SOLUTION INTRAMUSCULAR; INTRAVENOUS at 12:14

## 2021-01-01 RX ADMIN — ZINC SULFATE 220 MG (50 MG) CAPSULE 50 MG: CAPSULE at 10:19

## 2021-01-01 RX ADMIN — Medication 75 MCG/HR: at 03:00

## 2021-01-01 RX ADMIN — PIPERACILLIN AND TAZOBACTAM 3375 MG: 3; .375 INJECTION, POWDER, LYOPHILIZED, FOR SOLUTION INTRAVENOUS at 09:55

## 2021-01-01 RX ADMIN — MINERAL OIL AND PETROLATUM: 150; 830 OINTMENT OPHTHALMIC at 21:22

## 2021-01-01 RX ADMIN — SODIUM CHLORIDE: 9 INJECTION, SOLUTION INTRAVENOUS at 00:24

## 2021-01-01 RX ADMIN — Medication 175 MCG/HR: at 20:05

## 2021-01-01 RX ADMIN — Medication 10 ML: at 10:27

## 2021-01-01 RX ADMIN — Medication 10 ML: at 08:41

## 2021-01-01 RX ADMIN — INSULIN LISPRO 4 UNITS: 100 INJECTION, SOLUTION INTRAVENOUS; SUBCUTANEOUS at 09:39

## 2021-01-01 RX ADMIN — MUPIROCIN: 20 OINTMENT TOPICAL at 08:14

## 2021-01-01 RX ADMIN — ZINC SULFATE 220 MG (50 MG) CAPSULE 50 MG: CAPSULE at 09:50

## 2021-01-01 RX ADMIN — GABAPENTIN 100 MG: 100 CAPSULE ORAL at 10:19

## 2021-01-01 RX ADMIN — INSULIN LISPRO 2 UNITS: 100 INJECTION, SOLUTION INTRAVENOUS; SUBCUTANEOUS at 05:08

## 2021-01-01 RX ADMIN — Medication 10 ML: at 10:20

## 2021-01-01 RX ADMIN — Medication 100 MCG/HR: at 12:07

## 2021-01-01 RX ADMIN — HEPARIN SODIUM 16 UNITS/KG/HR: 10000 INJECTION, SOLUTION INTRAVENOUS at 23:22

## 2021-01-01 RX ADMIN — Medication 10 ML: at 21:31

## 2021-01-01 RX ADMIN — INSULIN LISPRO 2 UNITS: 100 INJECTION, SOLUTION INTRAVENOUS; SUBCUTANEOUS at 18:22

## 2021-01-01 RX ADMIN — MIDODRINE HYDROCHLORIDE 15 MG: 5 TABLET ORAL at 16:14

## 2021-01-01 RX ADMIN — IPRATROPIUM BROMIDE AND ALBUTEROL SULFATE 1 AMPULE: .5; 3 SOLUTION RESPIRATORY (INHALATION) at 16:59

## 2021-01-01 RX ADMIN — Medication 1 TABLET: at 09:40

## 2021-01-01 RX ADMIN — SENNOSIDES 8.6 MG: 8.6 TABLET, FILM COATED ORAL at 21:22

## 2021-01-01 RX ADMIN — DEXAMETHASONE SODIUM PHOSPHATE 6 MG: 4 INJECTION, SOLUTION INTRAMUSCULAR; INTRAVENOUS at 10:25

## 2021-01-01 RX ADMIN — INSULIN LISPRO 2 UNITS: 100 INJECTION, SOLUTION INTRAVENOUS; SUBCUTANEOUS at 01:43

## 2021-01-01 RX ADMIN — ALBUMIN (HUMAN) 25 G: 0.25 INJECTION, SOLUTION INTRAVENOUS at 06:45

## 2021-01-01 RX ADMIN — SODIUM CHLORIDE, PRESERVATIVE FREE 10 ML: 5 INJECTION INTRAVENOUS at 03:25

## 2021-01-01 RX ADMIN — SUCCINYLCHOLINE CHLORIDE 200 MG: 20 INJECTION, SOLUTION INTRAMUSCULAR; INTRAVENOUS at 22:30

## 2021-01-01 RX ADMIN — FENTANYL CITRATE 25 MCG: 50 INJECTION, SOLUTION INTRAMUSCULAR; INTRAVENOUS at 14:23

## 2021-01-01 RX ADMIN — ANORECTAL OINTMENT: 15.7; .44; 24; 20.6 OINTMENT TOPICAL at 10:26

## 2021-01-01 RX ADMIN — GABAPENTIN 100 MG: 100 CAPSULE ORAL at 20:26

## 2021-01-01 RX ADMIN — INSULIN LISPRO 2 UNITS: 100 INJECTION, SOLUTION INTRAVENOUS; SUBCUTANEOUS at 06:03

## 2021-01-01 RX ADMIN — Medication 1 TABLET: at 09:03

## 2021-01-01 RX ADMIN — CALCIUM GLUCONATE 1000 MG: 98 INJECTION, SOLUTION INTRAVENOUS at 13:46

## 2021-01-01 RX ADMIN — ACETAMINOPHEN 650 MG: 325 TABLET ORAL at 08:59

## 2021-01-01 RX ADMIN — DEXAMETHASONE SODIUM PHOSPHATE 6 MG: 4 INJECTION, SOLUTION INTRAMUSCULAR; INTRAVENOUS at 12:15

## 2021-01-01 RX ADMIN — ALBUMIN (HUMAN) 25 G: 0.25 INJECTION, SOLUTION INTRAVENOUS at 18:59

## 2021-01-01 RX ADMIN — MIDODRINE HYDROCHLORIDE 15 MG: 5 TABLET ORAL at 12:13

## 2021-01-01 RX ADMIN — INSULIN LISPRO 2 UNITS: 100 INJECTION, SOLUTION INTRAVENOUS; SUBCUTANEOUS at 02:11

## 2021-01-01 RX ADMIN — CHLORHEXIDINE GLUCONATE 15 ML: 1.2 RINSE ORAL at 21:30

## 2021-01-01 RX ADMIN — METHYLPREDNISOLONE SODIUM SUCCINATE 40 MG: 40 INJECTION, POWDER, FOR SOLUTION INTRAMUSCULAR; INTRAVENOUS at 14:38

## 2021-01-01 RX ADMIN — SODIUM CHLORIDE: 9 INJECTION, SOLUTION INTRAVENOUS at 12:48

## 2021-01-01 RX ADMIN — ACETAMINOPHEN 650 MG: 650 SUPPOSITORY RECTAL at 13:09

## 2021-01-01 RX ADMIN — IPRATROPIUM BROMIDE AND ALBUTEROL SULFATE 1 AMPULE: 2.5; .5 SOLUTION RESPIRATORY (INHALATION) at 09:52

## 2021-01-01 RX ADMIN — MUPIROCIN: 20 OINTMENT TOPICAL at 09:04

## 2021-01-01 RX ADMIN — INSULIN LISPRO 2 UNITS: 100 INJECTION, SOLUTION INTRAVENOUS; SUBCUTANEOUS at 12:47

## 2021-01-01 RX ADMIN — HYDROCORTISONE SODIUM SUCCINATE 100 MG: 100 INJECTION, POWDER, FOR SOLUTION INTRAMUSCULAR; INTRAVENOUS at 03:52

## 2021-01-01 RX ADMIN — CHLORHEXIDINE GLUCONATE 15 ML: 1.2 RINSE ORAL at 09:57

## 2021-01-01 RX ADMIN — GLYCOPYRROLATE 0.2 MG: 0.2 INJECTION INTRAMUSCULAR; INTRAVENOUS at 23:41

## 2021-01-01 RX ADMIN — HYDROCORTISONE SODIUM SUCCINATE 100 MG: 100 INJECTION, POWDER, FOR SOLUTION INTRAMUSCULAR; INTRAVENOUS at 12:30

## 2021-01-01 RX ADMIN — HYDROCORTISONE SODIUM SUCCINATE 100 MG: 100 INJECTION, POWDER, FOR SOLUTION INTRAMUSCULAR; INTRAVENOUS at 15:58

## 2021-01-01 RX ADMIN — MUPIROCIN: 20 OINTMENT TOPICAL at 20:19

## 2021-01-01 RX ADMIN — IPRATROPIUM BROMIDE AND ALBUTEROL SULFATE 1 AMPULE: 2.5; .5 SOLUTION RESPIRATORY (INHALATION) at 13:13

## 2021-01-01 RX ADMIN — IPRATROPIUM BROMIDE AND ALBUTEROL SULFATE 1 AMPULE: 2.5; .5 SOLUTION RESPIRATORY (INHALATION) at 17:42

## 2021-01-01 RX ADMIN — CHLORHEXIDINE GLUCONATE 15 ML: 1.2 RINSE ORAL at 20:18

## 2021-01-01 RX ADMIN — HYDROCODONE BITARTRATE AND ACETAMINOPHEN 1 TABLET: 10; 325 TABLET ORAL at 21:51

## 2021-01-01 RX ADMIN — CEFTRIAXONE 1000 MG: 1 INJECTION, POWDER, FOR SOLUTION INTRAMUSCULAR; INTRAVENOUS at 15:56

## 2021-01-01 RX ADMIN — AMLODIPINE BESYLATE 10 MG: 10 TABLET ORAL at 16:47

## 2021-01-01 RX ADMIN — ALBUMIN (HUMAN) 25 G: 0.25 INJECTION, SOLUTION INTRAVENOUS at 00:45

## 2021-01-01 RX ADMIN — ZINC SULFATE 220 MG (50 MG) CAPSULE 50 MG: CAPSULE at 10:03

## 2021-01-01 RX ADMIN — IPRATROPIUM BROMIDE AND ALBUTEROL SULFATE 1 AMPULE: 2.5; .5 SOLUTION RESPIRATORY (INHALATION) at 12:03

## 2021-01-01 RX ADMIN — Medication 2 MCG/MIN: at 17:57

## 2021-01-01 RX ADMIN — SODIUM CHLORIDE: 9 INJECTION, SOLUTION INTRAVENOUS at 13:47

## 2021-01-01 RX ADMIN — ACETAMINOPHEN 650 MG: 325 TABLET ORAL at 14:37

## 2021-01-01 RX ADMIN — Medication 1 TABLET: at 08:40

## 2021-01-01 RX ADMIN — INSULIN LISPRO 2 UNITS: 100 INJECTION, SOLUTION INTRAVENOUS; SUBCUTANEOUS at 13:38

## 2021-01-01 RX ADMIN — GABAPENTIN 100 MG: 100 CAPSULE ORAL at 10:27

## 2021-01-01 RX ADMIN — IPRATROPIUM BROMIDE AND ALBUTEROL SULFATE 1 AMPULE: 2.5; .5 SOLUTION RESPIRATORY (INHALATION) at 16:56

## 2021-01-01 RX ADMIN — DOXYCYCLINE HYCLATE 100 MG: 100 CAPSULE ORAL at 10:25

## 2021-01-01 RX ADMIN — Medication 10 ML: at 21:53

## 2021-01-01 RX ADMIN — MINERAL OIL AND PETROLATUM: 150; 830 OINTMENT OPHTHALMIC at 20:02

## 2021-01-01 RX ADMIN — SODIUM CHLORIDE: 9 INJECTION, SOLUTION INTRAVENOUS at 13:03

## 2021-01-01 RX ADMIN — MINERAL OIL AND PETROLATUM: 150; 830 OINTMENT OPHTHALMIC at 10:31

## 2021-01-01 RX ADMIN — APIXABAN 2.5 MG: 2.5 TABLET, FILM COATED ORAL at 21:22

## 2021-01-01 RX ADMIN — FENTANYL CITRATE 25 MCG: 50 INJECTION, SOLUTION INTRAMUSCULAR; INTRAVENOUS at 13:38

## 2021-01-01 RX ADMIN — Medication 1 TABLET: at 09:56

## 2021-01-01 RX ADMIN — INSULIN LISPRO 2 UNITS: 100 INJECTION, SOLUTION INTRAVENOUS; SUBCUTANEOUS at 05:19

## 2021-01-01 RX ADMIN — SODIUM CHLORIDE: 9 INJECTION, SOLUTION INTRAVENOUS at 00:45

## 2021-01-01 RX ADMIN — MINERAL OIL AND PETROLATUM: 150; 830 OINTMENT OPHTHALMIC at 20:19

## 2021-01-01 RX ADMIN — MUPIROCIN: 20 OINTMENT TOPICAL at 09:56

## 2021-01-01 RX ADMIN — INSULIN LISPRO 4 UNITS: 100 INJECTION, SOLUTION INTRAVENOUS; SUBCUTANEOUS at 18:02

## 2021-01-01 RX ADMIN — GABAPENTIN 100 MG: 100 CAPSULE ORAL at 08:29

## 2021-01-01 RX ADMIN — INSULIN LISPRO 4 UNITS: 100 INJECTION, SOLUTION INTRAVENOUS; SUBCUTANEOUS at 21:48

## 2021-01-01 RX ADMIN — Medication 10 ML: at 20:26

## 2021-01-01 RX ADMIN — Medication 1 TABLET: at 08:28

## 2021-01-01 RX ADMIN — OXYCODONE HYDROCHLORIDE AND ACETAMINOPHEN 500 MG: 500 TABLET ORAL at 10:03

## 2021-01-01 RX ADMIN — HYDROCORTISONE SODIUM SUCCINATE 100 MG: 100 INJECTION, POWDER, FOR SOLUTION INTRAMUSCULAR; INTRAVENOUS at 07:55

## 2021-01-01 RX ADMIN — HYDROCORTISONE SODIUM SUCCINATE 100 MG: 100 INJECTION, POWDER, FOR SOLUTION INTRAMUSCULAR; INTRAVENOUS at 03:47

## 2021-01-01 RX ADMIN — CHLORHEXIDINE GLUCONATE 15 ML: 1.2 RINSE ORAL at 08:13

## 2021-01-01 RX ADMIN — LORAZEPAM 0.5 MG: 2 INJECTION INTRAMUSCULAR; INTRAVENOUS at 17:46

## 2021-01-01 RX ADMIN — APIXABAN 2.5 MG: 2.5 TABLET, FILM COATED ORAL at 10:19

## 2021-01-01 RX ADMIN — INSULIN LISPRO 2 UNITS: 100 INJECTION, SOLUTION INTRAVENOUS; SUBCUTANEOUS at 18:26

## 2021-01-01 RX ADMIN — Medication 5 MCG/MIN: at 06:38

## 2021-01-01 RX ADMIN — Medication 10 ML: at 10:04

## 2021-01-01 RX ADMIN — INSULIN LISPRO 2 UNITS: 100 INJECTION, SOLUTION INTRAVENOUS; SUBCUTANEOUS at 18:07

## 2021-01-01 RX ADMIN — HYDROCORTISONE SODIUM SUCCINATE 100 MG: 100 INJECTION, POWDER, FOR SOLUTION INTRAMUSCULAR; INTRAVENOUS at 19:51

## 2021-01-01 RX ADMIN — Medication 10 ML: at 21:09

## 2021-01-01 RX ADMIN — MUPIROCIN: 20 OINTMENT TOPICAL at 20:39

## 2021-01-01 RX ADMIN — MIDODRINE HYDROCHLORIDE 15 MG: 5 TABLET ORAL at 12:31

## 2021-01-01 RX ADMIN — MINERAL OIL AND PETROLATUM: 150; 830 OINTMENT OPHTHALMIC at 21:30

## 2021-01-01 RX ADMIN — INSULIN LISPRO 2 UNITS: 100 INJECTION, SOLUTION INTRAVENOUS; SUBCUTANEOUS at 21:41

## 2021-01-01 RX ADMIN — HEPARIN SODIUM 2000 UNITS: 1000 INJECTION INTRAVENOUS; SUBCUTANEOUS at 16:32

## 2021-01-01 RX ADMIN — MIDODRINE HYDROCHLORIDE 15 MG: 5 TABLET ORAL at 09:03

## 2021-01-01 RX ADMIN — ALBUMIN (HUMAN) 25 G: 0.25 INJECTION, SOLUTION INTRAVENOUS at 01:32

## 2021-01-01 RX ADMIN — APIXABAN 2.5 MG: 2.5 TABLET, FILM COATED ORAL at 10:25

## 2021-01-01 RX ADMIN — INSULIN LISPRO 2 UNITS: 100 INJECTION, SOLUTION INTRAVENOUS; SUBCUTANEOUS at 02:40

## 2021-01-01 RX ADMIN — IPRATROPIUM BROMIDE AND ALBUTEROL SULFATE 1 AMPULE: 2.5; .5 SOLUTION RESPIRATORY (INHALATION) at 16:42

## 2021-01-01 RX ADMIN — SODIUM CHLORIDE: 9 INJECTION, SOLUTION INTRAVENOUS at 00:56

## 2021-01-01 RX ADMIN — MIDODRINE HYDROCHLORIDE 15 MG: 5 TABLET ORAL at 19:04

## 2021-01-01 RX ADMIN — FENTANYL CITRATE 25 MCG: 50 INJECTION, SOLUTION INTRAMUSCULAR; INTRAVENOUS at 23:26

## 2021-01-01 RX ADMIN — SODIUM CHLORIDE: 9 INJECTION, SOLUTION INTRAVENOUS at 00:58

## 2021-01-01 RX ADMIN — INSULIN LISPRO 2 UNITS: 100 INJECTION, SOLUTION INTRAVENOUS; SUBCUTANEOUS at 17:28

## 2021-01-01 RX ADMIN — SODIUM CHLORIDE, PRESERVATIVE FREE 10 ML: 5 INJECTION INTRAVENOUS at 09:03

## 2021-01-01 RX ADMIN — HYDROCORTISONE SODIUM SUCCINATE 100 MG: 100 INJECTION, POWDER, FOR SOLUTION INTRAMUSCULAR; INTRAVENOUS at 19:04

## 2021-01-01 RX ADMIN — ACETAMINOPHEN 650 MG: 325 TABLET ORAL at 21:55

## 2021-01-01 RX ADMIN — OXYCODONE HYDROCHLORIDE AND ACETAMINOPHEN 500 MG: 500 TABLET ORAL at 12:15

## 2021-01-01 RX ADMIN — Medication 10 ML: at 09:05

## 2021-01-01 RX ADMIN — MINERAL OIL AND PETROLATUM: 150; 830 OINTMENT OPHTHALMIC at 08:26

## 2021-01-01 RX ADMIN — FENTANYL CITRATE 25 MCG: 50 INJECTION, SOLUTION INTRAMUSCULAR; INTRAVENOUS at 22:57

## 2021-01-01 RX ADMIN — DOXYCYCLINE HYCLATE 100 MG: 100 CAPSULE ORAL at 21:51

## 2021-01-01 RX ADMIN — Medication 175 MCG/HR: at 04:37

## 2021-01-01 RX ADMIN — ANORECTAL OINTMENT: 15.7; .44; 24; 20.6 OINTMENT TOPICAL at 21:53

## 2021-01-01 RX ADMIN — DEXTROSE MONOHYDRATE: 50 INJECTION, SOLUTION INTRAVENOUS at 12:15

## 2021-01-01 RX ADMIN — TRIMETHOBENZAMIDE HYDROCHLORIDE 200 MG: 100 INJECTION INTRAMUSCULAR at 01:49

## 2021-01-01 RX ADMIN — MINERAL OIL AND PETROLATUM: 150; 830 OINTMENT OPHTHALMIC at 19:49

## 2021-01-01 RX ADMIN — INSULIN LISPRO 2 UNITS: 100 INJECTION, SOLUTION INTRAVENOUS; SUBCUTANEOUS at 02:59

## 2021-01-01 RX ADMIN — IPRATROPIUM BROMIDE AND ALBUTEROL SULFATE 1 AMPULE: 2.5; .5 SOLUTION RESPIRATORY (INHALATION) at 00:57

## 2021-01-01 RX ADMIN — HYDROCORTISONE SODIUM SUCCINATE 100 MG: 100 INJECTION, POWDER, FOR SOLUTION INTRAMUSCULAR; INTRAVENOUS at 20:39

## 2021-01-01 RX ADMIN — Medication 200 MCG/HR: at 11:42

## 2021-01-01 RX ADMIN — INSULIN HUMAN 10 UNITS: 100 INJECTION, SOLUTION PARENTERAL at 09:41

## 2021-01-01 RX ADMIN — MULTIPLE VITAMINS W/ MINERALS TAB 1 TABLET: TAB at 10:19

## 2021-01-01 RX ADMIN — MIDAZOLAM 2 MG: 1 INJECTION INTRAMUSCULAR; INTRAVENOUS at 16:29

## 2021-01-01 RX ADMIN — MIDODRINE HYDROCHLORIDE 15 MG: 5 TABLET ORAL at 08:58

## 2021-01-01 RX ADMIN — Medication 100 MG/HR: at 05:57

## 2021-01-01 RX ADMIN — HEPARIN SODIUM 4000 UNITS: 1000 INJECTION INTRAVENOUS; SUBCUTANEOUS at 22:24

## 2021-01-01 RX ADMIN — ANTICOAGULANT SODIUM CITRATE SOLUTION 0.12 G: 4 SOLUTION INTRAVENOUS at 11:50

## 2021-01-01 RX ADMIN — APIXABAN 2.5 MG: 2.5 TABLET, FILM COATED ORAL at 21:52

## 2021-01-01 RX ADMIN — Medication 10 ML: at 20:19

## 2021-01-01 RX ADMIN — WATER 1 G: 1 INJECTION INTRAMUSCULAR; INTRAVENOUS; SUBCUTANEOUS at 21:22

## 2021-01-01 RX ADMIN — Medication 1 TABLET: at 12:34

## 2021-01-01 RX ADMIN — Medication 10 ML: at 20:39

## 2021-01-01 RX ADMIN — PANTOPRAZOLE SODIUM 40 MG: 40 INJECTION, POWDER, FOR SOLUTION INTRAVENOUS at 08:40

## 2021-01-01 RX ADMIN — MIDODRINE HYDROCHLORIDE 15 MG: 5 TABLET ORAL at 17:56

## 2021-01-01 RX ADMIN — IPRATROPIUM BROMIDE AND ALBUTEROL SULFATE 1 AMPULE: 2.5; .5 SOLUTION RESPIRATORY (INHALATION) at 09:32

## 2021-01-01 RX ADMIN — IPRATROPIUM BROMIDE AND ALBUTEROL SULFATE 1 AMPULE: 2.5; .5 SOLUTION RESPIRATORY (INHALATION) at 13:31

## 2021-01-01 RX ADMIN — Medication 50 MCG: at 21:21

## 2021-01-01 RX ADMIN — BENZONATATE 100 MG: 100 CAPSULE ORAL at 12:21

## 2021-01-01 RX ADMIN — WATER 1 G: 1 INJECTION INTRAMUSCULAR; INTRAVENOUS; SUBCUTANEOUS at 21:53

## 2021-01-01 RX ADMIN — SODIUM CHLORIDE: 9 INJECTION, SOLUTION INTRAVENOUS at 12:32

## 2021-01-01 RX ADMIN — INSULIN LISPRO 2 UNITS: 100 INJECTION, SOLUTION INTRAVENOUS; SUBCUTANEOUS at 09:57

## 2021-01-01 RX ADMIN — HYDROCORTISONE SODIUM SUCCINATE 100 MG: 100 INJECTION, POWDER, FOR SOLUTION INTRAMUSCULAR; INTRAVENOUS at 19:45

## 2021-01-01 RX ADMIN — WATER 1 G: 1 INJECTION INTRAMUSCULAR; INTRAVENOUS; SUBCUTANEOUS at 20:27

## 2021-01-01 RX ADMIN — CHLORHEXIDINE GLUCONATE 15 ML: 1.2 RINSE ORAL at 19:46

## 2021-01-01 RX ADMIN — PIPERACILLIN AND TAZOBACTAM 3375 MG: 3; .375 INJECTION, POWDER, LYOPHILIZED, FOR SOLUTION INTRAVENOUS at 08:26

## 2021-01-01 RX ADMIN — SODIUM CHLORIDE: 9 INJECTION, SOLUTION INTRAVENOUS at 12:40

## 2021-01-01 RX ADMIN — INSULIN LISPRO 4 UNITS: 100 INJECTION, SOLUTION INTRAVENOUS; SUBCUTANEOUS at 03:47

## 2021-01-01 RX ADMIN — Medication 10 ML: at 09:03

## 2021-01-01 RX ADMIN — Medication 100 MCG/HR: at 18:27

## 2021-01-01 RX ADMIN — SODIUM CHLORIDE, PRESERVATIVE FREE 10 ML: 5 INJECTION INTRAVENOUS at 08:40

## 2021-01-01 RX ADMIN — SODIUM BICARBONATE: 84 INJECTION, SOLUTION INTRAVENOUS at 01:06

## 2021-01-01 RX ADMIN — VANCOMYCIN HYDROCHLORIDE 1250 MG: 10 INJECTION, POWDER, LYOPHILIZED, FOR SOLUTION INTRAVENOUS at 12:47

## 2021-01-01 RX ADMIN — APIXABAN 2.5 MG: 2.5 TABLET, FILM COATED ORAL at 09:51

## 2021-01-01 RX ADMIN — POTASSIUM CHLORIDE 20 MEQ: 400 INJECTION, SOLUTION INTRAVENOUS at 20:01

## 2021-01-01 RX ADMIN — PHENYLEPHRINE HYDROCHLORIDE 50 MCG/MIN: 10 INJECTION, SOLUTION INTRAMUSCULAR; INTRAVENOUS; SUBCUTANEOUS at 14:52

## 2021-01-01 RX ADMIN — MUPIROCIN: 20 OINTMENT TOPICAL at 20:02

## 2021-01-01 RX ADMIN — ANORECTAL OINTMENT: 15.7; .44; 24; 20.6 OINTMENT TOPICAL at 09:51

## 2021-01-01 RX ADMIN — PANTOPRAZOLE SODIUM 40 MG: 40 INJECTION, POWDER, FOR SOLUTION INTRAVENOUS at 08:26

## 2021-01-01 RX ADMIN — INSULIN LISPRO 2 UNITS: 100 INJECTION, SOLUTION INTRAVENOUS; SUBCUTANEOUS at 17:42

## 2021-01-01 RX ADMIN — INSULIN LISPRO 2 UNITS: 100 INJECTION, SOLUTION INTRAVENOUS; SUBCUTANEOUS at 10:21

## 2021-01-01 RX ADMIN — CHLORHEXIDINE GLUCONATE 15 ML: 1.2 RINSE ORAL at 20:01

## 2021-01-01 RX ADMIN — MIDODRINE HYDROCHLORIDE 15 MG: 5 TABLET ORAL at 17:45

## 2021-01-01 RX ADMIN — MINERAL OIL AND PETROLATUM: 150; 830 OINTMENT OPHTHALMIC at 09:03

## 2021-01-01 RX ADMIN — INSULIN LISPRO 4 UNITS: 100 INJECTION, SOLUTION INTRAVENOUS; SUBCUTANEOUS at 06:38

## 2021-01-01 RX ADMIN — Medication 150 MCG/HR: at 04:38

## 2021-01-01 RX ADMIN — Medication 1 TABLET: at 08:12

## 2021-01-01 RX ADMIN — HEPARIN SODIUM 14 UNITS/KG/HR: 10000 INJECTION, SOLUTION INTRAVENOUS at 09:44

## 2021-01-01 RX ADMIN — OXYCODONE HYDROCHLORIDE AND ACETAMINOPHEN 500 MG: 500 TABLET ORAL at 20:27

## 2021-01-01 RX ADMIN — IPRATROPIUM BROMIDE AND ALBUTEROL SULFATE 1 AMPULE: 2.5; .5 SOLUTION RESPIRATORY (INHALATION) at 16:17

## 2021-01-01 RX ADMIN — INSULIN LISPRO 2 UNITS: 100 INJECTION, SOLUTION INTRAVENOUS; SUBCUTANEOUS at 18:18

## 2021-01-01 RX ADMIN — INSULIN LISPRO 2 UNITS: 100 INJECTION, SOLUTION INTRAVENOUS; SUBCUTANEOUS at 02:48

## 2021-01-01 RX ADMIN — IPRATROPIUM BROMIDE AND ALBUTEROL SULFATE 1 AMPULE: 2.5; .5 SOLUTION RESPIRATORY (INHALATION) at 07:36

## 2021-01-01 RX ADMIN — DEXTROSE MONOHYDRATE 12.5 G: 25 INJECTION, SOLUTION INTRAVENOUS at 09:40

## 2021-01-01 RX ADMIN — IPRATROPIUM BROMIDE AND ALBUTEROL SULFATE 1 AMPULE: 2.5; .5 SOLUTION RESPIRATORY (INHALATION) at 21:05

## 2021-01-01 RX ADMIN — ANORECTAL OINTMENT: 15.7; .44; 24; 20.6 OINTMENT TOPICAL at 10:20

## 2021-01-01 RX ADMIN — ALBUMIN (HUMAN) 25 G: 12.5 SOLUTION INTRAVENOUS at 07:51

## 2021-01-01 RX ADMIN — DEXAMETHASONE SODIUM PHOSPHATE 6 MG: 4 INJECTION, SOLUTION INTRAMUSCULAR; INTRAVENOUS at 12:11

## 2021-01-01 RX ADMIN — PIPERACILLIN AND TAZOBACTAM 3375 MG: 3; .375 INJECTION, POWDER, LYOPHILIZED, FOR SOLUTION INTRAVENOUS at 21:16

## 2021-01-01 RX ADMIN — Medication 150 MCG/HR: at 11:56

## 2021-01-01 RX ADMIN — Medication 2 MCG/MIN: at 08:44

## 2021-01-01 RX ADMIN — ALBUMIN (HUMAN) 25 G: 0.25 INJECTION, SOLUTION INTRAVENOUS at 07:51

## 2021-01-01 RX ADMIN — MIDODRINE HYDROCHLORIDE 15 MG: 5 TABLET ORAL at 08:40

## 2021-01-01 RX ADMIN — IPRATROPIUM BROMIDE AND ALBUTEROL SULFATE 1 AMPULE: 2.5; .5 SOLUTION RESPIRATORY (INHALATION) at 09:17

## 2021-01-01 RX ADMIN — OXYCODONE HYDROCHLORIDE AND ACETAMINOPHEN 500 MG: 500 TABLET ORAL at 10:19

## 2021-01-01 RX ADMIN — ALBUMIN (HUMAN) 25 G: 0.25 INJECTION, SOLUTION INTRAVENOUS at 14:39

## 2021-01-01 RX ADMIN — PIPERACILLIN AND TAZOBACTAM 3375 MG: 3; .375 INJECTION, POWDER, LYOPHILIZED, FOR SOLUTION INTRAVENOUS at 08:42

## 2021-01-01 RX ADMIN — LORAZEPAM 0.5 MG: 2 INJECTION INTRAMUSCULAR; INTRAVENOUS at 13:58

## 2021-01-01 RX ADMIN — Medication 150 MCG/HR: at 00:14

## 2021-01-01 RX ADMIN — Medication 10 ML: at 09:38

## 2021-01-01 RX ADMIN — MULTIPLE VITAMINS W/ MINERALS TAB 1 TABLET: TAB at 10:25

## 2021-01-01 RX ADMIN — IPRATROPIUM BROMIDE AND ALBUTEROL SULFATE 1 AMPULE: 2.5; .5 SOLUTION RESPIRATORY (INHALATION) at 20:58

## 2021-01-01 RX ADMIN — SODIUM CHLORIDE 1000 ML: 9 INJECTION, SOLUTION INTRAVENOUS at 09:52

## 2021-01-01 RX ADMIN — IPRATROPIUM BROMIDE AND ALBUTEROL SULFATE 1 AMPULE: 2.5; .5 SOLUTION RESPIRATORY (INHALATION) at 16:30

## 2021-01-01 RX ADMIN — MIDODRINE HYDROCHLORIDE 10 MG: 10 TABLET ORAL at 07:20

## 2021-01-01 RX ADMIN — CHLORHEXIDINE GLUCONATE 15 ML: 1.2 RINSE ORAL at 20:39

## 2021-01-01 RX ADMIN — CHLORHEXIDINE GLUCONATE 15 ML: 1.2 RINSE ORAL at 21:22

## 2021-01-01 RX ADMIN — HYDROCODONE BITARTRATE AND ACETAMINOPHEN 1 TABLET: 10; 325 TABLET ORAL at 21:23

## 2021-01-01 RX ADMIN — FENTANYL CITRATE 25 MCG: 50 INJECTION, SOLUTION INTRAMUSCULAR; INTRAVENOUS at 17:24

## 2021-01-01 RX ADMIN — INSULIN LISPRO 3 UNITS: 100 INJECTION, SOLUTION INTRAVENOUS; SUBCUTANEOUS at 09:49

## 2021-01-01 RX ADMIN — Medication 10 ML: at 19:45

## 2021-01-01 RX ADMIN — VANCOMYCIN HYDROCHLORIDE 750 MG: 10 INJECTION, POWDER, LYOPHILIZED, FOR SOLUTION INTRAVENOUS at 20:19

## 2021-01-01 RX ADMIN — ZINC SULFATE 220 MG (50 MG) CAPSULE 50 MG: CAPSULE at 10:27

## 2021-01-01 RX ADMIN — SODIUM CHLORIDE, PRESERVATIVE FREE 10 ML: 5 INJECTION INTRAVENOUS at 07:56

## 2021-01-01 RX ADMIN — HEPARIN SODIUM 2000 UNITS: 1000 INJECTION INTRAVENOUS; SUBCUTANEOUS at 09:42

## 2021-01-01 RX ADMIN — FENTANYL CITRATE 25 MCG: 50 INJECTION, SOLUTION INTRAMUSCULAR; INTRAVENOUS at 18:34

## 2021-01-01 RX ADMIN — LORAZEPAM 0.5 MG: 2 INJECTION INTRAMUSCULAR; INTRAVENOUS at 20:53

## 2021-01-01 RX ADMIN — Medication 175 MCG/HR: at 12:34

## 2021-01-01 RX ADMIN — CHLORHEXIDINE GLUCONATE 15 ML: 1.2 RINSE ORAL at 09:00

## 2021-01-01 RX ADMIN — Medication 150 MCG/HR: at 18:52

## 2021-01-01 RX ADMIN — HYDROCORTISONE SODIUM SUCCINATE 100 MG: 100 INJECTION, POWDER, FOR SOLUTION INTRAMUSCULAR; INTRAVENOUS at 11:23

## 2021-01-01 RX ADMIN — DEXTROSE MONOHYDRATE 12.5 G: 25 INJECTION, SOLUTION INTRAVENOUS at 09:41

## 2021-01-01 RX ADMIN — PIPERACILLIN AND TAZOBACTAM 3375 MG: 3; .375 INJECTION, POWDER, LYOPHILIZED, FOR SOLUTION INTRAVENOUS at 08:28

## 2021-01-01 RX ADMIN — IPRATROPIUM BROMIDE AND ALBUTEROL SULFATE 1 AMPULE: 2.5; .5 SOLUTION RESPIRATORY (INHALATION) at 11:52

## 2021-01-01 RX ADMIN — PANTOPRAZOLE SODIUM 40 MG: 40 INJECTION, POWDER, FOR SOLUTION INTRAVENOUS at 09:38

## 2021-01-01 RX ADMIN — Medication 175 MCG/HR: at 19:22

## 2021-01-01 RX ADMIN — REMDESIVIR 100 MG: 100 INJECTION, POWDER, LYOPHILIZED, FOR SOLUTION INTRAVENOUS at 14:38

## 2021-01-01 RX ADMIN — INSULIN LISPRO 4 UNITS: 100 INJECTION, SOLUTION INTRAVENOUS; SUBCUTANEOUS at 05:10

## 2021-01-01 RX ADMIN — BENZOCAINE, BUTAMBEN, AND TETRACAINE HYDROCHLORIDE: .028; .004; .004 AEROSOL, SPRAY TOPICAL at 12:43

## 2021-01-01 RX ADMIN — MINERAL OIL AND PETROLATUM: 150; 830 OINTMENT OPHTHALMIC at 09:40

## 2021-01-01 RX ADMIN — IPRATROPIUM BROMIDE AND ALBUTEROL SULFATE 1 AMPULE: 2.5; .5 SOLUTION RESPIRATORY (INHALATION) at 11:44

## 2021-01-01 RX ADMIN — MINERAL OIL AND PETROLATUM: 150; 830 OINTMENT OPHTHALMIC at 21:39

## 2021-01-01 RX ADMIN — OXYCODONE HYDROCHLORIDE AND ACETAMINOPHEN 500 MG: 500 TABLET ORAL at 21:22

## 2021-01-01 RX ADMIN — BUMETANIDE 0.2 MG/HR: 0.25 INJECTION INTRAMUSCULAR; INTRAVENOUS at 13:55

## 2021-01-01 RX ADMIN — MIDODRINE HYDROCHLORIDE 15 MG: 5 TABLET ORAL at 11:56

## 2021-01-01 RX ADMIN — SODIUM CHLORIDE, PRESERVATIVE FREE 10 ML: 5 INJECTION INTRAVENOUS at 07:39

## 2021-01-01 RX ADMIN — IPRATROPIUM BROMIDE AND ALBUTEROL SULFATE 1 AMPULE: 2.5; .5 SOLUTION RESPIRATORY (INHALATION) at 20:54

## 2021-01-01 RX ADMIN — PIPERACILLIN AND TAZOBACTAM 3375 MG: 3; .375 INJECTION, POWDER, LYOPHILIZED, FOR SOLUTION INTRAVENOUS at 20:40

## 2021-01-01 RX ADMIN — INSULIN LISPRO 4 UNITS: 100 INJECTION, SOLUTION INTRAVENOUS; SUBCUTANEOUS at 14:30

## 2021-01-01 RX ADMIN — LIDOCAINE HYDROCHLORIDE 5 ML: 10 INJECTION, SOLUTION EPIDURAL; INFILTRATION; INTRACAUDAL; PERINEURAL at 06:59

## 2021-01-01 RX ADMIN — IPRATROPIUM BROMIDE AND ALBUTEROL SULFATE 1 AMPULE: 2.5; .5 SOLUTION RESPIRATORY (INHALATION) at 13:03

## 2021-01-01 RX ADMIN — ANORECTAL OINTMENT: 15.7; .44; 24; 20.6 OINTMENT TOPICAL at 21:23

## 2021-01-01 RX ADMIN — IPRATROPIUM BROMIDE AND ALBUTEROL SULFATE 1 AMPULE: .5; 3 SOLUTION RESPIRATORY (INHALATION) at 11:36

## 2021-01-01 RX ADMIN — CHLORHEXIDINE GLUCONATE 15 ML: 1.2 RINSE ORAL at 09:39

## 2021-01-01 RX ADMIN — PIPERACILLIN AND TAZOBACTAM 3375 MG: 3; .375 INJECTION, POWDER, LYOPHILIZED, FOR SOLUTION INTRAVENOUS at 21:31

## 2021-01-01 RX ADMIN — DOXYCYCLINE HYCLATE 100 MG: 100 CAPSULE ORAL at 10:03

## 2021-01-01 RX ADMIN — CALCIUM GLUCONATE 2000 MG: 98 INJECTION, SOLUTION INTRAVENOUS at 07:55

## 2021-01-01 RX ADMIN — Medication 150 MCG/HR: at 03:56

## 2021-01-01 RX ADMIN — MINERAL OIL AND PETROLATUM: 150; 830 OINTMENT OPHTHALMIC at 07:39

## 2021-01-01 RX ADMIN — PIPERACILLIN AND TAZOBACTAM 3375 MG: 3; .375 INJECTION, POWDER, LYOPHILIZED, FOR SOLUTION INTRAVENOUS at 08:13

## 2021-01-01 RX ADMIN — HYDROCORTISONE SODIUM SUCCINATE 50 MG: 100 INJECTION, POWDER, FOR SOLUTION INTRAMUSCULAR; INTRAVENOUS at 08:26

## 2021-01-01 RX ADMIN — THIAMINE HYDROCHLORIDE 200 MG: 100 INJECTION, SOLUTION INTRAMUSCULAR; INTRAVENOUS at 08:30

## 2021-01-01 RX ADMIN — Medication 10 ML: at 19:51

## 2021-01-01 RX ADMIN — PIPERACILLIN AND TAZOBACTAM 3375 MG: 3; .375 INJECTION, POWDER, LYOPHILIZED, FOR SOLUTION INTRAVENOUS at 09:00

## 2021-01-01 RX ADMIN — INSULIN LISPRO 2 UNITS: 100 INJECTION, SOLUTION INTRAVENOUS; SUBCUTANEOUS at 01:54

## 2021-01-01 RX ADMIN — IPRATROPIUM BROMIDE AND ALBUTEROL SULFATE 1 AMPULE: 2.5; .5 SOLUTION RESPIRATORY (INHALATION) at 17:14

## 2021-01-01 RX ADMIN — SODIUM BICARBONATE: 84 INJECTION, SOLUTION INTRAVENOUS at 02:46

## 2021-01-01 RX ADMIN — INSULIN LISPRO 2 UNITS: 100 INJECTION, SOLUTION INTRAVENOUS; SUBCUTANEOUS at 01:59

## 2021-01-01 RX ADMIN — INSULIN LISPRO 2 UNITS: 100 INJECTION, SOLUTION INTRAVENOUS; SUBCUTANEOUS at 21:45

## 2021-01-01 RX ADMIN — DOXYCYCLINE 100 MG: 100 INJECTION, POWDER, LYOPHILIZED, FOR SOLUTION INTRAVENOUS at 15:56

## 2021-01-01 RX ADMIN — PIPERACILLIN AND TAZOBACTAM 3375 MG: 3; .375 INJECTION, POWDER, LYOPHILIZED, FOR SOLUTION INTRAVENOUS at 19:45

## 2021-01-01 RX ADMIN — HYDROCORTISONE SODIUM SUCCINATE 100 MG: 100 INJECTION, POWDER, FOR SOLUTION INTRAMUSCULAR; INTRAVENOUS at 09:38

## 2021-01-01 RX ADMIN — SENNOSIDES 8.6 MG: 8.6 TABLET, FILM COATED ORAL at 20:18

## 2021-01-01 RX ADMIN — ANORECTAL OINTMENT: 15.7; .44; 24; 20.6 OINTMENT TOPICAL at 20:29

## 2021-01-01 RX ADMIN — LORAZEPAM 0.5 MG: 2 INJECTION INTRAMUSCULAR; INTRAVENOUS at 22:49

## 2021-01-01 RX ADMIN — IPRATROPIUM BROMIDE AND ALBUTEROL SULFATE 1 AMPULE: 2.5; .5 SOLUTION RESPIRATORY (INHALATION) at 12:26

## 2021-01-01 RX ADMIN — SENNOSIDES 8.6 MG: 8.6 TABLET, FILM COATED ORAL at 19:45

## 2021-01-01 RX ADMIN — SODIUM CHLORIDE 2172 ML: 9 INJECTION, SOLUTION INTRAVENOUS at 20:15

## 2021-01-01 RX ADMIN — Medication 10 ML: at 07:38

## 2021-01-01 RX ADMIN — PANTOPRAZOLE SODIUM 40 MG: 40 INJECTION, POWDER, FOR SOLUTION INTRAVENOUS at 09:56

## 2021-01-01 RX ADMIN — IPRATROPIUM BROMIDE AND ALBUTEROL SULFATE 1 AMPULE: .5; 3 SOLUTION RESPIRATORY (INHALATION) at 11:37

## 2021-01-01 RX ADMIN — VANCOMYCIN HYDROCHLORIDE 1000 MG: 1 INJECTION, POWDER, LYOPHILIZED, FOR SOLUTION INTRAVENOUS at 19:58

## 2021-01-01 RX ADMIN — HYDROCORTISONE SODIUM SUCCINATE 100 MG: 100 INJECTION, POWDER, FOR SOLUTION INTRAMUSCULAR; INTRAVENOUS at 16:36

## 2021-01-01 RX ADMIN — INSULIN LISPRO 2 UNITS: 100 INJECTION, SOLUTION INTRAVENOUS; SUBCUTANEOUS at 05:56

## 2021-01-01 RX ADMIN — Medication 175 MCG/HR: at 04:12

## 2021-01-01 RX ADMIN — HYDROCORTISONE SODIUM SUCCINATE 100 MG: 100 INJECTION, POWDER, FOR SOLUTION INTRAMUSCULAR; INTRAVENOUS at 11:56

## 2021-01-01 RX ADMIN — CHLORHEXIDINE GLUCONATE 15 ML: 1.2 RINSE ORAL at 07:38

## 2021-01-01 RX ADMIN — LORAZEPAM 0.5 MG: 2 INJECTION INTRAMUSCULAR; INTRAVENOUS at 21:59

## 2021-01-01 RX ADMIN — Medication 10 ML: at 21:22

## 2021-01-01 RX ADMIN — Medication 10 ML: at 21:42

## 2021-01-01 RX ADMIN — GUAIFENESIN AND DEXTROMETHORPHAN 5 ML: 100; 10 SYRUP ORAL at 12:21

## 2021-01-01 RX ADMIN — HYDROCORTISONE SODIUM SUCCINATE 50 MG: 100 INJECTION, POWDER, FOR SOLUTION INTRAMUSCULAR; INTRAVENOUS at 18:20

## 2021-01-01 RX ADMIN — MIDAZOLAM 6 MG: 1 INJECTION INTRAMUSCULAR; INTRAVENOUS at 12:43

## 2021-01-01 RX ADMIN — SODIUM CHLORIDE: 9 INJECTION, SOLUTION INTRAVENOUS at 09:52

## 2021-01-01 RX ADMIN — VANCOMYCIN HYDROCHLORIDE 1000 MG: 1 INJECTION, POWDER, LYOPHILIZED, FOR SOLUTION INTRAVENOUS at 11:43

## 2021-01-01 RX ADMIN — PANTOPRAZOLE SODIUM 40 MG: 40 INJECTION, POWDER, FOR SOLUTION INTRAVENOUS at 07:56

## 2021-01-01 RX ADMIN — CALCIUM GLUCONATE 2000 MG: 98 INJECTION, SOLUTION INTRAVENOUS at 12:56

## 2021-01-01 RX ADMIN — INSULIN LISPRO 1 UNITS: 100 INJECTION, SOLUTION INTRAVENOUS; SUBCUTANEOUS at 17:30

## 2021-01-01 RX ADMIN — DEXTROSE 1 MG/MIN: 5 SOLUTION INTRAVENOUS at 07:16

## 2021-01-01 RX ADMIN — CHLORHEXIDINE GLUCONATE 15 ML: 1.2 RINSE ORAL at 09:29

## 2021-01-01 RX ADMIN — MIDODRINE HYDROCHLORIDE 10 MG: 10 TABLET ORAL at 12:35

## 2021-01-01 RX ADMIN — HYDROCORTISONE SODIUM SUCCINATE 100 MG: 100 INJECTION, POWDER, FOR SOLUTION INTRAMUSCULAR; INTRAVENOUS at 03:25

## 2021-01-01 RX ADMIN — ALBUTEROL SULFATE 2 PUFF: 90 AEROSOL, METERED RESPIRATORY (INHALATION) at 10:32

## 2021-01-01 RX ADMIN — ETOMIDATE 14 MG: 2 INJECTION, SOLUTION INTRAVENOUS at 21:08

## 2021-01-01 RX ADMIN — SODIUM CHLORIDE, POTASSIUM CHLORIDE, SODIUM LACTATE AND CALCIUM CHLORIDE 1000 ML: 600; 310; 30; 20 INJECTION, SOLUTION INTRAVENOUS at 01:38

## 2021-01-01 RX ADMIN — PIPERACILLIN AND TAZOBACTAM 3375 MG: 3; .375 INJECTION, POWDER, LYOPHILIZED, FOR SOLUTION INTRAVENOUS at 20:19

## 2021-01-01 RX ADMIN — MULTIPLE VITAMINS W/ MINERALS TAB 1 TABLET: TAB at 10:03

## 2021-01-01 RX ADMIN — OXYCODONE HYDROCHLORIDE AND ACETAMINOPHEN 500 MG: 500 TABLET ORAL at 21:52

## 2021-01-01 RX ADMIN — IPRATROPIUM BROMIDE AND ALBUTEROL SULFATE 1 AMPULE: 2.5; .5 SOLUTION RESPIRATORY (INHALATION) at 05:04

## 2021-01-01 RX ADMIN — INSULIN LISPRO 2 UNITS: 100 INJECTION, SOLUTION INTRAVENOUS; SUBCUTANEOUS at 22:33

## 2021-01-01 RX ADMIN — MUPIROCIN: 20 OINTMENT TOPICAL at 08:26

## 2021-01-01 RX ADMIN — SODIUM CHLORIDE, PRESERVATIVE FREE 10 ML: 5 INJECTION INTRAVENOUS at 12:34

## 2021-01-01 RX ADMIN — CHLORHEXIDINE GLUCONATE 15 ML: 1.2 RINSE ORAL at 08:40

## 2021-01-01 RX ADMIN — HYDROCORTISONE SODIUM SUCCINATE 50 MG: 100 INJECTION, POWDER, FOR SOLUTION INTRAMUSCULAR; INTRAVENOUS at 21:30

## 2021-01-01 RX ADMIN — SODIUM CHLORIDE 500 ML: 9 INJECTION, SOLUTION INTRAVENOUS at 12:54

## 2021-01-01 RX ADMIN — TRIMETHOBENZAMIDE HYDROCHLORIDE 200 MG: 100 INJECTION INTRAMUSCULAR at 02:21

## 2021-01-01 RX ADMIN — INSULIN LISPRO 4 UNITS: 100 INJECTION, SOLUTION INTRAVENOUS; SUBCUTANEOUS at 14:12

## 2021-01-01 RX ADMIN — ANORECTAL OINTMENT: 15.7; .44; 24; 20.6 OINTMENT TOPICAL at 10:03

## 2021-01-01 RX ADMIN — PIPERACILLIN AND TAZOBACTAM 3375 MG: 3; .375 INJECTION, POWDER, LYOPHILIZED, FOR SOLUTION INTRAVENOUS at 01:17

## 2021-01-01 RX ADMIN — INSULIN LISPRO 2 UNITS: 100 INJECTION, SOLUTION INTRAVENOUS; SUBCUTANEOUS at 22:14

## 2021-01-01 RX ADMIN — MIDODRINE HYDROCHLORIDE 10 MG: 10 TABLET ORAL at 17:28

## 2021-01-01 RX ADMIN — INSULIN LISPRO 2 UNITS: 100 INJECTION, SOLUTION INTRAVENOUS; SUBCUTANEOUS at 09:39

## 2021-01-01 RX ADMIN — MIDODRINE HYDROCHLORIDE 15 MG: 5 TABLET ORAL at 07:36

## 2021-01-01 RX ADMIN — MINERAL OIL AND PETROLATUM: 150; 830 OINTMENT OPHTHALMIC at 20:49

## 2021-01-01 RX ADMIN — DEXTROSE MONOHYDRATE 150 MG: 50 INJECTION, SOLUTION INTRAVENOUS at 06:56

## 2021-01-01 RX ADMIN — INSULIN LISPRO 4 UNITS: 100 INJECTION, SOLUTION INTRAVENOUS; SUBCUTANEOUS at 20:43

## 2021-01-01 RX ADMIN — MINERAL OIL AND PETROLATUM: 150; 830 OINTMENT OPHTHALMIC at 09:56

## 2021-01-01 RX ADMIN — ACETAMINOPHEN 650 MG: 650 SUPPOSITORY RECTAL at 01:14

## 2021-01-01 RX ADMIN — CHLORHEXIDINE GLUCONATE 15 ML: 1.2 RINSE ORAL at 21:38

## 2021-01-01 RX ADMIN — PIPERACILLIN AND TAZOBACTAM 3375 MG: 3; .375 INJECTION, POWDER, LYOPHILIZED, FOR SOLUTION INTRAVENOUS at 09:37

## 2021-01-01 RX ADMIN — SENNOSIDES 8.6 MG: 8.6 TABLET, FILM COATED ORAL at 21:30

## 2021-01-01 RX ADMIN — FLUCONAZOLE IN SODIUM CHLORIDE 400 MG: 2 INJECTION, SOLUTION INTRAVENOUS at 14:00

## 2021-01-01 RX ADMIN — ACETAMINOPHEN 650 MG: 650 SUPPOSITORY RECTAL at 00:04

## 2021-01-01 RX ADMIN — Medication 10 ML: at 21:38

## 2021-01-01 RX ADMIN — HYDROCORTISONE SODIUM SUCCINATE 100 MG: 100 INJECTION, POWDER, FOR SOLUTION INTRAMUSCULAR; INTRAVENOUS at 00:04

## 2021-01-01 RX ADMIN — REMDESIVIR 100 MG: 100 INJECTION, POWDER, LYOPHILIZED, FOR SOLUTION INTRAVENOUS at 15:18

## 2021-01-01 RX ADMIN — HYDROCORTISONE SODIUM SUCCINATE 100 MG: 100 INJECTION, POWDER, FOR SOLUTION INTRAMUSCULAR; INTRAVENOUS at 02:49

## 2021-01-01 RX ADMIN — MUPIROCIN: 20 OINTMENT TOPICAL at 22:49

## 2021-01-01 RX ADMIN — GABAPENTIN 100 MG: 100 CAPSULE ORAL at 21:22

## 2021-01-01 RX ADMIN — PANTOPRAZOLE SODIUM 40 MG: 40 INJECTION, POWDER, FOR SOLUTION INTRAVENOUS at 08:28

## 2021-01-01 RX ADMIN — INSULIN LISPRO 2 UNITS: 100 INJECTION, SOLUTION INTRAVENOUS; SUBCUTANEOUS at 14:52

## 2021-01-01 RX ADMIN — HEPARIN SODIUM 12 UNITS/KG/HR: 10000 INJECTION, SOLUTION INTRAVENOUS at 22:23

## 2021-01-01 RX ADMIN — IPRATROPIUM BROMIDE AND ALBUTEROL SULFATE 1 AMPULE: .5; 3 SOLUTION RESPIRATORY (INHALATION) at 12:55

## 2021-01-01 RX ADMIN — MINERAL OIL AND PETROLATUM: 150; 830 OINTMENT OPHTHALMIC at 21:43

## 2021-01-01 RX ADMIN — Medication 150 MCG/HR: at 18:47

## 2021-01-01 RX ADMIN — HYDROCODONE BITARTRATE AND ACETAMINOPHEN 1 TABLET: 10; 325 TABLET ORAL at 12:15

## 2021-01-01 RX ADMIN — ASCORBIC ACID 1500 MG: 500 INJECTION INTRAVENOUS at 08:30

## 2021-01-01 RX ADMIN — IPRATROPIUM BROMIDE AND ALBUTEROL SULFATE 1 AMPULE: 2.5; .5 SOLUTION RESPIRATORY (INHALATION) at 20:48

## 2021-01-01 RX ADMIN — Medication 200 MCG/HR: at 05:09

## 2021-01-01 RX ADMIN — VANCOMYCIN HYDROCHLORIDE 500 MG: 500 INJECTION, POWDER, LYOPHILIZED, FOR SOLUTION INTRAVENOUS at 20:39

## 2021-01-01 RX ADMIN — Medication 125 MCG/HR: at 08:54

## 2021-01-01 RX ADMIN — Medication 2 MCG/MIN: at 07:36

## 2021-01-01 RX ADMIN — IPRATROPIUM BROMIDE AND ALBUTEROL SULFATE 1 AMPULE: .5; 3 SOLUTION RESPIRATORY (INHALATION) at 20:34

## 2021-01-01 RX ADMIN — MULTIPLE VITAMINS W/ MINERALS TAB 1 TABLET: TAB at 09:50

## 2021-01-01 RX ADMIN — Medication 150 MCG/HR: at 10:46

## 2021-01-01 RX ADMIN — INSULIN LISPRO 2 UNITS: 100 INJECTION, SOLUTION INTRAVENOUS; SUBCUTANEOUS at 06:25

## 2021-01-01 RX ADMIN — IPRATROPIUM BROMIDE AND ALBUTEROL SULFATE 1 AMPULE: 2.5; .5 SOLUTION RESPIRATORY (INHALATION) at 09:05

## 2021-01-01 RX ADMIN — SODIUM CHLORIDE, PRESERVATIVE FREE 10 ML: 5 INJECTION INTRAVENOUS at 19:05

## 2021-01-01 RX ADMIN — Medication 200 MCG/HR: at 15:48

## 2021-01-01 RX ADMIN — IPRATROPIUM BROMIDE AND ALBUTEROL SULFATE 1 AMPULE: 2.5; .5 SOLUTION RESPIRATORY (INHALATION) at 08:21

## 2021-01-01 RX ADMIN — GABAPENTIN 100 MG: 100 CAPSULE ORAL at 09:50

## 2021-01-01 RX ADMIN — ALBUMIN (HUMAN) 25 G: 0.25 INJECTION, SOLUTION INTRAVENOUS at 12:48

## 2021-01-01 RX ADMIN — SODIUM CHLORIDE, PRESERVATIVE FREE 10 ML: 5 INJECTION INTRAVENOUS at 09:57

## 2021-01-01 RX ADMIN — HYDROCORTISONE SODIUM SUCCINATE 100 MG: 100 INJECTION, POWDER, FOR SOLUTION INTRAMUSCULAR; INTRAVENOUS at 08:28

## 2021-01-01 RX ADMIN — HYDROCODONE BITARTRATE AND ACETAMINOPHEN 1 TABLET: 10; 325 TABLET ORAL at 15:23

## 2021-01-01 RX ADMIN — SODIUM CHLORIDE, PRESERVATIVE FREE 10 ML: 5 INJECTION INTRAVENOUS at 09:38

## 2021-01-01 RX ADMIN — PIPERACILLIN AND TAZOBACTAM 3375 MG: 3; .375 INJECTION, POWDER, LYOPHILIZED, FOR SOLUTION INTRAVENOUS at 20:39

## 2021-01-01 RX ADMIN — AMLODIPINE BESYLATE 10 MG: 10 TABLET ORAL at 10:03

## 2021-01-01 RX ADMIN — INSULIN LISPRO 2 UNITS: 100 INJECTION, SOLUTION INTRAVENOUS; SUBCUTANEOUS at 14:32

## 2021-01-01 RX ADMIN — IPRATROPIUM BROMIDE AND ALBUTEROL SULFATE 1 AMPULE: 2.5; .5 SOLUTION RESPIRATORY (INHALATION) at 20:12

## 2021-01-01 RX ADMIN — SODIUM CHLORIDE, PRESERVATIVE FREE 10 ML: 5 INJECTION INTRAVENOUS at 15:58

## 2021-01-01 RX ADMIN — IPRATROPIUM BROMIDE AND ALBUTEROL SULFATE 1 AMPULE: 2.5; .5 SOLUTION RESPIRATORY (INHALATION) at 20:40

## 2021-01-01 RX ADMIN — DIGOXIN 500 MCG: 0.25 INJECTION INTRAMUSCULAR; INTRAVENOUS at 12:30

## 2021-01-01 RX ADMIN — DEXAMETHASONE SODIUM PHOSPHATE 6 MG: 4 INJECTION, SOLUTION INTRAMUSCULAR; INTRAVENOUS at 10:20

## 2021-01-01 RX ADMIN — CHLORHEXIDINE GLUCONATE 15 ML: 1.2 RINSE ORAL at 09:04

## 2021-01-01 RX ADMIN — VANCOMYCIN HYDROCHLORIDE 1000 MG: 1 INJECTION, POWDER, LYOPHILIZED, FOR SOLUTION INTRAVENOUS at 18:19

## 2021-01-01 RX ADMIN — PIPERACILLIN AND TAZOBACTAM 3375 MG: 3; .375 INJECTION, POWDER, LYOPHILIZED, FOR SOLUTION INTRAVENOUS at 21:22

## 2021-01-01 RX ADMIN — IPRATROPIUM BROMIDE AND ALBUTEROL SULFATE 1 AMPULE: 2.5; .5 SOLUTION RESPIRATORY (INHALATION) at 11:45

## 2021-01-01 ASSESSMENT — PULMONARY FUNCTION TESTS
PIF_VALUE: 25
PIF_VALUE: 36
PIF_VALUE: 37
PIF_VALUE: 31
PIF_VALUE: 28
PIF_VALUE: 31
PIF_VALUE: 27
PIF_VALUE: 36
PIF_VALUE: 27
PIF_VALUE: 28
PIF_VALUE: 26
PIF_VALUE: 28
PIF_VALUE: 35
PIF_VALUE: 39
PIF_VALUE: 29
PIF_VALUE: 25
PIF_VALUE: 37
PIF_VALUE: 27
PIF_VALUE: 26
PIF_VALUE: 34
PIF_VALUE: 29
PIF_VALUE: 31
PIF_VALUE: 31
PIF_VALUE: 24
PIF_VALUE: 24
PIF_VALUE: 30
PIF_VALUE: 34
PIF_VALUE: 30
PIF_VALUE: 32
PIF_VALUE: 24
PIF_VALUE: 38
PIF_VALUE: 41
PIF_VALUE: 33
PIF_VALUE: 26
PIF_VALUE: 20
PIF_VALUE: 28
PIF_VALUE: 21
PIF_VALUE: 24
PIF_VALUE: 24
PIF_VALUE: 27
PIF_VALUE: 23
PIF_VALUE: 26
PIF_VALUE: 27
PIF_VALUE: 29
PIF_VALUE: 34
PIF_VALUE: 28
PIF_VALUE: 36
PIF_VALUE: 30
PIF_VALUE: 27
PIF_VALUE: 26
PIF_VALUE: 28
PIF_VALUE: 35
PIF_VALUE: 21
PIF_VALUE: 19
PIF_VALUE: 32
PIF_VALUE: 32
PIF_VALUE: 30
PIF_VALUE: 37
PIF_VALUE: 27
PIF_VALUE: 35
PIF_VALUE: 29
PIF_VALUE: 26
PIF_VALUE: 36
PIF_VALUE: 33
PIF_VALUE: 27
PIF_VALUE: 26
PIF_VALUE: 22
PIF_VALUE: 28
PIF_VALUE: 22
PIF_VALUE: 26
PIF_VALUE: 25
PIF_VALUE: 28
PIF_VALUE: 27
PIF_VALUE: 23
PIF_VALUE: 27
PIF_VALUE: 25
PIF_VALUE: 38
PIF_VALUE: 31
PIF_VALUE: 66
PIF_VALUE: 27
PIF_VALUE: 28
PIF_VALUE: 25
PIF_VALUE: 27
PIF_VALUE: 27
PIF_VALUE: 21
PIF_VALUE: 39
PIF_VALUE: 33
PIF_VALUE: 30
PIF_VALUE: 28
PIF_VALUE: 37
PIF_VALUE: 28
PIF_VALUE: 27
PIF_VALUE: 29
PIF_VALUE: 26
PIF_VALUE: 42
PIF_VALUE: 30
PIF_VALUE: 28
PIF_VALUE: 27
PIF_VALUE: 29
PIF_VALUE: 29
PIF_VALUE: 23
PIF_VALUE: 28
PIF_VALUE: 29
PIF_VALUE: 25
PIF_VALUE: 38
PIF_VALUE: 28
PIF_VALUE: 25
PIF_VALUE: 28
PIF_VALUE: 33
PIF_VALUE: 28
PIF_VALUE: 24
PIF_VALUE: 33
PIF_VALUE: 27
PIF_VALUE: 22
PIF_VALUE: 29
PIF_VALUE: 30
PIF_VALUE: 20
PIF_VALUE: 28
PIF_VALUE: 32
PIF_VALUE: 27
PIF_VALUE: 38
PIF_VALUE: 27
PIF_VALUE: 28
PIF_VALUE: 26
PIF_VALUE: 28
PIF_VALUE: 23
PIF_VALUE: 26
PIF_VALUE: 52
PIF_VALUE: 26
PIF_VALUE: 34
PIF_VALUE: 30
PIF_VALUE: 33
PIF_VALUE: 27
PIF_VALUE: 26
PIF_VALUE: 28
PIF_VALUE: 26
PIF_VALUE: 31
PIF_VALUE: 28
PIF_VALUE: 29
PIF_VALUE: 19
PIF_VALUE: 33
PIF_VALUE: 28
PIF_VALUE: 31
PIF_VALUE: 23
PIF_VALUE: 19
PIF_VALUE: 35
PIF_VALUE: 31
PIF_VALUE: 28
PIF_VALUE: 27
PIF_VALUE: 31
PIF_VALUE: 29
PIF_VALUE: 27
PIF_VALUE: 26
PIF_VALUE: 33
PIF_VALUE: 34
PIF_VALUE: 31
PIF_VALUE: 23
PIF_VALUE: 26
PIF_VALUE: 29
PIF_VALUE: 33
PIF_VALUE: 25
PIF_VALUE: 27
PIF_VALUE: 28
PIF_VALUE: 27
PIF_VALUE: 23
PIF_VALUE: 25
PIF_VALUE: 29
PIF_VALUE: 37
PIF_VALUE: 26
PIF_VALUE: 29
PIF_VALUE: 35
PIF_VALUE: 30
PIF_VALUE: 21
PIF_VALUE: 25
PIF_VALUE: 37
PIF_VALUE: 34
PIF_VALUE: 29
PIF_VALUE: 26
PIF_VALUE: 24
PIF_VALUE: 25
PIF_VALUE: 27
PIF_VALUE: 27
PIF_VALUE: 36
PIF_VALUE: 29
PIF_VALUE: 15
PIF_VALUE: 24
PIF_VALUE: 29
PIF_VALUE: 32
PIF_VALUE: 28
PIF_VALUE: 27
PIF_VALUE: 57
PIF_VALUE: 31
PIF_VALUE: 26
PIF_VALUE: 29
PIF_VALUE: 22
PIF_VALUE: 30
PIF_VALUE: 28
PIF_VALUE: 35
PIF_VALUE: 28
PIF_VALUE: 27
PIF_VALUE: 19
PIF_VALUE: 27
PIF_VALUE: 39
PIF_VALUE: 28
PIF_VALUE: 36
PIF_VALUE: 36
PIF_VALUE: 26
PIF_VALUE: 23
PIF_VALUE: 30
PIF_VALUE: 28
PIF_VALUE: 25
PIF_VALUE: 36
PIF_VALUE: 24
PIF_VALUE: 28
PIF_VALUE: 26
PIF_VALUE: 27
PIF_VALUE: 41
PIF_VALUE: 24
PIF_VALUE: 34
PIF_VALUE: 24
PIF_VALUE: 22
PIF_VALUE: 29
PIF_VALUE: 16
PIF_VALUE: 28
PIF_VALUE: 31
PIF_VALUE: 22
PIF_VALUE: 25
PIF_VALUE: 27
PIF_VALUE: 28
PIF_VALUE: 26
PIF_VALUE: 32
PIF_VALUE: 27
PIF_VALUE: 36
PIF_VALUE: 23
PIF_VALUE: 28
PIF_VALUE: 34
PIF_VALUE: 20
PIF_VALUE: 42
PIF_VALUE: 26
PIF_VALUE: 27
PIF_VALUE: 28
PIF_VALUE: 26
PIF_VALUE: 29
PIF_VALUE: 30
PIF_VALUE: 30
PIF_VALUE: 24
PIF_VALUE: 44
PIF_VALUE: 27
PIF_VALUE: 26
PIF_VALUE: 23
PIF_VALUE: 25
PIF_VALUE: 27
PIF_VALUE: 27
PIF_VALUE: 31
PIF_VALUE: 36
PIF_VALUE: 27
PIF_VALUE: 23
PIF_VALUE: 28
PIF_VALUE: 28
PIF_VALUE: 31
PIF_VALUE: 26
PIF_VALUE: 29
PIF_VALUE: 26
PIF_VALUE: 32
PIF_VALUE: 40
PIF_VALUE: 36
PIF_VALUE: 27
PIF_VALUE: 27
PIF_VALUE: 32
PIF_VALUE: 25
PIF_VALUE: 19
PIF_VALUE: 25
PIF_VALUE: 31
PIF_VALUE: 29
PIF_VALUE: 29
PIF_VALUE: 23
PIF_VALUE: 28
PIF_VALUE: 37
PIF_VALUE: 25
PIF_VALUE: 36
PIF_VALUE: 26
PIF_VALUE: 22
PIF_VALUE: 34
PIF_VALUE: 26
PIF_VALUE: 32
PIF_VALUE: 21
PIF_VALUE: 27
PIF_VALUE: 23
PIF_VALUE: 36
PIF_VALUE: 40
PIF_VALUE: 24
PIF_VALUE: 27
PIF_VALUE: 29
PIF_VALUE: 38
PIF_VALUE: 33
PIF_VALUE: 27
PIF_VALUE: 30
PIF_VALUE: 22
PIF_VALUE: 33
PIF_VALUE: 28
PIF_VALUE: 28
PIF_VALUE: 26
PIF_VALUE: 37
PIF_VALUE: 24
PIF_VALUE: 25
PIF_VALUE: 30
PIF_VALUE: 33
PIF_VALUE: 27
PIF_VALUE: 28
PIF_VALUE: 28
PIF_VALUE: 36
PIF_VALUE: 31
PIF_VALUE: 26
PIF_VALUE: 34
PIF_VALUE: 33
PIF_VALUE: 36
PIF_VALUE: 24
PIF_VALUE: 34
PIF_VALUE: 23
PIF_VALUE: 24
PIF_VALUE: 31
PIF_VALUE: 32
PIF_VALUE: 21
PIF_VALUE: 29
PIF_VALUE: 28
PIF_VALUE: 28
PIF_VALUE: 30
PIF_VALUE: 27
PIF_VALUE: 25
PIF_VALUE: 35
PIF_VALUE: 28
PIF_VALUE: 28
PIF_VALUE: 31
PIF_VALUE: 35
PIF_VALUE: 17
PIF_VALUE: 35
PIF_VALUE: 27
PIF_VALUE: 28
PIF_VALUE: 24
PIF_VALUE: 19
PIF_VALUE: 27
PIF_VALUE: 26
PIF_VALUE: 35
PIF_VALUE: 27
PIF_VALUE: 33
PIF_VALUE: 28
PIF_VALUE: 25
PIF_VALUE: 29
PIF_VALUE: 24
PIF_VALUE: 25
PIF_VALUE: 24
PIF_VALUE: 35
PIF_VALUE: 22
PIF_VALUE: 29
PIF_VALUE: 25
PIF_VALUE: 31
PIF_VALUE: 23
PIF_VALUE: 24
PIF_VALUE: 28
PIF_VALUE: 28
PIF_VALUE: 27
PIF_VALUE: 23
PIF_VALUE: 27
PIF_VALUE: 18
PIF_VALUE: 26
PIF_VALUE: 23
PIF_VALUE: 33
PIF_VALUE: 24
PIF_VALUE: 24
PIF_VALUE: 28
PIF_VALUE: 35
PIF_VALUE: 29
PIF_VALUE: 31
PIF_VALUE: 26
PIF_VALUE: 29
PIF_VALUE: 29
PIF_VALUE: 28
PIF_VALUE: 25
PIF_VALUE: 30
PIF_VALUE: 36
PIF_VALUE: 21
PIF_VALUE: 27
PIF_VALUE: 29
PIF_VALUE: 25
PIF_VALUE: 26
PIF_VALUE: 27
PIF_VALUE: 23
PIF_VALUE: 20
PIF_VALUE: 30
PIF_VALUE: 42
PIF_VALUE: 30
PIF_VALUE: 33
PIF_VALUE: 31
PIF_VALUE: 27
PIF_VALUE: 28
PIF_VALUE: 27
PIF_VALUE: 28
PIF_VALUE: 29
PIF_VALUE: 24
PIF_VALUE: 37
PIF_VALUE: 33
PIF_VALUE: 35
PIF_VALUE: 29
PIF_VALUE: 27
PIF_VALUE: 27
PIF_VALUE: 32
PIF_VALUE: 31
PIF_VALUE: 28
PIF_VALUE: 24
PIF_VALUE: 27
PIF_VALUE: 29
PIF_VALUE: 31
PIF_VALUE: 35
PIF_VALUE: 28
PIF_VALUE: 27
PIF_VALUE: 28
PIF_VALUE: 28
PIF_VALUE: 30
PIF_VALUE: 34
PIF_VALUE: 28
PIF_VALUE: 26
PIF_VALUE: 32
PIF_VALUE: 28
PIF_VALUE: 34
PIF_VALUE: 27
PIF_VALUE: 27
PIF_VALUE: 30
PIF_VALUE: 35
PIF_VALUE: 33
PIF_VALUE: 28
PIF_VALUE: 20
PIF_VALUE: 27
PIF_VALUE: 36
PIF_VALUE: 36
PIF_VALUE: 51
PIF_VALUE: 36
PIF_VALUE: 30
PIF_VALUE: 30
PIF_VALUE: 35
PIF_VALUE: 34
PIF_VALUE: 27
PIF_VALUE: 35
PIF_VALUE: 29
PIF_VALUE: 26
PIF_VALUE: 20
PIF_VALUE: 28
PIF_VALUE: 28
PIF_VALUE: 36
PIF_VALUE: 30
PIF_VALUE: 39
PIF_VALUE: 26
PIF_VALUE: 25
PIF_VALUE: 36
PIF_VALUE: 24
PIF_VALUE: 39
PIF_VALUE: 25
PIF_VALUE: 20
PIF_VALUE: 26
PIF_VALUE: 28
PIF_VALUE: 27
PIF_VALUE: 31
PIF_VALUE: 26
PIF_VALUE: 22
PIF_VALUE: 24
PIF_VALUE: 28
PIF_VALUE: 28
PIF_VALUE: 27
PIF_VALUE: 27
PIF_VALUE: 31
PIF_VALUE: 19

## 2021-01-01 ASSESSMENT — PAIN SCALES - GENERAL
PAINLEVEL_OUTOF10: 0
PAINLEVEL_OUTOF10: 7
PAINLEVEL_OUTOF10: 0
PAINLEVEL_OUTOF10: 0
PAINLEVEL_OUTOF10: 7
PAINLEVEL_OUTOF10: 0
PAINLEVEL_OUTOF10: 10
PAINLEVEL_OUTOF10: 7
PAINLEVEL_OUTOF10: 0
PAINLEVEL_OUTOF10: 2
PAINLEVEL_OUTOF10: 0
PAINLEVEL_OUTOF10: 7
PAINLEVEL_OUTOF10: 0

## 2021-01-01 ASSESSMENT — PAIN DESCRIPTION - LOCATION
LOCATION: RIB CAGE
LOCATION: GENERALIZED
LOCATION: GENERALIZED
LOCATION: RIB CAGE
LOCATION: GENERALIZED
LOCATION: RIB CAGE

## 2021-01-01 ASSESSMENT — ENCOUNTER SYMPTOMS
ALLERGIC/IMMUNOLOGIC NEGATIVE: 1
DIARRHEA: 0
BACK PAIN: 0
ABDOMINAL DISTENTION: 0
SHORTNESS OF BREATH: 1
CONSTIPATION: 0
EYES NEGATIVE: 1
VOMITING: 0
COUGH: 1
NAUSEA: 0
GASTROINTESTINAL NEGATIVE: 1
ABDOMINAL PAIN: 0
ANAL BLEEDING: 0
BLOOD IN STOOL: 0
CHEST TIGHTNESS: 1

## 2021-01-01 ASSESSMENT — PAIN DESCRIPTION - PAIN TYPE
TYPE: ACUTE PAIN
TYPE: CHRONIC PAIN
TYPE: ACUTE PAIN
TYPE: ACUTE PAIN
TYPE: CHRONIC PAIN

## 2021-01-01 ASSESSMENT — PAIN DESCRIPTION - ONSET
ONSET: ON-GOING
ONSET: ON-GOING

## 2021-01-01 ASSESSMENT — PAIN DESCRIPTION - ORIENTATION
ORIENTATION: LEFT

## 2021-01-01 ASSESSMENT — PAIN DESCRIPTION - DESCRIPTORS
DESCRIPTORS: ACHING;CONSTANT
DESCRIPTORS: ACHING
DESCRIPTORS: ACHING

## 2021-01-01 ASSESSMENT — PAIN DESCRIPTION - PROGRESSION: CLINICAL_PROGRESSION: NOT CHANGED

## 2021-01-01 NOTE — H&P
mouth 2 times daily For thrombocytopenia  gabapentin (NEURONTIN) 100 MG capsule, Take 100 mg by mouth 2 times daily. For foot pain  predniSONE (DELTASONE) 20 MG tablet, Take 20 mg by mouth 2 times daily For covid  Multiple Vitamins-Minerals (THERAPEUTIC MULTIVITAMIN-MINERALS) tablet, Take 1 tablet by mouth daily  ipratropium-albuterol (DUONEB) 0.5-2.5 (3) MG/3ML SOLN nebulizer solution, Inhale 1 vial into the lungs every 4 hours As needed  HYDROcodone-acetaminophen (NORCO)  MG per tablet, Take 1 tablet by mouth every 4 hours as needed for Pain. ondansetron (ZOFRAN) 8 MG tablet, Take 8 mg by mouth every 8 hours as needed for Nausea or Vomiting    Allergies:    Patient has no known allergies. Social History:        Family History:   family history is not on file. REVIEW OF SYSTEMS:  As above in the HPI, otherwise negative    PHYSICAL EXAM:    Vitals:  /65   Pulse 60   Temp 97.6 °F (36.4 °C) (Oral)   Resp 18   SpO2 92%     General:  Awake, alert, oriented X 3. Well developed, well nourished, well groomed. No apparent distress. HEENT:  Normocephalic, atraumatic. Pupils equal, round, reactive to light. No scleral icterus. No conjunctival injection. Normal lips, teeth, and gums. No nasal discharge. Neck:  Supple  Heart:  RRR, no murmurs, gallops, rubs  Lungs:  CTA bilaterally, bilat symmetrical expansion, no wheeze, rales, or rhonchi  Abdomen:   Bowel sounds present, soft, nontender, no masses, no organomegaly, no peritoneal signs  Extremities:  No clubbing, cyanosis, or edema  Skin:  Warm and dry, no open lesions or rash  Neuro:  Cranial nerves 2-12 intact, no focal deficits  Breast: deferred  Rectal: deferred  Genitalia:  deferred    LABS:    CBC:   Lab Results   Component Value Date    WBC 3.7 12/31/2020    RBC 4.08 12/31/2020    HGB 12.5 12/31/2020    HCT 40.8 12/31/2020    .0 12/31/2020    MCH 30.6 12/31/2020    MCHC 30.6 12/31/2020    RDW 14.9 12/31/2020     12/31/2020 MPV 12.0 12/31/2020     CMP:    Lab Results   Component Value Date     12/31/2020    K 3.9 12/31/2020     12/31/2020    CO2 28 12/31/2020    BUN 79 12/31/2020    CREATININE 1.8 12/31/2020    GFRAA 33 12/31/2020    LABGLOM 27 12/31/2020    GLUCOSE 142 12/31/2020    PROT 6.7 12/31/2020    LABALBU 3.1 12/31/2020    CALCIUM 9.8 12/31/2020    BILITOT 0.4 12/31/2020    ALKPHOS 109 12/31/2020    AST 18 12/31/2020    ALT 18 12/31/2020       ASSESSMENT:      Patient Active Problem List   Diagnosis    Myeloma ([facility] [address]    Essential hypertension, benign    Localized edema    Thrombocytopenia, unspecified ([facility] [address]    Pneumonia due to COVID-19 virus    Elevated serum creatinine    Essential hypertension    Hypernatremia    Hypermagnesemia    Hypokalemia    Acute respiratory failure with hypoxia and hypercapnia (HCC)         PLAN:    1. COVID 19 PNA   Continue with dexamethasone and remdesivir   Possible component of bacterial pneumonia, procalcitonin 0.51 and dense infiltrate on CT chest.  Start Rocephin and doxycycline (prolonged QT)   Wean oxygen as tolerated  2.  DVT px - Deedee Thomas MD  7:07 PM  12/31/2020

## 2021-01-01 NOTE — PROGRESS NOTES
Subjective:    Patient seen and examined at bedside, complaining of watery eyes. No complaints. Oxygen requirements improving. Currently on 2.5 L via nasal cannula. No home O2 at NH    Objective:    /60   Pulse 52   Temp 97.6 °F (36.4 °C) (Oral)   Resp 18   SpO2 95%     Current medications that patient is taking have been reviewed. Heart:  RRR, no murmurs, gallops, or rubs. Lungs:  CTA bilaterally, no wheeze, rales or rhonchi  Abd: bowel sounds present, soft, nontender, nondistended, no masses  Extrem:  No cyanosis or edema    CBC:   Lab Results   Component Value Date    WBC 3.0 01/01/2021    RBC 3.75 01/01/2021    HGB 11.7 01/01/2021    HCT 37.3 01/01/2021    MCV 99.5 01/01/2021    MCH 31.2 01/01/2021    MCHC 31.4 01/01/2021    RDW 14.6 01/01/2021     01/01/2021    MPV 12.3 01/01/2021     BMP:    Lab Results   Component Value Date     01/01/2021    K 3.7 01/01/2021     01/01/2021    CO2 28 01/01/2021    BUN 61 01/01/2021    LABALBU 2.9 01/01/2021    CREATININE 1.4 01/01/2021    CALCIUM 9.7 01/01/2021    GFRAA 44 01/01/2021    LABGLOM 36 01/01/2021    GLUCOSE 170 01/01/2021        Assessment:    Patient Active Problem List   Diagnosis    Myeloma ([address]    Essential hypertension, benign    Localized edema    Thrombocytopenia, unspecified ([address]    Pneumonia due to COVID-19 virus    Elevated serum creatinine    Essential hypertension    Hypernatremia    Hypermagnesemia    Hypokalemia    Acute respiratory failure with hypoxia and hypercapnia (HCC)       Plan:    1. COVID 19 PNA              Continue with dexamethasone and remdesivir              Possible component of bacterial pneumonia, procalcitonin 0.51 and dense infiltrate on CT chest.  Continue Rocephin and doxycycline (prolonged QT)              Wean oxygen as tolerated  2.  DVT px - eliquis    Please call my cell phone between 8pm-6am [phone]    Song Orellana    4:24 PM  1/1/2021

## 2021-01-01 NOTE — PROGRESS NOTES
Notified Dr. Susanne Merrill via perfect serve re: pt not voiding during this shift. Bladder Scan results: 594 mL, awaiting new orders.

## 2021-01-02 NOTE — PROGRESS NOTES
Subjective:    Patient seen and examined at bedside, feeling well today. No relief from eyedrops. Denies shortness of breath. Currently on 2 L via nasal cannula, improved from 2.5. Objective:    /64   Pulse 60   Temp 97.8 °F (36.6 °C) (Oral)   Resp 16   SpO2 93%     Current medications that patient is taking have been reviewed. Heart:  RRR, no murmurs, gallops, or rubs. Lungs:  CTA bilaterally, no wheeze, rales or rhonchi  Abd: bowel sounds present, soft, nontender, nondistended, no masses  Extrem:  No cyanosis or edema    CBC:   Lab Results   Component Value Date    WBC 3.8 01/02/2021    RBC 3.91 01/02/2021    HGB 11.8 01/02/2021    HCT 39.0 01/02/2021    MCV 99.7 01/02/2021    MCH 30.2 01/02/2021    MCHC 30.3 01/02/2021    RDW 14.6 01/02/2021     01/02/2021    MPV 12.3 01/02/2021     BMP:    Lab Results   Component Value Date     01/02/2021    K 4.6 01/02/2021     01/02/2021    CO2 27 01/02/2021    BUN 48 01/02/2021    LABALBU 2.8 01/02/2021    CREATININE 1.1 01/02/2021    CALCIUM 9.8 01/02/2021    GFRAA 57 01/02/2021    LABGLOM 47 01/02/2021    GLUCOSE 100 01/02/2021        Assessment:    Patient Active Problem List   Diagnosis    Myeloma ([facility] [address]    Essential hypertension, benign    Localized edema    Thrombocytopenia, unspecified ([facility] [address]    Pneumonia due to COVID-19 virus    Elevated serum creatinine    Essential hypertension    Hypernatremia    Hypermagnesemia    Hypokalemia    Acute respiratory failure with hypoxia and hypercapnia (HCC)       Plan:    1. COVID 19 PNA              Continue with dexamethasone and remdesivir (4/5)              Possible component of bacterial pneumonia, procalcitonin 0.51 and dense infiltrate on CT chest.  Continue Rocephin and doxycycline (prolonged QT)              Wean oxygen as tolerated  2.  DVT px - eliquis    Discharge to nursing home after fifth day of remdesivir tomorrow    Please call my cell phone between 8pm-6am [phone] Randa    4:07 PM  1/2/2021

## 2021-01-03 NOTE — PROGRESS NOTES
Notified patient of possible discharge back to [facility] today. Patient stating she does not want to return to [facility], wants to go to a different facility upon discharge. Secure text message sent to Dr. Ivette Green to notify.

## 2021-01-04 NOTE — PROGRESS NOTES
Patient complaining of \"severe pain\" in her rectum. Patient manually disimpacted, as hard stool was observed in rectum upon assessment. Large amount of stool returned, patient reports feeling much better.

## 2021-01-04 NOTE — CARE COORDINATION
Social work / Discharge Planning:       [address] Social work spoke to patient via phone. Discharge plan is to return to [facility]. N-17 and ambulance form completed.   Electronically signed by LIZ Belcher on 1/4/2021 at 12:13 PM

## 2021-01-04 NOTE — CARE COORDINATION
Social work / Discharge Planning:       [address]. Discharge to [facility] at [address] via [facility]. Social work updated RN, facility liaison and patient's brother Ronni Quan.   Electronically signed by LIZ Ruvalcaba on 1/4/2021 at 1:40 PM

## 2021-01-04 NOTE — PROGRESS NOTES
Attempt x 2 made to call report to [facility], no one available to take report. Spoke with Colten Woodruff, call back number provided, advised pt to be picked up at 1700, awaiting call back.

## 2021-01-11 NOTE — DISCHARGE SUMMARY
Physician Discharge Summary     Patient ID:  Manuel Garcia  [id number]  78 y.o.  1940    Admit date: 12/30/2020    Discharge date and time:  1/4/21 5pm    Admission Diagnoses:   Patient Active Problem List   Diagnosis    Myeloma ([facility] [address]    Essential hypertension, benign    Localized edema    Thrombocytopenia, unspecified ([facility] [address]    Pneumonia due to COVID-19 virus    Elevated serum creatinine    Essential hypertension    Hypernatremia    Hypermagnesemia    Hypokalemia    Acute respiratory failure with hypoxia and hypercapnia ([facility] [address]       Discharge Diagnoses: COVID 19 PN    Consults: none    Procedures: None    Hospital Course:   1. COVID 19 PNA   Treated with dexamethasone, completed remdesivir. On 15L in ED, down to 2L on discharge. Treated with Rocephin and doxycycline, procal 0.51  2.  DVT px - eliquis       Discharge Exam:  Gen - NAD AAOx3  CV - RRR s1/s2, no M/R/G  Pulm - Fair air entry bilaterally, no wheeze  Abd - Soft NT ND  Ext - No LE edema    Condition:  L    Disposition: SNF    Patient Instructions:   Discharge Medication List as of 1/4/2021  4:15 PM      START taking these medications    Details   amLODIPine (NORVASC) 10 MG tablet Take 1 tablet by mouth daily, Disp-30 tablet, R-3Normal      benzonatate (TESSALON) 100 MG capsule Take 1 capsule by mouth every 8 hours as needed for Cough, Disp-21 capsule, R-0Normal      guaiFENesin-dextromethorphan (ROBITUSSIN DM) 100-10 MG/5ML syrup Take 5 mLs by mouth every 4 hours as needed for Cough, Disp-120 mL, R-0Normal      dexamethasone (DECADRON) 2 MG tablet Take 1 tablet by mouth daily (with breakfast) for 5 days, Disp-5 tablet, R-0Normal         CONTINUE these medications which have CHANGED    Details   apixaban (ELIQUIS) 2.5 MG TABS tablet Take 1 tablet by mouth 2 times daily, Disp-60 tablet, R-1Normal         CONTINUE these medications which have NOT CHANGED    Details   furosemide (LASIX) 20 MG tablet Take 20 mg by mouth dailyHistorical Med lenalidomide (REVLIMID) 10 MG chemo capsule Take 10 mg by mouth daily Give at bedtime for 21 days starting 12/12/20Historical Med      Ascorbic Acid (VITAMIN C ADULT GUMMIES PO) Take 1,000 mg by mouth dailyHistorical Med      Zinc 100 MG TABS Take by mouth dailyHistorical Med      gabapentin (NEURONTIN) 100 MG capsule Take 100 mg by mouth 2 times daily. For foot painHistorical Med      Multiple Vitamins-Minerals (THERAPEUTIC MULTIVITAMIN-MINERALS) tablet Take 1 tablet by mouth dailyHistorical Med      ipratropium-albuterol (DUONEB) 0.5-2.5 (3) MG/3ML SOLN nebulizer solution Inhale 1 vial into the lungs every 4 hours As neededHistorical Med      HYDROcodone-acetaminophen (NORCO)  MG per tablet Take 1 tablet by mouth every 4 hours as needed for Pain. Historical Med      ondansetron (ZOFRAN) 8 MG tablet Take 8 mg by mouth every 8 hours as needed for Nausea or VomitingHistorical Med         STOP taking these medications       predniSONE (DELTASONE) 20 MG tablet Comments:   Reason for Stopping:             Activity: ambulate in house  Diet: cardiac diet    Follow-up with PCP in 1 week    Note that over 30 minutes was spent in preparing discharge papers, discussing discharge with patient, medication review, etc.    Signed:  Kristen Roe    1/10/2021  9:13 PM

## 2021-01-12 NOTE — PROGRESS NOTES
1/12/2021    Yvette Stewart  1940    This resident is being seen today for an acute evaluation visit. 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 She is a 78 y.o. female resident who is being seen today for an acute evaluation visit regarding concerns at this time with respect to nausea and vomiting. Staff indicates that this has been over the course of at least the past 24 hours. Furthermore it is of note to mention that she was recently diagnosed with yeast in her sputum and placed on nystatin swish. Resident does indicate that her sore throat has improved with the benefit of the swish. Resident is however concerned of her severe stomach pain and feels that it is secondary to constipation. She does indicate that in the hospital setting that it was digitally removed. She does continue to have some degree of a decreased appetite possibly secondary to the pharyngitis, nausea with vomiting, or the viral infection with COVID-19. Staff does indicate that she has been refusing her meds which she states is due to the nausea with vomiting. At this time she denies any headaches, coughing or shortness of breath, chest pain, diarrhea, fever or chills, recent falls or syncopal events. Her biggest concern at this time is with respect to her underlying cancer and wanting to get back to her cancer treatments.       Objective     Vital Signs: 122/72 pulse 74 respiration 19 temperature 97.7 O2 94% weight of 157  Pounds Plan: Plan of care was discussed with the healthcare team with meds and labs reviewed. Regarding my concerns with respect to the acuity of this resident, I have made recommendations for a KUB in addition to a CBC and a CMP to be completed today. I do have concerns regarding some question of underlying constipation in addition to dehydration. If the KUB is positive for constipation I have given recommendations for a fleets enema x1. She will furthermore maintain the benefits of her nystatin swish with respect to the sputum culture with evidence of yeast.  She is been to maintain the recommendations for isolation precautions regarding her viral infection and I will continue with close observation with respect to the nausea with vomiting. It is of note to mention that throughout my examination, the vomiting consisted of mucus. I do feel that she will be more compliant with her medications when the nausea and vomiting does subside. She will furthermore be maintained on her medical regimen as indicated with the benefit of vitamin C with zinc.  Due to her isolation precautions with her ongoing symptomatology, her appointment will be placed on hold with the department of  hematology oncology with Dr. Renetta Steel. Despite the recommendations per Dr. Renetta Steel for no further labs here at the facility, I am going to recommend a CBC with a CMP today. I am concerned however that she may become dehydrated. I will otherwise maintain her current medical regimen with the plan of care as indicated and I will continue to  track her intakes, monitor her weights and behaviors, and see her routinely and as needed with further orders forthcoming. MONCHO BARTLETT, APRN - CNP      *Note was creating using voice recognition software.   The document was reviewed however grammatical errors may exist.

## 2021-01-19 NOTE — PROGRESS NOTES
1/19/2021    Yvette Stewart  1940    This resident is being seen today for an acute evaluation visit. 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 She is a 78 y.o. female resident who is being seen today for an acute evaluation. This resident was initially diagnosed with COVID-19 on December 19 and was treated accordingly at that time and has been in and out of the hospital setting. She currently remains a full CODE STATUS. Staff does indicate that she has had noncompliance with respect to her medications with ongoing shortness of breath in addition to nausea and vomiting. She will maintain the benefit of oxygen supplementation as indicated. She does tend to refuse Zofran with respect to her nausea and vomiting. She indicates that she has a burning sensation throughout her stomach and up into her esophagus but refuses treatment. After very long discussion she finally agreed to the benefit of Tums. Of note to mention that over the course of the past couple of weeks she was treated accordingly with what appeared to be yeast and has had improvement in this regard. On a side note, she has been up patient of ours for an extended period of time, and has had some degree of noncompliance in times past. At this time she denies any headaches, coughing or shortness of breath, chest pain, diarrhea, fever or chills, recent falls or syncopal events. Her biggest concern at this time is with respect to her underlying cancer and wanting to get back to her cancer treatments.       Objective     Vital Signs: 132/79 pulse 80 respiration 19 temperature 97.8 O2 94% weight of 138.2 Pounds Physical examination:Skin is essentially warm and dry. HEENT unremarkable. Neck is supple. Heart regular rate and rhythm. No rubs, gallops or murmurs noted. Lungs are clear to auscultation. No evidence of rhonchi, rales, or wheezing. Abdomen is distended with tenderness with tenderness throughout. Bowel sounds are hypoactive to all 4 quadrants. Negative Hernandez's, negative McBurney's, negative Srikanth's. Extremities; no true pitting edema. Pulses are adequate. No clubbing  or no cyanosis noted. Neurologically she  is alert and oriented x3. No evidence of paralysis or paresthesias noted. Diagnoses and all orders for this visit:    Weight loss  Comments:  Loss of 18.8 pounds in the course of the past month and a half. SOB (shortness of breath)  Comments:  Ongoing since diagnosis of COVID-19. Nausea and vomiting, intractability of vomiting not specified, unspecified vomiting type  Comments:  Possibly secondary to GERD. Resident noncompliant with medications. Refuses Zofran. Gastroesophageal reflux disease without esophagitis  Comments: Will start Tums 2 tablets  3 times a day    COVID-19  Comments:  Treated accordingly. Monitoring with respect to ongoing symptomatology in terms of shortness of breath. Plan: Plan of care was discussed with the healthcare team with meds and labs reviewed. Of 151 previously 147 BUN/creatinine 22/0.8 with a GFR 69 albumin of 3.1 H&H 12.4 and 38.2. With regards to the resident's current status, she is agreeable to the benefits of Tums 3 times a day to help with her reflux-like symptoms. If this does help, she is willing to take her medications. Furthermore, I will place her on low-dose Remeron regarding her weight loss. In addition, I will give her the benefit of a house supplement three times daily. Based on her elevated sodium, I will obtain a BMP in the course of a week for re-evaluation. For now, I will maintain isolation precautions due to ongoing symptomatology. I will otherwise maintain her current medical regimen with the plan of care as indicated and I will continue to  track her intakes, monitor her weights and behaviors, and see her routinely and as needed with further orders forthcoming. MONCHO BARTLETT, APRN - CNP      *Note was creating using voice recognition software.   The document was reviewed however grammatical errors may exist.

## 2021-02-09 NOTE — PROGRESS NOTES
2/9/2021    Yvette Stewart  1940    This resident is being seen today for an acute evaluation visit. 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 She is a 78 y.o. female resident who is being seen today for an acute evaluation due to staffing concerns regarding this resident's behavior in the recent days past.  Staff did indicate that she was found to be self extracting her stool with utilization of the end of a spoon. When they did remove her utensils, she proceeded to use her fingers. She has since been placed on Colace. I did discuss this with her today and the possible adverse effects that could have occurred with the inclusion of trauma, but she indicated that she would do it again. I explained to her that there are several medications available to her if this were to occur, but she is very nonadherent to her medical regimen. She does indicate that the constipation is essentially resolved at this time. There are concerns however with respect to her bilateral lower extremities, with notable increasing edema. Resident does indicate that she has had \"white socks\" in times past.  She does indicate that she is willing to utilize the SHAHRAM hose again to help with her symptomatology. She is a resident who is bed to chair confined and has recommendations to transfer by way of assist x2. She indicates that she has no current headache, dizziness, sore throat, coughing or shortness of breath, nausea or vomiting, no further constipation, no diarrhea, no fever or chills, no recent falls or syncopal events.         Objective     Vital Signs: 120/74 pulse 75 respiration 19 temperature 97.8 O2 96% weight of 148.6 Pounds    Physical examination:Skin is essentially warm and dry. HEENT unremarkable. Neck is supple. Heart regular rate and rhythm. No rubs, gallops or murmurs noted. Lungs are clear to auscultation. No evidence of rhonchi, rales, or wheezing. Abdomen is distended with tenderness with tenderness throughout. Bowel sounds are hypoactive to all 4 quadrants. Negative Hernandez's, negative McBurney's, negative Srikanth's. Extremities; does have evidence of 2+ pitting edema to the bilateral lower extremities. Pulses are adequate. No clubbing  or no cyanosis noted. Neurologically she  is alert and oriented x3. No evidence of paralysis or paresthesias noted. Diagnoses and all orders for this visit:    Constipation, unspecified constipation type  Comments:  Maintain Colace. Peripheral edema  Comments:  Upward titrate Lasix. Obtain BMP in 1 week. Utilize bilateral knee-high SHAHRAM hose. Essential hypertension, benign  Comments:  Maintain amlodipine with diuretic management. Multiple myeloma not having achieved remission ([facility] [address]  Comments: Follow with hematology/oncology. Anemia, unspecified type  Comments:  Stable with multivitamin. Plan: Plan of care was discussed with the healthcare team with meds and labs reviewed. BUN/creatinine 21/0.8 GFR 69. Given the concerns with respect to her peripheral edema and notable weight gain since my last evaluation, I am going to recommend upper titration of her Lasix to 40 mg a day with a BMP in the course of 1 week. I am going to withhold any potassium at this time, as she is nonadherent with respect to this medication. I will have to continue to monitor her blood work very closely with respect to her diuretic management. Furthermore, I am going to recommend that she utilize the SHAHRAM hose which can be on in the morning and off in the evening, which I did apply during my examination. She will continue forth with her bowel regimen as tolerated, with the inclusion of Colace.   I did discuss increasing her fluids throughout the day, which creates somewhat of a problem because she does not like the water at the facility and will only drink bottled water brought in by family/friends. Furthermore, I will maintain her plan of care as otherwise indicated and maintain the recommendations to track her intakes, monitor her weights and behaviors, and see her routinely and as needed with further orders forthcoming. MONCHO BARTLETT, APRN - CNP      *Note was creating using voice recognition software.   The document was reviewed however grammatical errors may exist.

## 2021-05-11 NOTE — PROGRESS NOTES
5/11/2021    Yvette Stewart  1940    This resident is being seen today for a follow-up evaluation visit. 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 She is a 78 y.o. female resident who is being seen today for a follow-up evaluation visit with this resident remaining bed to chair confined with recommendations to transfer by way of assist x2. Resident is able to self propel with respect to a wheelchair. This resident is essentially alert and oriented and in no acute distress at the time of my evaluation. She denied any complaints of pain, headaches or dizziness, sore throat, chest pain, coughing or shortness of breath, nausea or vomiting, constipation or diarrhea, dysuria or frequency, fever or chills, falls or syncopal events. Medications:  Zofran 8 mg every 8 hours as needed  Amlodipine 10 mg daily  Hydrocodone/acetaminophen  mg every 4 hours as needed  Eliquis 2.5 mg twice daily  Gabapentin 100 mg twice daily  Benzonatate capsule 100 mg every 8 hours as needed  Multivitamin daily  Visine dry eye relief 1 drop both eyes 3 times a day   mg twice daily   Remeron 7.5 mg at bedtime  Colace 100 mg twice daily  Lasix 40 mg daily  Revlimid 10 mg at bedtime        Objective     Vital Signs: 124/686 pulse 78 respiration 17 temperature 98 O2 97% weight of 160.6 Pounds    Physical examination:Skin is essentially warm and dry. HEENT unremarkable. Neck is supple. Heart regular rate and rhythm. No rubs, gallops or murmurs noted. Lungs are clear to auscultation. No evidence of rhonchi, rales, or wheezing. Abdomen soft and nontender with bowel sounds present x4 quadrants. Negative Hernandez's, negative McBurney's, negative Srikanth's.   Extremities; no true pitting edema.  Pulses are adequate. No clubbing  or no cyanosis noted. Neurologically she  is alert and oriented x3. No evidence of paralysis or paresthesias noted. Diagnoses and all orders for this visit:    Multiple myeloma not having achieved remission ([facility] [address]  Comments:  Seen in conjunction with the department of hematology/oncology with Dr. Maranda Roblero and does maintain the benefit of Revlimid    Thrombocytopenia, unspecified ([facility] [address]  Comments:  Stable with close observation of CBC    Essential hypertension, benign  Comments:  Controlled with amlodipine    Gastroesophageal reflux disease without esophagitis  Comments:  Stable    Constipation, unspecified constipation type  Comments:  Controlled with Colace      Plan: Plan of care was discussed with the healthcare team with meds and labs reviewed. Resident appears to be doing very well since my last examination. She is seen in conjunction with the department of hematology and oncology with Dr. Maranda Roblero and does have an upcoming appointment scheduled for 5/27/2021. We do encourage this resident to utilize her SHAHRAM hose. I will furthermore maintain her current medical regimen as set forth with the benefit of her no added salt diet and I will track her intakes, monitor her weights and behaviors, and see her routinely and as needed with further orders forthcoming             MONCHO BARTLETT, APRN - CNP      *Note was creating using voice recognition software.   The document was reviewed however grammatical errors may exist.

## 2021-06-10 NOTE — PROGRESS NOTES
mg twice daily  Lasix 40 mg daily  Revlimid 10 mg at bedtime        Objective     Vital Signs: 124/76 pulse 72 respiration 18 temperature 97.6 O2 96% weight of 165.2 Pounds    Physical examination:Skin is essentially warm and dry. HEENT unremarkable. Neck is supple. Heart regular rate and rhythm. No rubs, gallops or murmurs noted. Lungs are clear to auscultation. No evidence of rhonchi, rales, or wheezing. Abdomen soft and nontender with bowel sounds present x4 quadrants. Negative Hernandez's, negative McBurney's, negative Srikanth's. Extremities; no true pitting edema. Pulses are adequate. No clubbing  or no cyanosis noted. Neurologically she  is alert and oriented x3. No evidence of paralysis or paresthesias noted. Diagnoses and all orders for this visit:    Noncompliance with medication regimen  Comments:  Resident does tend to pick and choose medication she is willing to take. Will maintain current medical regimen as directed    Multiple myeloma not having achieved remission ([facility])  Comments:  Seen in conjunction with Dr. August Pires with recommendations for dexamethasone with Revlimid    Anemia, unspecified type  Comments:  Maintain multivitamin with close observation of CBC    Essential hypertension, benign  Comments:  Controlled with amlodipine and diuretic management    Lumbar pain  Comments:  Currently stable with hydrocodone/acetaminophen      Plan: Plan of care was discussed with the healthcare team with meds and labs reviewed. Resident will maintain the benefit of her current medical regimen as directed. She will continue with the benefit of Revlimid with dexamethasone per department of hematology. She will follow-up with Dr. August Pires 6/24/2021. She will continue with the benefit of her no added salt diet and utilization of SHAHRAM hose.   We will continue with close observation with respect to her medication noncompliance and furthermore maintain her plan of care with the benefit of tracking her intakes, monitoring her weights and behaviors, and seeing her routinely and as needed with further orders forthcoming. MONCHO BARTLETT, APRN - CNP      *Note was creating using voice recognition software.   The document was reviewed however grammatical errors may exist.

## 2021-06-26 PROBLEM — J13 CAP (COMMUNITY ACQUIRED PNEUMONIA) DUE TO PNEUMOCOCCUS (HCC): Status: ACTIVE | Noted: 2021-01-01

## 2021-06-26 NOTE — ED NOTES
Bed: 17B-17  Expected date:   Expected time:   Means of arrival:   Comments:  regina Vernon RN  06/26/21 1120

## 2021-06-26 NOTE — ED NOTES
Bed: 12  Expected date:   Expected time:   Means of arrival:   Comments:  [place] Francina Dandy, RN  06/26/21 1127

## 2021-06-26 NOTE — H&P
Hospital Medicine History & Physical      PCP: Frantz Ayala DO    Date of Admission: 6/26/2021    Date of Service: Pt seen/examined on 06/26/2021 and Admitted to in aptient status. Hx taken from patient and ed staff    Chief Complaint:  Mechanical Fall substain C-2 Dense Fracture, seen in trauma, neck brace applied with HAP and Bipap. History Of Present Illness: Natan Alex is a 78 y.o. female who was trans eddie from nursing facility due to mechanical fall substaining Dense C-2 Fracture, NS consulted and trauma applied neck collar for stability. The also found HAP with ARF and Bipap. She will be admitted to [facility] with pulmonary  in consult for HAP w/ ARF and Bipap. Past Medical History:        Diagnosis Date    Abnormality of plasma protein     Anemia     Asthma     Collapsed vertebra, not elsewhere classified, thoracic region, subsequent encounter for fracture with routine healing     Constipation     Dependence on supplemental oxygen     Difficulty in walking     GERD without esophagitis     Hypertension     Low back pain     Monoclonal gammopathy     Multiple myeloma not having achieved remission (HCC)     Other disorders of plasma-protein metabolism, not elsewhere classified     Secondary malignant neoplasm of bone (HCC)     Thrombocytopenia ([facility] [address]     Unspecified fracture of first lumbar vertebra, subsequent encounter for fracture with routine healing     Unspecified fracture of fourth lumbar vertebra, subsequent encounter for fracture with routine healing        Past Surgical History:    No past surgical history on file. MedicationsPrior to Admission:    Prior to Admission medications    Medication Sig Start Date End Date Taking?  Authorizing Provider   apixaban (ELIQUIS) 2.5 MG TABS tablet Take 1 tablet by mouth 2 times daily 1/4/21   Gregorio Garcia MD   amLODIPine (NORVASC) 10 MG tablet Take 1 tablet by mouth daily 1/5/21   Gregorio Garcia MD   furosemide (LASIX) 20 MG tablet Take 20 mg by mouth daily    Historical Provider, MD   lenalidomide (REVLIMID) 10 MG chemo capsule Take 10 mg by mouth daily Give at bedtime for 21 days starting 12/12/20    Historical Provider, MD   Ascorbic Acid (VITAMIN C ADULT GUMMIES PO) Take 1,000 mg by mouth daily    Historical Provider, MD   Zinc 100 MG TABS Take by mouth daily    Historical Provider, MD   gabapentin (NEURONTIN) 100 MG capsule Take 100 mg by mouth 2 times daily. For foot pain    Historical Provider, MD   Multiple Vitamins-Minerals (THERAPEUTIC MULTIVITAMIN-MINERALS) tablet Take 1 tablet by mouth daily    Historical Provider, MD   ipratropium-albuterol (DUONEB) 0.5-2.5 (3) MG/3ML SOLN nebulizer solution Inhale 1 vial into the lungs every 4 hours As needed    Historical Provider, MD   HYDROcodone-acetaminophen (NORCO)  MG per tablet Take 1 tablet by mouth every 4 hours as needed for Pain. Historical Provider, MD   ondansetron (ZOFRAN) 8 MG tablet Take 8 mg by mouth every 8 hours as needed for Nausea or Vomiting    Historical Provider, MD       :    Patient has no known allergies. Social History:    The patient currently lives nursing facility  TOBACCO:   reports that she has never smoked. She does not have any smokeless tobacco history on file. ETOH:   reports previous alcohol use. Family History:  Positive as follows:    No family history on file. REVIEW OF SYSTEMS:   Pertinent positives and negatives  as notedin the HPI and ROS. All other systems reviewed and negative. Review of Systems   Respiratory: Positive for cough, chest tightness and shortness of breath. Musculoskeletal: Positive for neck pain. Neurological: Positive for weakness. PHYSICAL EXAM:  /72   Pulse 115   Temp 97.6 °F (36.4 °C) (Oral)   Resp 29   Ht 5' 4\" (1.626 m)   Wt 150 lb (68 kg)   SpO2 96%   BMI 25.75 kg/m²     Physical Exam  Vitals reviewed. Constitutional:       Appearance: Normal appearance.  She is TECHNOLOGIST PROVIDED HISTORY: Reason for exam:->unwitnessed fall Has a \"code stroke\" or \"stroke alert\" been called? ->No Decision Support Exception - unselect if not a suspected or confirmed emergency medical condition->Emergency Medical Condition (MA) What reading provider will be dictating this exam?->CRC FINDINGS: There is diffuse atrophy with dilated ventricles and prominence of the sulci. 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 There is no acute stroke, mass or hemorrhage. A redemonstrated are extensive is punctate lytic lesions in the calvarium with motht eaten pattern likely multiple myeloma. Stable atrophy with small vessel ischemic disease. There is no acute stroke or hemorrhage. Persistent multiple punctate lytic lesions the calvarium likely due to malignancy like multiple myeloma. CT cervical spine. There is a fracture of the base of the dens of C2 (type 2). There is no displacement. No other acute fractures are identified in the cervical spine. There is mild diffuse degenerative changes from C3-T1 with osteophytes and multilevel disc bulges. There is osteopenia and punctate lytic lesions concerning for multiple myeloma. Impression Type 2 fracture of the dens of C2. Diffuse degenerative changes from C3-T1. Multiple myeloma. CT chest. Comparison December 30, 2020. Findings There is borderline cardiac size. The great vessels are normal.  Trachea and major bronchi are patent. There is dense consolidation in the left upper lobe and left lower lobe concerning for pneumonia. There is minimal atelectasis in the right lung base. The liver is of normal architecture. There is diffuse demineralization of the thoracic spine with punctate lucencies. Compression fractures with vertebroplasty are noted. There is slight irregularity of the sternum probably artifact. There is kyphosis of the spine. Impression Dense consolidation in the left upper lobe and left lower lobe concerning for pneumonia. Surveillance after appropriate treatment is recommended with repeat CT scan in 6-8 weeks to see complete resolution and exclude underlying malignancy. Demineralization and the punctate lytic lucencies in the spine and ribs concerning for multiple myeloma. Critical results were called by Dr. Tony Hutchinson to Dr. Lavinia Moore. on 6/26/2021 at 16:30.     CT CHEST WO CONTRAST    Result Date: 6/26/2021  EXAMINATION: CT OF THE HEAD WITHOUT CONTRAST; CT OF THE CHEST WITHOUT CONTRAST; CT OF THE CERVICAL SPINE WITHOUT CONTRAST  6/26/2021 1:56 pm TECHNIQUE: CT of the head was performed without the administration of intravenous contrast. Dose modulation, iterative reconstruction, and/or weight based adjustment of the mA/kV was utilized to reduce the radiation dose to as low as reasonably achievable.; CT of the chest was performed without the administration of intravenous contrast. Multiplanar reformatted images are provided for review. Dose modulation, iterative reconstruction, and/or weight based adjustment of the mA/kV was utilized to reduce the radiation dose to as low as reasonably achievable.; CT of the cervical spine was performed without the administration of intravenous contrast. Multiplanar reformatted images are provided for review. Dose modulation, iterative reconstruction, and/or weight based adjustment of the mA/kV was utilized to reduce the radiation dose to as low as reasonably achievable. COMPARISON: December 30, 2020. HISTORY: ORDERING SYSTEM PROVIDED HISTORY: unwitnessed fall TECHNOLOGIST PROVIDED HISTORY: Reason for exam:->unwitnessed fall Has a \"code stroke\" or \"stroke alert\" been called? ->No Decision Support Exception - unselect if not a suspected or confirmed emergency medical condition->Emergency Medical Condition (MA) What reading provider will be dictating this exam?->CRC FINDINGS: There is diffuse atrophy with dilated ventricles and prominence of the sulci. 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 There is no acute stroke, mass or hemorrhage. A redemonstrated are extensive is punctate lytic lesions in the calvarium with motht eaten pattern likely multiple myeloma. Stable atrophy with small vessel ischemic disease. There is no acute stroke or hemorrhage. Persistent multiple punctate lytic lesions the calvarium likely due to malignancy like multiple myeloma. CT cervical spine. There is a fracture of the base of the dens of C2 (type 2). There is no displacement. No other acute fractures are identified in the cervical spine. There is mild diffuse degenerative changes from C3-T1 with osteophytes and multilevel disc bulges. There is osteopenia and punctate lytic lesions concerning for multiple myeloma. Impression Type 2 fracture of the dens of C2. Diffuse degenerative changes from C3-T1. Multiple myeloma. CT chest. Comparison December 30, 2020. Findings There is borderline cardiac size. The great vessels are normal.  Trachea and major bronchi are patent. There is dense consolidation in the left upper lobe and left lower lobe concerning for pneumonia. There is minimal atelectasis in the right lung base. The liver is of normal architecture. There is diffuse demineralization of the thoracic spine with punctate lucencies. Compression fractures with vertebroplasty are noted. There is slight irregularity of the sternum probably artifact. There is kyphosis of the spine. Impression Dense consolidation in the left upper lobe and left lower lobe concerning for pneumonia.   Surveillance after appropriate treatment is recommended with repeat CT scan in 6-8 weeks to see complete resolution and exclude underlying malignancy. Demineralization and the punctate lytic lucencies in the spine and ribs concerning for multiple myeloma. Critical results were called by Dr. Yaquelin Stout to Dr. Ignacia Zimmerman on 6/26/2021 at 16:30.     CT Cervical Spine WO Contrast    Result Date: 6/26/2021  EXAMINATION: CT OF THE HEAD WITHOUT CONTRAST; CT OF THE CHEST WITHOUT CONTRAST; CT OF THE CERVICAL SPINE WITHOUT CONTRAST  6/26/2021 1:56 pm TECHNIQUE: CT of the head was performed without the administration of intravenous contrast. Dose modulation, iterative reconstruction, and/or weight based adjustment of the mA/kV was utilized to reduce the radiation dose to as low as reasonably achievable.; CT of the chest was performed without the administration of intravenous contrast. Multiplanar reformatted images are provided for review. Dose modulation, iterative reconstruction, and/or weight based adjustment of the mA/kV was utilized to reduce the radiation dose to as low as reasonably achievable.; CT of the cervical spine was performed without the administration of intravenous contrast. Multiplanar reformatted images are provided for review. Dose modulation, iterative reconstruction, and/or weight based adjustment of the mA/kV was utilized to reduce the radiation dose to as low as reasonably achievable. COMPARISON: December 30, 2020. HISTORY: ORDERING SYSTEM PROVIDED HISTORY: unwitnessed fall TECHNOLOGIST PROVIDED HISTORY: Reason for exam:->unwitnessed fall Has a \"code stroke\" or \"stroke alert\" been called? ->No Decision Support Exception - unselect if not a suspected or confirmed emergency medical condition->Emergency Medical Condition (MA) What reading provider will be dictating this exam?->CRC FINDINGS: There is diffuse atrophy with dilated ventricles and prominence of the sulci.  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 There is no acute stroke, mass or hemorrhage. A redemonstrated are extensive is punctate lytic lesions in the calvarium with motht eaten pattern likely multiple myeloma. Stable atrophy with small vessel ischemic disease. There is no acute stroke or hemorrhage. Persistent multiple punctate lytic lesions the calvarium likely due to malignancy like multiple myeloma. CT cervical spine. There is a fracture of the base of the dens of C2 (type 2). There is no displacement. No other acute fractures are identified in the cervical spine. There is mild diffuse degenerative changes from C3-T1 with osteophytes and multilevel disc bulges. There is osteopenia and punctate lytic lesions concerning for multiple myeloma. Impression Type 2 fracture of the dens of C2. Diffuse degenerative changes from C3-T1. Multiple myeloma. CT chest. Comparison December 30, 2020. Findings There is borderline cardiac size. The great vessels are normal.  Trachea and major bronchi are patent. There is dense consolidation in the left upper lobe and left lower lobe concerning for pneumonia. There is minimal atelectasis in the right lung base. The liver is of normal architecture. There is diffuse demineralization of the thoracic spine with punctate lucencies. Compression fractures with vertebroplasty are noted. There is slight irregularity of the sternum probably artifact. There is kyphosis of the spine. Impression Dense consolidation in the left upper lobe and left lower lobe concerning for pneumonia. Surveillance after appropriate treatment is recommended with repeat CT scan in 6-8 weeks to see complete resolution and exclude underlying malignancy. Demineralization and the punctate lytic lucencies in the spine and ribs concerning for multiple myeloma.  Critical results were called by Dr. Reyna Cardona to Dr. Pam Lugo. on 6/26/2021 at 16:30. XR CHEST PORTABLE    Result Date: 6/26/2021  EXAMINATION: ONE XRAY VIEW OF THE CHEST 6/26/2021 12:48 pm COMPARISON: December 30, 2020 HISTORY: ORDERING SYSTEM PROVIDED HISTORY: Shortness of breath TECHNOLOGIST PROVIDED HISTORY: Reason for exam:->Shortness of breath What reading provider will be dictating this exam?->CRC FINDINGS: Heart size is unable to be accurately assessed on this single portable view of the chest, but appears to be stable. There is a new hazy airspace opacity in the left mid to lower lung zone compared with the prior examination. Suspect at least a small left pleural effusion. Difficult to exclude a trace right pleural effusion. No pneumothorax is identified. Bone cement is noted at multiple locations in the thoracolumbar spine. No acute osseous abnormality is identified. Consolidative airspace opacities in the left mid to lower lung zone with a suspected small left pleural effusion. Findings may be on the basis of pneumonia or less likely asymmetric pulmonary edema. Consider correlation with CT of the chest.       EKG:        CBC   Recent Labs     06/24/21  1042 06/26/21  1137   WBC 7.4 3.2*   HGB 12.0 10.9*   HCT 35.8* 34.0    247      RENAL  Recent Labs     06/24/21  1042 06/26/21  1137    138   K 4.0 4.5    102   CO2 22 19*   BUN 22* 67*   CREATININE 1.6* 2.7*     LFT'S  Recent Labs     06/24/21  1042 06/26/21  1137   AST 18 17   ALT 22 23   BILITOT 1.1 0.7   ALKPHOS 109 91     COAG  No results for input(s): INR in the last 72 hours.   CARDIAC ENZYMES  Recent Labs     06/26/21  1137   CKTOTAL 57       U/A:   Lab Results   Component Value Date    COLORU Yellow 06/26/2021    WBCUA 1-3 06/26/2021    RBCUA 10-20 06/26/2021    BACTERIA MANY 06/26/2021    CLARITYU SL CLOUDY 06/26/2021    SPECGRAV 1.025 06/26/2021    LEUKOCYTESUR Negative 06/26/2021    BLOODU LARGE 06/26/2021    GLUCOSEU

## 2021-06-26 NOTE — CONSULTS
TRAUMA HISTORY & PHYSICAL  Surgical Resident/Advance Practice Nurse  6/26/2021  5:36 PM    PRIMARY SURVEY    CHIEF COMPLAINT:  Trauma consult. Injury occurred just prior to arrival.  Patient fell out of her chair and came in with significant shortness of breath. Patient has a past medical history of lung cancer. Fall was unwitnessed at nursing home and started complaining of trouble breathing and was hypoxic at outside facility.   Also has diffuse muscle tenderness in upper and lower extremities    AIRWAY:   Airway Normal  EMS ETT Absent  Noisy respirations Absent  Retractions: Present  Vomiting/bleeding: Absent      BREATHING:    Midaxillary breath sound left:  Diminished  Midaxillary breath sound right:  Diminished    Cough sound intensity:  poor  FiO2: 15 liters nasal cannula, in process of being switched to BiPAP  SMI unreliable      CIRCULATION:   Femerol pulse intensity: Strong  Palpebral conjunctiva: white      Vitals:    06/26/21 1715   BP:    Pulse:    Resp: (!) 31   Temp:    SpO2:        Vitals:    06/26/21 1500 06/26/21 1600 06/26/21 1703 06/26/21 1715   BP: 95/62 125/80 115/81    Pulse: 107 115 119    Resp: 27 (!) 31 28 (!) 31   Temp:   97.6 °F (36.4 °C)    TempSrc:   Oral    SpO2: 92% 91% 92%    Weight:       Height:            FAST EXAM: none    Central Nervous System    GCS Initial 15 minutes   Eye  Motor  Verbal 4 - Opens eyes on own  6 - Follows simple motor commands  5 - Alert and oriented 4 - Opens eyes on own  6 - Follows simple motor commands  4 - Seems confused, disoriented     Neuromuscular blockade: No  Pupil size:  Left 4 mm    Right 4 mm  Pupil reaction: Yes    Wiggles fingers: Left Yes Right Yes  Wiggles toes: Left Yes   Right Yes    Hand grasp:   Left  Present      Right  Present  Plantar flexion: Left  Present      Right   Present    Loss of consciousness:  No  History Obtained From:  Patient & EMS  Private Medical Doctor: Dennis Santoyo DO    Pre-exisiting Medical History: yes    Conditions: Anemia, hypertension, obesity, multiple myeloma, lung cancer    Medications: Eliquis, amlodipine, gabapentin, DuoNeb    Allergies: None    Social History:   Tobacco use:  none  Alcohol use:  none  Illicit drug use:  no history of illicit drug use    Past Surgical History:  none    Anticoagulant use:  Yes Eliquis  Antiplatelet use:    No     NSAID use in last 72 hours: no  Taken PCN in past:  no  Last food/drink: This morning at nursing home  Last tetanus: Unknown    Family History:   Not pertinent to presenting problem. Complaints:   Head:  None  Neck:   Mild  Chest:   Mild  Back:   None  Abdomen:   Mild  Extremities:   Moderate  Comments: Diffuse muscle tenderness in all 4 extremities, no overt signs of trauma to these areas    Review of systems:  All negative unless otherwise noted. SECONDARY SURVEY  Head/scalp: Atraumatic    Face: Atraumatic    Eyes/ears/nose: Atraumatic    Pharynx/mouth: Atraumatic    Neck: Atraumatic     Cervical spine tenderness:   Cervical collar in place at time of arrival  Pain:  mild  ROM:  Not indicated     Chest wall:  Atraumatic    Heart:  Regular rate & rhythm    Abdomen: Atraumatic. Soft ND  Tenderness:  none    Pelvis: Atraumatic  Tenderness: none    Thoracolumbar spine: Atraumatic  Tenderness:  none    Genitourinary:  Atraumatic. No blood or urine noted    Rectum: Atraumatic. No blood noted. Perineum: Atraumatic. No blood or urine noted.       Extremities:   Sensory abnormal: Diffuse tenderness within the muscle of all 4 extremities  Motor normal    Distal Pulses  Left arm normal  Right arm normal  Left leg normal  Right leg normal    Capillary refill  Left arm normal  Right arm normal  Left leg normal  Right leg normal    Procedures in ED:  Patient was placed on BiPAP    In the event of Emergency Blood Transfusion:  Due to the critical condition of this patient, I request the immediate release of blood products for emergency transfusion secondary to shock. I understand the increased risks incurred by the lack of complete transfusion testing.       Radiology: CT Head , CT Cervical spine  and CT Chest     Consultations:  Neurosurgery and Medicine    Admission/Diagnosis: Shortness of breath, pneumonia, fall    Plan of Treatment:  Admit to medicine  Continue BiPAP  Neurosurgery recommendations for type II dens fracture  Tertiary  Dispel planning    Plan discussed with Dr. Kam Granados at 6/26/2021 on 5:36 PM    Electronically signed by Avis Torres DO on 6/26/2021 at 5:36 PM

## 2021-06-26 NOTE — Clinical Note
Patient Class: Inpatient [101]   REQUIRED: Diagnosis: CAP (community acquired pneumonia) due to Pneumococcus [facility]) [[id number]]   Estimated Length of Stay: Estimated stay of more than 2 midnights   Admitting Provider: Amy Patel [[id number]]

## 2021-06-26 NOTE — ED PROVIDER NOTES
Patient is an 47-year-old female with past medical history of lung cancer who presented to the emergency department from [facility] via EMS with a complaint of fall and shortness of breath. EMS reports patient had an unwitnessed fall yesterday and was on the floor for unknown amount of time. Patient today developed trouble breathing and complained of left-sided chest pain. Patient was found hypoxic at the [facility] and was placed on low-flow nasal cannula oxygen. Patient is not normally on oxygen. Sats went up to 90% and patient was placed on a nonrebreather mask. Patient is on anticoagulation. Patient ROS is limited due to severe dyspnea. Patient denied chest pain but endorsed left lower flank rib pain, shortness of breath. Patient denies any other pain. Review of Systems   Unable to perform ROS: Severe respiratory distress      Physical Exam  Vitals and nursing note reviewed. Constitutional:       General: She is awake. She is not in acute distress. Appearance: She is obese. She is ill-appearing. She is not toxic-appearing. Interventions: Face mask in place. HENT:      Head: Normocephalic and atraumatic. Nose: Nose normal.      Mouth/Throat:      Mouth: Mucous membranes are moist.      Pharynx: Oropharynx is clear. Eyes:      Extraocular Movements: Extraocular movements intact. Conjunctiva/sclera: Conjunctivae normal.      Pupils: Pupils are equal, round, and reactive to light. Cardiovascular:      Rate and Rhythm: Regular rhythm. Tachycardia present. Pulses: Normal pulses. Heart sounds: Normal heart sounds. Pulmonary:      Effort: Pulmonary effort is normal.      Breath sounds: Normal breath sounds. Chest:      Chest wall: Tenderness present. Abdominal:      General: There is no distension. Palpations: Abdomen is soft. Tenderness: There is no abdominal tenderness.    Musculoskeletal:         General: Normal range admit and patient was accepted by the hospital service. Trauma services was also involved in evaluating the patient. Neurosurgery was notified and will be on consult. ED Course as of Jun 27 2022   Sat Jun 26, 2021   1250 LA noted 4.2    [QC]   1630 Received notificaiton from [facility] that patient with dens fracture type 2. Cervical collar placed. Trauma and NS services consulted. [QC]      ED Course User Index  [QC] Paris Partida MD      ED Course as of Jun 27 2022   Sat Jun 26, 2021   1250 LA noted 4.2    [QC]   1630 Received notificaiton from [facility] that patient with dens fracture type 2. Cervical collar placed. Trauma and NS services consulted. [QC]      ED Course User Index  [QC] Paris Partida MD       --------------------------------------------- PAST HISTORY ---------------------------------------------  Past Medical History:  has a past medical history of Abnormality of plasma protein, Anemia, Asthma, Collapsed vertebra, not elsewhere classified, thoracic region, subsequent encounter for fracture with routine healing, Constipation, Dependence on supplemental oxygen, Difficulty in walking, GERD without esophagitis, Hypertension, Low back pain, Monoclonal gammopathy, Multiple myeloma not having achieved remission ([facility] [address] Other disorders of plasma-protein metabolism, not elsewhere classified, Secondary malignant neoplasm of bone ([facility] [address] Thrombocytopenia ([facility] [address] Unspecified fracture of first lumbar vertebra, subsequent encounter for fracture with routine healing, and Unspecified fracture of fourth lumbar vertebra, subsequent encounter for fracture with routine healing. Past Surgical History:  has no past surgical history on file. Social History:  reports that she has never smoked. She does not have any smokeless tobacco history on file. She reports previous alcohol use. She reports previous drug use. Family History: family history is not on file.      The patients home medications have been reviewed. Allergies: Patient has no known allergies.     -------------------------------------------------- RESULTS -------------------------------------------------    LABS:  Results for orders placed or performed during the hospital encounter of 06/26/21   Culture, Urine    Specimen: Urine, clean catch   Result Value Ref Range    Urine Culture, Routine Growth not present, incubation continues    Culture, Blood 1    Specimen: Blood   Result Value Ref Range    Blood Culture, Routine (A)      Gram stain performed from blood culture bottle media  Gram positive cocci in clusters     Culture, Blood 2    Specimen: Blood   Result Value Ref Range    Culture, Blood 2 (A)      Gram stain performed from blood culture bottle media  Gram positive cocci in clusters     COVID-19, Rapid    Specimen: Nasopharyngeal Swab   Result Value Ref Range    SARS-CoV-2, NAAT Not Detected Not Detected   CBC Auto Differential   Result Value Ref Range    WBC 3.2 (L) 4.5 - 11.5 E9/L    RBC 3.53 3.50 - 5.50 E12/L    Hemoglobin 10.9 (L) 11.5 - 15.5 g/dL    Hematocrit 34.0 34.0 - 48.0 %    MCV 96.3 80.0 - 99.9 fL    MCH 30.9 26.0 - 35.0 pg    MCHC 32.1 32.0 - 34.5 %    RDW 15.9 (H) 11.5 - 15.0 fL    Platelets 552 494 - 902 E9/L    MPV 10.1 7.0 - 12.0 fL    Neutrophils % 87.7 (H) 43.0 - 80.0 %    Immature Granulocytes % 1.6 0.0 - 5.0 %    Lymphocytes % 5.0 (L) 20.0 - 42.0 %    Monocytes % 5.4 2.0 - 12.0 %    Eosinophils % 0.0 0.0 - 6.0 %    Basophils % 0.3 0.0 - 2.0 %    Neutrophils Absolute 2.78 1.80 - 7.30 E9/L    Immature Granulocytes # 0.05 E9/L    Lymphocytes Absolute 0.16 (L) 1.50 - 4.00 E9/L    Monocytes Absolute 0.17 0.10 - 0.95 E9/L    Eosinophils Absolute 0.00 (L) 0.05 - 0.50 E9/L    Basophils Absolute 0.01 0.00 - 0.20 E9/L    Polychromasia 1+     Poikilocytes 2+     Acanthocytes 1+     [place] Cells 2+     Ovalocytes 2+     Tear Drop Cells 1+    Comprehensive Metabolic Panel w/ Reflex to MG   Result Value Ref Range Nitrite, Urine Negative Negative    Leukocyte Esterase, Urine Negative Negative    Coarse Casts, UA 0-2 0 - 2 /LPF    WBC, UA 1-3 0 - 5 /HPF    RBC, UA 10-20 (A) 0 - 2 /HPF    Bacteria, UA MANY (A) None Seen /HPF   Procalcitonin   Result Value Ref Range    Procalcitonin 13.99 (H) 0.00 - 0.08 ng/mL   Comprehensive Metabolic Panel w/ Reflex to MG   Result Value Ref Range    Sodium 142 132 - 146 mmol/L    Potassium reflex Magnesium 4.2 3.5 - 5.0 mmol/L    Chloride 108 (H) 98 - 107 mmol/L    CO2 17 (L) 22 - 29 mmol/L    Anion Gap 17 (H) 7 - 16 mmol/L    Glucose 234 (H) 74 - 99 mg/dL    BUN 78 (H) 6 - 23 mg/dL    CREATININE 2.9 (H) 0.5 - 1.0 mg/dL    GFR Non-African American 15 >=60 mL/min/1.73    GFR African American 19     Calcium 8.1 (L) 8.6 - 10.2 mg/dL    Total Protein 6.1 (L) 6.4 - 8.3 g/dL    Albumin 2.6 (L) 3.5 - 5.2 g/dL    Total Bilirubin 0.4 0.0 - 1.2 mg/dL    Alkaline Phosphatase 58 35 - 104 U/L    ALT 17 0 - 32 U/L    AST 21 0 - 31 U/L   CBC auto differential   Result Value Ref Range    WBC 2.7 (L) 4.5 - 11.5 E9/L    RBC 3.45 (L) 3.50 - 5.50 E12/L    Hemoglobin 10.7 (L) 11.5 - 15.5 g/dL    Hematocrit 33.5 (L) 34.0 - 48.0 %    MCV 97.1 80.0 - 99.9 fL    MCH 31.0 26.0 - 35.0 pg    MCHC 31.9 (L) 32.0 - 34.5 %    RDW 16.3 (H) 11.5 - 15.0 fL    Platelets 271 284 - 214 E9/L    MPV 11.7 7.0 - 12.0 fL    Neutrophils % 72.0 43.0 - 80.0 %    Lymphocytes % 5.0 (L) 20.0 - 42.0 %    Monocytes % 2.0 2.0 - 12.0 %    Eosinophils % 0.0 0.0 - 6.0 %    Basophils % 0.0 0.0 - 2.0 %    Neutrophils Absolute 2.51 1.80 - 7.30 E9/L    Lymphocytes Absolute 0.14 (L) 1.50 - 4.00 E9/L    Monocytes Absolute 0.05 (L) 0.10 - 0.95 E9/L    Eosinophils Absolute 0.00 (L) 0.05 - 0.50 E9/L    Basophils Absolute 0.00 0.00 - 0.20 E9/L    Metamyelocytes Relative 14.0 (H) 0.0 - 1.0 %    Myelocyte Percent 7.0 0 - 0 %    Anisocytosis 1+     Poikilocytes 2+     [place] Cells 2+     Ovalocytes 2+     Tear Drop Cells 1+    Blood ID, Molecular   Result Value Ref Range    Bottle Type Aerobic     Source of Blood Culture Antecubital-Lef     Order Number [id number]     Enterobacter cloacae complex by PCR Not Detected Not Detected    Escherichia coli by PCR Not Detected Not Detected    Klebsiella oxytoca by PCR Not Detected Not Detected    Klebsiella pneumoniae group by PCR Not Detected Not Detected    Proteus species by PCR Not Detected Not Detected    Streptococcus agalactiae by PCR Not Detected Not Detected    Staphylococcus aureus by PCR Not Detected Not Detected    Serratia marcescens by PCR Not Detected Not Detected    Streptococcus pneumoniae by PCR Not Detected Not Detected    Streptococcus pyogenes  by PCR Not Detected Not Detected    Acinetobacter baumannii by PCR Not Detected Not Detected    Candida albicans by PCR Not Detected Not Detected    Candida glabrata by PCR Not Detected Not Detected    Candida krusei by PCR Not Detected Not Detected    Candida parapsilosis by PCR Not Detected Not Detected    Candida tropicalis by PCR Not Detected Not Detected    Enterobacteriaceae by PCR Not Detected Not Detected    Enterococcus by PCR Not Detected Not Detected    Haemophilus Influenzae by PCR Not Detected Not Detected    Listeria monocytogenes by PCR Not Detected Not Detected    Neisseria meningitidis by PCR Not Detected Not Detected    Pseudomonas aeruginosa by PCR Not Detected Not Detected    Staphylococcus species by PCR DETECTED (AA) Not Detected    Streptococcus species by PCR Not Detected Not Detected    Methicillin Resistance mecA/C  by PCR DETECTED (AA) Not Detected   Blood Gas, Arterial   Result Value Ref Range    Date Analyzed [id number]     Time Analyzed 1610     Source: Blood Arterial     pH, Blood Gas 7.458 (H) 7.350 - 7.450    PCO2 27.1 (L) 35.0 - 45.0 mmHg    PO2 63.4 (L) 75.0 - 100.0 mmHg    HCO3 18.8 (L) 22.0 - 26.0 mmol/L    B.E. -4.0 (L) -3.0 - 3.0 mmol/L    O2 Sat 92.8 92.0 - 98.5 %    PO2/FIO2 0.63 mmHg/%    AaDO2 597.5 mmHg    RI(T) 942 %    O2Hb 92.4 (L) 94.0 - 97.0 %    COHb 0.3 0.0 - 1.5 %    MetHb 0.1 0.0 - 1.5 %    O2 Content 14.2 mL/dL    HHb 7.2 (H) 0.0 - 5.0 %    tHb (est) 10.9 (L) 11.5 - 16.5 g/dL    Mode BIPAP     FIO2 100.0 %    Peep/Cpap 6.0 cmH2O    PS 12 cmH20    Date Of Collection      Time Collected      Pt Temp 37.0 C     ID [id number]     Lab [id number]     Critical(s) Notified .  No Critical Values    UREA NITROGEN, URINE   Result Value Ref Range    Urea Nitrogen, Ur 862 800 - 1666 mg/dL   Creatinine, urine, random   Result Value Ref Range    Creatinine, Ur 92 29 - 226 mg/dL   URINE ELECTROLYTES   Result Value Ref Range    Sodium, Ur 22 Not Established mmol/L    Potassium, Ur 47.3 Not Established mmol/L    Chloride 23 Not Established mmol/L   Lactic acid, plasma   Result Value Ref Range    Lactic Acid 3.6 (HH) 0.5 - 2.2 mmol/L   CBC Auto Differential   Result Value Ref Range    WBC 3.5 (L) 4.5 - 11.5 E9/L    RBC 3.32 (L) 3.50 - 5.50 E12/L    Hemoglobin 10.3 (L) 11.5 - 15.5 g/dL    Hematocrit 32.0 (L) 34.0 - 48.0 %    MCV 96.4 80.0 - 99.9 fL    MCH 31.0 26.0 - 35.0 pg    MCHC 32.2 32.0 - 34.5 %    RDW 16.3 (H) 11.5 - 15.0 fL    Platelets 171 218 - 080 E9/L    MPV 11.2 7.0 - 12.0 fL    Neutrophils % 93.9 (H) 43.0 - 80.0 %    Lymphocytes % 0.9 (L) 20.0 - 42.0 %    Monocytes % 2.6 2.0 - 12.0 %    Eosinophils % 0.0 0.0 - 6.0 %    Basophils % 0.9 0.0 - 2.0 %    Neutrophils Absolute 3.36 1.80 - 7.30 E9/L    Lymphocytes Absolute 0.04 (L) 1.50 - 4.00 E9/L    Monocytes Absolute 0.10 0.10 - 0.95 E9/L    Eosinophils Absolute 0.00 (L) 0.05 - 0.50 E9/L    Basophils Absolute 0.03 0.00 - 0.20 E9/L    Metamyelocytes Relative 1.7 (H) 0.0 - 1.0 %    nRBC 0.9 /100 WBC    Toxic Granulation 1+     [place]le Bodies 1+     Vacuolated Neutrophils 2+     Anisocytosis 1+     Poikilocytes 2+     Acanthocytes 1+     [place] Cells 2+     Ovalocytes 1+     Tear Drop Cells 1+    Blood Gas, Arterial   Result Value Ref Range    Date Analyzed [id number]     Time Analyzed 1931     Source: myeloma. CT chest.      Comparison December 30, 2020. Findings      There is borderline cardiac size. The great vessels are normal.  Trachea and   major bronchi are patent. There is dense consolidation in the left upper   lobe and left lower lobe concerning for pneumonia. There is minimal   atelectasis in the right lung base. The liver is of normal architecture. There is diffuse demineralization of the thoracic spine with punctate   lucencies. Compression fractures with vertebroplasty are noted. There is   slight irregularity of the sternum probably artifact. There is kyphosis of   the spine. Impression      Dense consolidation in the left upper lobe and left lower lobe concerning for   pneumonia. Surveillance after appropriate treatment is recommended with   repeat CT scan in 6-8 weeks to see complete resolution and exclude underlying   malignancy. Demineralization and the punctate lytic lucencies in the spine and ribs   concerning for multiple myeloma. Critical results were called by Dr. Diane Munguia to Dr. Sharolyn Felty. on   6/26/2021 at 16:30.         CT Cervical Spine WO Contrast   Final Result   Stable atrophy with small vessel ischemic disease. There is no acute stroke   or hemorrhage. Persistent multiple punctate lytic lesions the calvarium likely due to   malignancy like multiple myeloma. CT cervical spine. There is a fracture of the base of the dens of C2 (type 2). There is no   displacement. No other acute fractures are identified in the cervical spine. There is mild diffuse degenerative changes from C3-T1 with osteophytes and   multilevel disc bulges. There is osteopenia and punctate lytic lesions   concerning for multiple myeloma. Impression      Type 2 fracture of the dens of C2. Diffuse degenerative changes from C3-T1. Multiple myeloma. CT chest.      Comparison December 30, 2020.       Findings      There is borderline cardiac size.  The great vessels are normal.  Trachea and   major bronchi are patent. There is dense consolidation in the left upper   lobe and left lower lobe concerning for pneumonia. There is minimal   atelectasis in the right lung base. The liver is of normal architecture. There is diffuse demineralization of the thoracic spine with punctate   lucencies. Compression fractures with vertebroplasty are noted. There is   slight irregularity of the sternum probably artifact. There is kyphosis of   the spine. Impression      Dense consolidation in the left upper lobe and left lower lobe concerning for   pneumonia. Surveillance after appropriate treatment is recommended with   repeat CT scan in 6-8 weeks to see complete resolution and exclude underlying   malignancy. Demineralization and the punctate lytic lucencies in the spine and ribs   concerning for multiple myeloma. Critical results were called by Dr. She Rodgers to Dr. Pablo Hassan. on   6/26/2021 at 16:30.         CT CHEST WO CONTRAST   Final Result   Stable atrophy with small vessel ischemic disease. There is no acute stroke   or hemorrhage. Persistent multiple punctate lytic lesions the calvarium likely due to   malignancy like multiple myeloma. CT cervical spine. There is a fracture of the base of the dens of C2 (type 2). There is no   displacement. No other acute fractures are identified in the cervical spine. There is mild diffuse degenerative changes from C3-T1 with osteophytes and   multilevel disc bulges. There is osteopenia and punctate lytic lesions   concerning for multiple myeloma. Impression      Type 2 fracture of the dens of C2. Diffuse degenerative changes from C3-T1. Multiple myeloma. CT chest.      Comparison December 30, 2020. Findings      There is borderline cardiac size. The great vessels are normal.  Trachea and   major bronchi are patent.   There is dense consolidation in the 06/27/21 1545 137/77 99.4 °F (37.4 °C) Axillary 113 (!) 33     06/27/21 1345 (!) 140/80 100.2 °F (37.9 °C) Axillary 115 29     06/27/21 1245 (!) 141/80 101.2 °F (38.4 °C) Axillary 121 (!) 33     06/27/21 1145 120/74 99.4 °F (37.4 °C) Axillary 122 (!) 34     06/27/21 1008      96 %    06/27/21 0900 113/67 98.6 °F (37 °C) Temporal 118 26 96 %    06/27/21 0345  99.3 °F (37.4 °C) Axillary 115 25     06/27/21 0015 126/67 100.4 °F (38 °C) Axillary 111 26 96 %    06/26/21 2030 108/66 101.1 °F (38.4 °C) Axillary 113 29 95 %        Oxygen Saturation Interpretation: Abnormal, improved with supplemental oxygen    ------------------------------------------ PROGRESS NOTES ------------------------------------------  Re-evaluation(s):  Patients symptoms are improving  Repeat physical examination is improved    Counseling:  I have spoken with the patient and discussed todays results, in addition to providing specific details for the plan of care and counseling regarding the diagnosis and prognosis. Their questions are answered at this time and they are agreeable with the plan of admission.    --------------------------------- ADDITIONAL PROVIDER NOTES ---------------------------------  Consultations:  Spoke with hospitalist.  Discussed case. They will admit the patient. This patient's ED course included: a personal history and physicial examination, multiple bedside re-evaluations, cardiac monitoring and continuous pulse oximetry    This patient has remained hemodynamically stable during their ED course. Diagnosis:  1. Acute respiratory failure with hypoxia ([facility] [address]    2. Community acquired pneumonia of left lung, unspecified part of lung    3. Fall, initial encounter    4. Closed nondisplaced odontoid fracture with type II morphology, initial encounter ([facility] [address]      Disposition:  Patient's disposition: Admit to CCU/ICU  Patient's condition is stable. 6/27/21, 8:19 PM EDT.     This note is prepared by Karen Leon MD -PGY- 2           Karen Leon MD  Resident  06/27/21 7480

## 2021-06-27 NOTE — CONSULTS
[address] [place] [address] [facility]    Consultation Note     Admit Date: 6/26/2021 11:21 AM    Reason for Consult:       Attending Physician:  Renato Cruz MD     Chief Complaint: bacteremia    HISTORY OF PRESENT ILLNESS:   The patient is a 78 y.o.  female known to the Infectious Diseases service. The patient has the below past med hx. She apparently fell out of her chair on June 26. She complained of SOB at the time. Her admission dx was CAP. She was placed on rocephin and zithromax. She has dx of the base of the dens of C2 type 2  No displacment    Ct chest dense consolidation in the left upper lobe and left lower lobe concerning for pneumonia    Past Medical History:        Diagnosis Date    Abnormality of plasma protein     Anemia     Asthma     Collapsed vertebra, not elsewhere classified, thoracic region, subsequent encounter for fracture with routine healing     Constipation     Dependence on supplemental oxygen     Difficulty in walking     GERD without esophagitis     Hypertension     Low back pain     Monoclonal gammopathy     Multiple myeloma not having achieved remission ([address]     Other disorders of plasma-protein metabolism, not elsewhere classified     Secondary malignant neoplasm of bone ([address]     Thrombocytopenia ([address]     Unspecified fracture of first lumbar vertebra, subsequent encounter for fracture with routine healing     Unspecified fracture of fourth lumbar vertebra, subsequent encounter for fracture with routine healing      Past Surgical History:    No past surgical history on file.   Current Medications:   Scheduled Meds:   amLODIPine  10 mg Oral Daily    apixaban  2.5 mg Oral BID    ascorbic acid  1,000 mg Oral Daily    furosemide  20 mg Oral Daily    gabapentin  100 mg Oral BID    lenalidomide  10 mg Oral Daily    therapeutic multivitamin-minerals  1 tablet Oral Daily    sodium chloride flush  5-40 mL Intravenous 2 times per day    complaint. REVIEW OF SYSTEMS:    CONSTITUTIONAL:  No chills, fevers or night sweats. No loss of weight. EYES:  No double vision or drainage from eyes, ears or throat. HEENT:  No neck stiffness. No dysphagia. No drainage from eyes, ears or throat  RESPIRATORY:  No cough, productive sputum or hemoptysis. CARDIOVASCULAR:  No chest pain, palpitations, orthopnea or dyspnea on exertion. GASTROINTESTINAL:  No nausea, vomiting, diarrhea or constipation or hematochezia   GENITOURINARY:  No frequency burning dysuria or hematuria. INTEGUMENT/BREAST:  No rash or breast masses. HEMATOLOGIC/LYMPHATIC:  No lymphadenopathy or blood dyscrasics. ALLERGIC/IMMUNOLOGIC:  No anaphylaxis. ENDOCRINE:  No polyuria or polydipsia or temperature intolerance. MUSCULOSKELETAL:  No myalgia or arthralgia. Full ROM. NEUROLOGICAL:  No focal motor sensory deficit. BEHAVIOR/PSYCH:  No psychosis. PHYSICAL EXAM:    Vitals:    /67   Pulse 118   Temp 98.6 °F (37 °C) (Temporal)   Resp 26   Ht 5' 4\" (1.626 m)   Wt 150 lb (68 kg)   SpO2 96%   BMI 25.75 kg/m²   Constitutional: The patient is awake, alert, and oriented. Skin: Warm and dry. No rashes were noted. HEENT: Eyes show round, and reactive pupils. No jaundice. Moist mucous membranes, no ulcerations, no thrush. Neck: Supple to movements. No lymphadenopathy. Chest: No use of accessory muscles to breathe. Symmetrical expansion. Auscultation reveals no wheezing, crackles, or rhonchi. Cardiovascular: S1 and S2 are rhythmic and regular. No murmurs appreciated. Abdomen: Positive bowel sounds to auscultation. Benign to palpation. No masses felt. No hepatosplenomegaly. Extremities: No clubbing, no cyanosis, no edema.   Lines: peripheral      CBC+dif:  Recent Labs     06/26/21  1137 06/26/21  1137 06/27/21  0703   WBC 3.2*  --  2.7*   HGB 10.9*   < > 10.7*   HCT 34.0   < > 33.5*   MCV 96.3   < > 97.1      < > 142   NEUTROABS 2.78   < > 2.51    < > = values in this interval not displayed. Lab Results   Component Value Date    CRP 16.2 (H) 12/30/2020     No results found for: CRPHS  No results found for: SEDRATE  Lab Results   Component Value Date    ALT 17 06/27/2021    AST 21 06/27/2021    ALKPHOS 58 06/27/2021    BILITOT 0.4 06/27/2021     Lab Results   Component Value Date     06/27/2021    K 4.2 06/27/2021     06/27/2021    CO2 17 06/27/2021    BUN 78 06/27/2021    CREATININE 2.9 06/27/2021    GFRAA 19 06/27/2021    AGRATIO 0.9 06/24/2021    AGRATIO 1.1 06/24/2021    LABGLOM 15 06/27/2021    GLUCOSE 234 06/27/2021    PROT 6.1 06/27/2021    LABALBU 2.6 06/27/2021    CALCIUM 8.1 06/27/2021    BILITOT 0.4 06/27/2021    ALKPHOS 58 06/27/2021    AST 21 06/27/2021    ALT 17 06/27/2021       Lab Results   Component Value Date    PROTIME 24.4 12/30/2020    INR 2.1 12/30/2020       Lab Results   Component Value Date    TSH 0.253 12/30/2020       Lab Results   Component Value Date    COLORU Yellow 06/26/2021    PHUR 5.5 06/26/2021    WBCUA 1-3 06/26/2021    RBCUA 10-20 06/26/2021    BACTERIA MANY 06/26/2021    CLARITYU SL CLOUDY 06/26/2021    SPECGRAV 1.025 06/26/2021    LEUKOCYTESUR Negative 06/26/2021    UROBILINOGEN 0.2 06/26/2021    BILIRUBINUR Negative 06/26/2021    BLOODU LARGE 06/26/2021    GLUCOSEU Negative 06/26/2021    AMORPHOUS FEW 12/30/2020       No results found for: DTA5XCW, BEART, N8TGNKVC, PHART, THGBART, RUL4XQN, PO2ART, USL1QMG  Radiology:  CT Head WO Contrast   Final Result   Stable atrophy with small vessel ischemic disease. There is no acute stroke   or hemorrhage. Persistent multiple punctate lytic lesions the calvarium likely due to   malignancy like multiple myeloma. CT cervical spine. There is a fracture of the base of the dens of C2 (type 2). There is no   displacement. No other acute fractures are identified in the cervical spine.    There is mild diffuse degenerative changes from C3-T1 with osteophytes and Impression      Type 2 fracture of the dens of C2. Diffuse degenerative changes from C3-T1. Multiple myeloma. CT chest.      Comparison December 30, 2020. Findings      There is borderline cardiac size. The great vessels are normal.  Trachea and   major bronchi are patent. There is dense consolidation in the left upper   lobe and left lower lobe concerning for pneumonia. There is minimal   atelectasis in the right lung base. The liver is of normal architecture. There is diffuse demineralization of the thoracic spine with punctate   lucencies. Compression fractures with vertebroplasty are noted. There is   slight irregularity of the sternum probably artifact. There is kyphosis of   the spine. Impression      Dense consolidation in the left upper lobe and left lower lobe concerning for   pneumonia. Surveillance after appropriate treatment is recommended with   repeat CT scan in 6-8 weeks to see complete resolution and exclude underlying   malignancy. Demineralization and the punctate lytic lucencies in the spine and ribs   concerning for multiple myeloma. Critical results were called by Dr. Liborio Trujillo to Dr. Susie Page. on   6/26/2021 at 16:30.         CT CHEST WO CONTRAST   Final Result   Stable atrophy with small vessel ischemic disease. There is no acute stroke   or hemorrhage. Persistent multiple punctate lytic lesions the calvarium likely due to   malignancy like multiple myeloma. CT cervical spine. There is a fracture of the base of the dens of C2 (type 2). There is no   displacement. No other acute fractures are identified in the cervical spine. There is mild diffuse degenerative changes from C3-T1 with osteophytes and   multilevel disc bulges. There is osteopenia and punctate lytic lesions   concerning for multiple myeloma. Impression      Type 2 fracture of the dens of C2. Diffuse degenerative changes from C3-T1.       Multiple myeloma. CT chest.      Comparison December 30, 2020. Findings      There is borderline cardiac size. The great vessels are normal.  Trachea and   major bronchi are patent. There is dense consolidation in the left upper   lobe and left lower lobe concerning for pneumonia. There is minimal   atelectasis in the right lung base. The liver is of normal architecture. There is diffuse demineralization of the thoracic spine with punctate   lucencies. Compression fractures with vertebroplasty are noted. There is   slight irregularity of the sternum probably artifact. There is kyphosis of   the spine. Impression      Dense consolidation in the left upper lobe and left lower lobe concerning for   pneumonia. Surveillance after appropriate treatment is recommended with   repeat CT scan in 6-8 weeks to see complete resolution and exclude underlying   malignancy. Demineralization and the punctate lytic lucencies in the spine and ribs   concerning for multiple myeloma. Critical results were called by Dr. Linh Wilcox to Dr. Emperatriz Nath. on   6/26/2021 at 16:30. XR CHEST PORTABLE   Final Result   Consolidative airspace opacities in the left mid to lower lung zone with a   suspected small left pleural effusion. Findings may be on the basis of   pneumonia or less likely asymmetric pulmonary edema. Consider correlation   with CT of the chest.             Microbiology:  Pending  Recent Labs     06/26/21  1315   BC Gram stain performed from blood culture bottle media  Gram positive cocci in clusters  *     No results for input(s): ORG in the last 72 hours. Recent Labs     06/26/21  1315   BLOODCULT2 Gram stain performed from blood culture bottle media  Gram positive cocci in clusters  *     No results for input(s): STREPNEUMAGU in the last 72 hours. No results for input(s): LP1UAG in the last 72 hours. No results for input(s): ASO in the last 72 hours.   No results for input(s): CULTRESP in the last 72 hours. Assessment:  · Trauma fall  · History of COVID 19   · C2 fx  · Pneumonia  HAP   · Gm pos cocci bacteremia    Meth resist staph  High grade     Plan:    · Cont rocephin and zithromax   Add vancomycin  · Wait for ID   She will most likely need 2D echo and KHUSHBOO   · Check cultures  · Baseline ESR, CRP  · Monitor labs  · Will follow with you    Thank you for having us see this patient in consultation. I will be discussing this case with the treating physicians.       Electronically signed by Teri West MD on 6/27/2021 at 10:48 AM

## 2021-06-27 NOTE — PLAN OF CARE
Problem: Falls - Risk of:  Goal: Will remain free from falls  Description: Will remain free from falls  Outcome: Met This Shift  Goal: Absence of physical injury  Description: Absence of physical injury  Outcome: Met This Shift     Problem: Skin Integrity:  Goal: Will show no infection signs and symptoms  Description: Will show no infection signs and symptoms  Outcome: Met This Shift  Goal: Absence of new skin breakdown  Description: Absence of new skin breakdown  Outcome: Met This Shift     Problem: Confusion - Acute:  Goal: Absence of continued neurological deterioration signs and symptoms  Description: Absence of continued neurological deterioration signs and symptoms  Outcome: Met This Shift  Goal: Mental status will be restored to baseline  Description: Mental status will be restored to baseline  Outcome: Met This Shift

## 2021-06-27 NOTE — CONSULTS
Medical Intensive Care Unit     [address] History and Physical    Date and time: 6/27/2021 4:33 PM  Patient's name:  Cristian Thompson Record Number: [id number]  Patient's account/billing number: [de-identified]  Patient's YOB: 1940  Age: 78 y.o. Date of Admission: 6/26/2021 11:21 AM  Length of stay during current admission: 1    Primary Care Physician: Abner Zuñiga DO  ICU Attending Physician: Dr. Gaviota Saenz [facility]    Code Status: Full Code    Reason for ICU admission: Hypoxic respiratory failure     History of Present Illness:     33-year-old female with medical history significant for multiple myeloma, DVT, polyneuropathy presented to the emergency department on 6/26 from a nursing facility after sustaining an unwitnessed fall and patient was down for an unknown period of time. History obtained from chart review-  Patient started complaining of trouble breathing and diffuse muscular pain in the upper and lower extremities after fall, patient was noted to be hypoxic and was put on NRB. On presentation to the emergency department patient was afebrile, tachycardic, hemodynamically stable. CT head negative for acute intracranial changes, CT C spine significant for C2 fracture, CT chest significant for dense consolidation in left upper and lower lobe concerning for pneumonia    Patient was given ceftriaxone and azithromycin. Trauma surgery was consulted, [facility] for neurosurgery consultation and collar placement. ID started patient on Vancomycin for for high grade CONS bacteremia. Patient's respiratory status was noticed to be worsen on the floor, she was afebrile, AMS and transferred to ICU for further care.      CURRENT VENTILATION STATUS:   [] Ventilator  [x] BIPAP  [] Nasal Cannula [] Room Air      CENTRAL LINES:     [] No   [x] Yes   (Date of Insertion:   )           If yes -     [] Right IJ     [] Left IJ [] Right Femoral [] Left Femoral                   [] Right Subclavian [] Left Subclavian    REVIEW OF SYSTEMS:    Unable to obtain ROS due to mentation. OBJECTIVE:     VITAL SIGNS:  /77   Pulse 113   Temp 99.4 °F (37.4 °C) (Axillary)   Resp (!) 33   Ht 5' 4\" (1.626 m)   Wt 150 lb (68 kg)   SpO2 96%   BMI 25.75 kg/m²   Tmax over 24 hours:  Temp (24hrs), Av °F (37.8 °C), Min:97.6 °F (36.4 °C), Max:102.8 °F (39.3 °C)      Patient Vitals for the past 6 hrs:   BP Temp Temp src Pulse Resp   21 1545 137/77 99.4 °F (37.4 °C) Axillary 113 (!) 33   21 1345 (!) 140/80 100.2 °F (37.9 °C) Axillary 115 29   21 1245 (!) 141/80 101.2 °F (38.4 °C) Axillary 121 (!) 33   21 1145 120/74 99.4 °F (37.4 °C) Axillary 122 (!) 34         Intake/Output Summary (Last 24 hours) at 2021 1633  Last data filed at 2021 1545  Gross per 24 hour   Intake 130 ml   Output 1150 ml   Net -1020 ml     Wt Readings from Last 2 Encounters:   21 150 lb (68 kg)   21 149 lb 0.5 oz (67.6 kg)     Body mass index is 25.75 kg/m². PHYSICAL EXAMINATION:  Physical Exam  Vitals and nursing note reviewed. Constitutional:       General: She is in acute distress. Appearance: She is ill-appearing. HENT:      Head: Normocephalic and atraumatic. Eyes:      Extraocular Movements: Extraocular movements intact. Pupils: Pupils are equal, round, and reactive to light. Cardiovascular:      Rate and Rhythm: Normal rate and regular rhythm. Pulses: Normal pulses. Heart sounds: Normal heart sounds. Pulmonary:      Breath sounds: Rhonchi present. Abdominal:      General: Bowel sounds are normal.   Skin:     General: Skin is warm. Capillary Refill: Capillary refill takes less than 2 seconds.           Any additional physical findings:    MEDICATIONS:  Scheduled Meds:   [START ON 2021] vancomycin  750 mg Intravenous Q36H    amLODIPine  10 mg Oral Daily    apixaban  2.5 mg Oral BID    ascorbic acid  1,000 mg Oral Daily    furosemide  20 mg Oral Daily    gabapentin  100 mg Oral BID    lenalidomide  10 mg Oral Daily    therapeutic multivitamin-minerals  1 tablet Oral Daily    sodium chloride flush  5-40 mL Intravenous 2 times per day    cefTRIAXone (ROCEPHIN) IV  1,000 mg Intravenous Q24H    And    azithromycin  500 mg Oral Q24H    ipratropium-albuterol  1 ampule Inhalation Q4H WA     Continuous Infusions:   sodium chloride       PRN Meds:   HYDROcodone-acetaminophen, 1 tablet, Q4H PRN  ondansetron, 8 mg, Q8H PRN  sodium chloride flush, 5-40 mL, PRN  sodium chloride, 25 mL, PRN  polyethylene glycol, 17 g, Daily PRN  acetaminophen, 650 mg, Q6H PRN   Or  acetaminophen, 650 mg, Q6H PRN        VENT SETTINGS (Comprehensive) (if applicable):  Vent Information  Skin Assessment: Clean, dry, & intact  FiO2 : 100 %  SpO2: 96 %  SpO2/FiO2 ratio: 96  I Time/ I Time %: 0.9 s  Mask Type: Full face mask  Mask Size: Medium  Additional Respiratory  Assessments  Pulse: 113  Resp: (!) 33  SpO2: 96 %    ABGs:   Recent Labs     06/27/21  1610   PH 7.458*   PCO2 27.1*   PO2 63.4*   HCO3 18.8*   BE -4.0*   O2SAT 92.8       Laboratory findings:  Complete Blood Count:   Recent Labs     06/26/21  1137 06/27/21  0703   WBC 3.2* 2.7*   HGB 10.9* 10.7*   HCT 34.0 33.5*    142        Last 3 Blood Glucose:   Recent Labs     06/26/21  1137 06/27/21  0703   GLUCOSE 174* 234*        PT/INR:    Lab Results   Component Value Date    PROTIME 24.4 12/30/2020    INR 2.1 12/30/2020     PTT:  No results found for: APTT, PTT    Comprehensive Metabolic Profile:   Recent Labs     06/26/21  1137 06/26/21  1137 06/27/21  0703     --  142   K 4.5  --  4.2     --  108*   CO2 19*  --  17*   BUN 67*  --  78*   CREATININE 2.7*  --  2.9*   GLUCOSE 174*  --  234*   CALCIUM 8.6  --  8.1*   PROT 6.2*   < > 6.1*   LABALBU 3.0*   < > 2.6*   BILITOT 0.7   < > 0.4   ALKPHOS 91   < > 58   AST 17   < > 21   ALT 23   < > 17    < > = values in this interval not displayed.       Magnesium:   Lab Results   Component Value Date    MG 3.2 12/30/2020     Phosphorus: No results found for: PHOS  Ionized Calcium: No results found for: CAION   Troponin: No results for input(s): TROPONINI in the last 72 hours. Radiology/Imaging:   Chest Xray (6/27/2021):    ASSESSMENT  And PLAN:     Acute encephalopathy  -Ox1 on presentation to ED. Only responsive to pain at this time. -CT head 6/26 neg for acute intracranial changes   Acute hypoxic respiratory failure  -2/2 Left upper and lower lobe pneumonia on CT chest 6/26  -Ceftriaxone, azithromycin and vancomycin per ID   High grade methicillin resistant CONS bacteremia  -Blood cx from 6/26 positive (2/2 bottles). Vancomycin per ID   AUDREY vs AUDREY on CKD  -presented with Cr of 1.9, worsened to 2.9 today. Baseline appears to be around 1-1.1  C2 fracture 2/2 fall   -Lytic lesions, likely 2/2 multiple myeloma  -No plan for intervention. Soft collar when on BiPAP, hard collar otherwise. Neurosurgery following. High anion gap metabolic acidosis  -2/2 lactic acidosis  Multiple myeloma  Prolonged Qtc, 511 on 6/26  Hx of DVT, on Eliquis     Plan   -Follow cultures, procal , CRP. On ceftriaxone, azithromycin and vancomycin per ID. Vancomyicn dosing per pharmacy.   -Continue BiPAP for oxygenation, wean O2 as able   -Follow urine studies   -Follow lactic acid, IVF if needed   -Monitor platelet count, dropped from 235 to 135  -Holding Leflunamide for now  -Repeat EKG- presented with prolonged Qtc. Keep K >4, Mg >2  -Low dose sliding scale for hyperglycemia. POc Q4, Hypoglycemia protocol in place. ICU PROPHYLAXIS:  Stress ulcer:  [x] PPI Agent  [] Q3Oqfgl [] Sucralfate  [] Other:  VTE:   [] Enoxaparin  [] Unfract. Heparin Subcut  [x] Eliquis Cuffs    NUTRITION:  [x] NPO [] Tube Feeding (Specify: ) [] TPN  [] PO (Diet: ADULT DIET;  Regular)    CONSULTATION NEEDED:   [] No   [] Yes    Vicente Mixon MD, MD PGY-2  Attending Physician: Dr. Niurka Calixto  6/27/2021, 4:33 PM      I personally saw, examined and provided care for the patient. Radiographs, labs and medication list were reviewed by me independently. I spoke with bedside nursing, therapists and consultants. Critical care services and times documented are independent of procedures and multidisciplinary rounds with Residents. Additionally comprehensive, multidisciplinary rounds were conducted with the MICU team. The case was discussed in detail and plans for care were established. Review of Residents documentation was conducted and revisions were made as appropriate. I agree with the above documented exam, problem list and plan of care.   Pedro Landaverde MD,[facility]  Pulmonary&Critical Care Medicine   Director of [facility]  Medical Director of [address] [place] [address]    Lul Hines

## 2021-06-27 NOTE — PROGRESS NOTES
Trauma Tertiary Survey    Admit Date: 6/26/20213    Hospital day 1    CC:  Fall off couch       Past Medical History:   Diagnosis Date    Abnormality of plasma protein     Anemia     Asthma     Collapsed vertebra, not elsewhere classified, thoracic region, subsequent encounter for fracture with routine healing     Constipation     Dependence on supplemental oxygen     Difficulty in walking     GERD without esophagitis     Hypertension     Low back pain     Monoclonal gammopathy     Multiple myeloma not having achieved remission (HCC)     Other disorders of plasma-protein metabolism, not elsewhere classified     Secondary malignant neoplasm of bone (HCC)     Thrombocytopenia ([facility] [address]     Unspecified fracture of first lumbar vertebra, subsequent encounter for fracture with routine healing     Unspecified fracture of fourth lumbar vertebra, subsequent encounter for fracture with routine healing        Alcohol pre-screening:    Women: How many times in the past year have you had 4 or more drinks in a day? none    How much do you drink on a daily basis? none    Scheduled Meds:   amLODIPine  10 mg Oral Daily    apixaban  2.5 mg Oral BID    ascorbic acid  1,000 mg Oral Daily    furosemide  20 mg Oral Daily    gabapentin  100 mg Oral BID    lenalidomide  10 mg Oral Daily    therapeutic multivitamin-minerals  1 tablet Oral Daily    sodium chloride flush  5-40 mL Intravenous 2 times per day    cefTRIAXone (ROCEPHIN) IV  1,000 mg Intravenous Q24H    And    azithromycin  500 mg Oral Q24H    ipratropium-albuterol  1 ampule Inhalation Q4H WA     Continuous Infusions:   sodium chloride       PRN Meds:HYDROcodone-acetaminophen, ondansetron, sodium chloride flush, sodium chloride, polyethylene glycol, acetaminophen **OR** acetaminophen    Subjective:     No acute events overnight. Patient seems to be at her baseline mental status. Still on BiPAP. Still with neck tenderness.  No other acute traumatic injuries on tert. Baseline diffuse tenderness over the thigh s bilaterally     Objective:     Patient Vitals for the past 8 hrs:   BP Temp Temp src Pulse Resp SpO2   06/27/21 0345  99.3 °F (37.4 °C) Axillary 115 25    06/27/21 0015 126/67 100.4 °F (38 °C) Axillary 111 26 96 %       Past Medical History:   Diagnosis Date    Abnormality of plasma protein     Anemia     Asthma     Collapsed vertebra, not elsewhere classified, thoracic region, subsequent encounter for fracture with routine healing     Constipation     Dependence on supplemental oxygen     Difficulty in walking     GERD without esophagitis     Hypertension     Low back pain     Monoclonal gammopathy     Multiple myeloma not having achieved remission ([address]     Other disorders of plasma-protein metabolism, not elsewhere classified     Secondary malignant neoplasm of bone ([address]     Thrombocytopenia ([address]     Unspecified fracture of first lumbar vertebra, subsequent encounter for fracture with routine healing     Unspecified fracture of fourth lumbar vertebra, subsequent encounter for fracture with routine healing        Radiology:  CT Head WO Contrast   Final Result   Stable atrophy with small vessel ischemic disease. There is no acute stroke   or hemorrhage. Persistent multiple punctate lytic lesions the calvarium likely due to   malignancy like multiple myeloma. CT cervical spine. There is a fracture of the base of the dens of C2 (type 2). There is no   displacement. No other acute fractures are identified in the cervical spine. There is mild diffuse degenerative changes from C3-T1 with osteophytes and   multilevel disc bulges. There is osteopenia and punctate lytic lesions   concerning for multiple myeloma. Impression      Type 2 fracture of the dens of C2. Diffuse degenerative changes from C3-T1. Multiple myeloma. CT chest.      Comparison December 30, 2020.       Findings      There is borderline cardiac size. The great vessels are normal.  Trachea and   major bronchi are patent. There is dense consolidation in the left upper   lobe and left lower lobe concerning for pneumonia. There is minimal   atelectasis in the right lung base. The liver is of normal architecture. There is diffuse demineralization of the thoracic spine with punctate   lucencies. Compression fractures with vertebroplasty are noted. There is   slight irregularity of the sternum probably artifact. There is kyphosis of   the spine. Impression      Dense consolidation in the left upper lobe and left lower lobe concerning for   pneumonia. Surveillance after appropriate treatment is recommended with   repeat CT scan in 6-8 weeks to see complete resolution and exclude underlying   malignancy. Demineralization and the punctate lytic lucencies in the spine and ribs   concerning for multiple myeloma. Critical results were called by Dr. Ryann Doll to Dr. Loras Eisenmenger. on   6/26/2021 at 16:30.         CT Cervical Spine WO Contrast   Final Result   Stable atrophy with small vessel ischemic disease. There is no acute stroke   or hemorrhage. Persistent multiple punctate lytic lesions the calvarium likely due to   malignancy like multiple myeloma. CT cervical spine. There is a fracture of the base of the dens of C2 (type 2). There is no   displacement. No other acute fractures are identified in the cervical spine. There is mild diffuse degenerative changes from C3-T1 with osteophytes and   multilevel disc bulges. There is osteopenia and punctate lytic lesions   concerning for multiple myeloma. Impression      Type 2 fracture of the dens of C2. Diffuse degenerative changes from C3-T1. Multiple myeloma. CT chest.      Comparison December 30, 2020. Findings      There is borderline cardiac size. The great vessels are normal.  Trachea and   major bronchi are patent.   There is dense consolidation in the left upper   lobe and left lower lobe concerning for pneumonia. There is minimal   atelectasis in the right lung base. The liver is of normal architecture. There is diffuse demineralization of the thoracic spine with punctate   lucencies. Compression fractures with vertebroplasty are noted. There is   slight irregularity of the sternum probably artifact. There is kyphosis of   the spine. Impression      Dense consolidation in the left upper lobe and left lower lobe concerning for   pneumonia. Surveillance after appropriate treatment is recommended with   repeat CT scan in 6-8 weeks to see complete resolution and exclude underlying   malignancy. Demineralization and the punctate lytic lucencies in the spine and ribs   concerning for multiple myeloma. Critical results were called by Dr. Lizy Austin to Dr. Warren Thomas. on   6/26/2021 at 16:30.         CT CHEST WO CONTRAST   Final Result   Stable atrophy with small vessel ischemic disease. There is no acute stroke   or hemorrhage. Persistent multiple punctate lytic lesions the calvarium likely due to   malignancy like multiple myeloma. CT cervical spine. There is a fracture of the base of the dens of C2 (type 2). There is no   displacement. No other acute fractures are identified in the cervical spine. There is mild diffuse degenerative changes from C3-T1 with osteophytes and   multilevel disc bulges. There is osteopenia and punctate lytic lesions   concerning for multiple myeloma. Impression      Type 2 fracture of the dens of C2. Diffuse degenerative changes from C3-T1. Multiple myeloma. CT chest.      Comparison December 30, 2020. Findings      There is borderline cardiac size. The great vessels are normal.  Trachea and   major bronchi are patent. There is dense consolidation in the left upper   lobe and left lower lobe concerning for pneumonia.   There is minimal   atelectasis in the right lung base. The liver is of normal architecture. There is diffuse demineralization of the thoracic spine with punctate   lucencies. Compression fractures with vertebroplasty are noted. There is   slight irregularity of the sternum probably artifact. There is kyphosis of   the spine. Impression      Dense consolidation in the left upper lobe and left lower lobe concerning for   pneumonia. Surveillance after appropriate treatment is recommended with   repeat CT scan in 6-8 weeks to see complete resolution and exclude underlying   malignancy. Demineralization and the punctate lytic lucencies in the spine and ribs   concerning for multiple myeloma. Critical results were called by Dr. Alberta Saul to Dr. Temitope Silva. on   6/26/2021 at 16:30. XR CHEST PORTABLE   Final Result   Consolidative airspace opacities in the left mid to lower lung zone with a   suspected small left pleural effusion. Findings may be on the basis of   pneumonia or less likely asymmetric pulmonary edema. Consider correlation   with CT of the chest.             PHYSICAL EXAM:   GCS:    4 - Opens eyes on own   6 - Follows simple motor commands  4 - Seems confused, disoriented      Pupil size:  Left 3 mm Right 3 mm  Pupil reaction: Yes  Wiggles fingers: Left 3 Right 3  Hand grasp:   Left 3  Right 3  Wiggles toes: Left 3   Right 3  Plantar flexion: Left 3 Right 3    PHYSICAL EXAM   General: confused, on BiPAP   HEENT: Trachea midline, no masses, Pupils equal round   Chest: Respiratory effort was normal with no retractions or use of accessory muscles. RA, on BiPAP  Cardiovascular: Extremities warm, well perfused,   Abdomen:  Soft and non distended.   No tenderness, guarding, rebound, or rigidity   Extremities: Moves all 4 extremities, No pedal edema     Spine:     Spine Tenderness ROM   Cervical 04 /10 Abnormal-in brace    Thoracic 0 /10 Normal   Lumbar 0 /10 Normal     Musculoskeletal    Joint Tenderness Swelling ROM   Right shoulder absent absent normal   Left shoulder absent absent normal   Right elbow absent absent normal   Left elbow absent absent normal   Right wrist absent absent normal   Left wrist absent absent normal   Right hand grasp absent absent normal   Left hand grasp absent absent normal   Right hip Present  absent normal   Left hip Present  absent normal   Right knee Present  absent normal   Left knee present absent normal   Right ankle absent absent normal   Left ankle absent absent normal   Right foot absent absent normal   Left foot absent absent normal       CONSULTS:  Neurosurgery. Medicine     PROCEDURES: none    INJURIES:        Active Problems:    CAP (community acquired pneumonia) due to Pneumococcus [facility])  Resolved Problems:    * No resolved hospital problems. *        Assessment/Plan:       · Neuro:   Continue to monitor neuro status, GCS 14 one off confusion. Per nursing home her baseline is only moaning, but she will talk to you when off bipap  Type 2 berto fx, neurosurgery following- aspen collar   · CV: Monitor hemodynamics   · Pulm: Monitor RR and SpO2, pulmonary hygiene, wean off bipap if able, duoneb  · GI:  Diet, monitor bowel function   · Renal: no acute issues   · ID:  Ceftriaxone, zithromax   · Endocrine: no acute issues   · MSK: no acute issues   · Heme: no acute issues     Bowel regime: Glycolax, MOM   Pain control/Sedation: Tylenol, eliceo   DVT prophylaxis: SCD's on eliquis     GI: diet  Mouth/Eye care: Per patient  [place]: none     Code status:    Full Code  Patient/Family update:  As available     Disposition:  Tele, no other acute trauma surgical care at this time.  Please call with any other acute issues       Electronically signed by Michelle Rodrigues DO on 6/27/21 at 7:10 AM EDT

## 2021-06-27 NOTE — CARE COORDINATION
Patient's son called and updated about the patient's current condition. He will call his brother to discuss her CODE STATUS. She remains FULL CODE at this point. All the questions were addressed.

## 2021-06-27 NOTE — PROGRESS NOTES
[facility]  PROGRESS NOTE  ATTENDING NOTE        TRAUMA  MECHANISM:  Fall off a couch    Chief Complaint   Patient presents with    Fall     yesterday with increased SOB left rib pain and wheezing       HPI Injury occurred just prior to arrival.  Patient fell out of her chair and came in with significant shortness of breath. Patient has a past medical history of lung cancer. Fall was unwitnessed at nursing home and started complaining of trouble breathing and was hypoxic at outside facility. Also has diffuse muscle tenderness in upper and lower extremities    Patient Active Problem List   Diagnosis    Myeloma ([address]    Essential hypertension, benign    Localized edema    Thrombocytopenia, unspecified ([address]    Pneumonia due to COVID-19 virus    Elevated serum creatinine    Essential hypertension    Hypernatremia    Hypermagnesemia    Hypokalemia    Acute respiratory failure with hypoxia and hypercapnia (HCC)    CAP (community acquired pneumonia) due to Pneumococcus ([facility] [address]    Closed nondisplaced type II dens fracture ([facility] [address]       SUBJECTIVE/OVERNIGHT EVENTS:  Admitted and placed on BiPap    Review of Systems   Constitutional: Positive for activity change and appetite change. Negative for unexpected weight change. HENT: Negative. Eyes: Negative. Respiratory: Positive for cough and shortness of breath. Cardiovascular: Negative. Negative for chest pain and leg swelling. Gastrointestinal: Negative. Negative for abdominal distention, abdominal pain, anal bleeding, blood in stool, constipation, diarrhea, nausea and vomiting. Endocrine: Negative. Genitourinary: Negative. Musculoskeletal: Positive for arthralgias, myalgias, neck pain and neck stiffness. Negative for back pain, gait problem and joint swelling. Skin: Negative. Allergic/Immunologic: Negative. Neurological: Positive for weakness. Negative for dizziness and headaches. Hematological: Negative. Psychiatric/Behavioral: Negative. Negative for confusion, decreased concentration and sleep disturbance. BP (!) 141/80   Pulse 121   Temp 101.2 °F (38.4 °C) (Axillary)   Resp (!) 33   Ht 5' 4\" (1.626 m)   Wt 150 lb (68 kg)   SpO2 96%   BMI 25.75 kg/m²   Physical Exam  Constitutional:       Appearance: She is obese. HENT:      Head: Normocephalic and atraumatic. Nose: Nose normal.      Mouth/Throat:      Mouth: Mucous membranes are moist.      Pharynx: Oropharynx is clear. Eyes:      Extraocular Movements: Extraocular movements intact. Pupils: Pupils are equal, round, and reactive to light. Neck:      Comments: In cervical collar  Cardiovascular:      Rate and Rhythm: Normal rate and regular rhythm. Pulses: Normal pulses. Heart sounds: Normal heart sounds. Pulmonary:      Effort: Pulmonary effort is normal.      Breath sounds: Normal breath sounds. Abdominal:      General: There is no distension. Palpations: Abdomen is soft. Tenderness: There is no abdominal tenderness. Musculoskeletal:         General: No tenderness or signs of injury. Skin:     General: Skin is warm and dry. Neurological:      General: No focal deficit present. Mental Status: She is alert and oriented to person, place, and time. Comments: Follows commands   Psychiatric:         Mood and Affect: Mood normal.         Behavior: Behavior normal.         Thought Content: Thought content normal.         Judgment: Judgment normal.         ASSESSMENT/PLAN:  1.  C2 fracture--NSGY following, [place] collar; soft collar while in bed  2. CAP--abx, ID following  3. Acute respiratory failure--BiPAP  4.   Bacteremia--ID following, [facility] and echo    INCIDENTAL FINDINGS:  pneumonia    [facility]:  TBD/24    DVT/GI ppx:  Eliquis/diet    No other trauma issues, will sign-off, please call if needed    Verenice Davila MD, MSc, FACS  6/27/2021  1:02 PM

## 2021-06-27 NOTE — PROGRESS NOTES
Pharmacy Consultation Note  (Antibiotic Dosing and Monitoring)    Initial consult date: 2021  Consulting physician/provider: Dr. Elsi Palma  Drug(s): vancomycin  Indication: Staph species bacteremia; immunocompromised    Age/Gender IBW DW  Allergy Information   78 y.o. female; 162.6 cm, 68 kg 50.6 kg 57.6 kg  Patient has no known allergies. Date  WBC BUN/CR Drug/Dose Time   Given Level(s)   (Time) Comments    3.2 67/2.7 X X      2.7 78/2.9 Vancomycin 1250 mg x1 <1200>                         Estimated Creatinine Clearance: 14 mL/min (A) (based on SCr of 2.9 mg/dL (H)). Intake/Output Summary (Last 24 hours) at 2021 1119  Last data filed at 2021 0648  Gross per 24 hour   Intake 130 ml   Output 800 ml   Net -670 ml       Temp max: Temp (24hrs), Av.7 °F (37.6 °C), Min:97.6 °F (36.4 °C), Max:102.8 °F (39.3 °C)      Assessment:  · Consulted by Dr. Elsi Palma to dose/monitor vancomycin. · Goal trough level:  15-20 mCg/mL; AUC/ELEUTERIO = 400-600 mg/L-hr. · 81 yo/F admitted from SNF after fall and found down for unknown length of time. BC's from  +for GPC; Blood PCR today revealed methicillin-resistant Staph species. · Febrile, tachycardic, and lymphopenic on admission. · PMH: MM on lenalinomide therapy. · Empiric ATBs (ceftriaxone and doxycycline) given in ED on , PO azithromycin not given d/t NPO status; ceftriaxone and azithromycin continued on admission. ID ordered vanco to start with Pharmacy to dose. Plan:  · Start vancomycin 1250 mg x1 now, then 750 mg q36h on  PM.  · Will check vancomycin level when steady state is attained if vanco continues. · Pharmacist will follow and monitor/adjust dosing as necessary.     Bethanie Zamora PharmD  2021  11:19 AM

## 2021-06-27 NOTE — CONSULTS
NEUROSURGERY HISTORY AND PHYSICAL NOTE    Chief Complaint: C2 FRACTURE         HPI: Injury occurred just prior to arrival.  Patient fell out of her chair and came in with significant shortness of breath. Patient has a past medical history of lung cancer. Fall was unwitnessed at nursing home and started complaining of trouble breathing and was hypoxic at outside facility. Also has diffuse muscle tenderness in upper and lower extremities    NSGY consulted for C2 fracture        Past Medical History:   Diagnosis Date    Abnormality of plasma protein     Anemia     Asthma     Collapsed vertebra, not elsewhere classified, thoracic region, subsequent encounter for fracture with routine healing     Constipation     Dependence on supplemental oxygen     Difficulty in walking     GERD without esophagitis     Hypertension     Low back pain     Monoclonal gammopathy     [facility] myeloma not having achieved remission [facility] [address]     Other disorders of plasma-protein metabolism, not elsewhere classified     Secondary malignant neoplasm of bone ([address]     Thrombocytopenia ([facility] [address]     Unspecified fracture of first lumbar vertebra, subsequent encounter for fracture with routine healing     Unspecified fracture of fourth lumbar vertebra, subsequent encounter for fracture with routine healing      No past surgical history on file. No family history on file. Social History     Socioeconomic History    Marital status:       Spouse name: Not on file    Number of children: Not on file    Years of education: Not on file    Highest education level: Not on file   Occupational History    Not on file   Tobacco Use    Smoking status: Never Smoker   Substance and Sexual Activity    Alcohol use: Not Currently    Drug use: Not Currently    Sexual activity: Not on file   Other Topics Concern    Not on file   Social History Narrative    Not on file     Social Determinants of Health     Financial Resource Strain:    [facility] Difficulty of Paying Living Expenses:    Food Insecurity:     Worried About Running Out of Food in the Last Year:     [address] in the Last Year:    Transportation Needs:     Lack of Transportation (Medical):      Lack of Transportation (Non-Medical):    Physical Activity:     Days of Exercise per Week:     Minutes of Exercise per Session:    Stress:     Feeling of Stress :    Social Connections:     Frequency of Communication with Friends and Family:     Frequency of Social Gatherings with Friends and Family:     Attends [employer] Services:     Active Member of Clubs or Organizations:     Attends Club or Organization Meetings:     Marital Status:    Intimate Partner Violence:     Fear of Current or Ex-Partner:     Emotionally Abused:     Physically Abused:     Sexually Abused:        Medications:   Current Facility-Administered Medications   Medication Dose Route Frequency Provider Last Rate Last Admin    vancomycin (VANCOCIN) 1,250 mg in dextrose 5 % 250 mL IVPB  1,250 mg Intravenous Once Teri West MD        Followed by   Socorro Berkowitz ON 6/28/2021] vancomycin (VANCOCIN) 750 mg in dextrose 5 % 250 mL IVPB  750 mg Intravenous Breana Morrison MD        amLODIPine (NORVASC) tablet 10 mg  10 mg Oral Daily Michael Todd MD        apixaban (ELIQUIS) tablet 2.5 mg  2.5 mg Oral BID Michael Todd MD        ascorbic acid (VITAMIN C) tablet 1,000 mg  1,000 mg Oral Daily Michael Todd MD        furosemide (LASIX) tablet 20 mg  20 mg Oral Daily Michael Todd MD        gabapentin (NEURONTIN) capsule 100 mg  100 mg Oral BID Michael Todd MD        HYDROcodone-acetaminophen (NORCO)  MG per tablet 1 tablet  1 tablet Oral Q4H PRN Micheal Todd MD        lenalidomide (REVLIMID) chemo capsule 10 mg  10 mg Oral Daily Michael Todd MD        therapeutic multivitamin-minerals 1 tablet  1 tablet Oral Daily Michael Todd MD        ondansetron (ZOFRAN) tablet 8 mg  8 mg Oral Q8H PRN Junior Keenan MD        sodium chloride flush 0.9 % injection 5-40 mL  5-40 mL Intravenous 2 times per day Junior Keenan MD   10 mL at 06/27/21 0915    sodium chloride flush 0.9 % injection 5-40 mL  5-40 mL Intravenous PRN Junior Keenan MD        0.9 % sodium chloride infusion  25 mL Intravenous PRN Junior Keenan MD        polyethylene glycol (GLYCOLAX) packet 17 g  17 g Oral Daily PRN Junior Keenan MD        acetaminophen (TYLENOL) tablet 650 mg  650 mg Oral Q6H PRN Junior Keenan MD        Or    acetaminophen (TYLENOL) suppository 650 mg  650 mg Rectal Q6H PRN Junior Keenan MD        cefTRIAXone (ROCEPHIN) 1,000 mg in sterile water 10 mL IV syringe  1,000 mg Intravenous Q24H Junior Keenan MD        And    azithromycin (ZITHROMAX) tablet 500 mg  500 mg Oral Q24H Junior Keenan MD        ipratropium-albuterol (DUONEB) nebulizer solution 1 ampule  1 ampule Inhalation Q4H WA Junior Keenan MD   1 ampule at 06/27/21 1008        Allergies:    Patient has no known allergies. Review of Systems   Unable to perform ROS: Severe respiratory distress        Physical Exam  Vitals and nursing note reviewed. HENT:      Head: Normocephalic. Right Ear: Tympanic membrane normal.      Nose: Nose normal.      Mouth/Throat:      Mouth: Mucous membranes are moist.   Eyes:      Extraocular Movements: Extraocular movements intact. Conjunctiva/sclera: Conjunctivae normal.      Pupils: Pupils are equal, round, and reactive to light. Cardiovascular:      Rate and Rhythm: Normal rate and regular rhythm. Pulses: Normal pulses. Heart sounds: Normal heart sounds. Pulmonary:      Comments: USING BIPAP    Abdominal:      Palpations: Abdomen is soft. Musculoskeletal:         General: Normal range of motion. Cervical back: Normal range of motion and neck supple. Skin:     General: Skin is warm. Neurological:      Mental Status: She is alert. Mental status is at baseline.       Cranial Nerves: Cranial nerves are intact. Coordination: Coordination is intact. Deep Tendon Reflexes: Reflexes are normal and symmetric. Comments: Awake oriented times 1  FCX4  Good strength in upper and lower extremities   Psychiatric:         Mood and Affect: Mood normal.          /67   Pulse 118   Temp 98.6 °F (37 °C) (Temporal)   Resp 26   Ht 5' 4\" (1.626 m)   Wt 150 lb (68 kg)   SpO2 96%   BMI 25.75 kg/m²        Assessment:   · C2 fracture which will be managed nonoperatively in a Hard collar for 12 weeks  ·   · Soft collar can be worn when patient is in bed and using BIPAP which won't fit with hard collar    · followup in one month in our office with AP and Lateral C spine xrays  · Signing off. Call with questions.        Electronically signed by Rene Wick MD on 6/27/2021 at 11:34 AM

## 2021-06-27 NOTE — PROGRESS NOTES
HOSPITALIST PROGRESS NOTE  Date: 6/27/2021   Name: Katherin Mccloud   MRN: [id number]   YOB: 1940        Hospital Course:   Ms Umm Martinez is 78 YF who was trans eddie from nursing facility due to mechanical fall substaining Dense C-2 Fracture, NS consulted and trauma applied neck collar for stability. The also found HAP with ARF and Bipap. She will be admitted to [facility] with pulmonary  in consult for HAP w/ ARF and Bipap.   Subjective/Interval Hx:   Patient remains on BiPAP at this time due to the bilateral pneumonia we will continue to monitor ID is following along with pulmonary    Objective:   Physical Exam:   BP (!) 140/80   Pulse 115   Temp 100.2 °F (37.9 °C) (Axillary)   Resp 29   Ht 5' 4\" (1.626 m)   Wt 150 lb (68 kg)   SpO2 96%   BMI 25.75 kg/m²   General: no acute distress, well nourished and well hydrated  HEENT: NCAT  Heart: S1S2 RRR  Lungs: Clear to ascultation bilaterally, respiratory effort normal  Abdomen: soft, NT/ND, positive bowel sounds  Extremities: no pitting edema, nontender   Neuro: patient is awake, alert and orientated times 3, no gross deficits  Skin: no rashes or ecchymosis        Meds:   Meds:    vancomycin  1,250 mg Intravenous Once    Followed by   Maci Luciano ON 6/28/2021] vancomycin  750 mg Intravenous Q36H    amLODIPine  10 mg Oral Daily    apixaban  2.5 mg Oral BID    ascorbic acid  1,000 mg Oral Daily    furosemide  20 mg Oral Daily    gabapentin  100 mg Oral BID    lenalidomide  10 mg Oral Daily    therapeutic multivitamin-minerals  1 tablet Oral Daily    sodium chloride flush  5-40 mL Intravenous 2 times per day    cefTRIAXone (ROCEPHIN) IV  1,000 mg Intravenous Q24H    And    azithromycin  500 mg Oral Q24H    ipratropium-albuterol  1 ampule Inhalation Q4H WA      Infusions:    sodium chloride       PRN Meds: HYDROcodone-acetaminophen, 1 tablet, Q4H PRN  ondansetron, 8 mg, Q8H PRN  sodium chloride flush, 5-40 mL, PRN  sodium chloride, 25 mL, PRN  polyethylene glycol, 17 g, Daily PRN  acetaminophen, 650 mg, Q6H PRN   Or  acetaminophen, 650 mg, Q6H PRN        Data/Labs:     Recent Labs     06/26/21  1137 06/27/21  0703   WBC 3.2* 2.7*   HGB 10.9* 10.7*   HCT 34.0 33.5*    142      Recent Labs     06/26/21  1137 06/27/21  0703    142   K 4.5 4.2    108*   CO2 19* 17*   BUN 67* 78*   CREATININE 2.7* 2.9*     Recent Labs     06/26/21  1137 06/27/21  0703   AST 17 21   ALT 23 17   BILITOT 0.7 0.4   ALKPHOS 91 58     No results for input(s): INR in the last 72 hours. Recent Labs     06/26/21  1137   CKTOTAL 57       I/O last 3 completed shifts: In: 130 [P.O.:120; I.V.:10]  Out: 800 [Urine:800]    Intake/Output Summary (Last 24 hours) at 6/27/2021 1408  Last data filed at 6/27/2021 0648  Gross per 24 hour   Intake 130 ml   Output 800 ml   Net -670 ml        Assessment/Plan:     1. HAP- patient from nursing facility with cough rhonchi and pna per x-ray, iv atbx, fluids, monitor labs, good pulmonary tolitry, bipap, pulmonary is following  06/27/2021IV antibiotics patient is on BiPAP pulmonary is following continue with medical management  2. Mechanical Fall w/ C-2 Fracture type 2 dens fracture- seen by NS, collar applied, pain control  06/27/2020patient seen by neurosurgery recommendations with a hard collar x12 weeks with soft collar at night to fit BiPAP  3. Hypertension- amilodipine, monitor BP  4. Gram-positive cocci bacteremia with MRSAID has been consulted and is following continue Rocephin and vancomycin and continue to monitor also suggested 2D echo and a KHUSHBOO we will continue to follow  4. H/O DVT- eliquis      DVT Prophylaxis: eliquis  Diet: ADULT DIET;  Regular  Code Status: Full Code    Dispo: when stable     Electronically signed by Magui Russo MD on 6/27/2021 at 2:08 PM  [facility]

## 2021-06-27 NOTE — PROGRESS NOTES
MD notified via perfect serve message that patient was unable to take PO medications due to respiratory status requiring BiPap.

## 2021-06-27 NOTE — PROCEDURES
Central Line Insertion     Procedure: right femoral vein Triple Lumen Catheter placement. Indications: vascular access    Consent: The family members were counseled regarding the procedure, its indications, risks, potential complications and alternatives, and any questions were answered. Consent was obtained to proceed. Number of sticks: 1    Number of Kits used: 1    Procedure: Time Out: Immediately prior to the procedure a \"timeout\" was called to verify the correct patient and procedure. The patient was place in the trendelenburg position and the skin over the right femoral vein was prepped with betadine and draped in a sterile fashion. Local anesthesia was obtained by infiltration using 1% Lidocaine without epinephrine. With Ultrasound guidance a large bore needle was used to identify the vein, dark non pulsatile blood returned. The guide wire was then inserted through the needle with minimal resistance. 2 mm nick was made in the skin beside the guidewire. Then a dilator was inserted and removed. A triple lumen catheter was then inserted into the vessel over the guide wire using the Seldinger technique to the 15 cm sydnee. All ports showed good, free flowing blood return and were flushed with saline solution. The catheter was then securely fastened to the skin with sutures and covered with a bio patch and sterile dressing. A post procedure X-ray was not indicated. Complications: None   The patient tolerated the procedure well. Estimated blood loss: 1 ml.     Boni Anguiano MD PGY-2  6/27/2021  6:50 PM

## 2021-06-27 NOTE — CARE COORDINATION
Called patient's son Mr. Valeria Salinas to discuss patient's medical condition, code status and for consent for procedures. Unable to reach son at this time. Left a  Voicemail requesting for a call back.      Electronically signed by Cristin Bean MD on 6/27/2021 at 6:18 PM

## 2021-06-27 NOTE — CONSULTS
Pulmonary [facility]                             Pulmonary Consult/Progress Note :          Patient: Wiley Rodriguez  MRN: [id number]  : 1940      Date of Admission: .2021 11:21 AM    Consulting Physician: Dr. Sadaf Adam     Reason for Consultation: Hypoxic respiratory failure   CC : Fall and SOB    HPI:   Wiley Rodriguez is a 78y.o. year old significant for multiple myeloma, DVT, polyneuropathy presented to the emergency department on  from a nursing facility after sustaining an unwitnessed fall and patient was down for an unknown period of time. Patient was noted to be hypoxic requiring NRB. On presentation CT chest was significant for left upper and lower lobe consolidation. Patient was initially started on Ceftriaxone and azithromycin. Blood cultures from  are growing methicillin resistant CONS. ID was consulted and vancomycin was added to the regimen. Patient was noted to have worsening hypoxia, put on BiPAP with FiO2 of 100%. Patient was transferred to ICU for further care.      PAST MEDICAL HISTORY:   Past Medical History:   Diagnosis Date    Abnormality of plasma protein     Anemia     Asthma     Collapsed vertebra, not elsewhere classified, thoracic region, subsequent encounter for fracture with routine healing     Constipation     Dependence on supplemental oxygen     Difficulty in walking     GERD without esophagitis     Hypertension     Low back pain     Monoclonal gammopathy     Multiple myeloma not having achieved remission ([address]     Other disorders of plasma-protein metabolism, not elsewhere classified     Secondary malignant neoplasm of bone (HCC)     Thrombocytopenia ([address]     Unspecified fracture of first lumbar vertebra, subsequent encounter for fracture with routine healing     Unspecified fracture of fourth lumbar vertebra, subsequent encounter for fracture with routine healing        PAST SURGICAL HISTORY:   No past surgical history on file. FAMILY HISTORY:   No family history on file. SOCIAL HISTORY:   Social History     Socioeconomic History    Marital status:      Spouse name: Not on file    Number of children: Not on file    Years of education: Not on file    Highest education level: Not on file   Occupational History    Not on file   Tobacco Use    Smoking status: Never Smoker   Substance and Sexual Activity    Alcohol use: Not Currently    Drug use: Not Currently    Sexual activity: Not on file   Other Topics Concern    Not on file   Social History Narrative    Not on file     Social Determinants of Health     Financial Resource Strain:     Difficulty of Paying Living Expenses:    Food Insecurity:     Worried About Running Out of Food in the Last Year:     [address] in the Last Year:    Transportation Needs:     Lack of Transportation (Medical):  Lack of Transportation (Non-Medical):    Physical Activity:     Days of Exercise per Week:     Minutes of Exercise per Session:    Stress:     Feeling of Stress :    Social Connections:     Frequency of Communication with Friends and Family:     Frequency of Social Gatherings with Friends and Family:     Attends [employer] Services:     Active Member of Clubs or Organizations:     Attends Club or Organization Meetings:     Marital Status:    Intimate Partner Violence:     Fear of Current or Ex-Partner:     Emotionally Abused:     Physically Abused:     Sexually Abused:      Social History     Tobacco Use   Smoking Status Never Smoker     Social History     Substance and Sexual Activity   Alcohol Use Not Currently     Social History     Substance and Sexual Activity   Drug Use Not Currently     HOME MEDICATIONS:  Prior to Admission medications    Medication Sig Start Date End Date Taking?  Authorizing Provider   apixaban (ELIQUIS) 2.5 MG TABS tablet Take 1 tablet by mouth 2 times daily 1/4/21   Morris Felix MD   amLODIPine (NORVASC) 10 MG tablet Take 1 tablet by mouth daily 1/5/21   Julian Billy MD   furosemide (LASIX) 20 MG tablet Take 20 mg by mouth daily    Historical Provider, MD   lenalidomide (REVLIMID) 10 MG chemo capsule Take 10 mg by mouth daily Give at bedtime for 21 days starting 12/12/20    Historical Provider, MD   Ascorbic Acid (VITAMIN C ADULT GUMMIES PO) Take 1,000 mg by mouth daily    Historical Provider, MD   Zinc 100 MG TABS Take by mouth daily    Historical Provider, MD   gabapentin (NEURONTIN) 100 MG capsule Take 100 mg by mouth 2 times daily. For foot pain    Historical Provider, MD   Multiple Vitamins-Minerals (THERAPEUTIC MULTIVITAMIN-MINERALS) tablet Take 1 tablet by mouth daily    Historical Provider, MD   ipratropium-albuterol (DUONEB) 0.5-2.5 (3) MG/3ML SOLN nebulizer solution Inhale 1 vial into the lungs every 4 hours As needed    Historical Provider, MD   HYDROcodone-acetaminophen (NORCO)  MG per tablet Take 1 tablet by mouth every 4 hours as needed for Pain.     Historical Provider, MD   ondansetron (ZOFRAN) 8 MG tablet Take 8 mg by mouth every 8 hours as needed for Nausea or Vomiting    Historical Provider, MD       CURRENT MEDICATIONS:  Current Facility-Administered Medications: [COMPLETED] vancomycin (VANCOCIN) 1,250 mg in dextrose 5 % 250 mL IVPB, 1,250 mg, Intravenous, Once **FOLLOWED BY** [START ON 6/28/2021] vancomycin (VANCOCIN) 750 mg in dextrose 5 % 250 mL IVPB, 750 mg, Intravenous, Q36H  amLODIPine (NORVASC) tablet 10 mg, 10 mg, Oral, Daily  apixaban (ELIQUIS) tablet 2.5 mg, 2.5 mg, Oral, BID  ascorbic acid (VITAMIN C) tablet 1,000 mg, 1,000 mg, Oral, Daily  furosemide (LASIX) tablet 20 mg, 20 mg, Oral, Daily  gabapentin (NEURONTIN) capsule 100 mg, 100 mg, Oral, BID  HYDROcodone-acetaminophen (NORCO)  MG per tablet 1 tablet, 1 tablet, Oral, Q4H PRN  lenalidomide (REVLIMID) chemo capsule 10 mg, 10 mg, Oral, Daily  therapeutic multivitamin-minerals 1 tablet, 1 tablet, Oral, murmurs, no gallops, no carotid bruits and no JVD   Extremities: no cyanosis, + edema  Musculoskeletal:  no joint swelling, deformity or tenderness     LABS/IMAGING:    CBC:  Lab Results   Component Value Date    WBC 2.7 (L) 06/27/2021    HGB 10.7 (L) 06/27/2021    HCT 33.5 (L) 06/27/2021    MCV 97.1 06/27/2021     06/27/2021    LYMPHOPCT 5.0 (L) 06/27/2021    RBC 3.45 (L) 06/27/2021    MCH 31.0 06/27/2021    MCHC 31.9 (L) 06/27/2021    RDW 16.3 (H) 06/27/2021    NEUTOPHILPCT 72.0 06/27/2021    MONOPCT 2.0 06/27/2021    BASOPCT 0.0 06/27/2021    NEUTROABS 2.51 06/27/2021    LYMPHSABS 0.14 (L) 06/27/2021    MONOSABS 0.05 (L) 06/27/2021    EOSABS 0.00 (L) 06/27/2021    BASOSABS 0.00 06/27/2021       Recent Labs     06/27/21  0703 06/26/21  1137 06/24/21  1042   WBC 2.7* 3.2* 7.4   HGB 10.7* 10.9* 12.0   HCT 33.5* 34.0 35.8*   MCV 97.1 96.3 96.0    247 239       BMP:   Recent Labs     06/26/21  1137 06/27/21  0703    142   K 4.5 4.2    108*   CO2 19* 17*   BUN 67* 78*   CREATININE 2.7* 2.9*       MG:   Lab Results   Component Value Date    MG 3.2 12/30/2020     Ca/Phos:   Lab Results   Component Value Date    CALCIUM 8.1 (L) 06/27/2021     Amylase: No results found for: AMYLASE  Lipase:   Lab Results   Component Value Date    LIPASE 149 (H) 12/30/2020     LIVER PROFILE:   Recent Labs     06/26/21  1137 06/27/21  0703   AST 17 21   ALT 23 17   BILITOT 0.7 0.4   ALKPHOS 91 58       PT/INR: No results for input(s): PROTIME, INR in the last 72 hours. APTT: No results for input(s): APTT in the last 72 hours.     Cardiac Enzymes:  Lab Results   Component Value Date    CKTOTAL 57 06/26/2021    TROPONINI 0.03 12/30/2020         MICROBIOLOGY:  Blood cx 6/26 positive for Methicillin resistant CONS    CXR: Dense left mid and lower lung consolidation     PFTs: none on chart     2D Echocardiogram: none on chart     CT Chest: 6/26/21 left upper and lower lobe consolidation, some right sided atelectasis PROBLEM LIST:  Patient Active Problem List   Diagnosis    Myeloma [facility]    Essential hypertension, benign    Localized edema    Thrombocytopenia, unspecified ([facility]    Pneumonia due to COVID-19 virus    Elevated serum creatinine    Essential hypertension    Hypernatremia    Hypermagnesemia    Hypokalemia    Acute respiratory failure with hypoxia and hypercapnia (HCC)    CAP (community acquired pneumonia) due to Pneumococcus ([facility]    Closed nondisplaced type II dens fracture ([facility]     ASSESSMENT:  1. Acute hypoxic respiratory failure   -likely hospital acquired pneumonia  2. Acute encephalopathy   3. CONS bacteremia blood cx from 6/26 positive  4. AUDREY vs AUDREY on CKD  5. C2 fracture, Collar   6. Lactic acidosis  7. Multiple myeloma, on leflunamide - holding   8.  Hx of DVT, on eliquis       PLAN:  - Continue BiPAP, wean FiO2 as able, continue breathing tx   - Antibiotics per ID  - Follow cx, procal, crp  -Continue Eliquis for DVT prophylaxis     Thank you very much for allowing me to participate in the care of this pleasant patient , should you have any questions ,please do not hesitate to contact me    [facility] MD [place],[facility]  Pulmonary&Critical Care Medicine   Director of [address] [place] [address] Director of [facility]    Gissel Alcantara

## 2021-06-27 NOTE — PROGRESS NOTES
ABG drawn x 1 from Right Radial. Patient had Unable to be performed due to the emergent nature of this procedureAllen's Test.  Patient was on 100% O2 via bipap 12/6  at time of puncture. Pressure held for 5. No bleeding or bruising noted at puncture site.   Patient tolerated procedure well      Performed by Tiffany Jordan RCP

## 2021-06-28 NOTE — PLAN OF CARE
Problem: Non-Violent Restraints  Goal: Removal from restraints as soon as assessed to be safe  6/28/2021 1108 by Marcus Yancey RN  Outcome: Completed  6/28/2021 0820 by Farrukh Waterman RN  Outcome: Not Met This Shift  Goal: No harm/injury to patient while restraints in use  6/28/2021 1108 by Marcus Yancey RN  Outcome: Completed  6/28/2021 0820 by Farrukh Waterman RN  Outcome: Met This Shift  Goal: Patient's dignity will be maintained  6/28/2021 1108 by Marcus Yancey RN  Outcome: Completed  6/28/2021 0820 by Farrukh Waterman RN  Outcome: Met This Shift

## 2021-06-28 NOTE — PROCEDURES
Arterial line placement    Procedure: Right radial arterial line placement. Indications: Continuous monitoring of blood pressure in a patient with hypotension +/- shock, on Levophed. Anesthesia: Local infiltration of 1% lidocaine. Consent: The family members were counseled regarding the procedure, its indications, risks, potential complications and alternatives, and any questions were answered. Consent was obtained to proceed. Technique: Time Out: Immediately prior to the procedure a \"timeout\" was called to verify the correct patient and procedure. Procedure was done using strict aseptic technique. Adam's test was performed and was normal. right radial site was cleaned with chloraprep and draped. Radial artery was identified, then Lidocaine 1% was infiltrated locally. Radial arterial line was inserted, a good blood flow was obtained, after which guidewire was inserted all the way with no resistance. Then the canula was inserted and needle with guidewire was withdrawn. Pulsatile bright red blood flow was observed. The canula was connected to BP monitoring apparatus and a good quality waveform was noted. Then the canula was secured with 2 stay sutures of 3-0 silk after Lidocaine infiltration, following which dressing was applied. Number of sticks: 3. Number of Kits used: 1      Complications: No immediate complication. Estimated blood loss: About 1 ml. Comment: Patient tolerated the procedure well. Silviano Lei MD PGY-3  6/28/2021 6:46 AM    Ghada Reinoso DO, MPH, Latrice Akers, JOCELYN  Director, [address] [facility]  Professor of [address] [place] [address] [address] [place] [address]

## 2021-06-28 NOTE — CARE COORDINATION
I called the patient's power of  Mr. Orlando Gomez and son. He requested that the patient's CODE STATUS to be changed to DNR CCA at this point. All the questions were addressed. Chart was updated accordingly. Patient's nurse was available over the phone for confirmation of CODE STATUS change.

## 2021-06-28 NOTE — PROGRESS NOTES
Infectious Disease  Progress Note  [facility]    Chief Complaint:  On vent    Subjective: bacteremia  HAP    Scheduled Meds:   ascorbic acid  1,500 mg Intravenous Q6H    thiamine (VITAMIN B1) IVPB  200 mg Intravenous Q12H    hydrocortisone sodium succinate PF  100 mg Intravenous Q8H    artificial tears   Both Eyes BID    chlorhexidine  15 mL Mouth/Throat BID    vancomycin  750 mg Intravenous Q36H    insulin lispro  0-6 Units Subcutaneous Q4H    pantoprazole  40 mg Intravenous Daily    And    sodium chloride (PF)  10 mL Intravenous Daily    piperacillin-tazobactam  3,375 mg Intravenous Q12H    And    sodium chloride   Intravenous Q12H    ipratropium-albuterol  1 ampule Inhalation Q4H WA    [Held by provider] amLODIPine  10 mg Oral Daily    [Held by provider] apixaban  2.5 mg Oral BID    [Held by provider] furosemide  20 mg Oral Daily    gabapentin  100 mg Oral BID    [Held by provider] lenalidomide  10 mg Oral Daily    therapeutic multivitamin-minerals  1 tablet Oral Daily    sodium chloride flush  5-40 mL Intravenous 2 times per day     Continuous Infusions:   sodium bicarbonate in sterile water infusion 100 mL/hr at 06/28/21 0246    norepinephrine 4 mcg/min (06/28/21 1022)    sodium chloride Stopped (06/28/21 1027)    fentaNYL 5 mcg/ml in D5W 250 ml infusion 200 mcg/hr (06/28/21 0946)    dextrose      heparin (PORCINE) Infusion 14 Units/kg/hr (06/28/21 0944)    sodium chloride       PRN Meds:glucose, dextrose, glucagon (rDNA), dextrose, heparin (porcine), heparin (porcine), midazolam, HYDROcodone-acetaminophen, sodium chloride flush, sodium chloride, polyethylene glycol, acetaminophen **OR** acetaminophen    Patient Vitals for the past 24 hrs:   BP Temp Temp src Pulse Resp SpO2 Weight   06/28/21 1055    108 15 94 %    06/28/21 0953    101 22 92 %    06/28/21 0952     28 92 %    06/28/21 0700    97 19 94 %    06/28/21 0630 (!) 99/53   129 29 94 %    06/28/21 0600 (!) 81/50   107 24 95 %    06/28/21 0500 84/63   111 29 97 %    06/28/21 0400 (!) 87/57 100.6 °F (38.1 °C) Axillary 123 (!) 31 97 %    06/28/21 0300 84/61   132 (!) 34 96 %    06/28/21 0200 (!) 60/45   134 20 92 %    06/28/21 0142    131 19 90 %    06/28/21 0100 (!) 69/43   125 18 (!) 86 %    06/28/21 0000 (!) 92/56 100.6 °F (38.1 °C) Axillary 128 22 (!) 86 %    06/27/21 2300 103/81   131 25 (!) 82 %    06/27/21 2200 104/78   136 24 (!) 87 %    06/27/21 2122    117 14 (!) 80 %    06/27/21 2100 105/78   134 (!) 37 90 %    06/27/21 2024     (!) 36     06/27/21 2000 106/65 101.1 °F (38.4 °C)  137 (!) 35 90 %    06/27/21 1900 (!) 106/54   139 (!) 33 (!) 89 %    06/27/21 1800 116/78   114 30 94 %    06/27/21 1700 123/70   111 29 95 %    06/27/21 1647 120/67 100.8 °F (38.2 °C) Axillary 108 29 (!) 89 % 159 lb 9.8 oz (72.4 kg)   06/27/21 1545 137/77 99.4 °F (37.4 °C) Axillary 113 (!) 33     06/27/21 1345 (!) 140/80 100.2 °F (37.9 °C) Axillary 115 29     06/27/21 1245 (!) 141/80 101.2 °F (38.4 °C) Axillary 121 (!) 33     06/27/21 1145 120/74 99.4 °F (37.4 °C) Axillary 122 (!) 34         CBC with Differential:    Lab Results   Component Value Date    WBC 6.3 06/28/2021    RBC 3.01 06/28/2021    HGB 9.3 06/28/2021    HCT 29.3 06/28/2021     06/28/2021    MCV 97.3 06/28/2021    MCH 30.9 06/28/2021    MCHC 31.7 06/28/2021    RDW 16.7 06/28/2021    NRBC 0.9 06/28/2021    SEGSPCT 45.6 05/27/2021    BANDSPCT 12.0 06/24/2021    METASPCT 2.6 06/28/2021    LYMPHOPCT 1.7 06/28/2021    MONOPCT 1.9 06/28/2021    MYELOPCT 0.9 06/28/2021    BASOPCT 1.3 06/28/2021    MONOSABS 0.00 06/28/2021    LYMPHSABS 0.08 06/28/2021    EOSABS 0.00 06/28/2021    BASOSABS 0.00 06/28/2021     CMP:    Lab Results   Component Value Date     06/28/2021    K 5.2 06/28/2021    K 4.2 06/27/2021     06/28/2021    CO2 20 06/28/2021    BUN 93 06/28/2021    CREATININE 3.3 06/28/2021    GFRAA 16 06/28/2021    AGRATIO 0.9 06/24/2021    AGRATIO 1.1 06/24/2021    LABGLOM 13 06/28/2021    GLUCOSE 190 06/28/2021    PROT 5.0 06/28/2021    LABALBU 2.0 06/28/2021    CALCIUM 7.6 06/28/2021    BILITOT 0.5 06/28/2021    ALKPHOS 44 06/28/2021    AST 1,530 06/28/2021     06/28/2021       BP (!) 99/53   Pulse 108   Temp 100.6 °F (38.1 °C) (Axillary)   Resp 15   Ht 5' 4\" (1.626 m)   Wt 159 lb 9.8 oz (72.4 kg)   SpO2 94%   BMI 27.40 kg/m²     Physical Exam  Const/Neuro- unchanged, no signs of acute distress, Alert  ENMT- Within Normal Limits, Normocephalic, mucous membranes pink/moist, No thrush  Neck: Neck supple  Heart- Regular, Rate, Rhythm- no murmur appreciated. Lungs- clear to ascultation. Respirations even and nonlabored. Abdomen- Soft, bowel sounds positive, non tender  Musculo/Extremities-  Equal and symmetrical, no edema. No tenderness. Skin:  Warm and dry, free from rashes. Cultures reviewed    Radiology reviewed  XR CHEST PORTABLE   Final Result   1. Persistent the infiltrate with pleural effusion in the left lung cause   significant the opacification of the left image chest particular from the mid   to the base. 2.  No significant changes since the previous examinations recently performed. 3.  Please see report of CT chest of June 26. XR CHEST ABDOMEN NG PLACEMENT   Final Result   Satisfactory position of the NGT. XR CHEST PORTABLE   Final Result   Intervally placed NGT extends below the diaphragm into at least the mid   stomach, with tip not confidently seen within the field of view. Endotracheal tube tip is 1.7 cm from the paradise and could be backed out 5-10   mm for more optimal positioning. Otherwise, stable chest with unchanged left lung opacities. XR CHEST PORTABLE   Final Result   Stable abnormal chest with persistent infiltrates and pleural effusion in the   left lung base likely pneumonia.   Surveillance recommended to see complete clearing. There is minimal infiltrates developing in the right lung base. Endotracheal tube close to the paradise and could be retracted by 1 cm. XR CHEST PORTABLE   Final Result   Stable chest series. Persistent dense consolidation in the left mid and   lower lung. Stable atherosclerotic disease. No new cardiopulmonary   pathology. CT Head WO Contrast   Final Result   Stable atrophy with small vessel ischemic disease. There is no acute stroke   or hemorrhage. Persistent multiple punctate lytic lesions the calvarium likely due to   malignancy like multiple myeloma. CT cervical spine. There is a fracture of the base of the dens of C2 (type 2). There is no   displacement. No other acute fractures are identified in the cervical spine. There is mild diffuse degenerative changes from C3-T1 with osteophytes and   multilevel disc bulges. There is osteopenia and punctate lytic lesions   concerning for multiple myeloma. Impression      Type 2 fracture of the dens of C2. Diffuse degenerative changes from C3-T1. Multiple myeloma. CT chest.      Comparison December 30, 2020. Findings      There is borderline cardiac size. The great vessels are normal.  Trachea and   major bronchi are patent. There is dense consolidation in the left upper   lobe and left lower lobe concerning for pneumonia. There is minimal   atelectasis in the right lung base. The liver is of normal architecture. There is diffuse demineralization of the thoracic spine with punctate   lucencies. Compression fractures with vertebroplasty are noted. There is   slight irregularity of the sternum probably artifact. There is kyphosis of   the spine. Impression      Dense consolidation in the left upper lobe and left lower lobe concerning for   pneumonia.   Surveillance after appropriate treatment is recommended with   repeat CT scan in 6-8 weeks to see complete resolution and exclude underlying   malignancy. Demineralization and the punctate lytic lucencies in the spine and ribs   concerning for multiple myeloma. Critical results were called by Dr. Marielos Inman to Dr. Yasir Neely on   6/26/2021 at 16:30.         CT Cervical Spine WO Contrast   Final Result   Stable atrophy with small vessel ischemic disease. There is no acute stroke   or hemorrhage. Persistent multiple punctate lytic lesions the calvarium likely due to   malignancy like multiple myeloma. CT cervical spine. There is a fracture of the base of the dens of C2 (type 2). There is no   displacement. No other acute fractures are identified in the cervical spine. There is mild diffuse degenerative changes from C3-T1 with osteophytes and   multilevel disc bulges. There is osteopenia and punctate lytic lesions   concerning for multiple myeloma. Impression      Type 2 fracture of the dens of C2. Diffuse degenerative changes from C3-T1. Multiple myeloma. CT chest.      Comparison December 30, 2020. Findings      There is borderline cardiac size. The great vessels are normal.  Trachea and   major bronchi are patent. There is dense consolidation in the left upper   lobe and left lower lobe concerning for pneumonia. There is minimal   atelectasis in the right lung base. The liver is of normal architecture. There is diffuse demineralization of the thoracic spine with punctate   lucencies. Compression fractures with vertebroplasty are noted. There is   slight irregularity of the sternum probably artifact. There is kyphosis of   the spine. Impression      Dense consolidation in the left upper lobe and left lower lobe concerning for   pneumonia. Surveillance after appropriate treatment is recommended with   repeat CT scan in 6-8 weeks to see complete resolution and exclude underlying   malignancy.       Demineralization and the punctate lytic lucencies in the spine and ribs concerning for multiple myeloma. Critical results were called by Dr. Reyna Macielly to Dr. Pam Lugo. on   6/26/2021 at 16:30.         CT CHEST WO CONTRAST   Final Result   Stable atrophy with small vessel ischemic disease. There is no acute stroke   or hemorrhage. Persistent multiple punctate lytic lesions the calvarium likely due to   malignancy like multiple myeloma. CT cervical spine. There is a fracture of the base of the dens of C2 (type 2). There is no   displacement. No other acute fractures are identified in the cervical spine. There is mild diffuse degenerative changes from C3-T1 with osteophytes and   multilevel disc bulges. There is osteopenia and punctate lytic lesions   concerning for multiple myeloma. Impression      Type 2 fracture of the dens of C2. Diffuse degenerative changes from C3-T1. Multiple myeloma. CT chest.      Comparison December 30, 2020. Findings      There is borderline cardiac size. The great vessels are normal.  Trachea and   major bronchi are patent. There is dense consolidation in the left upper   lobe and left lower lobe concerning for pneumonia. There is minimal   atelectasis in the right lung base. The liver is of normal architecture. There is diffuse demineralization of the thoracic spine with punctate   lucencies. Compression fractures with vertebroplasty are noted. There is   slight irregularity of the sternum probably artifact. There is kyphosis of   the spine. Impression      Dense consolidation in the left upper lobe and left lower lobe concerning for   pneumonia. Surveillance after appropriate treatment is recommended with   repeat CT scan in 6-8 weeks to see complete resolution and exclude underlying   malignancy. Demineralization and the punctate lytic lucencies in the spine and ribs   concerning for multiple myeloma. Critical results were called by Dr. Reyna Macielly to Dr. Pam Lugo.  on

## 2021-06-28 NOTE — PROGRESS NOTES
Pharmacy Consultation Note  (Antibiotic Dosing and Monitoring)    Initial consult date: 2021  Consulting physician/provider: Dr. Isidoro Man  Drug(s): vancomycin  Indication: Staph species bacteremia; immunocompromised    Age/Gender IBW DW  Allergy Information   78 y.o. female; 162.6 cm, 68 kg 50.6 kg 57.6 kg  Patient has no known allergies. Date  WBC BUN/CR Drug/Dose Time   Given Level(s)   (Time) Comments    3.2 67/2.7 X X      2.7 78/2.9 Vancomycin 1250 mg x1 1247      6.3 91/3.1 Vancomycin 1000 mg IV x1 1143                Estimated Creatinine Clearance: 13 mL/min (A) (based on SCr of 3.3 mg/dL (H)). Intake/Output Summary (Last 24 hours) at 2021 1116  Last data filed at 2021 1010  Gross per 24 hour   Intake 5610 ml   Output 628 ml   Net 4982 ml       Temp max: Temp (24hrs), Av.3 °F (37.9 °C), Min:99.4 °F (37.4 °C), Max:101.2 °F (38.4 °C)      Assessment:  · Consulted by Dr. Isidoro Man to dose/monitor vancomycin. · Goal trough level:  15-20 mCg/mL; AUC/ELEUTERIO = 400-600 mg/L-hr. · 81 yo/F admitted from SNF after fall and found down for unknown length of time. BC's from  +for [facility]; Blood PCR today revealed methicillin-resistant Staph species. · Febrile, tachycardic, and lymphopenic on admission. · PMH: MM on lenalinomide therapy. · sCr 1.3 --> 3.1    Plan:  · Vancomycin 1000 mg IV x1 - random level tomorrow AM  · Will check vancomycin level when steady state is attained if vanco continues. · Pharmacist will follow and monitor/adjust dosing as necessary.     Louise Biggs, PharmD, BCPS 2021 3:35 PM

## 2021-06-28 NOTE — PROGRESS NOTES
Stage  CI HR MAP TPRI SVI   Baseline 2.8 [phone]   Challenge 3.0 [phone]   Result (%Ä) 5.4 1.8 1.6 -3.6 6.2

## 2021-06-28 NOTE — PROGRESS NOTES
HOSPITALIST PROGRESS NOTE  Date: 6/28/2021   Name: Jia Adhikari   MRN: [id number]   YOB: 1940        Hospital Course:   Ms Karina Stock is 78 YF who was trans eddie from nursing facility due to mechanical fall substaining Dense C-2 Fracture, NS consulted and trauma applied neck collar for stability. The also found HAP with ARF and Bipap. She will be admitted [facility] with pulmonary  in consult for HAP w/ ARF and Bipap.   Subjective/Interval Hx:   Patient seen in intensive care unit after consult with pulmonary and transferred to ICU with BiPAP seeing in the unit on right side continue to monitor critical care team    Objective:   Physical Exam:   BP (!) 99/53   Pulse 103   Temp 99.7 °F (37.6 °C) (Oral)   Resp 14   Ht 5' 4\" (1.626 m)   Wt 159 lb 9.8 oz (72.4 kg)   SpO2 91%   BMI 27.40 kg/m²   General: no acute distress, well nourished and well hydrated  HEENT: NCAT  Heart: S1S2 RRR  Lungs: Clear to ascultation bilaterally, respiratory effort normal  Abdomen: soft, NT/ND, positive bowel sounds  Extremities: no pitting edema, nontender   Neuro: patient is awake, alert and orientated times 3, no gross deficits  Skin: no rashes or ecchymosis        Meds:   Meds:    hydrocortisone sodium succinate PF  100 mg Intravenous Q8H    artificial tears   Both Eyes BID    chlorhexidine  15 mL Mouth/Throat BID    vancomycin (VANCOCIN) intermittent dosing (placeholder)   Other RX Placeholder    calcium gluconate IVPB  1,000 mg Intravenous Once    albumin human  25 g Intravenous Q6H    insulin lispro  0-6 Units Subcutaneous Q4H    pantoprazole  40 mg Intravenous Daily    And    sodium chloride (PF)  10 mL Intravenous Daily    piperacillin-tazobactam  3,375 mg Intravenous Q12H    And    sodium chloride   Intravenous Q12H    ipratropium-albuterol  1 ampule Inhalation Q4H WA    [Held by provider] amLODIPine  10 mg Oral Daily    [Held by provider] apixaban  2.5 mg Oral BID    [Held by provider] furosemide  20 mg Oral Daily    [Held by provider] gabapentin  100 mg Oral BID    [Held by provider] lenalidomide  10 mg Oral Daily    therapeutic multivitamin-minerals  1 tablet Oral Daily    sodium chloride flush  5-40 mL Intravenous 2 times per day      Infusions:    sodium bicarbonate in sterile water infusion 100 mL/hr at 06/28/21 1346    norepinephrine 8 mcg/min (06/28/21 1255)    fentaNYL 5 mcg/ml in D5W 250 ml infusion 200 mcg/hr (06/28/21 1142)    bumetanide 0.1 mg/mL infusion 0.2 mg/hr (06/28/21 1355)    dextrose      heparin (PORCINE) Infusion 14 Units/kg/hr (06/28/21 0944)    sodium chloride       PRN Meds: perflutren lipid microspheres, 1.5 mL, ONCE PRN  glucose, 15 g, PRN  dextrose, 12.5 g, PRN  glucagon (rDNA), 1 mg, PRN  dextrose, 100 mL/hr, PRN  heparin (porcine), 4,000 Units, PRN  heparin (porcine), 2,000 Units, PRN  midazolam, 2 mg, Q4H PRN  sodium chloride flush, 5-40 mL, PRN  sodium chloride, 25 mL, PRN  polyethylene glycol, 17 g, Daily PRN  acetaminophen, 650 mg, Q6H PRN   Or  acetaminophen, 650 mg, Q6H PRN        Data/Labs:     Recent Labs     06/27/21  2351 06/28/21  0514 06/28/21  0834   WBC 3.9* 3.8* 6.3   HGB 9.5* 8.4* 9.3*   HCT 30.1* 26.7* 29.3*   * 88* 107*      Recent Labs     06/27/21  1903 06/27/21  1903 06/28/21  0006 06/28/21  0514 06/28/21  1120      < > 143 141 143   K 4.0   < > 5.2* 5.2* 4.7      < > 112* 110* 110*   CO2 19*   < > 15* 20* 20*   PHOS 4.1  --  5.6* 6.3*  --    BUN 88*   < > 89* 93* 93*   CREATININE 3.1*   < > 3.1* 3.3* 3.1*    < > = values in this interval not displayed. Recent Labs     06/27/21  0703 06/27/21  1903 06/28/21  0514   AST 21 36* 1,530*   ALT 17 21 661*   BILIDIR  --  0.3 0.4*   BILITOT 0.4 0.4 0.5   ALKPHOS 58 53 44     No results for input(s): INR in the last 72 hours. Recent Labs     06/26/21  1137 06/27/21  1903   CKTOTAL 57 132   CKMB  --  1.6       I/O last 3 completed shifts:   In: 5708 [I.V.:4314]  Out: 605 [Urine:605]    Intake/Output Summary (Last 24 hours) at 6/28/2021 1445  Last data filed at 6/28/2021 1401  Gross per 24 hour   Intake 5610 ml   Output 568 ml   Net 5042 ml        Assessment/Plan:     1. Septic shock probably due to HAP-LLL wound/bacteremia- patient from nursing facility with cough rhonchi and pna per x-ray, iv atbx, fluids, monitor labs, good pulmonary tolitry, bipap, pulmonary is following  06/27/2021IV antibiotics patient is on BiPAP pulmonary is following continue with medical management  06/28/2021patient was transferred yesterday to ICU by pulmonary, will continue to manage with critical care team patient  on her right side today we will continue to monitor  2. Mechanical Fall w/ C-2 Fracture type 2 dens fracture- seen by NS, collar applied, pain control  06/27/2020patient seen by neurosurgery recommendations with a hard collar x12 weeks with soft collar at night to fit BiPAP  3. Hypertension- amilodipine, monitor BP  4. Gram-positive cocci bacteremia with MRSAID has been consulted and is following continue Rocephin and vancomycin and continue to monitor also suggested 2D echo and a KHUSHBOO we will continue to follow  4.   Atrial fibrillation H/O DVT- eliquisEP has been consulted, will treat medically and may consider cardioversion if indicated      DVT Prophylaxis: eliquis  Diet: Diet NPO  Code Status: DNR-CCA    Dispo: when stable     Electronically signed by Mickey Canseco MD on 6/28/2021 at 2:45 PM  [facility] Hospitalist

## 2021-06-28 NOTE — CONSULTS
Inpatient Cardiology Consultation      Reason for Consult: A. fib, RVR, elevated troponin    Consulting Physician: Rao Martins MD    Requesting Physician:  Tanmay Brown MD    Date of Consultation: 6/28/2021    HISTORY OF PRESENT ILLNESS OF Lamin Stewart located in  room 4415/4415-A:     Rober Kendrick is a 78 y.o. female  unknown to [facility] cardiology     Patient has h/o HTN,  multiple myeloma with diffuse bone involvement, pancytopenia, lung cancer (per EMR), collapse of vertebral, low back pain. Patient was brought by EMS to ED of [facility] on 6/26/2021 at 11:20 AM from a skilled nursing facility with complaint of shortness of breath and fall. Per medical record, according EMS, patient has had a day before being brought to ED and unwitnessed fall with unknown line of time spent on the floor. She was admitted from ED with diagnosis: Acute respiratory failure with hypoxia, community-acquired pneumonia of left lung, fall, closed nondisplaced odontoid fracture with type II morphology. In hospital since her respiratory status was worsening she was intubated. Also she developed A. fib RVR and was started on amiodarone dripEP was consulted. Since she was noticed to have elevated troponins cardiology was also consulted. During the exam: Patient intubated and sedated        Please note: past medical records were reviewed per electronic medical record (EMR) - see detailed reports under Past Medical/ Surgical History.    Past Medical History:    Past Medical History:   Diagnosis Date    Abnormality of plasma protein     Anemia     Asthma     Collapsed vertebra, not elsewhere classified, thoracic region, subsequent encounter for fracture with routine healing     Constipation     Dependence on supplemental oxygen     Difficulty in walking     GERD without esophagitis     Hypertension     Low back pain     Monoclonal gammopathy     Multiple myeloma not having achieved remission ([facility] [address]     Other % sodium chloride infusion admixture, , Intravenous, Q12H  ipratropium-albuterol (DUONEB) nebulizer solution 1 ampule, 1 ampule, Inhalation, Q4H WA  heparin (porcine) injection 4,000 Units, 4,000 Units, Intravenous, PRN  heparin (porcine) injection 2,000 Units, 2,000 Units, Intravenous, PRN  heparin 25,000 units in dextrose 5% 250 mL (premix) infusion, 5-30 Units/kg/hr, Intravenous, Continuous  midazolam PF (VERSED) injection 2 mg, 2 mg, Intravenous, Q4H PRN  [Held by provider] amLODIPine (NORVASC) tablet 10 mg, 10 mg, Oral, Daily  [Held by provider] apixaban (ELIQUIS) tablet 2.5 mg, 2.5 mg, Oral, BID  [Held by provider] furosemide (LASIX) tablet 20 mg, 20 mg, Oral, Daily  [Held by provider] gabapentin (NEURONTIN) capsule 100 mg, 100 mg, Oral, BID  [Held by provider] lenalidomide (REVLIMID) chemo capsule 10 mg, 10 mg, Oral, Daily  therapeutic multivitamin-minerals 1 tablet, 1 tablet, Oral, Daily  sodium chloride flush 0.9 % injection 5-40 mL, 5-40 mL, Intravenous, 2 times per day  sodium chloride flush 0.9 % injection 5-40 mL, 5-40 mL, Intravenous, PRN  0.9 % sodium chloride infusion, 25 mL, Intravenous, PRN  polyethylene glycol (GLYCOLAX) packet 17 g, 17 g, Oral, Daily PRN  acetaminophen (TYLENOL) tablet 650 mg, 650 mg, Oral, Q6H PRN **OR** acetaminophen (TYLENOL) suppository 650 mg, 650 mg, Rectal, Q6H PRN    Allergies:  Patient has no known allergies. Social History:    Social History     Socioeconomic History    Marital status:       Spouse name: Not on file    Number of children: Not on file    Years of education: Not on file    Highest education level: Not on file   Occupational History    Not on file   Tobacco Use    Smoking status: Never Smoker   Substance and Sexual Activity    Alcohol use: Not Currently    Drug use: Not Currently    Sexual activity: Not on file   Other Topics Concern    Not on file   Social History Narrative    Not on file     Social Determinants of Health     Financial Resource Strain:     Difficulty of Paying Living Expenses:    Food Insecurity:     Worried About Running Out of Food in the Last Year:     [address] in the Last Year:    Transportation Needs:     Lack of Transportation (Medical):  Lack of Transportation (Non-Medical):    Physical Activity:     Days of Exercise per Week:     Minutes of Exercise per Session:    Stress:     Feeling of Stress :    Social Connections:     Frequency of Communication with Friends and Family:     Frequency of Social Gatherings with Friends and Family:     Attends Yarsanism Services:     Active Member of Clubs or Organizations:     Attends Club or Organization Meetings:     Marital Status:    Intimate Partner Violence:     Fear of Current or Ex-Partner:     Emotionally Abused:     Physically Abused:     Sexually Abused:      Family History:   No family history on file. REVIEW OF SYSTEMS:   Patient intubated and sedated    PHYSICAL EXAM:   BP (!) 99/53   Pulse 103   Temp 99.7 °F (37.6 °C) (Oral)   Resp 14   Ht 5' 4\" (1.626 m)   Wt 159 lb 9.8 oz (72.4 kg)   SpO2 91%   BMI 27.40 kg/m²   CONST: Obese. Intubated sedated  HEENT:   Head- Normocephalic, atraumatic   Eyes- Conjunctivae pink, anicteric  Throat- Oral mucosa pink and moist  Neck-  No stridor heard, c-collar on  CHEST: Chest symmetrical and signs of tenderness to palpation. No accessory muscle use or intercostal retractions  RESPIRATORY: Lung sounds -rhonchi bilateral, mechanical ventilation sounds  CARDIOVASCULAR:     Carotid not auscultated due to c-collar  Heart Inspection- shows no noted pulsations  Heart Ausculation- Irregularly irregular rate and rhythm, difficult to appreciate  murmurs mechanical ventilation sounds, rhonchi's and tachycardia. No s3, s4 or rub   PV: No lower extremity edema. No varicosities. DP pulses: 0 bilateral radial pulses +2 bilateral, no clubbing or cyanosis   ABDOMEN: Soft, no signs of-tenderness to light palpation. called by Dr. Monica Sigala to Dr. Tanya Hampton. on 6/26/2021 at 16:30.     CT CHEST WO CONTRAST    Result Date: 6/26/2021  EXAMINATION: CT OF THE HEAD WITHOUT CONTRAST; CT OF THE CHEST WITHOUT CONTRAST; CT OF THE CERVICAL SPINE WITHOUT CONTRAST  6/26/2021 1:56 pm TECHNIQUE: CT of the head was performed without the administration of intravenous contrast. Dose modulation, iterative reconstruction, and/or weight based adjustment of the mA/kV was utilized to reduce the radiation dose to as low as reasonably achievable.; CT of the chest was performed without the administration of intravenous contrast. Multiplanar reformatted images are provided for review. Dose modulation, iterative reconstruction, and/or weight based adjustment of the mA/kV was utilized to reduce the radiation dose to as low as reasonably achievable.; CT of the cervical spine was performed without the administration of intravenous contrast. Multiplanar reformatted images are provided for review. Dose modulation, iterative reconstruction, and/or weight based adjustment of the mA/kV was utilized to reduce the radiation dose to as low as reasonably achievable. COMPARISON: December 30, 2020. HISTORY: ORDERING SYSTEM PROVIDED HISTORY: unwitnessed fall TECHNOLOGIST PROVIDED HISTORY: Reason for exam:->unwitnessed fall Has a \"code stroke\" or \"stroke alert\" been called? ->No Decision Support Exception - unselect if not a suspected or confirmed emergency medical condition->Emergency Medical Condition (MA) What reading provider will be dictating this exam?->CRC FINDINGS: There is diffuse atrophy with dilated ventricles and prominence of the sulci. 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 There is no acute stroke, mass or hemorrhage.   A redemonstrated are extensive is punctate lytic lesions in the calvarium with motht eaten pattern likely multiple myeloma. Stable atrophy with small vessel ischemic disease. There is no acute stroke or hemorrhage. Persistent multiple punctate lytic lesions the calvarium likely due to malignancy like multiple myeloma. CT cervical spine. There is a fracture of the base of the dens of C2 (type 2). There is no displacement. No other acute fractures are identified in the cervical spine. There is mild diffuse degenerative changes from C3-T1 with osteophytes and multilevel disc bulges. There is osteopenia and punctate lytic lesions concerning for multiple myeloma. Impression Type 2 fracture of the dens of C2. Diffuse degenerative changes from C3-T1. Multiple myeloma. CT chest. Comparison December 30, 2020. Findings There is borderline cardiac size. The great vessels are normal.  Trachea and major bronchi are patent. There is dense consolidation in the left upper lobe and left lower lobe concerning for pneumonia. There is minimal atelectasis in the right lung base. The liver is of normal architecture. There is diffuse demineralization of the thoracic spine with punctate lucencies. Compression fractures with vertebroplasty are noted. There is slight irregularity of the sternum probably artifact. There is kyphosis of the spine. Impression Dense consolidation in the left upper lobe and left lower lobe concerning for pneumonia. Surveillance after appropriate treatment is recommended with repeat CT scan in 6-8 weeks to see complete resolution and exclude underlying malignancy. Demineralization and the punctate lytic lucencies in the spine and ribs concerning for multiple myeloma. Critical results were called by Dr. Diane Munguia to Dr. Sharolyn Felty.  on 6/26/2021 at 16:30.     CT Cervical Spine WO Contrast. Result Date: 6/26/2021  Requesting pacemaker AICD check or earlier interrogation you just write order for verbal orders with nursing verbal    Stable atrophy with small vessel ischemic disease. There is no acute stroke or hemorrhage. Persistent multiple punctate lytic lesions the calvarium likely due to malignancy like multiple myeloma. CT cervical spine. There is a fracture of the base of the dens of C2 (type 2). There is no displacement. No other acute fractures are identified in the cervical spine. There is mild diffuse degenerative changes from C3-T1 with osteophytes and multilevel disc bulges. There is osteopenia and punctate lytic lesions concerning for multiple myeloma. Impression Type 2 fracture of the dens of C2. Diffuse degenerative changes from C3-T1. Multiple myeloma. CT chest. Comparison December 30, 2020. Findings There is borderline cardiac size. The great vessels are normal.  Trachea and major bronchi are patent. There is dense consolidation in the left upper lobe and left lower lobe concerning for pneumonia. There is minimal atelectasis in the right lung base. The liver is of normal architecture. There is diffuse demineralization of the thoracic spine with punctate lucencies. Compression fractures with vertebroplasty are noted. There is slight irregularity of the sternum probably artifact. There is kyphosis of the spine. Impression Dense consolidation in the left upper lobe and left lower lobe concerning for pneumonia. Surveillance after appropriate treatment is recommended with repeat CT scan in 6-8 weeks to see complete resolution and exclude underlying malignancy. Demineralization and the punctate lytic lucencies in the spine and ribs concerning for multiple myeloma. Critical results were called by Dr. Abhijeet Roblero to Dr. Gerald Lorenz. on 6/26/2021 at 16:30. XR CHEST PORTABLE. Result Date: 6/28/2021    Intervally placed NGT extends below the diaphragm into at least the mid stomach, with tip not confidently seen within the field of view.  Endotracheal tube tip is 1.7 cm from the paradise and could be backed out 5-10 mm 06/27/2021    CREATININE 2.9 06/27/2021    CREATININE 2.7 06/26/2021    CREATININE 1.6 06/24/2021    CREATININE 1.5 05/27/2021    CREATININE 1.2 04/30/2021    CREATININE 1.1 01/04/2021    CREATININE 1.2 01/03/2021    CREATININE 1.1 01/02/2021    CREATININE 1.4 01/01/2021    CREATININE 1.8 12/31/2020    CREATININE 2.2 12/30/2020    CREATININE 2.1 12/30/2020     CBC:   Recent Labs     06/28/21  0514 06/28/21  0834   WBC 3.8* 6.3   HGB 8.4* 9.3*   HCT 26.7* 29.3*   PLT 88* 107*     Mag:   Recent Labs     06/28/21  0006 06/28/21  0514   MG 2.7* 2.7*     Phos:   Recent Labs     06/28/21  0006 06/28/21  0514   PHOS 5.6* 6.3*     TSH: No results for input(s): TSH in the last 72 hours. HgA1c: No results found for: LABA1C  No results found for: EAG  BNP: No results for input(s): BNP in the last 72 hours. PT/INR: No results for input(s): PROTIME, INR in the last 72 hours. APTT:  Recent Labs     06/28/21  0545 06/28/21  0834   APTT 53.0* 47.3*       FASTING LIPID PANEL:No results found for: CHOL, HDL, LDLDIRECT, LDLCALC, TRIG  LIVER PROFILE:  Recent Labs     06/27/21  1903 06/28/21  0514   AST 36* 1,530*   ALT 21 661*   LABALBU 2.6* 2.0*       COVID-19 Labs:  Lab Results   Component Value Date    COVID19 Not Detected 06/26/2021     Recent Labs     06/26/21  1315   [address] Not Detected           ASSESSMENT:  1. Acute myocardial injury in the setting of septic shock  1. High-sensitivity troponin 74--284 over the span of 48 hours  2. Atrial fibrillation with RVR. EP was consulted. They recommended rate control +/-amiodarone  1. Bedside TTE by Dr. Luis Stewart: Difficult to interpret LV function due to atrial fibrillation but grossly LV systolic function is low normal.  No pericardial effusion. Normal RV size and systolic function. 3. Multiple myeloma with diffuse bones involvement  4. S/p fall,  with trauma, including type II fracture of the dens of C2-to be managed conservatively  5.  MRSA bacteremia, presumably secondary to pneumonia  6. AUDREY on CKD 3 a.-Baseline creatinine~1.2  7. HTN, now hypotensive      Recommendations:  · Management of septic shock per critical care  · TTE when able. Indication for KHUSHBOO per infectious disease service. · Lenient rate control with beta-blocker and/or digoxin in the setting of septic shock. Consider amiodarone or DC cardioversion if needed for hemodynamic instability  · Keep potassium 4-5, magnesium greater than 2  · Aspirin if safe  · IV anticoagulation for AF  · We will sign off. Please call with any questions or concerns      Assessment and plan discussed with Dr. Brandon Kolb MD    Electronically signed by CARMINE Almanzar on 6/28/2021 at 2:29 PM     PHYSICIAN ADDENDUM:  I independently interviewed and examined the patient. I have reviewed the above documentation completed by the JANEL. Please see my additional contributions to the HPI, physical exam, and assessment / medical decision making. I independently reviewed the HPI, ROS, PMH, PSH, [address], SH, and medications independently and agree with above documentation.     I discussed the assessment and plan with the patient/family at the bedside as well as the bedside nurse and the primary team.'    Critical care time 45 minutes spent reviewing data, documentation, exam, formulating a therapeutic plan and discussing with family/care team.       Brandon Kolb MD, [address] [facility]  Interventional Cardiology/Structural Heart Disease  Office: [phone]

## 2021-06-28 NOTE — CONSULTS
Department of Internal Medicine  Nephrology Nurse Practitioner Consult Note      Reason for Consult:  AUDREY, anuric  Requesting Physician:  Stacy Meeks     CHIEF COMPLAINT:  Shortness of breath, s/p fall    History Obtained From:  electronic medical record    HISTORY OF PRESENT ILLNESS:  Briefly Ms. Farshad Hernandez is an 78year old  female with a PMH of HTN, lung cancer, multiple myeloma, thrombocytopenia, anemia, DVT on chronic anticoagulation, and asthma who was admitted to the MICU after presenting to the ER via EMS from SNF following an unwitnessed fall resulting in a dense C-2 fracture and shortness of breath. Labs in ER were significant for sodium 138, potassium 4.5, chloride 102, bicarbonate 19, BUN 67, creatinine 2.7 mg/dl, anion gap 17, lactic acid 4.2, and procalcitonin 13.99. Dexamethasone, mirtazapine, apixaban, amlodipine, furosemide, gabapentin, and  lenalidomide are medications patient was taking prior to admission. We are consulted for AUDREY. Past Medical History:        Diagnosis Date    Abnormality of plasma protein     Anemia     Asthma     Collapsed vertebra, not elsewhere classified, thoracic region, subsequent encounter for fracture with routine healing     Constipation     Dependence on supplemental oxygen     Difficulty in walking     GERD without esophagitis     Hypertension     Low back pain     Monoclonal gammopathy     Multiple myeloma not having achieved remission (HCC)     Other disorders of plasma-protein metabolism, not elsewhere classified     Secondary malignant neoplasm of bone (HCC)     Thrombocytopenia ([facility] [address]     Unspecified fracture of first lumbar vertebra, subsequent encounter for fracture with routine healing     Unspecified fracture of fourth lumbar vertebra, subsequent encounter for fracture with routine healing      Past Surgical History:    History reviewed. No pertinent surgical history.   Current Medications:    Current Facility-Administered Medications: sodium bicarbonate 150 mEq in sterile water 1,000 mL infusion, , Intravenous, Continuous  norepinephrine (LEVOPHED) 16 mg in dextrose 5% 250 mL infusion, 2-100 mcg/min, Intravenous, Continuous  hydrocortisone sodium succinate PF (SOLU-CORTEF) injection 100 mg, 100 mg, Intravenous, Q8H  0.9 % sodium chloride infusion, , Intravenous, Continuous  lubrifresh P.M. (artificial tears) ophthalmic ointment, , Both Eyes, BID  chlorhexidine (PERIDEX) 0.12 % solution 15 mL, 15 mL, Mouth/Throat, BID  fentaNYL 5 mcg/mL in D5W 250 mL infusion, 12.5-200 mcg/hr, Intravenous, Continuous  perflutren lipid microspheres (DEFINITY) injection 1.65 mg, 1.5 mL, Intravenous, ONCE PRN  vancomycin 1000 mg IVPB in 250 mL D5W addavial, 1,000 mg, Intravenous, Once  vancomycin (VANCOCIN) intermittent dosing (placeholder), , Other, RX Placeholder  glucose (GLUTOSE) 40 % oral gel 15 g, 15 g, Oral, PRN  dextrose 50 % IV solution, 12.5 g, Intravenous, PRN  glucagon (rDNA) injection 1 mg, 1 mg, Intramuscular, PRN  dextrose 5 % solution, 100 mL/hr, Intravenous, PRN  insulin lispro (HUMALOG) injection vial 0-6 Units, 0-6 Units, Subcutaneous, Q4H  pantoprazole (PROTONIX) injection 40 mg, 40 mg, Intravenous, Daily **AND** sodium chloride (PF) 0.9 % injection 10 mL, 10 mL, Intravenous, Daily  piperacillin-tazobactam (ZOSYN) 3,375 mg in dextrose 5 % 100 mL IVPB extended infusion (mini-bag), 3,375 mg, Intravenous, Q12H **AND** 0.9 % sodium chloride infusion admixture, , Intravenous, Q12H  ipratropium-albuterol (DUONEB) nebulizer solution 1 ampule, 1 ampule, Inhalation, Q4H WA  heparin (porcine) injection 4,000 Units, 4,000 Units, Intravenous, PRN  heparin (porcine) injection 2,000 Units, 2,000 Units, Intravenous, PRN  heparin 25,000 units in dextrose 5% 250 mL (premix) infusion, 5-30 Units/kg/hr, Intravenous, Continuous  midazolam PF (VERSED) injection 2 mg, 2 mg, Intravenous, Q4H PRN  [Held by provider] amLODIPine (NORVASC) admission. We are consulted for AUDREY. 1. AUDREY stage III, 2/2 ischemic ATN patient acutely hypotensive likely secondary to septic shock, vs obstructive uropathy. FENa 45.8% indicating the likelihood of post-renal obstruction. Anuric, may require initiation of renal replacement therapy, at this time there is no emergent indication. 2. Septic shock, 2/2 HAP, requiring vasopressors   3. Hypotension, likely 2/2 septic shock  4. HAGMA, 2/2 lactic acidosis and uremia  5. Pneumonia, healthcare acquired, on piperacillin-tazobactam and vancomycin  6. Respiratory failure, s/p intubation on mechanical ventilation  7. DVT, on apixaban prior to admission  8. Hx Lung cancer with metastasis to bone? 9. Multiple myeloma (advancing?), on lenalidomide at home  10.  Atrial fibrillation, on amiodarone infusion    PLAN:    · Start bumex drip 0.2 mg/hr  · Repeat urine electrolytes  · Obtain kidney ultrasound  · Monitor BP  · Obtain Free lyte chain essay  · Obtain UPEP and SPEP  · Obtain UPCR and UACR  · Replace calcium  · Monitor calcium levels  · Await cultures  · Obtain vancomycin trough level   · Albumin 25 g Q 6 X 8 doses  · BMP Q 4hrs  · Obtain echocardiogram    Electronically signed by PETE Melendez CNP on 6/28/2021 at 12:55 PM      Thank you so much Dr. Noemi Han   Patient seen and examined and discussed with MICU staff    Luisana Valente MD

## 2021-06-28 NOTE — CARE COORDINATION
6/28 Care Coordination: Pt presented to the emergency department from  [address] [place] [address] via EMS with a complaint of fall and shortness of breath. Pt has a past medical history of lung cancer. She was also a fall at the [facility] substaining Dense C-2 Fracture, NS consulted and trauma applied neck collar for stability Pt was admit to MICU, BiPAP , pt was subsequently  intubated, On Vent,fio2 100%, p10.  Spoke with [facility], pt is a long term medicaid bed hold. Will take her back. If requires Therapy will precert her again. CM/SW will continue to follow for discharge planning.    Bennie WILLIAMSON,RN--BC  [phone]

## 2021-06-28 NOTE — OP NOTE
[place]  Department of Pulmonary, Critical Care and Sleep Medicine  [address] [facility]  Department of Internal Medicine  Bronchoscopy Note     DATE OF PROCEDURE: 6/28/2021    INDICATIONS & HISTORY:  Juan Luis Goel is a 78 y.o. female with suggestion of left lung collapse rule out mucous plug. The risks, benefits, complications, treatment options and expected outcomes were discussed with the patient and appropriate care givers. The possibilities of reaction to medication, pulmonary aspiration, perforation of an organ, bleeding, failure to diagnose a condition and creating a complication requiring transfusion or operation were discussed with the patient/POA who freely signed the informed consent. PREOPERATIVE DIAGNOSIS:    [x] Respiratory Failure,   [x] Mucous Plug,  [x] Abnormal Imaging CXR/CT scan     POSTOPERATIVE DIAGNOSES:  [x] Normal Endobronchial Evaluation,   [x] Normal Anatomy,  [x] Scan bloody secretions/thin without obstruction     PROCEDURE PERFORMED:   [x] Fiberoptic Bronchoscopy,   [] Endobronchial Ultrasound  [] Bronchoalveolar Lavage [BAL]  [] Transbronchial Biopsy  [] Endobronchial Biopsy,   [x] Bronchial Wash  [] EBUS Lymph Node STARKEY Sampling  []Other       SURGEON:    Rosangela Gupta DO, MPH, FCCP, FACP, FACOI     Allen Atkinson, PGY3    ASSISTANT:    Bronchoscopy Nursing, ICU Team    SEDATION:  []  General Anesthesia [] Regional Anesthesia [x] ICU Protocol Team,       ANESTHESIA:     [] Lidocaine 2% via intranasal instillation,    [] Epinephrine 1:10,000 diluted Endobronchial administration   [x] ICU Sedation Protcol , Versed 4 mg IV x 1  ANESTHESIOLOGIST:  Per Epic     SPECIMENS:  [x] Bronchial wash sent for:   [] Cytology   [x] Acid-fast bacillus smear and culture   [x] Fungal smear & culture   [x] Gram stain, C&S   [] DFA & Culture for Legionella    ESTIMATED BLOOD LOSS: Minimal    FINDINGS:     1.  Normal nasal passages,

## 2021-06-28 NOTE — CONSULTS
CARDIAC ELECTROPHYSIOLOGY CONSULTATION    PATIENT: Frank Alexander [facility] RECORD NUMBER: [id number]  DATE OF SERVICE:  6/28/2021  CONSULTING ELECTROPHYSIOLOGIST: Tracie Morrison  PHYSICIAN REQUESTING CONSULTATION: Demetrio  REASON FOR CONSULTATION: Atrial fibrillation    PRESENTATION  [facility] ED from [facility] after a fall. SYNOPSIS OF RELEVANT MEDICAL ISSUES  1. Frailty. 2. Poor metabolic health incurring multiple co-morbid conditions. 3. Multiple myeloma: apparently maintained on dexamethasone and lenalidomide. Current evidence of diffuse bone involvement. Details uncertain. 4. She carries a chart diagnosis of lung cancer: details uncertain. 5. Pancytopenia. 6. Pulmonary infiltrate consistent with acute pneumonia. 7. C2 fracture: current plan is non-operative management. 8. Bacteremia: details uncertain. 9. Transaminitis: details uncertain. Being carried as \"shock liver. \"  10. Left bundle branch block. 11. Atrial fibrillation: apparently a new diagnosis for her. Apparently emerged during this admission. Currently persistent, with rapid ventricular response. 12. Unclear state of cardiac mechanical function. 13. CHADS-VASC > 2.   14. Shock syndrome: details uncertain. Currently receiving pharmacologic blood pressure support. RECOMMENDATION      If you can get away with ventricular rate control, do so. If not (eg. ventricular rate/regularity felt to impede care despite best efforts art pharmacologic control), you may consider cardioversion, but atrial fibrillation will very likely recur in the absence of tandem antiarrhythmic drug. Amiodarone, which would be the only reasonable drug in present clinical context, may be considered, with close monitoring of liver function trajectory. Will sign off. Please call if I can be of further assistance.      Tracie Morrison MD, [facility]  Cardiac Electrophysiology  [phone]    10:51 AM  6/28/2021

## 2021-06-28 NOTE — PROGRESS NOTES
[address] [facility]   Department of Internal Medicine   Internal Medicine Residency  MICU Progress Note    Patient:  Jorge Kay 78 y.o. female   MRN: [id number]       Date of Service: 2021    Allergy: Patient has no known allergies. Subjective     Patient was seen and examined in AM.  Patient currently sedated and intubated on the vent. Patient with the left side up. Patient has a cervical collar on. 24 hour change: Patient was in A. fib RVR. Started on low-dose heparin drip as well as amnio bolus along with amiodarone drip. Given 30 cc/kg fluid bolus and adjust antibiotics to piperacillintazobactam and vancomycin. Patient presented with a fall with unknown amount of downtime from a nursing homewas found to have shortness of breath and transferred to the emergency department. Patient subsequently transferred to the ICU due to worsening hypoxia and worsening mentation. At baseline the patient is ANO x3 but was only ANO x1 in the ED and subsequently became responsive only to pain. I have reviewed all pertinent PMHx, PSHx, FamHx, SocialHx, medications, and allergies and updated history as appropriate. Objective     TEMPERATURE:  Current - Temp: 100.6 °F (38.1 °C); Max - Temp  Av.4 °F (38 °C)  Min: 99.7 °F (37.6 °C)  Max: 101.1 °F (38.4 °C)  RESPIRATIONS RANGE: Resp  Av.5  Min: 7  Max: 48  PULSE RANGE: Pulse  Av.7  Min: 85  Max: 138  BLOOD PRESSURE RANGE:  Systolic (54OIT), LIT:59 , Min:60 , ZRV:926   ; Diastolic (92VCC), ERU:12, Min:43, Max:81    PULSE OXIMETRY RANGE: SpO2  Av.3 %  Min: 74 %  Max: 97 %    I & O - 24hr:    Intake/Output Summary (Last 24 hours) at 2021  Last data filed at 2021 1801  Gross per 24 hour   Intake 8197 ml   Output 413 ml   Net 7784 ml     I/O last 3 completed shifts:   In: 7259 [I.V.:4610; IV Piggyback:1000]  Out: 573 [Urine:573] I/O this shift:  In: 2587 [I.V.:2587]  Out: 115 [Urine:115]   Weight change: 9 lb 9.8 oz (4.36 kg)    Physical Exam  Vitals and nursing note reviewed. Constitutional:       General: She is sleeping. She is not in acute distress. Appearance: She is overweight. She is ill-appearing. Interventions: She is sedated and intubated. Cervical collar in place. HENT:      Right Ear: External ear normal.      Left Ear: External ear normal.   Eyes:      General: No scleral icterus. Right eye: No discharge. Left eye: No discharge. Conjunctiva/sclera: Conjunctivae normal.      Comments: Dry eyes bilaterally   Cardiovascular:      Rate and Rhythm: Regular rhythm. Tachycardia present. Pulses: Normal pulses. Heart sounds: Normal heart sounds. No murmur heard. No friction rub. No gallop. Pulmonary:      Effort: She is intubated. Breath sounds: Decreased air movement and transmitted upper airway sounds present. Decreased breath sounds (LT >RT) and rhonchi (Lt > Rt) present. No wheezing. Abdominal:      General: Abdomen is protuberant. Bowel sounds are decreased. Palpations: Abdomen is soft. Tenderness: There is no abdominal tenderness. There is no guarding or rebound. Hernia: No hernia is present. Musculoskeletal:      Right lower leg: No edema. Left lower leg: No edema. Skin:     General: Skin is warm. Capillary Refill: Capillary refill takes 2 to 3 seconds.        Medications     Continuous Infusions:   sodium bicarbonate in sterile water infusion 100 mL/hr at 06/28/21 1346    norepinephrine 2 mcg/min (06/28/21 1800)    fentaNYL 5 mcg/ml in D5W 250 ml infusion 150 mcg/hr (06/28/21 1847)    bumetanide 0.1 mg/mL infusion 0.2 mg/hr (06/28/21 1355)    dextrose      heparin (PORCINE) Infusion 16 Units/kg/hr (06/28/21 1630)    sodium chloride         Scheduled Meds:   hydrocortisone sodium succinate PF  100 mg Intravenous Q8H    artificial tears   Both Eyes BID    chlorhexidine  15 mL Mouth/Throat BID    vancomycin (VANCOCIN) intermittent dosing (placeholder)   Other RX Placeholder    albumin human  25 g Intravenous Q6H    insulin lispro  0-6 Units Subcutaneous Q4H    pantoprazole  40 mg Intravenous Daily    And    sodium chloride (PF)  10 mL Intravenous Daily    piperacillin-tazobactam  3,375 mg Intravenous Q12H    And    sodium chloride   Intravenous Q12H    ipratropium-albuterol  1 ampule Inhalation Q4H WA    [Held by provider] amLODIPine  10 mg Oral Daily    [Held by provider] apixaban  2.5 mg Oral BID    [Held by provider] furosemide  20 mg Oral Daily    [Held by provider] gabapentin  100 mg Oral BID    [Held by provider] lenalidomide  10 mg Oral Daily    therapeutic multivitamin-minerals  1 tablet Oral Daily    sodium chloride flush  5-40 mL Intravenous 2 times per day       PRN Meds: perflutren lipid microspheres, glucose, dextrose, glucagon (rDNA), dextrose, heparin (porcine), heparin (porcine), midazolam, sodium chloride flush, sodium chloride, polyethylene glycol, acetaminophen **OR** acetaminophen    Home Meds:   Prior to Admission medications    Medication Sig Start Date End Date Taking? Authorizing Provider   docusate sodium (COLACE) 100 MG capsule Take 100 mg by mouth 2 times daily as needed for Constipation   Yes Historical Provider, MD   benzonatate (TESSALON) 100 MG capsule Take 100 mg by mouth 3 times daily as needed for Cough   Yes Historical Provider, MD   Dexamethasone 20 MG TABS Take 20 mg by mouth once a week 6/26/21  Yes Historical Provider, MD   mirtazapine (REMERON) 15 MG tablet Take 7.5 mg by mouth nightly   Yes Historical Provider, MD   apixaban (ELIQUIS) 2.5 MG TABS tablet Take 1 tablet by mouth 2 times daily 1/4/21  Yes Honey Núñez MD   amLODIPine (NORVASC) 10 MG tablet Take 1 tablet by mouth daily 1/5/21  Yes Honey Núñez MD   furosemide (LASIX) 20 MG tablet Take 40 mg by mouth daily    Yes Historical Provider, MD   gabapentin (NEURONTIN) 100 MG capsule Take 100 mg by mouth 2 times daily.  For foot pain   Yes Historical Provider, MD   Multiple Vitamins-Minerals (THERAPEUTIC MULTIVITAMIN-MINERALS) tablet Take 1 tablet by mouth daily   Yes Historical Provider, MD   HYDROcodone-acetaminophen (NORCO)  MG per tablet Take 1 tablet by mouth every 4 hours as needed for Pain.    Yes Historical Provider, MD   lenalidomide (REVLIMID) 10 MG chemo capsule Take 10 mg by mouth daily Give at bedtime for 21 days starting 12/12/20    Historical Provider, MD   Ascorbic Acid (VITAMIN C ADULT GUMMIES PO) Take 1,000 mg by mouth daily    Historical Provider, MD   Zinc 100 MG TABS Take by mouth daily    Historical Provider, MD   ipratropium-albuterol (DUONEB) 0.5-2.5 (3) MG/3ML SOLN nebulizer solution Inhale 1 vial into the lungs every 4 hours As needed    Historical Provider, MD   ondansetron (ZOFRAN) 8 MG tablet Take 8 mg by mouth every 8 hours as needed for Nausea or Vomiting    Historical Provider, MD        Nutrition:   NPO    Labs and Imaging Studies     CBC:   Recent Labs     06/27/21  2351 06/28/21  0514 06/28/21  0834   WBC 3.9* 3.8* 6.3   HGB 9.5* 8.4* 9.3*   HCT 30.1* 26.7* 29.3*   MCV 99.3 99.3 97.3   * 88* 107*       BMP:    Recent Labs     06/28/21  0514 06/28/21  1120 06/28/21  1716    143 144   K 5.2* 4.7 5.1*   * 110* 106   CO2 20* 20* 24   BUN 93* 93* 93*   CREATININE 3.3* 3.1* 3.3*   GLUCOSE 190* 241* 230*       LIVER PROFILE:   Recent Labs     06/27/21  0703 06/27/21  1903 06/28/21  0006 06/28/21  0514   AST 21 36*  --  1,530*   ALT 17 21  --  661*   LIPASE  --   --  8*  --    BILIDIR  --  0.3  --  0.4*   BILITOT 0.4 0.4  --  0.5   ALKPHOS 58 53  --  44       PT/INR:   Recent Labs     06/28/21  1433   PROTIME 16.8*   INR 1.5       APTT:   Recent Labs     06/28/21  0545 06/28/21  0834 06/28/21  1433   APTT 53.0* 47.3* 45.7*       Fasting Lipid Panel:    No results found for: CHOL, TRIG, HDL    Cardiac Enzymes:    Lab Results   Component Value Date    CKTOTAL 132 06/27/2021    CKTOTAL 57 06/26/2021    CKMB 1.6 06/27/2021    TROPONINI 0.03 12/30/2020       Notable Cultures:      Blood cultures   Blood Culture, Routine   Date Value Ref Range Status   06/26/2021 (A)  Preliminary    Gram stain performed from blood culture bottle media  Gram positive cocci in clusters     06/26/2021 Identification and sensitivity to follow  Preliminary     Respiratory cultures No results found for: RESPCULTURE No results found for: LABGRAM  Urine   Urine Culture, Routine   Date Value Ref Range Status   06/26/2021 Growth not present  Final     Legionella No results found for: LABLEGI  C Diff PCR No results found for: CDIFPCR  Wound culture/abscess: No results for input(s): WNDABS in the last 72 hours. Tip culture:No results for input(s): CXCATHTIP in the last 72 hours.      Antibiotic  Days  Day started   Piperacillintazobactam   6/27/2021   Vancomycin       6/27/2021                Oxygen:     Vent Information  $Ventilation: $Subsequent Day  Skin Assessment: Clean, dry, & intact  Suction Catheter Diameter:  (16)  Equipment ID: [id number]  Vent Type: [id number]  Vent Mode: AC/VC  Vt Ordered: 350 mL  Rate Set: 24 bmp  Peak Flow: 0 L/min  Pressure Support: 0 cmH20  FiO2 : 95 %  SpO2: 95 %  SpO2/FiO2 ratio: 100  Sensitivity: 3  PEEP/CPAP: 10  I Time/ I Time %: 0 s  Humidification Source: Heated wire  Humidification Temp: 37  Humidification Temp Measured: 36.9  Mask Type: Full face mask  Mask Size: Medium  Additional Respiratory  Assessments  Pulse: 118  Resp: 24  SpO2: 95 %  Humidification Source: Heated wire  Humidification Temp: 37  Oral Care: Mouth swabbed, Mouth moisturizer, Mouth suctioned, Suction toothette, Mouthwash  Subglottic Suction Done?: Yes     Nasal cannula L/min     Face mask %     Reservoirs mask %       ABG   6/28/2021 Vent Settings   6/20/2021   PH   7.239 Mode   AC/VC   PCO2   44.4 TV   400   PO2   100 RR   14   HCO3   18.6 PS      Sat%   95.9 PEEP   10   FIO2   100% FIO2   100     P/F   1       Lines:  Site  Day  Date inserted     TLC  right femoral     6/27/2021     PICC              Arterial line  right radial       6/27/2021     Peripheral line              HD cath            Urethral Catheter-Output (mL): 22 mL    Imaging Studies:  CT Head WO Contrast    Result Date: 6/26/2021  EXAMINATION: CT OF THE HEAD WITHOUT CONTRAST; CT OF THE CHEST WITHOUT CONTRAST; CT OF THE CERVICAL SPINE WITHOUT CONTRAST  6/26/2021 1:56 pm TECHNIQUE: CT of the head was performed without the administration of intravenous contrast. Dose modulation, iterative reconstruction, and/or weight based adjustment of the mA/kV was utilized to reduce the radiation dose to as low as reasonably achievable.; CT of the chest was performed without the administration of intravenous contrast. Multiplanar reformatted images are provided for review. Dose modulation, iterative reconstruction, and/or weight based adjustment of the mA/kV was utilized to reduce the radiation dose to as low as reasonably achievable.; CT of the cervical spine was performed without the administration of intravenous contrast. Multiplanar reformatted images are provided for review. Dose modulation, iterative reconstruction, and/or weight based adjustment of the mA/kV was utilized to reduce the radiation dose to as low as reasonably achievable. COMPARISON: December 30, 2020. HISTORY: ORDERING SYSTEM PROVIDED HISTORY: unwitnessed fall TECHNOLOGIST PROVIDED HISTORY: Reason for exam:->unwitnessed fall Has a \"code stroke\" or \"stroke alert\" been called? ->No Decision Support Exception - unselect if not a suspected or confirmed emergency medical condition->Emergency Medical Condition (MA) What reading provider will be dictating this exam?->CRC FINDINGS: There is diffuse atrophy with dilated ventricles and prominence of the sulci.  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 There is no acute stroke, mass or hemorrhage. A redemonstrated are extensive is punctate lytic lesions in the calvarium with motht eaten pattern likely multiple myeloma. Stable atrophy with small vessel ischemic disease. There is no acute stroke or hemorrhage. Persistent multiple punctate lytic lesions the calvarium likely due to malignancy like multiple myeloma. CT cervical spine. There is a fracture of the base of the dens of C2 (type 2). There is no displacement. No other acute fractures are identified in the cervical spine. There is mild diffuse degenerative changes from C3-T1 with osteophytes and multilevel disc bulges. There is osteopenia and punctate lytic lesions concerning for multiple myeloma. Impression Type 2 fracture of the dens of C2. Diffuse degenerative changes from C3-T1. Multiple myeloma. CT chest. Comparison December 30, 2020. Findings There is borderline cardiac size. The great vessels are normal.  Trachea and major bronchi are patent. There is dense consolidation in the left upper lobe and left lower lobe concerning for pneumonia. There is minimal atelectasis in the right lung base. The liver is of normal architecture. There is diffuse demineralization of the thoracic spine with punctate lucencies. Compression fractures with vertebroplasty are noted. There is slight irregularity of the sternum probably artifact. There is kyphosis of the spine. Impression Dense consolidation in the left upper lobe and left lower lobe concerning for pneumonia. Surveillance after appropriate treatment is recommended with repeat CT scan in 6-8 weeks to see complete resolution and exclude underlying malignancy. Demineralization and the punctate lytic lucencies in the spine and ribs concerning for multiple myeloma. Critical results were called by Dr. Evans Thompson to Dr. Coty Hernnadez.  on 6/26/2021 at 16:30.     CT CHEST WO CONTRAST    Result Date: 6/26/2021  EXAMINATION: CT OF THE HEAD WITHOUT CONTRAST; CT OF THE CHEST WITHOUT CONTRAST; CT OF THE CERVICAL SPINE WITHOUT CONTRAST  6/26/2021 1:56 pm TECHNIQUE: CT of the head was performed without the administration of intravenous contrast. Dose modulation, iterative reconstruction, and/or weight based adjustment of the mA/kV was utilized to reduce the radiation dose to as low as reasonably achievable.; CT of the chest was performed without the administration of intravenous contrast. Multiplanar reformatted images are provided for review. Dose modulation, iterative reconstruction, and/or weight based adjustment of the mA/kV was utilized to reduce the radiation dose to as low as reasonably achievable.; CT of the cervical spine was performed without the administration of intravenous contrast. Multiplanar reformatted images are provided for review. Dose modulation, iterative reconstruction, and/or weight based adjustment of the mA/kV was utilized to reduce the radiation dose to as low as reasonably achievable. COMPARISON: December 30, 2020. HISTORY: ORDERING SYSTEM PROVIDED HISTORY: unwitnessed fall TECHNOLOGIST PROVIDED HISTORY: Reason for exam:->unwitnessed fall Has a \"code stroke\" or \"stroke alert\" been called? ->No Decision Support Exception - unselect if not a suspected or confirmed emergency medical condition->Emergency Medical Condition (MA) What reading provider will be dictating this exam?->CRC FINDINGS: There is diffuse atrophy with dilated ventricles and prominence of the sulci. 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 There is no acute stroke, mass or hemorrhage. A redemonstrated are extensive is punctate lytic lesions in the calvarium with motht eaten pattern likely multiple myeloma.      Stable atrophy with small vessel ischemic disease. There is no acute stroke or hemorrhage. Persistent multiple punctate lytic lesions the calvarium likely due to malignancy like multiple myeloma. CT cervical spine. There is a fracture of the base of the dens of C2 (type 2). There is no displacement. No other acute fractures are identified in the cervical spine. There is mild diffuse degenerative changes from C3-T1 with osteophytes and multilevel disc bulges. There is osteopenia and punctate lytic lesions concerning for multiple myeloma. Impression Type 2 fracture of the dens of C2. Diffuse degenerative changes from C3-T1. Multiple myeloma. CT chest. Comparison December 30, 2020. Findings There is borderline cardiac size. The great vessels are normal.  Trachea and major bronchi are patent. There is dense consolidation in the left upper lobe and left lower lobe concerning for pneumonia. There is minimal atelectasis in the right lung base. The liver is of normal architecture. There is diffuse demineralization of the thoracic spine with punctate lucencies. Compression fractures with vertebroplasty are noted. There is slight irregularity of the sternum probably artifact. There is kyphosis of the spine. Impression Dense consolidation in the left upper lobe and left lower lobe concerning for pneumonia. Surveillance after appropriate treatment is recommended with repeat CT scan in 6-8 weeks to see complete resolution and exclude underlying malignancy. Demineralization and the punctate lytic lucencies in the spine and ribs concerning for multiple myeloma. Critical results were called by Dr. Lizy Ausitn to Dr. Warren Thomas.  on 6/26/2021 at 16:30.     CT Cervical Spine WO Contrast    Result Date: 6/26/2021  EXAMINATION: CT OF THE HEAD WITHOUT CONTRAST; CT OF THE CHEST WITHOUT CONTRAST; CT OF THE CERVICAL SPINE WITHOUT CONTRAST  6/26/2021 1:56 pm TECHNIQUE: CT of the head was performed without the administration of intravenous contrast. Dose modulation, iterative reconstruction, and/or weight based adjustment of the mA/kV was utilized to reduce the radiation dose to as low as reasonably achievable.; CT of the chest was performed without the administration of intravenous contrast. Multiplanar reformatted images are provided for review. Dose modulation, iterative reconstruction, and/or weight based adjustment of the mA/kV was utilized to reduce the radiation dose to as low as reasonably achievable.; CT of the cervical spine was performed without the administration of intravenous contrast. Multiplanar reformatted images are provided for review. Dose modulation, iterative reconstruction, and/or weight based adjustment of the mA/kV was utilized to reduce the radiation dose to as low as reasonably achievable. COMPARISON: December 30, 2020. HISTORY: ORDERING SYSTEM PROVIDED HISTORY: unwitnessed fall TECHNOLOGIST PROVIDED HISTORY: Reason for exam:->unwitnessed fall Has a \"code stroke\" or \"stroke alert\" been called? ->No Decision Support Exception - unselect if not a suspected or confirmed emergency medical condition->Emergency Medical Condition (MA) What reading provider will be dictating this exam?->CRC FINDINGS: There is diffuse atrophy with dilated ventricles and prominence of the sulci. 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 There is no acute stroke, mass or hemorrhage. A redemonstrated are extensive is punctate lytic lesions in the calvarium with motht eaten pattern likely multiple myeloma. Stable atrophy with small vessel ischemic disease. There is no acute stroke or hemorrhage. Persistent multiple punctate lytic lesions the calvarium likely due to malignancy like multiple myeloma. CT cervical spine.  There is a fracture of the base of the dens of C2 (type 2). There is no displacement. No other acute fractures are identified in the cervical spine. There is mild diffuse degenerative changes from C3-T1 with osteophytes and multilevel disc bulges. There is osteopenia and punctate lytic lesions concerning for multiple myeloma. Impression Type 2 fracture of the dens of C2. Diffuse degenerative changes from C3-T1. Multiple myeloma. CT chest. Comparison December 30, 2020. Findings There is borderline cardiac size. The great vessels are normal.  Trachea and major bronchi are patent. There is dense consolidation in the left upper lobe and left lower lobe concerning for pneumonia. There is minimal atelectasis in the right lung base. The liver is of normal architecture. There is diffuse demineralization of the thoracic spine with punctate lucencies. Compression fractures with vertebroplasty are noted. There is slight irregularity of the sternum probably artifact. There is kyphosis of the spine. Impression Dense consolidation in the left upper lobe and left lower lobe concerning for pneumonia. Surveillance after appropriate treatment is recommended with repeat CT scan in 6-8 weeks to see complete resolution and exclude underlying malignancy. Demineralization and the punctate lytic lucencies in the spine and ribs concerning for multiple myeloma. Critical results were called by Dr. Maciej Martinez to Dr. Mateus Harrell. on 6/26/2021 at 16:30. XR CHEST PORTABLE    Result Date: 6/28/2021  EXAMINATION: ONE XRAY VIEW OF THE CHEST 6/28/2021 8:21 am COMPARISON: June 26-June 27 CT chest of a June 26 HISTORY: ORDERING SYSTEM PROVIDED HISTORY: ET TECHNOLOGIST PROVIDED HISTORY: Reason for exam:->ET What reading provider will be dictating this exam?->CRC FINDINGS: Endotracheal tube in good position at the level of the arch of the aorta. NG tube in the area of the stomach. Heart is enlarged.   There is increased density with the opacification/consolidation of the mid lower aspect of the left lung with some ground-glass density towards the upper lobes. Please see report of CT chest of June 26. There is also a component of mild left pleural effusion. Right lung is a expanded within normal range. The some artifacts are overlying the right chest.  No conspicuous infiltrates or consolidations are seen. There is no right-sided perihilar vascular congestion. There is no right-sided pleural effusion. The heart appears to be enlarged in mild-to-moderate degree. Patient had multiple levels of vertebroplasty. 1.  Persistent the infiltrate with pleural effusion in the left lung cause significant the opacification of the left image chest particular from the mid to the base. 2.  No significant changes since the previous examinations recently performed. 3.  Please see report of CT chest of June 26. XR CHEST PORTABLE    Result Date: 6/28/2021  EXAMINATION: ONE XRAY VIEW OF THE CHEST 6/27/2021 11:22 pm COMPARISON: 2142 hours on 06/27/2021. HISTORY: ORDERING SYSTEM PROVIDED HISTORY: ett placement - post retraction TECHNOLOGIST PROVIDED HISTORY: Reason for exam:->ett placement - post retraction What reading provider will be dictating this exam?->CRC FINDINGS: Endotracheal tube tip is 1.7 cm from the paradise and could be backed out 5-10 mm for more optimal positioning. NGT again extends below the diaphragm and into at least the mid stomach, with tip not confidently visualized within the field of view. Overlying EKG leads and other multifocal extracorporeal artifact. No pneumothorax or visualized right pleural effusion. There is redemonstrated subtotal opacification of the left lung, with air bronchograms seen at left mid to upper lung zone levels, with relative sparing of airspace disease at the left apex. The right lung remains essentially clear as visualized. Cardiac silhouette is poorly evaluated but stable and nonenlarged as visualized.  Stable densities projecting at 2 spinal levels presumably relate to prior vertebroplasty. No acute osseous abnormality is identified. Intervally placed NGT extends below the diaphragm into at least the mid stomach, with tip not confidently seen within the field of view. Endotracheal tube tip is 1.7 cm from the paradise and could be backed out 5-10 mm for more optimal positioning. Otherwise, stable chest with unchanged left lung opacities. XR CHEST PORTABLE    Result Date: 6/27/2021  EXAMINATION: ONE XRAY VIEW OF THE CHEST 6/27/2021 10:03 pm COMPARISON: Previous chest radiograph 06/27/2021 and CT scan 06/26/2021. HISTORY: ORDERING SYSTEM PROVIDED HISTORY: ET tube placement TECHNOLOGIST PROVIDED HISTORY: Reason for exam:->ET tube placement What reading provider will be dictating this exam?->CRC FINDINGS: There is borderline cardiac size. There is persistent the consolidation in the left upper lobe and left lower lobe. Minimal focal infiltrate  is developing in the right lung base. The endotracheal tube is approximately 1.5 cm above the paradise and could be retracted by 1 cm. Stable abnormal chest with persistent infiltrates and pleural effusion in the left lung base likely pneumonia. Surveillance recommended to see complete clearing. There is minimal infiltrates developing in the right lung base. Endotracheal tube close to the paradise and could be retracted by 1 cm. XR CHEST PORTABLE    Result Date: 6/27/2021  EXAMINATION: ONE XRAY VIEW OF THE CHEST 6/27/2021 5:14 pm COMPARISON: Chest series from June 26, 2021 HISTORY: ORDERING SYSTEM PROVIDED HISTORY: worsening hypoxia TECHNOLOGIST PROVIDED HISTORY: Reason for exam:->worsening hypoxia What reading provider will be dictating this exam?->CRC FINDINGS: Persistent low lung volumes. Unchanged mid and lower lung dense consolidation. There are coarse interstitial markings in the un opacified portions of the lungs. No pneumothorax.   Atherosclerotic disease in the thoracic aorta. Normal pulmonary vascularity. Osseous and thoracic soft tissue structures demonstrate no acute findings. Vertebroplasty changes in the spine. Stable chest series. Persistent dense consolidation in the left mid and lower lung. Stable atherosclerotic disease. No new cardiopulmonary pathology. XR CHEST PORTABLE    Result Date: 6/26/2021  EXAMINATION: ONE XRAY VIEW OF THE CHEST 6/26/2021 12:48 pm COMPARISON: December 30, 2020 HISTORY: ORDERING SYSTEM PROVIDED HISTORY: Shortness of breath TECHNOLOGIST PROVIDED HISTORY: Reason for exam:->Shortness of breath What reading provider will be dictating this exam?->CRC FINDINGS: Heart size is unable to be accurately assessed on this single portable view of the chest, but appears to be stable. There is a new hazy airspace opacity in the left mid to lower lung zone compared with the prior examination. Suspect at least a small left pleural effusion. Difficult to exclude a trace right pleural effusion. No pneumothorax is identified. Bone cement is noted at multiple locations in the thoracolumbar spine. No acute osseous abnormality is identified. Consolidative airspace opacities in the left mid to lower lung zone with a suspected small left pleural effusion. Findings may be on the basis of pneumonia or less likely asymmetric pulmonary edema. Consider correlation with CT of the chest.     XR CHEST ABDOMEN NG PLACEMENT    Result Date: 6/28/2021  EXAMINATION: ONE SUPINE XRAY VIEW(S) OF THE ABDOMEN 6/27/2021 11:26 pm COMPARISON: Portable chest x-ray obtained 1 minute previous. HISTORY: ORDERING SYSTEM PROVIDED HISTORY: NG placement TECHNOLOGIST PROVIDED HISTORY: Reason for exam:->NG placement What reading provider will be dictating this exam?->CRC FINDINGS: Focused radiograph is obtained at the upper abdomen for purpose of NGT tip localization.  The NGT extends satisfactorily into the mid stomach, with side port projecting caudal to the GE junction. Paucity of abdominal bowel gas within the field of view, with the stomach appearing decompressed and gasless. Lung and osseous findings are unchanged. Satisfactory position of the NGT. Resident's Assessment and Plan      Ger Vann is 78 y.o. female was admitted on 6/26/2021 and subsequently transferred to the ICU on 6/27/2021 after presenting from a nursing home following an unwitnessed fall and unknown amount of downtime. CT cervical spine showed C2 fracture and CT chest showed consolidation left upper and lower lobe is concerning for pneumonia. Initially started on ceftriaxone and azithromycin. Trauma surgery neurosurgery on board placed on cervical collar. Found to have high-grade methicillin assistance to follow coccus (not aureus) bacteremia. The patient was subsequently transferred to the ICU on BiPAP and then intubated. Patient has a history of multiple myeloma, DVT, polyneuropathy        Diagnosis Date    Abnormality of plasma protein     Anemia     Asthma     Collapsed vertebra, not elsewhere classified, thoracic region, subsequent encounter for fracture with routine healing     Constipation     Dependence on supplemental oxygen     Difficulty in walking     GERD without esophagitis     Hypertension     Low back pain     Monoclonal gammopathy     Multiple myeloma not having achieved remission (HCC)     Other disorders of plasma-protein metabolism, not elsewhere classified     Secondary malignant neoplasm of bone ([address]     Thrombocytopenia ([address]     Unspecified fracture of first lumbar vertebra, subsequent encounter for fracture with routine healing     Unspecified fracture of fourth lumbar vertebra, subsequent encounter for fracture with routine healing        History reviewed. No pertinent surgical history. No Known Allergies     No family history on file. Social History     Socioeconomic History    Marital status:       Spouse name: None    Number of children: None    Years of education: None    Highest education level: None   Occupational History    None   Tobacco Use    Smoking status: Never Smoker   Substance and Sexual Activity    Alcohol use: Not Currently    Drug use: Not Currently    Sexual activity: None   Other Topics Concern    None   Social History Narrative    None     Social Determinants of Health     Financial Resource Strain:     Difficulty of Paying Living Expenses:    Food Insecurity:     Worried About Running Out of Food in the Last Year:     Ran Out of Food in the Last Year:    Transportation Needs:     Lack of Transportation (Medical):  Lack of Transportation (Non-Medical):    Physical Activity:     Days of Exercise per Week:     Minutes of Exercise per Session:    Stress:     Feeling of Stress :    Social Connections:     Frequency of Communication with Friends and Family:     Frequency of Social Gatherings with Friends and Family:     Attends Judaism Services:     Active Member of Clubs or Organizations:     Attends Club or Organization Meetings:     Marital Status:    Intimate Partner Violence:     Fear of Current or Ex-Partner:     Emotionally Abused:     Physically Abused:     Sexually Abused:           Assessment:  1. Acute encephalopathy 2/2 likely septic shock 2/2 left-sided pneumonia (CAP) and methicillin-resistant staph (not aureus) bacteremia  · Currently sedated and intubated. On the vent. 2. Septic shock 2/2 left-sided pneumonia (CAP) and methicillin-resistant staph (not aureus) bacteremia  · ID on board. On piperacillintazobactam and vancomycin. Will need echo and KHUSHBOO at some point. · Blood cultures growing methicillin-resistant staph (not aureus)  · Underwent bronc today that did not show any mucous plugs. · CT chest suggestive of left-sided pneumonia  · Procalcitonin >100  · Cortisol 29.35  · On hydrocortisone 100 mg every 8 hours and on vitamin C protocol    3.  Acute hypoxic chain assay   Urine protein creatinine and urine albumin creatinine ratio   BMP every 4 hours   F/U echocardiogram, will need KHUSHBOO at some point   Consult EP and cardiology   1 L normal saline bolus   Bronchoscopy today and send cell count, bacterial and fungal cultures, AFB   Treat hyperkalemia   F/U MRSA nares   Stop amiodarone   Loaded with digoxin   F/U HIT panel   Chlorhexidine mouthwash and eye ointment   Obtain TSH, ammonia, B12   Replace calcium      #Diet: N.p.o. for now  # Peptic ulcer prophylaxis: Pantoprazole  # DVT Prophylaxis: Heparin drip  # Disposition: Cont current care    Electronically signed by Radha Vieira MD, PGY- 3 on 6/28/2021   ICU Attending: Dr. Ella Calderon, [address] [facility]  Department of Pulmonary, Critical Care and Sleep Medicine  [address] [facility]  Department of Internal Medicine      During multidisciplinary team rounds Laci Bland is a 78 y.o. female was seen, examined and discussed. This is confirmation that I have personally seen and examined the patient and that the key elements of the encounter were performed by me (> 85 % time). The medications & laboratory data was discussed and adjusted where necessary. The radiographic images were reviewed or with radiologist or consultant if felt dis-concordant with the exam or history. The above findings were corroborated, plans confirmed and changes made if needed. Family is updated at the bedside as available. Key issues of the case were discussed among consultants. Critical Care time is documented if appropriate.   [address] DO Sergio, FACP, FCCP, [facility],

## 2021-06-28 NOTE — ANESTHESIA PROCEDURE NOTES
Airway  Date/Time: 6/27/2021 9:08 PM  Urgency: emergent    Difficult airway    General Information and Staff    Patient location during procedure: ICU  Resident/CRNA: PETE Washington - CRNA  Other anesthesia staff: Jose R Ramon RN  Performed: other anesthesia staff     Consent for Airway (if performed for an anesthetic, see related documentation for consents)  Patient identity confirmed: per hospital policy  Consent: The procedure was performed in an emergent situation. Verbal consent not obtained. Written consent not obtained. Risks and benefits: risks, benefits and alternatives were not discussed      Indications and Patient Condition  Indications for airway management: respiratory failure  Spontaneous ventilation: present  Sedation level: deep  Preoxygenated: yes  Patient position: reverse Trendelenburg  MILS maintained throughout  Mask difficulty assessment: not attempted    Final Airway Details  Final airway type: endotracheal airway      Successful airway: ETT  Cuffed: yes   Successful intubation technique: video laryngoscopy  Endotracheal tube insertion site: oral  Blade type: c-mac.   Blade size: #4  ETT size (mm): 8.0  Cormack-Lehane Classification: grade I - full view of glottis  Placement verified by: chest auscultation and capnometry   Measured from: lips  ETT to lips (cm): 22  Number of attempts at approach: 1  Ventilation between attempts: none  Number of other approaches attempted: 0

## 2021-06-29 PROBLEM — R76.8 RED BLOOD CELL ANTIBODY POSITIVE: Chronic | Status: ACTIVE | Noted: 2021-01-01

## 2021-06-29 NOTE — PROGRESS NOTES
Hospitalist Progress Note      SYNOPSIS: Patient admitted on 2021 for Closed nondisplaced type II dens fracture ([address]    Mechanical fall subsequent closed fr type II dens  SUBJECTIVE:        Hypotensive and bradycardic this afternoon  Levophed initiated      Temp (24hrs), Av.5 °F (38.1 °C), Min:99 °F (37.2 °C), Max:101.5 °F (38.6 °C)    DIET: Diet NPO  CODE: DNR-CCA    Intake/Output Summary (Last 24 hours) at 2021 1858  Last data filed at 2021 1800  Gross per 24 hour   Intake 4476.5 ml   Output 601 ml   Net 3875.5 ml       OBJECTIVE:    BP (!) 100/43   Pulse 50   Temp 100.9 °F (38.3 °C) (Esophageal)   Resp 22   Ht 5' 4\" (1.626 m)   Wt 163 lb 2.3 oz (74 kg)   SpO2 96%   BMI 28.00 kg/m²   Intubated  Resp=on vent       ASSESSMENT:  Pulseless vfib episode  Hypotensive  bradycradic  resp failure acute hypoxia  Pneumon bacterial Left   Anemia  afib rvr   chrnc lbbb  transaminitis  Hyperkalemia  Elevated tropon  Leukopenia  Thrombocytopenia    1+ schitocytes   however periph smear review on  does not mention presence of schistocytes    LDH elevated     coags midly elevated     Fibrinogen level high    D dimer elevated   Multiple myeloma           PLAN:    As per crit care team      DISPOSITION: contin to require icu care inpatient    Medications:  REVIEWED DAILY    Infusion Medications    fentaNYL 5 mcg/ml in 0.9%  ml infusion 75 mcg/hr (21)    sodium chloride      sodium chloride      sodium chloride      norepinephrine 10 mcg/min (21 1817)    bumetanide 0.1 mg/mL infusion Stopped (21 1746)    dextrose      [Held by provider] heparin (PORCINE) Infusion Stopped (21 1000)    sodium chloride       Scheduled Medications    insulin lispro  0-12 Units Subcutaneous Q4H    mupirocin   Topical BID    potassium chloride  20 mEq Intravenous Once    hydrocortisone sodium succinate PF  100 mg Intravenous Q8H    artificial tears   Both Eyes BID    chlorhexidine  15 mL Mouth/Throat BID    vancomycin (VANCOCIN) intermittent dosing (placeholder)   Other RX Placeholder    albumin human  25 g Intravenous Q6H    pantoprazole  40 mg Intravenous Daily    And    sodium chloride (PF)  10 mL Intravenous Daily    piperacillin-tazobactam  3,375 mg Intravenous Q12H    And    sodium chloride   Intravenous Q12H    ipratropium-albuterol  1 ampule Inhalation Q4H WA    [Held by provider] amLODIPine  10 mg Oral Daily    [Held by provider] apixaban  2.5 mg Oral BID    [Held by provider] furosemide  20 mg Oral Daily    [Held by provider] gabapentin  100 mg Oral BID    [Held by provider] lenalidomide  10 mg Oral Daily    therapeutic multivitamin-minerals  1 tablet Oral Daily    sodium chloride flush  5-40 mL Intravenous 2 times per day     PRN Meds: sodium chloride, sodium chloride, sodium chloride, perflutren lipid microspheres, glucose, dextrose, glucagon (rDNA), dextrose, [Held by provider] heparin (porcine), [Held by provider] heparin (porcine), midazolam, sodium chloride flush, sodium chloride, polyethylene glycol, acetaminophen **OR** acetaminophen    Labs:     Recent Labs     06/28/21  0834 06/28/21  0834 06/29/21  0433 06/29/21  1113 06/29/21  1739   WBC 6.3  --  7.1 6.6  --    HGB 9.3*   < > 7.1* 6.9* 8.1*   HCT 29.3*   < > 21.9* 21.1* 24.3*   *  --  51* 38*  --     < > = values in this interval not displayed. Recent Labs     06/28/21  0514 06/28/21  1120 06/29/21  0131 06/29/21  0433 06/29/21  1739      < > 138 141 137   K 5.2*   < > 4.0 3.8 3.7   *   < > 101 100 96*   CO2 20*   < > 22 25 23   BUN 93*   < > 99* 98* 101*   CREATININE 3.3*   < > 3.5* 3.5* 3.7*   CALCIUM 7.6*   < > 7.2* 7.1* 7.5*   PHOS 6.3*  --   --  4.9* 6.3*    < > = values in this interval not displayed.        Recent Labs     06/27/21  1903 06/28/21  0006 06/28/21  0514 06/29/21  0433   PROT 6.0*  --  5.0* 5.6*   ALKPHOS 53  --  44 38   ALT 21  --  661* 785*   AST 36*  --  1,530* 988*   BILITOT 0.4  --  0.5 1.2   LIPASE  --  8*  --   --        Recent Labs     06/28/21  1433 06/29/21  0433   INR 1.5 1.6       Recent Labs     06/27/21  1903   CKTOTAL 132       Chronic labs:    Lab Results   Component Value Date    TSH 0.155 (L) 06/29/2021    INR 1.6 06/29/2021       Radiology: cxr 6/29/2021  Persistent near complete whiteout of the left lung unchanged when compared   to the prior study         +++++++++++++++++++++++++++++++++++++++++++++++++  Ailyn Green DO  [address]  +++++++++++++++++++++++++++++++++++++++++++++++++  NOTE: This report was transcribed using voice recognition software. Every effort was made to ensure accuracy; however, inadvertent computerized transcription errors may be present.

## 2021-06-29 NOTE — PLAN OF CARE
Problem: Falls - Risk of:  Goal: Will remain free from falls  Description: Will remain free from falls  Outcome: Met This Shift  Goal: Absence of physical injury  Description: Absence of physical injury  Outcome: Met This Shift     Problem: Skin Integrity:  Goal: Will show no infection signs and symptoms  Description: Will show no infection signs and symptoms  Outcome: Met This Shift  Goal: Absence of new skin breakdown  Description: Absence of new skin breakdown  Outcome: Met This Shift     Problem: Injury - Risk of, Physical Injury:  Goal: Will remain free from falls  Description: Will remain free from falls  Outcome: Met This Shift  Goal: Absence of physical injury  Description: Absence of physical injury  Outcome: Met This Shift     Problem: Respiratory:  Goal: Ability to maintain a clear airway will improve  Description: Ability to maintain a clear airway will improve  Outcome: Ongoing  Goal: Ability to maintain adequate ventilation will improve  Description: Ability to maintain adequate ventilation will improve  Outcome: Ongoing  Goal: Complications related to the disease process, condition or treatment will be avoided or minimized  Description: Complications related to the disease process, condition or treatment will be avoided or minimized  Outcome: Ongoing

## 2021-06-29 NOTE — CONSULTS
Department of [facility] Oncology  Attending Consult Note      Reason for Visit:   Mechanical fall/SOB with History of multiple myeloma     Referring Physician:  Dr Katarzyna Snell     PCP:  Melvin Espinal DO    History of Present Illness:  78year old female PMH of Multiple myeloma on lenalidomide, DVT on Eliquis, and polyneuropathy that presented to [facility] ER from nursing home via EMS for unwitnessed fall and SOB on 2021. Patient had complaint of SOB and left sided chest pain. At the nursing home patient is on room air and was found hypoxic, laced on NRB mask and sats improved. Upon arrival labs significant for BUN 67 Creatinine 2.7 LA: 4.2   WBC: Hgb: Hct: Plt:     Patient was given ceftriaxone and azithromycin     CT chest with persistent multiple punctate lytic lesions in the calvarium likely d/t multiple myeloma. CT head unremarkable    CT Cervical spine with C2 fracture    Patient developed worsening respiratory status and was intubated   Patient developed AFRVR and started on Low dose heparin and amio gtts. Underwent bronch 2021 with Dr Katarzyna Snell with normal airway examination. 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    -Currently on Daratumumab, Revlimid and Dex since 2020, Revlimid added 10/17/2020   -Last office visit was 2021 for cycle 8 Day 1 of Alysha and Cycle 22 Day 2 for Rev/Dex  -Most recent labs prior to admission with hgb: 12 WBC: 7.4 plt: 239  Ig IgA:71 IgM: 30 Protein: 5.9 Alpha 1 globulin: 0.5 Alpha 2 globulin: 1.1 Beta Globulin: 1.0 Gamma Globulin: 0.4 M spike: 0.1 A/G ratio: 1.1 Globulin: 2.9  Free kappa light chains: 11.6 Free lambda light chain: 11.8  K/L ratio: 0.98      Review of Systems;  CONSTITUTIONAL: Intubated and sedated   Review of Systems    Remainder:  ROS NEGATIVE    Past Medical History:      Diagnosis Date    Abnormality of plasma protein     Anemia     Asthma     Collapsed vertebra, not elsewhere classified, thoracic region, subsequent encounter for fracture with routine healing     Constipation     Dependence on supplemental oxygen     Difficulty in walking     GERD without esophagitis     Hypertension     Low back pain     Monoclonal gammopathy     Multiple myeloma not having achieved remission (HCC)     Other disorders of plasma-protein metabolism, not elsewhere classified     Secondary malignant neoplasm of [facility]     [facility]     Unspecified fracture of first lumbar vertebra, subsequent encounter for fracture with routine healing     Unspecified fracture of fourth lumbar vertebra, subsequent encounter for fracture with routine healing        Past Surgical History:  History reviewed. No pertinent surgical history. Family History:  No family history on file. Medications:  Reviewed and reconciled. Social History:  Social History     Socioeconomic History    Marital status:      Spouse name: Not on file    Number of children: Not on file    Years of education: Not on file    Highest education level: Not on file   Occupational History    Not on file   Tobacco Use    Smoking status: Never Smoker   Substance and Sexual Activity    Alcohol use: Not Currently    Drug use: Not Currently    Sexual activity: Not on file   Other Topics Concern    Not on file   Social History Narrative    Not on file     Social Determinants of Health     Financial Resource Strain:     Difficulty of Paying Living Expenses:    Food Insecurity:     Worried About Running Out of Food in the Last Year:     [address] in the Last Year:    Transportation Needs:     Lack of Transportation (Medical):      Lack of Transportation (Non-Medical):    Physical Activity:     Days of Exercise per Week:     Minutes of Exercise per Session:    Stress:     Feeling of Stress :    Social Connections:     Frequency of Communication with Friends and Family:     Frequency of Social Gatherings with Friends and Family:     Attends Scientology Services:     Active Member of Clubs or Organizations:     Attends Club or Organization Meetings:     Marital Status:    Intimate Partner Violence:     Fear of Current or Ex-Partner:     Emotionally Abused:     Physically Abused:     Sexually Abused: Allergies:  No Known Allergies    Physical Exam:  BP (!) 110/57   Pulse 105   Temp 99.3 °F (37.4 °C) (Esophageal)   Resp 24   Ht 5' 4\" (1.626 m)   Wt 159 lb 9.8 oz (72.4 kg)   SpO2 91%   BMI 27.40 kg/m²   Physical Exam   General: Intubated and sedated  HEENT: PERRL, ETT intact  Respiratory: Vent synchronous   Cardiac: irregular rate and rhythm. Pulses palpable   Abdominal: soft, non distended, active bowel sounds  Extremities: No clubbing or cyanosis   Neuro: sedated     Impression/Plan:  78year old female PMH of Multiple myeloma on lenalidomide, DVT on Eliquis, and polyneuropathy that presented to [facility] ER from nursing home via EMS for unwitnessed fall and SOB on 6/26/2021. Patient had complaint of SOB and left sided chest pain. At the nursing home patient is on room air and was found hypoxic, laced on NRB mask and sats improved. Upon arrival labs significant for BUN 67 Creatinine 2.7 LA: 4.2   WBC: Hgb: Hct: Plt:     Patient was given ceftriaxone and azithromycin     CT chest with persistent multiple punctate lytic lesions in the calvarium likely d/t multiple myeloma. CT head unremarkable    CT Cervical spine with C2 fracture    Patient developed worsening respiratory status and was intubated   Patient developed AFRVR and started on Low dose heparin and amio gtts. Underwent bronch 06/28/2021 with Dr Ella Calderon with normal airway examination. 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    -Currently on Daratumumab, Revlimid and Dex since 08/14/2020, Revlimid added 10/17/2020   -Last office visit was 2021 for cycle 8 Day 1 of Alysha and Cycle 22 Day 2 for Rev/Dex  -Most recent labs prior to admission with hgb: 12 WBC: 7.4 plt: 239  Ig IgA:71 IgM: 30 Protein: 5.9 Alpha 1 globulin: 0.5 Alpha 2 globulin: 1.1 Beta Globulin: 1.0 Gamma Globulin: 0.4 M spike: 0.1 A/G ratio: 1.1 Globulin: 2.9  Free kappa light chains: 11.6 Free lambda light chain: 11.8  K/L ratio: 0.98    -Neurosurgery consult; collar placement   -Hold Myeloma treatment for now. -Nephrology consult for AUDREY stage III on CKD- SLED x4 hours today   -ID consulted for gram + cocci bacteriemia with MRS-Bactrim and vanc. recommend 2d echo and KHUSHBOO   -Anemia and Thrombocytopenia secondary to chemo, sepsis and ATB. Continue to monitor her CBCD. Transfuse to keep hgb>7 and plt>10, 1 unit transfused today for hgb 6.9. Thank you for allowing us to participate in the care of Emanuel Stone. [address]   [phone]    The patient was seen and examined, I agree with RAMAN Lou's H&P, assessment and plan as outlined.       Yasmine Kelly MD   HEMATOLOGY/MEDICAL ONCOLOGY  [address] [place] [address] ONCOLOGY  [facility] [address] [facility] [address]  Dept: [phone]

## 2021-06-29 NOTE — PROGRESS NOTES
Department of Internal Medicine  Nephrology Attending Progress Note    Events reviewed. SUBJECTIVE:  We are following Mrs. Stewart for AUDREY. Patient remains intubated.     Physical Exam:    VITALS:  BP (!) 110/57   Pulse 86   Temp 100 °F (37.8 °C) (Esophageal)   Resp 24   Ht 5' 4\" (1.626 m)   Wt 159 lb 9.8 oz (72.4 kg)   SpO2 95%   BMI 27.40 kg/m²   24HR INTAKE/OUTPUT:      Intake/Output Summary (Last 24 hours) at 6/29/2021 7392  Last data filed at 6/29/2021 0600  Gross per 24 hour   Intake 6135 ml   Output 473 ml   Net 5662 ml     Constitutional: intubate on mechanical ventilator  HEENT:  Cervical collar in place  Respiratory:  Intubated on vent  Cardiovascular/Edema:  Atrial fibrillation  Gastrointestinal:  Soft, non-tender, +BS  Neurologic:  Intubated and sedated  Skin:  Warm, dry, no rash or lesion  Other:  Triple lumen central line and arterial line in place    Scheduled Meds:   insulin lispro  0-12 Units Subcutaneous Q4H    hydrocortisone sodium succinate PF  100 mg Intravenous Q8H    artificial tears   Both Eyes BID    chlorhexidine  15 mL Mouth/Throat BID    vancomycin (VANCOCIN) intermittent dosing (placeholder)   Other RX Placeholder    albumin human  25 g Intravenous Q6H    pantoprazole  40 mg Intravenous Daily    And    sodium chloride (PF)  10 mL Intravenous Daily    piperacillin-tazobactam  3,375 mg Intravenous Q12H    And    sodium chloride   Intravenous Q12H    ipratropium-albuterol  1 ampule Inhalation Q4H WA    [Held by provider] amLODIPine  10 mg Oral Daily    [Held by provider] apixaban  2.5 mg Oral BID    [Held by provider] furosemide  20 mg Oral Daily    [Held by provider] gabapentin  100 mg Oral BID    [Held by provider] lenalidomide  10 mg Oral Daily    therapeutic multivitamin-minerals  1 tablet Oral Daily    sodium chloride flush  5-40 mL Intravenous 2 times per day     Continuous Infusions:   fentaNYL 5 mcg/ml in 0.9%  ml infusion      sodium bicarbonate in sterile water infusion 100 mL/hr at 06/29/21 0106    norepinephrine 2 mcg/min (06/28/21 1800)    bumetanide 0.1 mg/mL infusion 0.2 mg/hr (06/28/21 1355)    dextrose      heparin (PORCINE) Infusion 16 Units/kg/hr (06/28/21 2322)    sodium chloride       PRN Meds:.perflutren lipid microspheres, glucose, dextrose, glucagon (rDNA), dextrose, heparin (porcine), heparin (porcine), midazolam, sodium chloride flush, sodium chloride, polyethylene glycol, acetaminophen **OR** acetaminophen    DATA:    CBC:   Lab Results   Component Value Date    WBC 6.3 06/28/2021    RBC 3.01 06/28/2021    HGB 9.3 06/28/2021    HCT 29.3 06/28/2021    MCV 97.3 06/28/2021    MCH 30.9 06/28/2021    MCHC 31.7 06/28/2021    RDW 16.7 06/28/2021     06/28/2021    MPV 11.9 06/28/2021     CMP:    Lab Results   Component Value Date     06/29/2021    K 3.8 06/29/2021    K 4.2 06/27/2021     06/29/2021    CO2 25 06/29/2021    BUN 98 06/29/2021    CREATININE 3.5 06/29/2021    GFRAA 15 06/29/2021    AGRATIO 0.9 06/24/2021    AGRATIO 1.1 06/24/2021    LABGLOM 12 06/29/2021    GLUCOSE 197 06/29/2021    PROT 5.6 06/29/2021    LABALBU 2.9 06/29/2021    CALCIUM 7.1 06/29/2021    BILITOT 1.2 06/29/2021    ALKPHOS 38 06/29/2021     06/29/2021     06/29/2021     Magnesium:    Lab Results   Component Value Date    MG 2.4 06/29/2021     Phosphorus:    Lab Results   Component Value Date    PHOS 4.9 06/29/2021     Radiology Review:      Kidney ultrasound June 28, 2021   FINDINGS:       Kidneys:       The right kidney measures 9.2 cm in length and the left kidney measures 10.8   cm in length.       Kidneys demonstrate normal cortical echogenicity.  No evidence of   hydronephrosis or intrarenal stones.           Bladder:       The urinary bladder is decompressed around a Bennett catheter placed   Impression:  Unremarkable ultrasound of the kidneys.       The urinary bladder is decompressed around a Bennett catheter.         Chest x-ray June 29, 2021   1. Persistent near complete whiteout of the left lung unchanged when compared   to the prior study. 2. The right lung is clear. 3.         BRIEF SUMMARY OF INITIAL CONSULT:    Briefly Ms. Analisa Ugarte is an 78year old  female with a PMH of HTN, lung cancer, multiple myeloma, thrombocytopenia, anemia, DVT on chronic anticoagulation, and asthma who was admitted to the MICU after presenting to the ER via EMS from [facility] following an unwitnessed fall resulting in a dense C-2 fracture and shortness of breath. Labs in ER were significant for sodium 138, potassium 4.5, chloride 102, bicarbonate 19, BUN 67, creatinine 2.7 mg/dl, anion gap 17, lactic acid 4.2, and procalcitonin 13.99. Dexamethasone, mirtazapine, apixaban, amlodipine, furosemide, gabapentin, and  lenalidomide are medications patient was taking prior to admission. We are consulted for AUDREY. Her baseline  creatinine is 1.1-1.2 mg/dL. IMPRESSION/RECOMMENDATIONS:      1. AUDREY stage III on CKD, ischemic ATN, oliguric. Creatinine level stable but probably confound by large IV fluid resuscitation with >8.6 L positive fluid balance. Will start SLED. 2. CKD stage III, probably due to nephrosclerosis  3. Hemodynamic shock, on norepinephrine and antibiotics  4. Hypocalcemia, 2/2 MM therapy?  ------------------------------------------  5. Respiratory failure status post intubation  6. Multiple myeloma  7. Pneumonia, probably MRSA, on vancomycin and piperacillin-tazobactam  8. MRSA bacteremia, on vancomycin and piperacillin-tazobactam  9. Shock liver, LFTs improved  10. AF with RVR, heparin drip  11. History of DVT, on heparin drip  12. Status post fall  13. Normocytic anemia  14.  Nutrition, n.p.o.    Plan:    · SLED x4 hours today  · Consult vascular for temporary dialysis catheter placement  · Discontinue Bicarbonate drip  · Discontinue Bumex drip  · Start SLED x 4 hours today   · Replace calcium  · Continue to monitor renal function

## 2021-06-29 NOTE — PROGRESS NOTES
Infectious Disease  Progress Note  [facility]    Chief Complaint:  On vent    Subjective: bacteremia  HAP    Scheduled Meds:   insulin lispro  0-12 Units Subcutaneous Q4H    mupirocin   Topical BID    hydrocortisone sodium succinate PF  100 mg Intravenous Q8H    artificial tears   Both Eyes BID    chlorhexidine  15 mL Mouth/Throat BID    vancomycin (VANCOCIN) intermittent dosing (placeholder)   Other RX Placeholder    albumin human  25 g Intravenous Q6H    pantoprazole  40 mg Intravenous Daily    And    sodium chloride (PF)  10 mL Intravenous Daily    piperacillin-tazobactam  3,375 mg Intravenous Q12H    And    sodium chloride   Intravenous Q12H    ipratropium-albuterol  1 ampule Inhalation Q4H WA    [Held by provider] amLODIPine  10 mg Oral Daily    [Held by provider] apixaban  2.5 mg Oral BID    [Held by provider] furosemide  20 mg Oral Daily    [Held by provider] gabapentin  100 mg Oral BID    [Held by provider] lenalidomide  10 mg Oral Daily    therapeutic multivitamin-minerals  1 tablet Oral Daily    sodium chloride flush  5-40 mL Intravenous 2 times per day     Continuous Infusions:   fentaNYL 5 mcg/ml in 0.9%  ml infusion      sodium chloride      norepinephrine 2 mcg/min (06/28/21 1800)    bumetanide 0.1 mg/mL infusion 0.2 mg/hr (06/28/21 1355)    dextrose      [Held by provider] heparin (PORCINE) Infusion Stopped (06/29/21 1000)    sodium chloride       PRN Meds:sodium chloride, perflutren lipid microspheres, glucose, dextrose, glucagon (rDNA), dextrose, [Held by provider] heparin (porcine), [Held by provider] heparin (porcine), midazolam, sodium chloride flush, sodium chloride, polyethylene glycol, acetaminophen **OR** acetaminophen    Patient Vitals for the past 24 hrs:   BP Temp Temp src Pulse Resp SpO2   06/29/21 1100    106 24 93 %   06/29/21 1000    150 17 95 %   06/29/21 0900    87 24 94 %   06/29/21 0847     24 93 %   06/29/21 0842    84 24 94 %   06/29/21 0800  99.3 °F (37.4 °C) Esophageal 83 24 93 %   06/29/21 0600    86 24 95 %   06/29/21 0500    86 24 95 %   06/29/21 0400  100 °F (37.8 °C) Esophageal 86 24 94 %   06/29/21 0300    88 24 94 %   06/29/21 0200  100.8 °F (38.2 °C) Esophageal 91 24 94 %   06/29/21 0100    92 24 95 %   06/29/21 0000 (!) 110/57 101.5 °F (38.6 °C) Esophageal 110 24 (!) 89 %   06/28/21 2300    122 24 90 %   06/28/21 2200  100.9 °F (38.3 °C) Esophageal 115 24 90 %   06/28/21 2157    100 24 90 %   06/28/21 2100    89 24 91 %   06/28/21 2000  100.8 °F (38.2 °C) Esophageal 101 24 92 %   06/28/21 1900    92 19 92 %   06/28/21 1857    118 24 95 %   06/28/21 1700    120 24 93 %   06/28/21 1600  100.6 °F (38.1 °C) Axillary 123 24    06/28/21 1517    98 14 (!) 89 %   06/28/21 1515    102 14 (!) 88 %   06/28/21 1504    103 14 (!) 88 %   06/28/21 1500    102 14 (!) 89 %   06/28/21 1445    119 16 (!) 89 %   06/28/21 1430    101 14 (!) 89 %   06/28/21 1415    103 12 91 %   06/28/21 1412    103 14 91 %   06/28/21 1411    107 14 91 %   06/28/21 1410    96 16 91 %   06/28/21 1409    105 14 91 %   06/28/21 1408    108 14 91 %   06/28/21 1407    106 14 91 %   06/28/21 1406    109 14 91 %   06/28/21 1405    113 14 91 %   06/28/21 1404    115 14 91 %   06/28/21 1403    96 16 91 %   06/28/21 1402    114 14 91 %   06/28/21 1401    105 14 91 %   06/28/21 1400    103 16 91 %   06/28/21 1359    103 14 91 %   06/28/21 1358    105 14 91 %   06/28/21 1357    100 16 91 %   06/28/21 1356    98 17 91 %   06/28/21 1355    110 16 91 %   06/28/21 1354    111 16 92 %   06/28/21 1353    103 14 91 %   06/28/21 1352    92 13 91 %   06/28/21 1351    93 14 92 %   06/28/21 1350    95 14 92 %   06/28/21 1349    105 14 92 %   06/28/21 1348    97 14 92 %   06/28/21 1347    106 14 92 %   06/28/21 1346    108 14 92 %   06/28/21 1345    100 14 93 06/28/21 1246    105 15 93 %   06/28/21 1245    102 14 93 %   06/28/21 1244    97 15 93 %   06/28/21 1243    88 15 93 %   06/28/21 1242    103 15 93 %   06/28/21 1241    104 14 91 %   06/28/21 1240    120 18 92 %   06/28/21 1239    95 16 93 %   06/28/21 1238    90 18 93 %   06/28/21 1237    113 14 93 %   06/28/21 1236    102 14 93 %   06/28/21 1235    95 15 93 %   06/28/21 1234    98 20 93 %   06/28/21 1233    92 14 93 %   06/28/21 1232    105 15 93 %   06/28/21 1231    103 14 93 %   06/28/21 1230    98 14 93 %   06/28/21 1229    94 14 93 %   06/28/21 1228    93 15 93 %   06/28/21 1227    94 15 92 %   06/28/21 1226    93 17 93 %   06/28/21 1225    102 14 92 %   06/28/21 1224    96 14 93 %   06/28/21 1223    105 16 93 %   06/28/21 1222    99 15 93 %   06/28/21 1221    105 14 93 %   06/28/21 1220    92 16 93 %   06/28/21 1219    102 15 93 %   06/28/21 1218    95 14 92 %   06/28/21 1217    93 14 93 %   06/28/21 1216    100 14 93 %   06/28/21 1215    111 15 93 %   06/28/21 1214    106 13 93 %   06/28/21 1213    88 14 93 %   06/28/21 1212    99 13 92 %   06/28/21 1211    94 18 92 %   06/28/21 1210    93 17 92 %   06/28/21 1209    95 15 93 %   06/28/21 1208    99 14 93 %   06/28/21 1207    90 16 93 %   06/28/21 1206    107 14 93 %   06/28/21 1205    107 14 93 %   06/28/21 1204    100 16 94 %   06/28/21 1203    102 13 93 %   06/28/21 1202    100 14 93 %   06/28/21 1201    102 14 93 %   06/28/21 1200  99.7 °F (37.6 °C) Axillary 101 17 93 %   06/28/21 1159    106 16 93 %   06/28/21 1158    118 14 93 %   06/28/21 1157    101 14 93 %       CBC with Differential:    Lab Results   Component Value Date    WBC 6.6 06/29/2021    RBC 2.22 06/29/2021    HGB 6.9 06/29/2021    HCT 21.1 06/29/2021    PLT 38 06/29/2021    MCV 95.0 06/29/2021    MCH 31.1 06/29/2021    MCHC 32.7 06/29/2021 RDW 16.3 06/29/2021    NRBC 1.0 06/28/2021    SEGSPCT 45.6 05/27/2021    BANDSPCT 12.0 06/24/2021    METASPCT 0.9 06/29/2021    LYMPHOPCT 0.7 06/29/2021    MONOPCT 25.2 06/29/2021    MYELOPCT 1.0 06/28/2021    BASOPCT 0.0 06/29/2021    MONOSABS 1.78 06/29/2021    LYMPHSABS 0.00 06/29/2021    EOSABS 0.00 06/29/2021    BASOSABS 0.00 06/29/2021     CMP:    Lab Results   Component Value Date     06/29/2021    K 3.8 06/29/2021    K 4.2 06/27/2021     06/29/2021    CO2 25 06/29/2021    BUN 98 06/29/2021    CREATININE 3.5 06/29/2021    GFRAA 15 06/29/2021    AGRATIO 0.9 06/24/2021    AGRATIO 1.1 06/24/2021    LABGLOM 12 06/29/2021    GLUCOSE 197 06/29/2021    PROT 5.6 06/29/2021    LABALBU 2.9 06/29/2021    CALCIUM 7.1 06/29/2021    BILITOT 1.2 06/29/2021    ALKPHOS 38 06/29/2021     06/29/2021     06/29/2021       BP (!) 110/57   Pulse 106   Temp 99.3 °F (37.4 °C) (Esophageal)   Resp 24   Ht 5' 4\" (1.626 m)   Wt 159 lb 9.8 oz (72.4 kg)   SpO2 93%   BMI 27.40 kg/m²     Physical Exam  Const/Neuro- unchanged, no signs of acute distress, Alert  ENMT- Within Normal Limits, Normocephalic, mucous membranes pink/moist, No thrush  Neck: Neck supple  Heart- Regular, Rate, Rhythm- no murmur appreciated. Lungs- clear to ascultation. Respirations even and nonlabored. Abdomen- Soft, bowel sounds positive, non tender  Musculo/Extremities-  Equal and symmetrical, no edema. No tenderness. Skin:  Warm and dry, free from rashes. Cultures reviewed    Radiology reviewed  XR CHEST PORTABLE   Final Result   1. Persistent near complete whiteout of the left lung unchanged when compared   to the prior study. 2. The right lung is clear. 3.      XR CHEST PORTABLE   Final Result   1. Opacities throughout left lung. New opacities are present in previously   aerated left upper lung field. 2.  Endotracheal tube is 2.4 cm above the paradise.          US RETROPERITONEAL COMPLETE   Final Result Unremarkable ultrasound of the kidneys. The urinary bladder is decompressed around a Bennett catheter. XR CHEST PORTABLE   Final Result   1. Persistent the infiltrate with pleural effusion in the left lung cause   significant the opacification of the left image chest particular from the mid   to the base. 2.  No significant changes since the previous examinations recently performed. 3.  Please see report of CT chest of June 26. XR CHEST ABDOMEN NG PLACEMENT   Final Result   Satisfactory position of the NGT. XR CHEST PORTABLE   Final Result   Intervally placed NGT extends below the diaphragm into at least the mid   stomach, with tip not confidently seen within the field of view. Endotracheal tube tip is 1.7 cm from the paradise and could be backed out 5-10   mm for more optimal positioning. Otherwise, stable chest with unchanged left lung opacities. XR CHEST PORTABLE   Final Result   Stable abnormal chest with persistent infiltrates and pleural effusion in the   left lung base likely pneumonia. Surveillance recommended to see complete   clearing. There is minimal infiltrates developing in the right lung base. Endotracheal tube close to the paradise and could be retracted by 1 cm. XR CHEST PORTABLE   Final Result   Stable chest series. Persistent dense consolidation in the left mid and   lower lung. Stable atherosclerotic disease. No new cardiopulmonary   pathology. CT Head WO Contrast   Final Result   Stable atrophy with small vessel ischemic disease. There is no acute stroke   or hemorrhage. Persistent multiple punctate lytic lesions the calvarium likely due to   malignancy like multiple myeloma. CT cervical spine. There is a fracture of the base of the dens of C2 (type 2). There is no   displacement. No other acute fractures are identified in the cervical spine.    There is mild diffuse degenerative changes from C3-T1 with osteophytes and   multilevel disc bulges. There is osteopenia and punctate lytic lesions   concerning for multiple myeloma. Impression      Type 2 fracture of the dens of C2. Diffuse degenerative changes from C3-T1. Multiple myeloma. CT chest.      Comparison December 30, 2020. Findings      There is borderline cardiac size. The great vessels are normal.  Trachea and   major bronchi are patent. There is dense consolidation in the left upper   lobe and left lower lobe concerning for pneumonia. There is minimal   atelectasis in the right lung base. The liver is of normal architecture. There is diffuse demineralization of the thoracic spine with punctate   lucencies. Compression fractures with vertebroplasty are noted. There is   slight irregularity of the sternum probably artifact. There is kyphosis of   the spine. Impression      Dense consolidation in the left upper lobe and left lower lobe concerning for   pneumonia. Surveillance after appropriate treatment is recommended with   repeat CT scan in 6-8 weeks to see complete resolution and exclude underlying   malignancy. Demineralization and the punctate lytic lucencies in the spine and ribs   concerning for multiple myeloma. Critical results were called by Dr. Dawn Fix to Dr. Dayna Umaña on   6/26/2021 at 16:30.         CT Cervical Spine WO Contrast   Final Result   Stable atrophy with small vessel ischemic disease. There is no acute stroke   or hemorrhage. Persistent multiple punctate lytic lesions the calvarium likely due to   malignancy like multiple myeloma. CT cervical spine. There is a fracture of the base of the dens of C2 (type 2). There is no   displacement. No other acute fractures are identified in the cervical spine. There is mild diffuse degenerative changes from C3-T1 with osteophytes and   multilevel disc bulges.   There is osteopenia and punctate lytic lesions   concerning for multiple myeloma. Impression      Type 2 fracture of the dens of C2. Diffuse degenerative changes from C3-T1. Multiple myeloma. CT chest.      Comparison December 30, 2020. Findings      There is borderline cardiac size. The great vessels are normal.  Trachea and   major bronchi are patent. There is dense consolidation in the left upper   lobe and left lower lobe concerning for pneumonia. There is minimal   atelectasis in the right lung base. The liver is of normal architecture. There is diffuse demineralization of the thoracic spine with punctate   lucencies. Compression fractures with vertebroplasty are noted. There is   slight irregularity of the sternum probably artifact. There is kyphosis of   the spine. Impression      Dense consolidation in the left upper lobe and left lower lobe concerning for   pneumonia. Surveillance after appropriate treatment is recommended with   repeat CT scan in 6-8 weeks to see complete resolution and exclude underlying   malignancy. Demineralization and the punctate lytic lucencies in the spine and ribs   concerning for multiple myeloma. Critical results were called by Dr. Hemant Gomez to Dr. Arnulfo Blizzard. on   6/26/2021 at 16:30.         CT CHEST WO CONTRAST   Final Result   Stable atrophy with small vessel ischemic disease. There is no acute stroke   or hemorrhage. Persistent multiple punctate lytic lesions the calvarium likely due to   malignancy like multiple myeloma. CT cervical spine. There is a fracture of the base of the dens of C2 (type 2). There is no   displacement. No other acute fractures are identified in the cervical spine. There is mild diffuse degenerative changes from C3-T1 with osteophytes and   multilevel disc bulges. There is osteopenia and punctate lytic lesions   concerning for multiple myeloma. Impression      Type 2 fracture of the dens of C2.       Diffuse degenerative changes from C3-T1. Multiple myeloma. CT chest.      Comparison December 30, 2020. Findings      There is borderline cardiac size. The great vessels are normal.  Trachea and   major bronchi are patent. There is dense consolidation in the left upper   lobe and left lower lobe concerning for pneumonia. There is minimal   atelectasis in the right lung base. The liver is of normal architecture. There is diffuse demineralization of the thoracic spine with punctate   lucencies. Compression fractures with vertebroplasty are noted. There is   slight irregularity of the sternum probably artifact. There is kyphosis of   the spine. Impression      Dense consolidation in the left upper lobe and left lower lobe concerning for   pneumonia. Surveillance after appropriate treatment is recommended with   repeat CT scan in 6-8 weeks to see complete resolution and exclude underlying   malignancy. Demineralization and the punctate lytic lucencies in the spine and ribs   concerning for multiple myeloma. Critical results were called by Dr. Barry Stein to Dr. Belle Guzman. on   6/26/2021 at 16:30. XR CHEST PORTABLE   Final Result   Consolidative airspace opacities in the left mid to lower lung zone with a   suspected small left pleural effusion. Findings may be on the basis of   pneumonia or less likely asymmetric pulmonary edema.   Consider correlation   with CT of the chest.         XR CHEST PORTABLE    (Results Pending)       Assessment  Trauma fall  resp failure  c2 fx  Pneumonia HAP  High grade bacteremia  MR staph species  On vent  DNR-CCA   CXR : persistent infiltrates with pleural effusion in the left lung base  significat opacification of the left  Bronch  Yesterday   cutlures pending   Acute myocardial injury in the setting of septic shock   Multiple myeloma  Talked to MICRO :  BC : staph warneii    Staph epidermidis both in same sets  3/4   Personally talked to micro     Plan  On zosyn  But needs

## 2021-06-29 NOTE — PROGRESS NOTES
Pharmacy Consultation Note  (Antibiotic Dosing and Monitoring)    Initial consult date: 2021  Consulting physician/provider: Dr. Dorotha Duverney  Drug(s): vancomycin  Indication: Staph species bacteremia; immunocompromised    Age/Gender IBW DW  Allergy Information   78 y.o. female; 162.6 cm, 68 kg 50.6 kg 57.6 kg  Patient has no known allergies. Date  WBC BUN/CR Drug/Dose Time   Given Level(s)   (Time) Comments    3.2 67/2.7 X X      2.7 78/2.9 Vancomycin 1250 mg x1 1247      6.3 91/3.1 Vancomycin 1000 mg IV x1 1143      7.1 98/3.5 x x Random 22.6 @0433                                 Estimated Creatinine Clearance: 12 mL/min (A) (based on SCr of 3.5 mg/dL (H)). Intake/Output Summary (Last 24 hours) at 2021 1048  Last data filed at 2021 0530  Gross per 24 hour   Intake 4849 ml   Output 551 ml   Net 4298 ml       Temp max: Temp (24hrs), Av.5 °F (38.1 °C), Min:99.3 °F (37.4 °C), Max:101.5 °F (38.6 °C)      Assessment:  · Consulted by Dr. Dorotha Duverney to dose/monitor vancomycin. · Goal trough level:  15-20 mCg/mL; AUC/ELEUTERIO = 400-600 mg/L-hr. · 81 yo/F admitted from SNF after fall and found down for unknown length of time. BC's from  +for [facility]; Blood PCR today revealed methicillin-resistant Staph species. · Febrile, tachycardic, and lymphopenic on admission. · PMH: MM on lenalinomide therapy. · sCr 1.3 --> 3.5    Plan:  · Random level tomorrow AM  · Will check vancomycin level when steady state is attained if vanco continues. · Pharmacist will follow and monitor/adjust dosing as necessary.     Nisha Muro, PharmD, BCPS 2021 10:48 AM

## 2021-06-29 NOTE — PROGRESS NOTES
[address] [facility]   Department of Internal Medicine   Internal Medicine Residency  MICU Progress Note    Patient:  Neeru Montaño 78 y.o. female   MRN: [id number]       Date of Service: 2021    Allergy: Patient has no known allergies. Subjective     Patient was seen and examined in AM.  Patient currently sedated and intubated on the vent. Patient with the left side up. Patient has a cervical collar on. 24 hour change:   Febrile overnight Tmax 101.5, increasing Levophed requirement, rate controlled. Repeat cultures negative, follow repeat inflammatory markers. Started on Difulcan     Hb drop to 6.9 this AM. Transfuse 1 unit. Follow H&H Q6, PT/INr, D-dimer, fibrinogen, peripheral smear, LDH, haptoglobin. 0.4 L urine output on bumex drip, plan to start SLED today per nephro, vascular surgery consulted for HD cath placement. Follow sputum cx cytology ( 3 samples)  Hem/onc for MM management/eval.     I have reviewed all pertinent PMHx, PSHx, FamHx, SocialHx, medications, and allergies and updated history as appropriate. Objective     TEMPERATURE:  Current - Temp: 99.3 °F (37.4 °C); Max - Temp  Av.6 °F (38.1 °C)  Min: 99.3 °F (37.4 °C)  Max: 101.5 °F (38.6 °C)  RESPIRATIONS RANGE: Resp  Av.3  Min: 12  Max: 24  PULSE RANGE: Pulse  Av.6  Min: 83  Max: 150  BLOOD PRESSURE RANGE:  Systolic (09KTH), DIS:358 , Min:110 , TPO:531   ; Diastolic (38CDB), BLZ:11, Min:57, Max:57    PULSE OXIMETRY RANGE: SpO2  Av.1 %  Min: 88 %  Max: 95 %    I & O - 24hr:    Intake/Output Summary (Last 24 hours) at 2021 1249  Last data filed at 2021 1101  Gross per 24 hour   Intake 4849 ml   Output 611 ml   Net 4238 ml     I/O last 3 completed shifts: In: 6036 [I.V.:5135; IV Piggyback:1000]  Out: 5 [Urine:486] I/O this shift:  In: 10 [I.V.:10]  Out: 178 [Urine:178]   Weight change:     Physical Exam  Vitals and nursing note reviewed. Constitutional:       General: She is sleeping. She is not in acute distress. Appearance: She is overweight. She is ill-appearing. Interventions: She is sedated and intubated. Cervical collar in place. HENT:      Right Ear: External ear normal.      Left Ear: External ear normal.   Eyes:      General: No scleral icterus. Right eye: No discharge. Left eye: No discharge. Conjunctiva/sclera: Conjunctivae normal.      Comments: Dry eyes bilaterally   Cardiovascular:      Rate and Rhythm: Regular rhythm. Tachycardia present. Pulses: Normal pulses. Heart sounds: Normal heart sounds. No murmur heard. No friction rub. No gallop. Pulmonary:      Effort: She is intubated. Breath sounds: Decreased air movement and transmitted upper airway sounds present. Decreased breath sounds (LT >RT) and rhonchi (Lt > Rt) present. No wheezing. Abdominal:      General: Abdomen is protuberant. Bowel sounds are decreased. Palpations: Abdomen is soft. Tenderness: There is no abdominal tenderness. There is no guarding or rebound. Hernia: No hernia is present. Musculoskeletal:      Right lower leg: No edema. Left lower leg: No edema. Skin:     General: Skin is warm. Capillary Refill: Capillary refill takes 2 to 3 seconds.        Medications     Continuous Infusions:   fentaNYL 5 mcg/ml in 0.9%  ml infusion 100 mcg/hr (06/29/21 1207)    sodium chloride      sodium chloride      norepinephrine 2 mcg/min (06/28/21 1800)    bumetanide 0.1 mg/mL infusion 0.2 mg/hr (06/28/21 1355)    dextrose      [Held by provider] heparin (PORCINE) Infusion Stopped (06/29/21 1000)    sodium chloride         Scheduled Meds:   insulin lispro  0-12 Units Subcutaneous Q4H    mupirocin   Topical BID    calcium gluconate IVPB  2,000 mg Intravenous Once    fluconazole  400 mg Intravenous Once    hydrocortisone sodium succinate PF  100 mg Intravenous Q8H    artificial tears   Both Eyes BID    chlorhexidine  15 mL Mouth/Throat BID    vancomycin (VANCOCIN) intermittent dosing (placeholder)   Other RX Placeholder    albumin human  25 g Intravenous Q6H    pantoprazole  40 mg Intravenous Daily    And    sodium chloride (PF)  10 mL Intravenous Daily    piperacillin-tazobactam  3,375 mg Intravenous Q12H    And    sodium chloride   Intravenous Q12H    ipratropium-albuterol  1 ampule Inhalation Q4H WA    [Held by provider] amLODIPine  10 mg Oral Daily    [Held by provider] apixaban  2.5 mg Oral BID    [Held by provider] furosemide  20 mg Oral Daily    [Held by provider] gabapentin  100 mg Oral BID    [Held by provider] lenalidomide  10 mg Oral Daily    therapeutic multivitamin-minerals  1 tablet Oral Daily    sodium chloride flush  5-40 mL Intravenous 2 times per day       PRN Meds: sodium chloride, sodium chloride, perflutren lipid microspheres, glucose, dextrose, glucagon (rDNA), dextrose, [Held by provider] heparin (porcine), [Held by provider] heparin (porcine), midazolam, sodium chloride flush, sodium chloride, polyethylene glycol, acetaminophen **OR** acetaminophen    Home Meds:   Prior to Admission medications    Medication Sig Start Date End Date Taking?  Authorizing Provider   docusate sodium (COLACE) 100 MG capsule Take 100 mg by mouth 2 times daily as needed for Constipation   Yes Historical Provider, MD   benzonatate (TESSALON) 100 MG capsule Take 100 mg by mouth 3 times daily as needed for Cough   Yes Historical Provider, MD   Dexamethasone 20 MG TABS Take 20 mg by mouth once a week 6/26/21  Yes Historical Provider, MD   mirtazapine (REMERON) 15 MG tablet Take 7.5 mg by mouth nightly   Yes Historical Provider, MD   apixaban (ELIQUIS) 2.5 MG TABS tablet Take 1 tablet by mouth 2 times daily 1/4/21  Yes Marcelo Oviedo MD   amLODIPine (NORVASC) 10 MG tablet Take 1 tablet by mouth daily 1/5/21  Yes Marcelo Oviedo MD   furosemide (LASIX) 20 MG tablet Take 40 mg by mouth daily    Yes Historical Provider, MD HDL    Cardiac Enzymes:    Lab Results   Component Value Date    CKTOTAL 132 06/27/2021    CKTOTAL 57 06/26/2021    CKMB 1.6 06/27/2021    TROPONINI 0.03 12/30/2020       Notable Cultures:      Blood cultures   Blood Culture, Routine   Date Value Ref Range Status   06/27/2021 24 Hours no growth  Preliminary     Respiratory cultures No results found for: RESPCULTURE No results found for: LABGRAM  Urine   Urine Culture, Routine   Date Value Ref Range Status   06/26/2021 Growth not present  Final     Legionella No results found for: LABLEGI  C Diff PCR No results found for: CDIFPCR  Wound culture/abscess: No results for input(s): WNDABS in the last 72 hours. Tip culture:No results for input(s): CXCATHTIP in the last 72 hours.      Antibiotic  Days  Day started   Piperacillintazobactam   6/27/2021   Vancomycin       6/27/2021                Oxygen:     Vent Information  $Ventilation: $Subsequent Day  Skin Assessment: Clean, dry, & intact  Suction Catheter Diameter:  (16)  Equipment ID: [id number]  Vent Type: [id number]  Vent Mode: AC/VC  Vt Ordered: 350 mL  Rate Set: 24 bmp  Peak Flow: 0 L/min  Pressure Support: 0 cmH20  FiO2 : 100 %  SpO2: 91 %  SpO2/FiO2 ratio: 91  Sensitivity: 3  PEEP/CPAP: 10  I Time/ I Time %: 0 s  Humidification Source: Heated wire  Humidification Temp: 37  Humidification Temp Measured: 36.9  Mask Type: Full face mask  Mask Size: Medium  Additional Respiratory  Assessments  Pulse: 105  Resp: 24  SpO2: 91 %  Humidification Source: Heated wire  Humidification Temp: 37  Oral Care: Mouth swabbed, Mouth moisturizer, Mouth suctioned, Suction toothette, Mouthwash  Subglottic Suction Done?: Yes     Nasal cannula L/min     Face mask %     Reservoirs mask %       ABG   6/28/2021 Vent Settings   6/20/2021   PH   7.239 Mode   AC/VC   PCO2   44.4 TV   400   PO2   100 RR   14   HCO3   18.6 PS      Sat%   95.9 PEEP   10   FIO2   100% FIO2   100     P/F   1       Lines:  Site  Day  Date inserted     TLC  right femoral 6/27/2021     PICC              Arterial line  right radial       6/27/2021     Peripheral line              HD cath            Urethral Catheter-Output (mL): 25 mL    Imaging Studies:  CT Head WO Contrast    Result Date: 6/26/2021  EXAMINATION: CT OF THE HEAD WITHOUT CONTRAST; CT OF THE CHEST WITHOUT CONTRAST; CT OF THE CERVICAL SPINE WITHOUT CONTRAST  6/26/2021 1:56 pm TECHNIQUE: CT of the head was performed without the administration of intravenous contrast. Dose modulation, iterative reconstruction, and/or weight based adjustment of the mA/kV was utilized to reduce the radiation dose to as low as reasonably achievable.; CT of the chest was performed without the administration of intravenous contrast. Multiplanar reformatted images are provided for review. Dose modulation, iterative reconstruction, and/or weight based adjustment of the mA/kV was utilized to reduce the radiation dose to as low as reasonably achievable.; CT of the cervical spine was performed without the administration of intravenous contrast. Multiplanar reformatted images are provided for review. Dose modulation, iterative reconstruction, and/or weight based adjustment of the mA/kV was utilized to reduce the radiation dose to as low as reasonably achievable. COMPARISON: December 30, 2020. HISTORY: ORDERING SYSTEM PROVIDED HISTORY: unwitnessed fall TECHNOLOGIST PROVIDED HISTORY: Reason for exam:->unwitnessed fall Has a \"code stroke\" or \"stroke alert\" been called? ->No Decision Support Exception - unselect if not a suspected or confirmed emergency medical condition->Emergency Medical Condition (MA) What reading provider will be dictating this exam?->CRC FINDINGS: There is diffuse atrophy with dilated ventricles and prominence of the sulci.  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 There is no acute stroke, mass or hemorrhage. A redemonstrated are extensive is punctate lytic lesions in the calvarium with motht eaten pattern likely multiple myeloma. Stable atrophy with small vessel ischemic disease. There is no acute stroke or hemorrhage. Persistent multiple punctate lytic lesions the calvarium likely due to malignancy like multiple myeloma. CT cervical spine. There is a fracture of the base of the dens of C2 (type 2). There is no displacement. No other acute fractures are identified in the cervical spine. There is mild diffuse degenerative changes from C3-T1 with osteophytes and multilevel disc bulges. There is osteopenia and punctate lytic lesions concerning for multiple myeloma. Impression Type 2 fracture of the dens of C2. Diffuse degenerative changes from C3-T1. Multiple myeloma. CT chest. Comparison December 30, 2020. Findings There is borderline cardiac size. The great vessels are normal.  Trachea and major bronchi are patent. There is dense consolidation in the left upper lobe and left lower lobe concerning for pneumonia. There is minimal atelectasis in the right lung base. The liver is of normal architecture. There is diffuse demineralization of the thoracic spine with punctate lucencies. Compression fractures with vertebroplasty are noted. There is slight irregularity of the sternum probably artifact. There is kyphosis of the spine. Impression Dense consolidation in the left upper lobe and left lower lobe concerning for pneumonia. Surveillance after appropriate treatment is recommended with repeat CT scan in 6-8 weeks to see complete resolution and exclude underlying malignancy. Demineralization and the punctate lytic lucencies in the spine and ribs concerning for multiple myeloma. Critical results were called by Dr. Abhijeet Roblero to Dr. Gerald Lorenz.  on 6/26/2021 at 16:30.     CT CHEST WO CONTRAST    Result Date: 6/26/2021  EXAMINATION: CT OF THE HEAD WITHOUT CONTRAST; CT OF THE CHEST WITHOUT CONTRAST; CT OF THE CERVICAL SPINE WITHOUT CONTRAST  6/26/2021 1:56 pm TECHNIQUE: CT of the head was performed without the administration of intravenous contrast. Dose modulation, iterative reconstruction, and/or weight based adjustment of the mA/kV was utilized to reduce the radiation dose to as low as reasonably achievable.; CT of the chest was performed without the administration of intravenous contrast. Multiplanar reformatted images are provided for review. Dose modulation, iterative reconstruction, and/or weight based adjustment of the mA/kV was utilized to reduce the radiation dose to as low as reasonably achievable.; CT of the cervical spine was performed without the administration of intravenous contrast. Multiplanar reformatted images are provided for review. Dose modulation, iterative reconstruction, and/or weight based adjustment of the mA/kV was utilized to reduce the radiation dose to as low as reasonably achievable. COMPARISON: December 30, 2020. HISTORY: ORDERING SYSTEM PROVIDED HISTORY: unwitnessed fall TECHNOLOGIST PROVIDED HISTORY: Reason for exam:->unwitnessed fall Has a \"code stroke\" or \"stroke alert\" been called? ->No Decision Support Exception - unselect if not a suspected or confirmed emergency medical condition->Emergency Medical Condition (MA) What reading provider will be dictating this exam?->CRC FINDINGS: There is diffuse atrophy with dilated ventricles and prominence of the sulci. 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 There is no acute stroke, mass or hemorrhage. A redemonstrated are extensive is punctate lytic lesions in the calvarium with motht eaten pattern likely multiple myeloma.      Stable atrophy with small vessel ischemic iterative reconstruction, and/or weight based adjustment of the mA/kV was utilized to reduce the radiation dose to as low as reasonably achievable.; CT of the chest was performed without the administration of intravenous contrast. Multiplanar reformatted images are provided for review. Dose modulation, iterative reconstruction, and/or weight based adjustment of the mA/kV was utilized to reduce the radiation dose to as low as reasonably achievable.; CT of the cervical spine was performed without the administration of intravenous contrast. Multiplanar reformatted images are provided for review. Dose modulation, iterative reconstruction, and/or weight based adjustment of the mA/kV was utilized to reduce the radiation dose to as low as reasonably achievable. COMPARISON: December 30, 2020. HISTORY: ORDERING SYSTEM PROVIDED HISTORY: unwitnessed fall TECHNOLOGIST PROVIDED HISTORY: Reason for exam:->unwitnessed fall Has a \"code stroke\" or \"stroke alert\" been called? ->No Decision Support Exception - unselect if not a suspected or confirmed emergency medical condition->Emergency Medical Condition (MA) What reading provider will be dictating this exam?->CRC FINDINGS: There is diffuse atrophy with dilated ventricles and prominence of the sulci. 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 There is no acute stroke, mass or hemorrhage. A redemonstrated are extensive is punctate lytic lesions in the calvarium with motht eaten pattern likely multiple myeloma. Stable atrophy with small vessel ischemic disease. There is no acute stroke or hemorrhage. Persistent multiple punctate lytic lesions the calvarium likely due to malignancy like multiple myeloma. CT cervical spine. There is a fracture of the base of the dens of C2 (type 2). There is no displacement. No other acute fractures are identified in the cervical spine. There is mild diffuse degenerative changes from C3-T1 with osteophytes and multilevel disc bulges. There is osteopenia and punctate lytic lesions concerning for multiple myeloma. Impression Type 2 fracture of the dens of C2. Diffuse degenerative changes from C3-T1. Multiple myeloma. CT chest. Comparison December 30, 2020. Findings There is borderline cardiac size. The great vessels are normal.  Trachea and major bronchi are patent. There is dense consolidation in the left upper lobe and left lower lobe concerning for pneumonia. There is minimal atelectasis in the right lung base. The liver is of normal architecture. There is diffuse demineralization of the thoracic spine with punctate lucencies. Compression fractures with vertebroplasty are noted. There is slight irregularity of the sternum probably artifact. There is kyphosis of the spine. Impression Dense consolidation in the left upper lobe and left lower lobe concerning for pneumonia. Surveillance after appropriate treatment is recommended with repeat CT scan in 6-8 weeks to see complete resolution and exclude underlying malignancy. Demineralization and the punctate lytic lucencies in the spine and ribs concerning for multiple myeloma. Critical results were called by Dr. Marielos Inman to Dr. Yasir Neely on 6/26/2021 at 16:30. XR CHEST PORTABLE    Result Date: 6/28/2021  EXAMINATION: ONE XRAY VIEW OF THE CHEST 6/28/2021 8:21 am COMPARISON: June 26-June 27 CT chest of a June 26 HISTORY: ORDERING SYSTEM PROVIDED HISTORY: ET TECHNOLOGIST PROVIDED HISTORY: Reason for exam:->ET What reading provider will be dictating this exam?->CRC FINDINGS: Endotracheal tube in good position at the level of the arch of the aorta. NG tube in the area of the stomach. Heart is enlarged.   There is increased density with the opacification/consolidation of the mid lower aspect of the left lung with some ground-glass density towards the upper lobes. Please see report of CT chest of June 26. There is also a component of mild left pleural effusion. Right lung is a expanded within normal range. The some artifacts are overlying the right chest.  No conspicuous infiltrates or consolidations are seen. There is no right-sided perihilar vascular congestion. There is no right-sided pleural effusion. The heart appears to be enlarged in mild-to-moderate degree. Patient had multiple levels of vertebroplasty. 1.  Persistent the infiltrate with pleural effusion in the left lung cause significant the opacification of the left image chest particular from the mid to the base. 2.  No significant changes since the previous examinations recently performed. 3.  Please see report of CT chest of June 26. XR CHEST PORTABLE    Result Date: 6/28/2021  EXAMINATION: ONE XRAY VIEW OF THE CHEST 6/27/2021 11:22 pm COMPARISON: 2142 hours on 06/27/2021. HISTORY: ORDERING SYSTEM PROVIDED HISTORY: ett placement - post retraction TECHNOLOGIST PROVIDED HISTORY: Reason for exam:->ett placement - post retraction What reading provider will be dictating this exam?->CRC FINDINGS: Endotracheal tube tip is 1.7 cm from the paradise and could be backed out 5-10 mm for more optimal positioning. NGT again extends below the diaphragm and into at least the mid stomach, with tip not confidently visualized within the field of view. Overlying EKG leads and other multifocal extracorporeal artifact. No pneumothorax or visualized right pleural effusion. There is redemonstrated subtotal opacification of the left lung, with air bronchograms seen at left mid to upper lung zone levels, with relative sparing of airspace disease at the left apex. The right lung remains essentially clear as visualized. Cardiac silhouette is poorly evaluated but stable and nonenlarged as visualized.  Stable densities projecting at 2 spinal levels presumably relate to prior vertebroplasty. No acute osseous abnormality is identified. Intervally placed NGT extends below the diaphragm into at least the mid stomach, with tip not confidently seen within the field of view. Endotracheal tube tip is 1.7 cm from the paradise and could be backed out 5-10 mm for more optimal positioning. Otherwise, stable chest with unchanged left lung opacities. XR CHEST PORTABLE    Result Date: 6/27/2021  EXAMINATION: ONE XRAY VIEW OF THE CHEST 6/27/2021 10:03 pm COMPARISON: Previous chest radiograph 06/27/2021 and CT scan 06/26/2021. HISTORY: ORDERING SYSTEM PROVIDED HISTORY: ET tube placement TECHNOLOGIST PROVIDED HISTORY: Reason for exam:->ET tube placement What reading provider will be dictating this exam?->CRC FINDINGS: There is borderline cardiac size. There is persistent the consolidation in the left upper lobe and left lower lobe. Minimal focal infiltrate  is developing in the right lung base. The endotracheal tube is approximately 1.5 cm above the paradise and could be retracted by 1 cm. Stable abnormal chest with persistent infiltrates and pleural effusion in the left lung base likely pneumonia. Surveillance recommended to see complete clearing. There is minimal infiltrates developing in the right lung base. Endotracheal tube close to the paradise and could be retracted by 1 cm. XR CHEST PORTABLE    Result Date: 6/27/2021  EXAMINATION: ONE XRAY VIEW OF THE CHEST 6/27/2021 5:14 pm COMPARISON: Chest series from June 26, 2021 HISTORY: ORDERING SYSTEM PROVIDED HISTORY: worsening hypoxia TECHNOLOGIST PROVIDED HISTORY: Reason for exam:->worsening hypoxia What reading provider will be dictating this exam?->CRC FINDINGS: Persistent low lung volumes. Unchanged mid and lower lung dense consolidation. There are coarse interstitial markings in the un opacified portions of the lungs. No pneumothorax. Atherosclerotic disease in the thoracic aorta. Normal pulmonary vascularity.   Osseous and thoracic soft tissue structures demonstrate no acute findings. Vertebroplasty changes in the spine. Stable chest series. Persistent dense consolidation in the left mid and lower lung. Stable atherosclerotic disease. No new cardiopulmonary pathology. XR CHEST PORTABLE    Result Date: 6/26/2021  EXAMINATION: ONE XRAY VIEW OF THE CHEST 6/26/2021 12:48 pm COMPARISON: December 30, 2020 HISTORY: ORDERING SYSTEM PROVIDED HISTORY: Shortness of breath TECHNOLOGIST PROVIDED HISTORY: Reason for exam:->Shortness of breath What reading provider will be dictating this exam?->CRC FINDINGS: Heart size is unable to be accurately assessed on this single portable view of the chest, but appears to be stable. There is a new hazy airspace opacity in the left mid to lower lung zone compared with the prior examination. Suspect at least a small left pleural effusion. Difficult to exclude a trace right pleural effusion. No pneumothorax is identified. Bone cement is noted at multiple locations in the thoracolumbar spine. No acute osseous abnormality is identified. Consolidative airspace opacities in the left mid to lower lung zone with a suspected small left pleural effusion. Findings may be on the basis of pneumonia or less likely asymmetric pulmonary edema. Consider correlation with CT of the chest.     XR CHEST ABDOMEN NG PLACEMENT    Result Date: 6/28/2021  EXAMINATION: ONE SUPINE XRAY VIEW(S) OF THE ABDOMEN 6/27/2021 11:26 pm COMPARISON: Portable chest x-ray obtained 1 minute previous. HISTORY: ORDERING SYSTEM PROVIDED HISTORY: NG placement TECHNOLOGIST PROVIDED HISTORY: Reason for exam:->NG placement What reading provider will be dictating this exam?->CRC FINDINGS: Focused radiograph is obtained at the upper abdomen for purpose of NGT tip localization. The NGT extends satisfactorily into the mid stomach, with side port projecting caudal to the GE junction.  Paucity of abdominal bowel gas within the field of view, with the stomach appearing decompressed and gasless. Lung and osseous findings are unchanged. Satisfactory position of the NGT. Resident's Assessment and Plan      Jenn Head is 78 y.o. female was admitted on 6/26/2021 and subsequently transferred to the ICU on 6/27/2021 after presenting from a nursing home following an unwitnessed fall and unknown amount of downtime. CT cervical spine showed C2 fracture and CT chest showed consolidation left upper and lower lobe is concerning for pneumonia. Initially started on ceftriaxone and azithromycin. Trauma surgery neurosurgery on board placed on cervical collar. Found to have high-grade methicillin assistance to follow coccus (not aureus) bacteremia. The patient was subsequently transferred to the ICU on BiPAP and then intubated. Patient has a history of multiple myeloma, DVT, polyneuropathy        Diagnosis Date    Abnormality of plasma protein     Anemia     Asthma     Collapsed vertebra, not elsewhere classified, thoracic region, subsequent encounter for fracture with routine healing     Constipation     Dependence on supplemental oxygen     Difficulty in walking     GERD without esophagitis     Hypertension     Low back pain     Monoclonal gammopathy     Multiple myeloma not having achieved remission (HCC)     Other disorders of plasma-protein metabolism, not elsewhere classified     Secondary malignant neoplasm of bone ([address]     Thrombocytopenia ([address]     Unspecified fracture of first lumbar vertebra, subsequent encounter for fracture with routine healing     Unspecified fracture of fourth lumbar vertebra, subsequent encounter for fracture with routine healing        History reviewed. No pertinent surgical history. No Known Allergies     No family history on file. Social History     Socioeconomic History    Marital status:       Spouse name: None    Number of children: None    Years of education: None    Highest education level: None   Occupational History    None   Tobacco Use    Smoking status: Never Smoker   Substance and Sexual Activity    Alcohol use: Not Currently    Drug use: Not Currently    Sexual activity: None   Other Topics Concern    None   Social History Narrative    None     Social Determinants of Health     Financial Resource Strain:     Difficulty of Paying Living Expenses:    Food Insecurity:     Worried About Running Out of Food in the Last Year:     Ran Out of Food in the Last Year:    Transportation Needs:     Lack of Transportation (Medical):  Lack of Transportation (Non-Medical):    Physical Activity:     Days of Exercise per Week:     Minutes of Exercise per Session:    Stress:     Feeling of Stress :    Social Connections:     Frequency of Communication with Friends and Family:     Frequency of Social Gatherings with Friends and Family:     Attends Anabaptism Services:     Active Member of Clubs or Organizations:     Attends Club or Organization Meetings:     Marital Status:    Intimate Partner Violence:     Fear of Current or Ex-Partner:     Emotionally Abused:     Physically Abused:     Sexually Abused:           Assessment:  1. Acute encephalopathy 2/2 likely septic shock 2/2 left-sided pneumonia (CAP) and methicillin-resistant staph (not aureus) bacteremia  · Currently sedated and intubated. On the vent. 2. Septic shock 2/2 left-sided pneumonia (CAP) and methicillin-resistant staph (not aureus) bacteremia  · ID on board. On piperacillintazobactam and vancomycin. Will need echo and KHUSHBOO at some point. · Blood cultures growing methicillin-resistant staph epidermidis and staph warinii   · Underwent bronch 6/28 that did not show any mucous plugs/obstruction. · CT chest suggestive of left-sided pneumonia  · Procalcitonin >100, CRP 67  · Cortisol 29.35, On hydrocortisone 100 mg every 8 hours and on vitamin C protocol    3.  Acute hypoxic respiratory failure 2/2 #2 s/p intubation  · Currently sedated on the vent with left side up    4. AUDREY vs AUDREY on CKD likely 2/2 ischemic ATN 2/2 #2, possibly worsened with vancomycin  · FENa 0.5% yesterday  · Baseline creatinine 11. 1  · Anuric  · Creatinine trending up to three-point 3 in the AM    5. Combined respiratory acidosis and HAG MA 2/2 hypercapnia 2/2 iatrogenic as well as lactic acidosis 2/2 likely #2  · S/p Bicarb drip  · Adjusting vent settings    6. Lactic acidosisresolved    7. A. fib with RVR 2/2 #2  · On low-dose heparin drip and amnio drip    8. Elevated troponins 2/2 #2, #5  · Troponin peaked at 143    9. Transaminitis 2/2 shock liver 2/2 likely #2  · Liver enzymes fine till evening of 6/27/2020. · Elevated ALT/AST: 661/1530    10. Hyperkalemia 2/2 #5    11. Leukopenia 2/2 #2 -resolved    12. Thrombocytopenia 2/2 #2, less likely HIT  · Platelets dropped down to 88 but then trended back up to 107    13. Fall with C2 # 2/2 likely precipitated by #1, #2, #3, #4 in the setting of #13  · Neurosurgery and trauma assessment done when patient initially presented. · Has a cervical collar per neurosurgery. 14. Normocytic anemia 2/2 ? Blood loss  · Hemoglobin dropped from 10.9-8.4 but trended back up to 9.3  · FOBT pending    15. Hyperglycemia 2/2 possibly stress dose steroids  · On low-dose sliding scale    16. Chronic LBBB    17. History of DVT on apixaban  · Currently on full dose heparin drip. 18. Multiple Myeloma with hypocalcemia, ?lung cancer  · On lenalidomide for multiple myeloma      Plan:   Starting Fluconazole, continue vanc and zosyn. Bactroban for MRSA nares. Follow ID recc.  Continue Stress dose steroids, wean levo as able.  Plan for SLED, cnsult vascular for HD cath placement. Plan for volume removal.    F/U echocardiogram, will need KHUSHBOO at some point   Bronchoscopy cultures. Send Sputum samples x3 for cytology.      Treat hyperkalemia   F/U HIT panel   Chlorhexidine mouthwash and eye

## 2021-06-29 NOTE — PROCEDURES
Blaine Farmer is a 78 y.o. female patient. 1. Acute respiratory failure with hypoxia ([address]    2. Community acquired pneumonia of left lung, unspecified part of lung    3. Fall, initial encounter    4. Closed nondisplaced odontoid fracture with type II morphology, initial encounter [facility])      Past Medical History:   Diagnosis Date    Abnormality of plasma protein     Anemia     Asthma     Collapsed vertebra, not elsewhere classified, thoracic region, subsequent encounter for fracture with routine healing     Constipation     Dependence on supplemental oxygen     Difficulty in walking     GERD without esophagitis     Hypertension     Low back pain     Monoclonal gammopathy     Multiple myeloma not having achieved remission (HCC)     Other disorders of plasma-protein metabolism, not elsewhere classified     Secondary malignant neoplasm of bone (HCC)     Thrombocytopenia ([address]     Unspecified fracture of first lumbar vertebra, subsequent encounter for fracture with routine healing     Unspecified fracture of fourth lumbar vertebra, subsequent encounter for fracture with routine healing      Blood pressure 99/67, pulse 105, temperature 100.9 °F (38.3 °C), resp. rate 24, height 5' 4\" (1.626 m), weight 159 lb 9.8 oz (72.4 kg), SpO2 95 %. Temporary dialysis catheter    Date/Time: 6/29/2021 5:10 PM  Performed by: Rosalio Easley MD  Authorized by: Rosalio Easley MD   Consent: Written consent obtained. Risks and benefits: risks, benefits and alternatives were discussed  Consent given by: family. Required items: required blood products, implants, devices, and special equipment available  Patient identity confirmed: anonymous protocol, patient vented/unresponsive  Indications: vascular access  Anesthesia method: already sedated on vent.   Preparation: skin prepped with ChloraPrep  Skin prep agent dried: skin prep agent completely dried prior to procedure  Sterile barriers: all five maximum sterile barriers used - cap, mask, sterile gown, sterile gloves, and large sterile sheet  Hand hygiene: hand hygiene performed prior to central venous catheter insertion  Location details: left femoral  Patient position: flat  Catheter size: 14 Fr  Pre-procedure: landmarks identified  Ultrasound guidance: yes  Sterile ultrasound techniques: sterile gel and sterile probe covers were used  Number of attempts: 1  Successful placement: yes  Post-procedure: line sutured and dressing applied  Assessment: blood return through all ports and free fluid flow  Complications: patient had some seconds (<1 minute) of pulseless vfib/asystole after placing the catheter and she spontaneously corrected herself but was more bradycardic than previous when she returned. Blood loss was minimal <5cc. and the needle stick was uncomplicated. Platelets were being actively transfused during procedure.     Performed under Dr Sarabjit Zhang supervision    Sol Chery MD  6/29/2021    Jenifer Brooks MD

## 2021-06-29 NOTE — PROGRESS NOTES
Perfect Serve complete to Dr. Lilian Peck for insertion of Hemoaccess, all supplies at bedside and consents signed.     Irma Lozano RN

## 2021-06-29 NOTE — CONSULTS
chloride      norepinephrine 4 mcg/min (06/29/21 1415)    bumetanide 0.1 mg/mL infusion 0.2 mg/hr (06/28/21 1355)    dextrose      [Held by provider] heparin (PORCINE) Infusion Stopped (06/29/21 1000)    sodium chloride        sodium chloride, sodium chloride, sodium chloride, perflutren lipid microspheres, glucose, dextrose, glucagon (rDNA), dextrose, [Held by provider] heparin (porcine), [Held by provider] heparin (porcine), midazolam, sodium chloride flush, sodium chloride, polyethylene glycol, acetaminophen **OR** acetaminophen    insulin lispro  0-12 Units Subcutaneous Q4H    mupirocin   Topical BID    hydrocortisone sodium succinate PF  100 mg Intravenous Q8H    artificial tears   Both Eyes BID    chlorhexidine  15 mL Mouth/Throat BID    vancomycin (VANCOCIN) intermittent dosing (placeholder)   Other RX Placeholder    albumin human  25 g Intravenous Q6H    pantoprazole  40 mg Intravenous Daily    And    sodium chloride (PF)  10 mL Intravenous Daily    piperacillin-tazobactam  3,375 mg Intravenous Q12H    And    sodium chloride   Intravenous Q12H    ipratropium-albuterol  1 ampule Inhalation Q4H WA    [Held by provider] amLODIPine  10 mg Oral Daily    [Held by provider] apixaban  2.5 mg Oral BID    [Held by provider] furosemide  20 mg Oral Daily    [Held by provider] gabapentin  100 mg Oral BID    [Held by provider] lenalidomide  10 mg Oral Daily    therapeutic multivitamin-minerals  1 tablet Oral Daily    sodium chloride flush  5-40 mL Intravenous 2 times per day        Home Medications:  Prior to Admission medications    Medication Sig Start Date End Date Taking?  Authorizing Provider   docusate sodium (COLACE) 100 MG capsule Take 100 mg by mouth 2 times daily as needed for Constipation   Yes Historical Provider, MD   benzonatate (TESSALON) 100 MG capsule Take 100 mg by mouth 3 times daily as needed for Cough   Yes Historical Provider, MD   Dexamethasone 20 MG TABS Take 20 mg by mouth once a week 6/26/21  Yes Historical Provider, MD   mirtazapine (REMERON) 15 MG tablet Take 7.5 mg by mouth nightly   Yes Historical Provider, MD   apixaban (ELIQUIS) 2.5 MG TABS tablet Take 1 tablet by mouth 2 times daily 1/4/21  Yes Alvin Swanson MD   amLODIPine (NORVASC) 10 MG tablet Take 1 tablet by mouth daily 1/5/21  Yes Alvin Swanson MD   furosemide (LASIX) 20 MG tablet Take 40 mg by mouth daily    Yes Historical Provider, MD   gabapentin (NEURONTIN) 100 MG capsule Take 100 mg by mouth 2 times daily. For foot pain   Yes Historical Provider, MD   Multiple Vitamins-Minerals (THERAPEUTIC MULTIVITAMIN-MINERALS) tablet Take 1 tablet by mouth daily   Yes Historical Provider, MD   HYDROcodone-acetaminophen (NORCO)  MG per tablet Take 1 tablet by mouth every 4 hours as needed for Pain. Yes Historical Provider, MD   lenalidomide (REVLIMID) 10 MG chemo capsule Take 10 mg by mouth daily Give at bedtime for 21 days starting 12/12/20    Historical Provider, MD   Ascorbic Acid (VITAMIN C ADULT GUMMIES PO) Take 1,000 mg by mouth daily    Historical Provider, MD   Zinc 100 MG TABS Take by mouth daily    Historical Provider, MD   ipratropium-albuterol (DUONEB) 0.5-2.5 (3) MG/3ML SOLN nebulizer solution Inhale 1 vial into the lungs every 4 hours As needed    Historical Provider, MD   ondansetron (ZOFRAN) 8 MG tablet Take 8 mg by mouth every 8 hours as needed for Nausea or Vomiting    Historical Provider, MD       Allergies:  Patient has no known allergies. Social History     Socioeconomic History    Marital status:       Spouse name: Not on file    Number of children: Not on file    Years of education: Not on file    Highest education level: Not on file   Occupational History    Not on file   Tobacco Use    Smoking status: Never Smoker   Substance and Sexual Activity    Alcohol use: Not Currently    Drug use: Not Currently    Sexual activity: Not on file   Other Topics Concern    Not on file   Social History Narrative    Not on file     Social Determinants of Health     Financial Resource Strain:     Difficulty of Paying Living Expenses:    Food Insecurity:     Worried About Running Out of Food in the Last Year:     [address] in the Last Year:    Transportation Needs:     Lack of Transportation (Medical):  Lack of Transportation (Non-Medical):    Physical Activity:     Days of Exercise per Week:     Minutes of Exercise per Session:    Stress:     Feeling of Stress :    Social Connections:     Frequency of Communication with Friends and Family:     Frequency of Social Gatherings with Friends and Family:     Attends Mandaen Services:     Active Member of Clubs or Organizations:     Attends Club or Organization Meetings:     Marital Status:    Intimate Partner Violence:     Fear of Current or Ex-Partner:     Emotionally Abused:     Physically Abused:     Sexually Abused:         No family history on file.     PHYSICAL EXAM:    BP 99/67   Pulse 105   Temp 100.9 °F (38.3 °C)   Resp 24   Ht 5' 4\" (1.626 m)   Wt 159 lb 9.8 oz (72.4 kg)   SpO2 95%   BMI 27.40 kg/m²   CONSTITUTIONAL:  NAD on vent  NEURO:  Occasionally moves upper extremities not to command  EYES: closed  HENT:  ETT in place  NECK: aspen collar on  LUNGS:  Nonlabored on vent, Occasional cough, 100% FiO2  CARDIOVASCULAR:  Mildly tachy rate, no cyanosis  ABDOMEN:  Greatly distended but soft  SKIN:  no bruising or bleeding  EXTREMITIES:   R UE Swelling absent   Incisions absent         L UE Swelling absent   Incisions absent         R LE Edema absent    Incisions absent    Varicose veins absent   L LE Edema absent    Incisions absent    Varicose veins absent   R radial A-line L radial    R femoral (central line) L femoral 2+   Above definitions: 2 = palpable, 1 = trace, 0 = nonpalpable    LABS:    Lab Results   Component Value Date    WBC 6.6 06/29/2021    HGB 6.9 (LL) 06/29/2021    HCT 21.1 (L) 06/29/2021    PLT 38 (L) 06/29/2021    PROTIME 17.2 (H) 06/29/2021    INR 1.6 06/29/2021    K 3.8 06/29/2021    BUN 98 (H) 06/29/2021    CREATININE 3.5 (H) 06/29/2021       RADIOLOGY:  CXR shows white-out of left lung    Assesment/Plan  -Temporary dialysis catheter placement while platelets hanging  -Please call vascular surgery for issues with the line  -Recommend exchange or removal in 7 to 10 days  -Please call if longer-term access is needed    Electronically signed by Mary Contreras MD on 6/29/2021 at 5:18 PM    Pt seen and examined  Temp hd cath in place   Functioning  Please call if long term access needed    Adrian Nguyen MD

## 2021-06-29 NOTE — PROGRESS NOTES
Arrived to room for Dialysis tx. Noted patient with bradycardia and hypotension. Nurse reported pt. Just had an episode of pulseless vfib(came out of on own) Now is junctional. All reported to nephrologist. Dr. Bailon Counter wants a bmp obtained and to attempt a UF only tx if pt. Tolerates.

## 2021-06-30 NOTE — PROGRESS NOTES
Hospitalist Progress Note      SYNOPSIS: Patient admitted on 2021 for Closed nondisplaced type II dens fracture ([address]      SUBJECTIVE:yesterd hypotnsv and bradycardc      mechnc fall  Trauma   c2fx  Pneumonia bacterial  Left lung   Bacteremia- staph warneii staph epidermidis  resp failure  septc shock  hemdynam shock  multiple myeloma  hypocalcem  sHock liver  ckd III  afib with RVR  Hx of dvt  Nc anemia  thrombocytopentroponitis  Had been rcving SLED treatment        Temp (24hrs), Av.3 °F (37.9 °C), Min:98.9 °F (37.2 °C), Max:101.3 °F (38.5 °C)    DIET: Diet NPO  CODE: DNR-CCA    Intake/Output Summary (Last 24 hours) at 2021 1311  Last data filed at 2021 1158  Gross per 24 hour   Intake 3646.05 ml   Output 2550 ml   Net 1096.05 ml       OBJECTIVE:    /62   Pulse 108   Temp 98.9 °F (37.2 °C) (Esophageal)   Resp 30   Ht 5' 4\" (1.626 m)   Wt 182 lb 15.7 oz (83 kg)   SpO2 93%   BMI 31.41 kg/m²   Gen= elderly  Intubated  Resp= on vent intubated clear lungs  CV= episodic tachycardia  119-143    ASSESSMENT:    Hypotensive  bradycradic  resp failure acute hypoxia  Pneumon bacterial Left   Anemia  afib rvr   chrnc lbbb  transaminitis  Hyperkalemia  Elevated tropon  Leukopenia  Thrombocytopenia    1+ schitocytes   however periph smear review on  does not mention presence of schistocytes    LDH elevated     coags midly elevated     Fibrinogen level high    D dimer elevated   Multiple myeloma     PLAN:    As per crit care team    DISPOSITION: tbd    Medications:  REVIEWED DAILY    Infusion Medications    fentaNYL 5 mcg/ml in 0.9%  ml infusion Stopped (21 0901)    sodium chloride      dextrose      [Held by provider] heparin (PORCINE) Infusion Stopped (21 1000)    sodium chloride       Scheduled Medications    hydrocortisone sodium succinate PF  50 mg Intravenous Q8H    vancomycin  1,000 mg Intravenous Once    insulin lispro  0-12 Units Subcutaneous Q4H    mupirocin   Topical BID    artificial tears   Both Eyes BID    chlorhexidine  15 mL Mouth/Throat BID    vancomycin (VANCOCIN) intermittent dosing (placeholder)   Other RX Placeholder    pantoprazole  40 mg Intravenous Daily    And    sodium chloride (PF)  10 mL Intravenous Daily    piperacillin-tazobactam  3,375 mg Intravenous Q12H    And    sodium chloride   Intravenous Q12H    ipratropium-albuterol  1 ampule Inhalation Q4H WA    [Held by provider] amLODIPine  10 mg Oral Daily    [Held by provider] apixaban  2.5 mg Oral BID    [Held by provider] furosemide  20 mg Oral Daily    [Held by provider] gabapentin  100 mg Oral BID    [Held by provider] lenalidomide  10 mg Oral Daily    therapeutic multivitamin-minerals  1 tablet Oral Daily    sodium chloride flush  5-40 mL Intravenous 2 times per day     PRN Meds: sodium chloride, anticoagulant sodium citrate, perflutren lipid microspheres, glucose, dextrose, glucagon (rDNA), dextrose, [Held by provider] heparin (porcine), [Held by provider] heparin (porcine), midazolam, sodium chloride flush, sodium chloride, polyethylene glycol, acetaminophen **OR** acetaminophen    Labs:     Recent Labs     06/29/21  0433 06/29/21  0433 06/29/21  1113 06/29/21  1739 06/29/21  2356 06/30/21  0500 06/30/21  1150   WBC 7.1  --  6.6  --   --  12.3*  --    HGB 7.1*   < > 6.9*   < > 8.4* 8.5* 8.2*   HCT 21.9*   < > 21.1*   < > 25.1* 25.0* 24.4*   PLT 51*  --  38*  --   --  26*  --     < > = values in this interval not displayed.        Recent Labs     06/29/21  1739 06/29/21  2113 06/29/21  2356 06/29/21  2356 06/30/21  0500 06/30/21  0926 06/30/21  1150      < > 137   < > 142 143 142   K 3.7   < > 3.7   < > 4.0 4.2 3.9   CL 96*   < > 94*   < > 98 100 98   CO2 23   < > 23   < > 23 25 23   *   < > 104*   < > 109* 110* 112*   CREATININE 3.7*   < > 3.8*   < > 4.0* 4.0* 4.0*   CALCIUM 7.5*   < > 7.7*   < > 7.9* 8.3* 8.4*   PHOS 6.3*  --  5.1*  --  4.9*  --   --     < > = values in this interval not displayed. Recent Labs     06/27/21  1903 06/28/21  0006 06/28/21  0514 06/29/21  0433 06/30/21  0500   PROT   < >  --  5.0* 5.6* 6.0*   ALKPHOS   < >  --  44 38 48   ALT   < >  --  661* 785* 553*   AST   < >  --  1,530* 988* 401*   BILITOT   < >  --  0.5 1.2 1.9*   LIPASE  --  8*  --   --   --     < > = values in this interval not displayed. Recent Labs     06/28/21  1433 06/29/21  0433 06/30/21  0500   INR 1.5 1.6 1.6       Recent Labs     06/27/21 1903   CKTOTAL 132       Chronic labs:    Lab Results   Component Value Date    TSH 0.127 (L) 06/30/2021    INR 1.6 06/30/2021       Radiology: cxr 6/30/21  Persistent near complete opacification of the left hemithorax   2. The right lung is clear. 3. Stable position of the support lines and tubes.         +++++++++++++++++++++++++++++++++++++++++++++++++  DO Marty Sotomayor Physician - [address] [place], [place]  +++++++++++++++++++++++++++++++++++++++++++++++++  NOTE: This report was transcribed using voice recognition software. Every effort was made to ensure accuracy; however, inadvertent computerized transcription errors may be present.

## 2021-06-30 NOTE — PROGRESS NOTES
MD at bedside d/t decreased SP02 reading in high 80s. Vent changes made and pt readjusted to optimal lung position. Will draw ABG in one hour to reevaluate.

## 2021-06-30 NOTE — PROGRESS NOTES
Comprehensive Nutrition Assessment    Type and Reason for Visit:  Initial, RD Nutrition Re-Screen/LOS     Nutrition Recommendations/Plan: Rec initiate EN when able. If able to start EN, would rec Nepro @ goal rate of 40ml/hr to provide: 1728 kcals, 77 gm pro and 698 ml free water. Nutrition Assessment:  Adm s/p fall with closed nondisplaced type II dens fracture. Presents with PNA, bacteremia, resp failure, septic shock and CKD. Bronch 6/28, SLED treament initiated. Pt with h/o multiple myeloma and bone cancer. Was on chemo PTA. No malnutrition identiifed at this time. Currently NPO. Would rec EN when appropriate. Malnutrition Assessment:  Malnutrition Status:  No malnutrition    Context:  Chronic Illness       Estimated Daily Nutrient Needs:  Energy (kcal):  1144-3205 kcals (PS10); Weight Used for Energy Requirements:  Current     Protein (g):  65-83 gm pro (1.2-1.5 gm pro); Weight Used for Protein Requirements:  Ideal        Fluid (ml/day):  Per MD;     Nutrition Related Findings:  Currently intubated and sedated. Abd rounded, hypo BS, +1 pitting edema to ROSA ISELA. +I&O. Meds incl: insulin, Zosyn, Vanco and IV Fentanyl. Lasix held. Labs reviewed: electrolytes WNL, BUN/Cr elevated, gluc 141-234, Phos 4.9, CRP improved from 67 to 49.3 but remains elevated, liver tests elevated, PreAlb 5. Wounds:   (boggy heels)       Current Nutrition Therapies:    Diet NPO    Anthropometric Measures:  · Height: 5' 4\" (162.6 cm)  · Current Body Weight: 182 lb 15.7 oz (83 kg)   · Admission Body Weight: 159 lb 9.8 oz (72.4 kg) (6/27 first actual recorded)    · Usual Body Weight: 149 lb (67.6 kg) (12/30/20, actual)     · Ideal Body Weight: 120 lbs; % Ideal Body Weight 152.5 %   · BMI: 27.6 based on wt from 6/29   · BMI Categories: Overweight (BMI 25.0-29.9) (Wt elevated due to fluids, wts 159-163# past 3 days.  BMI 27.6)       Nutrition Diagnosis:   · Inadequate protein-energy intake related to impaired respiratory function as evidenced by NPO or clear liquid status due to medical condition, intubation      Nutrition Interventions:   Food and/or Nutrient Delivery:  Continue NPO  Nutrition Education/Counseling:  No recommendation at this time   Coordination of Nutrition Care:  Continue to monitor while inpatient    Goals:  Initiate EN as able       Nutrition Monitoring and Evaluation:   Behavioral-Environmental Outcomes:  None Identified   Food/Nutrient Intake Outcomes:  Diet Advancement/Tolerance  Physical Signs/Symptoms Outcomes:  Biochemical Data, Fluid Status or Edema, Hemodynamic Status, Nutrition Focused Physical Findings, Weight, Skin     Discharge Planning:     Too soon to determine     Electronically signed by Jannette Rios RD, LD on 6/30/21 at 3:15 PM EDT    Contact: [phone]

## 2021-06-30 NOTE — PROGRESS NOTES
Infectious Disease  Progress Note  [facility]    Chief Complaint:  On vent    Subjective: bacteremia  HAP    Scheduled Meds:   calcium gluconate IVPB  2,000 mg Intravenous Once    insulin lispro  0-12 Units Subcutaneous Q4H    mupirocin   Topical BID    hydrocortisone sodium succinate PF  100 mg Intravenous Q8H    artificial tears   Both Eyes BID    chlorhexidine  15 mL Mouth/Throat BID    vancomycin (VANCOCIN) intermittent dosing (placeholder)   Other RX Placeholder    pantoprazole  40 mg Intravenous Daily    And    sodium chloride (PF)  10 mL Intravenous Daily    piperacillin-tazobactam  3,375 mg Intravenous Q12H    And    sodium chloride   Intravenous Q12H    ipratropium-albuterol  1 ampule Inhalation Q4H WA    [Held by provider] amLODIPine  10 mg Oral Daily    [Held by provider] apixaban  2.5 mg Oral BID    [Held by provider] furosemide  20 mg Oral Daily    [Held by provider] gabapentin  100 mg Oral BID    [Held by provider] lenalidomide  10 mg Oral Daily    therapeutic multivitamin-minerals  1 tablet Oral Daily    sodium chloride flush  5-40 mL Intravenous 2 times per day     Continuous Infusions:   fentaNYL 5 mcg/ml in 0.9%  ml infusion Stopped (06/30/21 0901)    sodium chloride      sodium chloride      sodium chloride      norepinephrine Stopped (06/30/21 0830)    dextrose      [Held by provider] heparin (PORCINE) Infusion Stopped (06/29/21 1000)    sodium chloride       PRN Meds:sodium chloride, sodium chloride, sodium chloride, anticoagulant sodium citrate, perflutren lipid microspheres, glucose, dextrose, glucagon (rDNA), dextrose, [Held by provider] heparin (porcine), [Held by provider] heparin (porcine), midazolam, sodium chloride flush, sodium chloride, polyethylene glycol, acetaminophen **OR** acetaminophen    Patient Vitals for the past 24 hrs:   BP Temp Temp src Pulse Resp SpO2 Weight   06/30/21 0713 130/62 99 °F (37.2 °C) Esophageal 89 24     06/30/21 0600 131/63   92 24 96 %    06/30/21 0500 (!) 127/59   103 25 93 %    06/30/21 0400 136/64 99 °F (37.2 °C) Esophageal 101 23 93 % 182 lb 15.7 oz (83 kg)   06/30/21 0300 137/66   151 24 93 %    06/30/21 0200 139/62   136 24 92 %    06/30/21 0100 131/62   127 23 92 %    06/30/21 0000 (!) 131/58 100.6 °F (38.1 °C) Esophageal 150 27 92 %    06/29/21 2300 (!) 125/46   (!) 47 25 90 %    06/29/21 2200 (!) 124/45   (!) 49 23 92 %    06/29/21 2100 (!) 85/58   52 24 91 %    06/29/21 2000 (!) 138/54 101.3 °F (38.5 °C) Esophageal 50 24 92 %    06/29/21 1945    50 18  160 lb 15 oz (73 kg)   06/29/21 1930    (!) 49      06/29/21 1915    (!) 49      06/29/21 1900 (!) 156/56   50 21 93 %    06/29/21 1845    50      06/29/21 1830    50      06/29/21 1817    (!) 48 22 96 %    06/29/21 1815    50      06/29/21 1812    (!) 49 26 95 %    06/29/21 1807    (!) 49 24 94 %    06/29/21 1802    52 18 94 %    06/29/21 1800  100.9 °F (38.3 °C) Esophageal 52 24 96 %    06/29/21 1745 (!) 100/43 99 °F (37.2 °C)  50 22  163 lb 2.3 oz (74 kg)   06/29/21 1700    (!) 49 24     06/29/21 1646 91/63 100.8 °F (38.2 °C) Esophageal       06/29/21 1631 99/67 100.9 °F (38.3 °C)  105      06/29/21 1600 (!) 98/54 100.6 °F (38.1 °C) Esophageal 99 24 95 %    06/29/21 1530 (!) 102/58 100.8 °F (38.2 °C) Esophageal 113 24 94 %    06/29/21 1502    107 24 93 %    06/29/21 1500 (!) 98/56 100.8 °F (38.2 °C) Esophageal 108 24 93 %    06/29/21 1452    114 24 93 %    06/29/21 1450    136 24 92 %    06/29/21 1447 (!) 109/55 100.6 °F (38.1 °C)  113 24 91 %    06/29/21 1400  100.6 °F (38.1 °C) Esophageal 108 24 95 %    06/29/21 1331     24 92 %    06/29/21 1330    102 21 92 %    06/29/21 1300    104 25     06/29/21 1200  100.4 °F (38 °C) Esophageal 105 24 91 %    06/29/21 1100    106 24 93 %    06/29/21 1000    150 17 95 %        CBC with Differential: Lab Results   Component Value Date    WBC 12.3 06/30/2021    RBC 2.74 06/30/2021    HGB 8.5 06/30/2021    HCT 25.0 06/30/2021    PLT 26 06/30/2021    MCV 91.2 06/30/2021    MCH 31.0 06/30/2021    MCHC 34.0 06/30/2021    RDW 15.9 06/30/2021    NRBC 0.9 06/29/2021    SEGSPCT 45.6 05/27/2021    BANDSPCT 12.0 06/24/2021    METASPCT 3.5 06/29/2021    LYMPHOPCT 0.4 06/30/2021    MONOPCT 3.5 06/30/2021    MYELOPCT 0.9 06/29/2021    BASOPCT 0.1 06/30/2021    MONOSABS 0.49 06/30/2021    LYMPHSABS 0.00 06/30/2021    EOSABS 0.00 06/30/2021    BASOSABS 0.00 06/30/2021     CMP:    Lab Results   Component Value Date     06/30/2021    K 4.0 06/30/2021    K 4.2 06/27/2021    CL 98 06/30/2021    CO2 23 06/30/2021     06/30/2021    CREATININE 4.0 06/30/2021    GFRAA 13 06/30/2021    AGRATIO 0.9 06/24/2021    AGRATIO 1.1 06/24/2021    LABGLOM 11 06/30/2021    GLUCOSE 141 06/30/2021    PROT 6.0 06/30/2021    LABALBU 3.5 06/30/2021    CALCIUM 7.9 06/30/2021    BILITOT 1.9 06/30/2021    ALKPHOS 48 06/30/2021     06/30/2021     06/30/2021       /62   Pulse 89   Temp 99 °F (37.2 °C) (Esophageal)   Resp 24   Ht 5' 4\" (1.626 m)   Wt 182 lb 15.7 oz (83 kg)   SpO2 96%   BMI 31.41 kg/m²     Physical Exam  Const/Neuro- unchanged, no signs of acute distress, Alert  ENMT- Within Normal Limits, Normocephalic, mucous membranes pink/moist, No thrush  Neck: Neck supple  Heart- Regular, Rate, Rhythm- no murmur appreciated. Lungs- clear to ascultation. Respirations even and nonlabored. Abdomen- Soft, bowel sounds positive, non tender  Musculo/Extremities-  Equal and symmetrical, no edema. No tenderness. Skin:  Warm and dry, free from rashes. Cultures reviewed    Radiology reviewed  XR CHEST PORTABLE   Final Result   1. Persistent near complete opacification of the left hemithorax   2. The right lung is clear. 3. Stable position of the support lines and tubes.          XR ABDOMEN FOR NG/OG/NE TUBE PLACEMENT   Final Result   Satisfactory position of nasogastric tube. XR CHEST PORTABLE   Final Result   1. Persistent near complete whiteout of the left lung unchanged when compared   to the prior study. 2. The right lung is clear. 3.      XR CHEST PORTABLE   Final Result   1. Opacities throughout left lung. New opacities are present in previously   aerated left upper lung field. 2.  Endotracheal tube is 2.4 cm above the paradise. US RETROPERITONEAL COMPLETE   Final Result   Unremarkable ultrasound of the kidneys. The urinary bladder is decompressed around a Bennett catheter. XR CHEST PORTABLE   Final Result   1. Persistent the infiltrate with pleural effusion in the left lung cause   significant the opacification of the left image chest particular from the mid   to the base. 2.  No significant changes since the previous examinations recently performed. 3.  Please see report of CT chest of June 26. XR CHEST ABDOMEN NG PLACEMENT   Final Result   Satisfactory position of the NGT. XR CHEST PORTABLE   Final Result   Intervally placed NGT extends below the diaphragm into at least the mid   stomach, with tip not confidently seen within the field of view. Endotracheal tube tip is 1.7 cm from the paradise and could be backed out 5-10   mm for more optimal positioning. Otherwise, stable chest with unchanged left lung opacities. XR CHEST PORTABLE   Final Result   Stable abnormal chest with persistent infiltrates and pleural effusion in the   left lung base likely pneumonia. Surveillance recommended to see complete   clearing. There is minimal infiltrates developing in the right lung base. Endotracheal tube close to the paradise and could be retracted by 1 cm. XR CHEST PORTABLE   Final Result   Stable chest series. Persistent dense consolidation in the left mid and   lower lung. Stable atherosclerotic disease.   No new cardiopulmonary pathology. CT Head WO Contrast   Final Result   Stable atrophy with small vessel ischemic disease. There is no acute stroke   or hemorrhage. Persistent multiple punctate lytic lesions the calvarium likely due to   malignancy like multiple myeloma. CT cervical spine. There is a fracture of the base of the dens of C2 (type 2). There is no   displacement. No other acute fractures are identified in the cervical spine. There is mild diffuse degenerative changes from C3-T1 with osteophytes and   multilevel disc bulges. There is osteopenia and punctate lytic lesions   concerning for multiple myeloma. Impression      Type 2 fracture of the dens of C2. Diffuse degenerative changes from C3-T1. Multiple myeloma. CT chest.      Comparison December 30, 2020. Findings      There is borderline cardiac size. The great vessels are normal.  Trachea and   major bronchi are patent. There is dense consolidation in the left upper   lobe and left lower lobe concerning for pneumonia. There is minimal   atelectasis in the right lung base. The liver is of normal architecture. There is diffuse demineralization of the thoracic spine with punctate   lucencies. Compression fractures with vertebroplasty are noted. There is   slight irregularity of the sternum probably artifact. There is kyphosis of   the spine. Impression      Dense consolidation in the left upper lobe and left lower lobe concerning for   pneumonia. Surveillance after appropriate treatment is recommended with   repeat CT scan in 6-8 weeks to see complete resolution and exclude underlying   malignancy. Demineralization and the punctate lytic lucencies in the spine and ribs   concerning for multiple myeloma. Critical results were called by Dr. Evans Thompson to Dr. Coty Hernandez. on   6/26/2021 at 16:30.         CT Cervical Spine WO Contrast   Final Result   Stable atrophy with small vessel ischemic disease. There is no acute stroke   or hemorrhage. Persistent multiple punctate lytic lesions the calvarium likely due to   malignancy like multiple myeloma. CT cervical spine. There is a fracture of the base of the dens of C2 (type 2). There is no   displacement. No other acute fractures are identified in the cervical spine. There is mild diffuse degenerative changes from C3-T1 with osteophytes and   multilevel disc bulges. There is osteopenia and punctate lytic lesions   concerning for multiple myeloma. Impression      Type 2 fracture of the dens of C2. Diffuse degenerative changes from C3-T1. Multiple myeloma. CT chest.      Comparison December 30, 2020. Findings      There is borderline cardiac size. The great vessels are normal.  Trachea and   major bronchi are patent. There is dense consolidation in the left upper   lobe and left lower lobe concerning for pneumonia. There is minimal   atelectasis in the right lung base. The liver is of normal architecture. There is diffuse demineralization of the thoracic spine with punctate   lucencies. Compression fractures with vertebroplasty are noted. There is   slight irregularity of the sternum probably artifact. There is kyphosis of   the spine. Impression      Dense consolidation in the left upper lobe and left lower lobe concerning for   pneumonia. Surveillance after appropriate treatment is recommended with   repeat CT scan in 6-8 weeks to see complete resolution and exclude underlying   malignancy. Demineralization and the punctate lytic lucencies in the spine and ribs   concerning for multiple myeloma. Critical results were called by Dr. Linh Wilcox to Dr. Emperatriz Monreal on   6/26/2021 at 16:30.         CT CHEST WO CONTRAST   Final Result   Stable atrophy with small vessel ischemic disease. There is no acute stroke   or hemorrhage.       Persistent multiple punctate lytic lesions the calvarium likely due to malignancy like multiple myeloma. CT cervical spine. There is a fracture of the base of the dens of C2 (type 2). There is no   displacement. No other acute fractures are identified in the cervical spine. There is mild diffuse degenerative changes from C3-T1 with osteophytes and   multilevel disc bulges. There is osteopenia and punctate lytic lesions   concerning for multiple myeloma. Impression      Type 2 fracture of the dens of C2. Diffuse degenerative changes from C3-T1. Multiple myeloma. CT chest.      Comparison December 30, 2020. Findings      There is borderline cardiac size. The great vessels are normal.  Trachea and   major bronchi are patent. There is dense consolidation in the left upper   lobe and left lower lobe concerning for pneumonia. There is minimal   atelectasis in the right lung base. The liver is of normal architecture. There is diffuse demineralization of the thoracic spine with punctate   lucencies. Compression fractures with vertebroplasty are noted. There is   slight irregularity of the sternum probably artifact. There is kyphosis of   the spine. Impression      Dense consolidation in the left upper lobe and left lower lobe concerning for   pneumonia. Surveillance after appropriate treatment is recommended with   repeat CT scan in 6-8 weeks to see complete resolution and exclude underlying   malignancy. Demineralization and the punctate lytic lucencies in the spine and ribs   concerning for multiple myeloma. Critical results were called by Dr. Ko Dobbs to Dr. Sona nIman. on   6/26/2021 at 16:30. XR CHEST PORTABLE   Final Result   Consolidative airspace opacities in the left mid to lower lung zone with a   suspected small left pleural effusion. Findings may be on the basis of   pneumonia or less likely asymmetric pulmonary edema.   Consider correlation   with CT of the chest.         XR CHEST PORTABLE    (Results Pending)       Assessment  Trauma fall  resp failure  c2 fx  Pneumonia HAP  High grade bacteremia  MR staph species  On vent  DNR-CCA   CXR : persistent infiltrates with pleural effusion in the left lung base  significat opacification of the left  Bronch  Yesterday   cutlures pending   Acute myocardial injury in the setting of septic shock   Multiple myeloma  Talked to MICRO :  BC : staph warneii    Staph epidermidis both in same sets  3/4   Personally talked to micro   Temp dialysis cath yesterday   Subsequent BC are negative     Plan  On zosyn  But needs vanco also   Repeat BC  Wait for ID She will most likely need 2D echo and KHUSHBOO   Check cultures   Baseline ESR, CRP   Monitor labs   Will follow with you  ECHO REPORT PENDING ???  Repeat BC today          Electronically signed by Imelda Jo MD on 6/30/2021 at 9:05 AM

## 2021-06-30 NOTE — PLAN OF CARE
Problem: Falls - Risk of:  Goal: Will remain free from falls  Description: Will remain free from falls  6/30/2021 0024 by Sol Moore RN  Outcome: Met This Shift     Problem: Falls - Risk of:  Goal: Absence of physical injury  Description: Absence of physical injury  6/30/2021 0024 by Sol Moore RN  Outcome: Met This Shift     Problem: Skin Integrity:  Goal: Will show no infection signs and symptoms  Description: Will show no infection signs and symptoms  6/30/2021 0024 by Sol Moore RN  Outcome: Met This Shift     Problem: Skin Integrity:  Goal: Absence of new skin breakdown  Description: Absence of new skin breakdown  6/30/2021 0024 by Sol Moore RN  Outcome: Met This Shift

## 2021-06-30 NOTE — FLOWSHEET NOTE
06/29/21 1945   Vital Signs   BP - Standing 156/55   Pulse 50   Resp 18   Weight 160 lb 15 oz (73 kg)   Percent Weight Change -1.35   Post-Hemodialysis Assessment   Post-Treatment Procedures Blood returned;Catheter capped, clamped with Citrate x 2 ports   Machine Disinfection Process Acid/Vinegar Clean;Bleach   Rinseback Volume (ml) 300 ml   Dialyzer Clearance Lightly streaked   Duration of Treatment (minutes) 120 minutes   Hemodialysis Intake (ml) 300 ml   Hemodialysis Output (ml) 1300 ml   NET Removed (ml) 1000 ml   Tolerated Treatment Fair   Patient Response to Treatment james tx with pressor support. Patient bradycardic throughout treatment, bp stable on pressor. Full rb given, lines flushed and citrate to close. End caps secured. Report given to floor RN. Bilateral Breath Sounds Diminished   Edema Generalized   Edema Generalized +1   Physician Notified?  Yes

## 2021-06-30 NOTE — PROGRESS NOTES
Pharmacy Consultation Note  (Antibiotic Dosing and Monitoring)    Initial consult date: 2021  Consulting physician/provider: Dr. Michel Miramontes  Drug(s): vancomycin  Indication: Staph species bacteremia; immunocompromised    Age/Gender IBW DW  Allergy Information   78 y.o. female; 162.6 cm, 68 kg 50.6 kg 57.6 kg  Patient has no known allergies. Date  WBC BUN/CR Drug/Dose Time   Given Level(s)   (Time) Comments    3.2 67/2.7 X X      2.7 78/2.9 Vancomycin 1250 mg x1 1247      6.3 91/3.1 Vancomycin 1000 mg IV x1 1143      7.1 98/3.5 x x Random 22.6 @0433     12.2 HD Vancomycin 1000 mg IVx1 1819                         Estimated Creatinine Clearance: 11 mL/min (A) (based on SCr of 4 mg/dL (H)). Intake/Output Summary (Last 24 hours) at 2021 1220  Last data filed at 2021 1158  Gross per 24 hour   Intake 3646.05 ml   Output 2575 ml   Net 1071.05 ml       Temp max: Temp (24hrs), Av.3 °F (37.9 °C), Min:98.9 °F (37.2 °C), Max:101.3 °F (38.5 °C)      Assessment:  · Consulted by Dr. Michel Miramontes to dose/monitor vancomycin. · Goal trough level:  15-20 mCg/mL; AUC/ELEUTERIO = 400-600 mg/L-hr. · 81 yo/F admitted from SNF after fall and found down for unknown length of time. BC's from  +for [facility]; Blood PCR today revealed methicillin-resistant Staph species. · Febrile, tachycardic, and lymphopenic on admission. · PMH: MM on lenalinomide therapy. · N ow receiving HD    Plan:  · Vancomycin 1000 mg IV x1 post HD  · Will check vancomycin level when steady state is attained if vanco continues. · Pharmacist will follow and monitor/adjust dosing as necessary.     Alistair Queen, PharmD, BCPS 2021 12:20 PM

## 2021-06-30 NOTE — PROGRESS NOTES
Mary Jo Stewart 1940    SUBJECTIVE:    Intubated  SLED x 4 hours today    OBJECTIVE  Physical Exam:  VITALS:  /62   Pulse 103   Temp 98.9 °F (37.2 °C) (Esophageal)   Resp 30   Ht 5' 4\" (1.626 m)   Wt 182 lb 15.7 oz (83 kg)   SpO2 90%   BMI 31.41 kg/m²   General: Intubated and sedated on fentanyl  HEENT: PERRL, ETT intact, Cervical collar on   Respiratory: Vent synchronous, bilateral course breath sounds    Cardiac: irregular rate and rhythm. Pulses palpable   Abdominal: soft, non distended, hypoactive bowel sounds  Extremities: No clubbing or cyanosis   Neuro: responds to commands     DIAGNOSTIC DATA  Lab Results   Component Value Date    WBC 12.3 (H) 06/30/2021    HGB 8.5 (L) 06/30/2021    HCT 25.0 (L) 06/30/2021    MCV 91.2 06/30/2021    PLT 26 (L) 06/30/2021     No results found for: CEA  Recent Labs     06/29/21  1739 06/29/21  2113 06/29/21  2356 06/30/21  0500 06/30/21  0926      < > 137 142 143   CO2 23   < > 23 23 25   PHOS 6.3*  --  5.1* 4.9*  --    *   < > 104* 109* 110*   CREATININE 3.7*   < > 3.8* 4.0* 4.0*    < > = values in this interval not displayed.        Current Facility-Administered Medications   Medication Dose Route Frequency Provider Last Rate Last Admin    insulin lispro (HUMALOG) injection vial 0-12 Units  0-12 Units Subcutaneous Q4H Lis Cabrera MD   2 Units at 06/30/21 0939    fentaNYL 5 mcg/ml in 0.9%  ml infusion  12.5-200 mcg/hr Intravenous Continuous Alma Delia Castaneda DO   Stopped at 06/30/21 0901    mupirocin (BACTROBAN) 2 % ointment   Topical BID Alma Delia Castaneda DO   Given at 06/30/21 0814    0.9 % sodium chloride infusion   Intravenous PRN Selvin Collins MD        0.9 % sodium chloride infusion   Intravenous PRN Henrik Banda MD        0.9 % sodium chloride infusion   Intravenous PRN Reyna Francisco MD        anticoagulant sodium citrate 4 GM/100ML solution 0.12 g  3 mL Intracatheter PRN Tommy Yanez MD        norepinephrine (LEVOPHED) 16 mg in dextrose 5% 250 mL infusion  2-100 mcg/min Intravenous Continuous Jean Claude Beltran MD   Stopped at 06/30/21 0830    hydrocortisone sodium succinate PF (SOLU-CORTEF) injection 100 mg  100 mg Intravenous Clement Dorsey MD   100 mg at 06/30/21 0755    lubrifresh P.M. (artificial tears) ophthalmic ointment   Both Eyes BID Constantine Reed MD   Given at 06/30/21 0757    chlorhexidine (PERIDEX) 0.12 % solution 15 mL  15 mL Mouth/Throat BID Allen Deng MD   15 mL at 06/30/21 0813    perflutren lipid microspheres (DEFINITY) injection 1.65 mg  1.5 mL Intravenous ONCE PRN Jannette Boyd MD        vancomycin Cherilynn Mate) intermittent dosing (placeholder)   Other RX Placeholder Ti Hill DO        glucose (GLUTOSE) 40 % oral gel 15 g  15 g Oral PRN Dustin Tovar MD        dextrose 50 % IV solution  12.5 g Intravenous PRN Dustin Tovar MD        glucagon (rDNA) injection 1 mg  1 mg Intramuscular PRN Dustin Tovar MD        dextrose 5 % solution  100 mL/hr Intravenous PRN Dustin Tovar MD        pantoprazole (PROTONIX) injection 40 mg  40 mg Intravenous Daily Dustin Tovar MD   40 mg at 06/30/21 0756    And    sodium chloride (PF) 0.9 % injection 10 mL  10 mL Intravenous Daily Dustin Tovar MD   10 mL at 06/30/21 0756    piperacillin-tazobactam (ZOSYN) 3,375 mg in dextrose 5 % 100 mL IVPB extended infusion (mini-bag)  3,375 mg Intravenous Q12H Jean Claude Beltran MD 25 mL/hr at 06/30/21 0813 3,375 mg at 06/30/21 0813    And    0.9 % sodium chloride infusion admixture   Intravenous Q12H Jean Claude Beltran MD   Stopped at 06/30/21 0156    ipratropium-albuterol (DUONEB) nebulizer solution 1 ampule  1 ampule Inhalation Q4H WA Jean Claude Beltran MD   1 ampule at 06/30/21 0917    [Held by provider] heparin (porcine) injection 4,000 Units  4,000 Units Intravenous PRN Jean Claude Beltran MD        [facility] [place] by provider] heparin (porcine) injection 2,000 Units  2,000 Units Intravenous PRN Gulshan Bell MD   2,000 Units at 06/28/21 1632    [Held by provider] heparin 25,000 units in dextrose 5% 250 mL (premix) infusion  5-30 Units/kg/hr Intravenous Continuous Gulshan Bell MD   Stopped at 06/29/21 1000    midazolam PF (VERSED) injection 2 mg  2 mg Intravenous Q4H PRN Noman Villalta MD   6 mg at 06/28/21 1243    [Held by provider] amLODIPine (NORVASC) tablet 10 mg  10 mg Oral Daily Arpit Warren MD        [Held by provider] apixaban (ELIQUIS) tablet 2.5 mg  2.5 mg Oral BID Arpit Warren MD        [Held by provider] furosemide (LASIX) tablet 20 mg  20 mg Oral Daily Arpit Warren MD        [Held by provider] gabapentin (NEURONTIN) capsule 100 mg  100 mg Oral BID Arpit Warren MD   100 mg at 06/28/21 0829    [Held by provider] lenalidomide (REVLIMID) chemo capsule 10 mg  10 mg Oral Daily Arpit Warren MD        therapeutic multivitamin-minerals 1 tablet  1 tablet Oral Daily Arpit Warren MD   1 tablet at 06/30/21 8949    sodium chloride flush 0.9 % injection 5-40 mL  5-40 mL Intravenous 2 times per day Arpit Warren MD   10 mL at 06/29/21 2001    sodium chloride flush 0.9 % injection 5-40 mL  5-40 mL Intravenous PRN Arpit Warren MD   10 mL at 06/29/21 1558    0.9 % sodium chloride infusion  25 mL Intravenous PRN Arpit Warren MD        polyethylene glycol (GLYCOLAX) packet 17 g  17 g Oral Daily PRN Arpit Warren MD        acetaminophen (TYLENOL) tablet 650 mg  650 mg Oral Q6H PRN Arpit Warren MD        Or    acetaminophen (TYLENOL) suppository 650 mg  650 mg Rectal Q6H PRN Arpit Warren MD   650 mg at 06/29/21 0004         IMPRESSION:  Patient Active Problem List   Diagnosis    Multiple myeloma ([address]    Essential hypertension, benign    Localized edema    Thrombocytopenia, unspecified (HCC)    Pneumonia due to COVID-19 virus    Elevated serum creatinine    Essential hypertension    Hypernatremia    Hypermagnesemia    Hypokalemia    Acute respiratory failure with hypoxia and hypercapnia ([facility] [address]    CAP (community acquired pneumonia) due to Pneumococcus [facility])    Closed nondisplaced type II dens fracture ([facility] [address]    Red blood cell antibody positive       PLAN:  78year old female PMH of Multiple myeloma on lenalidomide, DVT on Eliquis, and polyneuropathy that presented to [facility] ER from nursing home via EMS for unwitnessed fall and SOB on 2021. Patient had complaint of SOB and left sided chest pain. At the nursing home patient is on room air and was found hypoxic, laced on NRB mask and sats improved. Upon arrival labs significant for BUN 67 Creatinine 2.7 LA: 4.2   WBC: Hgb: Hct: Plt:      Patient was given ceftriaxone and azithromycin      CT chest with persistent multiple punctate lytic lesions in the calvarium likely d/t multiple myeloma. CT head unremarkable    CT Cervical spine with C2 fracture     Patient developed worsening respiratory status and was intubated   Patient developed AFRVR and started on Low dose heparin and amio gtts. Underwent bronch 2021 with Dr Alejandra Keenan with normal airway examination. 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 -Currently on Daratumumab, Revlimid and Dex since 2020, Revlimid added 10/17/2020   -Last office visit was 2021 for cycle 8 Day 1 of Alysha and Cycle 22 Day 2 for Rev/Dex  -Most recent labs prior to admission with hgb: 12 WBC: 7.4 plt: 239  Ig IgA:71 IgM: 30 Protein: 5.9 Alpha 1 globulin: 0.5 Alpha 2 globulin: 1.1 Beta Globulin: 1.0 Gamma Globulin: 0.4 M spike: 0.1 A/G ratio: 1.1 Globulin: 2.9  Free kappa light chains: 11.6 Free lambda light chain: 11.8  K/L ratio: 0.98     -Neurosurgery consult; collar placement   -Hold Myeloma treatment for now. -Nephrology consult for AUDREY stage III on CKD- SLED x4 hours today   -ID consulted for gram + cocci bacteriemia with MRS-Bactrim and vanc.  recommend 2d echo and [id number]   -Anemia and Thrombocytopenia secondary to chemo, sepsis and ATB. Continue to monitor her CBCD. Transfuse to keep hgb>7 and plt>10, 1 unit transfused yesterday, hgb 8.5 plts 26. 23 Karley Laura Said   [phone]    The patient was seen and examined, I agree with RAMAN Mason's H&P, assessment and plan as outlined.    06/30/2021  Yfn Ramirez MD

## 2021-06-30 NOTE — PROGRESS NOTES
[facility]   Department of Internal Medicine   Internal Medicine Residency  MICU Progress Note    Patient:  Fredi Peterson 78 y.o. female   MRN: [id number]       Date of Service: 2021    Allergy: Patient has no known allergies. Subjective     Patient was seen and examined in AM.  Patient currently sedated and intubated on the vent. Only on fentanyl for sedation. Patient supine. Patient has a cervical collar on. Patient was following commands on the left upper extremity, bilateral lower extremity intermittently and opening her eyes to voice. 24 hour change:   Overnight, the patient had what looks like an initial V. fib followed by sinus pauses followed by junctional bradycardia followed by sinus bradycardia with more than 3-second pause. Then went into A. fib RVR again. Dig level overnight was 0.9. The patient also desaturated to 8889%. The overnight team increase the PEEP to 12 with plateau pressure between 2426. I have reviewed all pertinent PMHx, PSHx, FamHx, SocialHx, medications, and allergies and updated history as appropriate. Objective     TEMPERATURE:  Current - Temp: 101.3 °F (38.5 °C); Max - Temp  Av.1 °F (37.8 °C)  Min: 98.9 °F (37.2 °C)  Max: 101.3 °F (38.5 °C)  RESPIRATIONS RANGE: Resp  Av.7  Min: 18  Max: 32  PULSE RANGE: Pulse  Av  Min: 47  Max: 151  BLOOD PRESSURE RANGE:  Systolic (88SCB), OBZ:460 , Min:85 , KAH:529   ; Diastolic (65KLG), NMR:04, Min:43, Max:66    PULSE OXIMETRY RANGE: SpO2  Av %  Min: 86 %  Max: 96 %    I & O - 24hr:    Intake/Output Summary (Last 24 hours) at 2021 1645  Last data filed at 2021 1158  Gross per 24 hour   Intake 2114.05 ml   Output 2467 ml   Net -352.95 ml     I/O last 3 completed shifts: In: 2134.1 [I.V.:1151.6; Blood:682.5]  Out: 8944 [Urine:1220] No intake/output data recorded. Weight change:     Physical Exam  Vitals and nursing note reviewed.    Constitutional:       General: She is (06/29/21 1000)    sodium chloride         Scheduled Meds:   hydrocortisone sodium succinate PF  50 mg Intravenous Q8H    vancomycin  1,000 mg Intravenous Once    insulin lispro  0-12 Units Subcutaneous Q4H    mupirocin   Topical BID    artificial tears   Both Eyes BID    chlorhexidine  15 mL Mouth/Throat BID    vancomycin (VANCOCIN) intermittent dosing (placeholder)   Other RX Placeholder    pantoprazole  40 mg Intravenous Daily    And    sodium chloride (PF)  10 mL Intravenous Daily    piperacillin-tazobactam  3,375 mg Intravenous Q12H    And    sodium chloride   Intravenous Q12H    ipratropium-albuterol  1 ampule Inhalation Q4H WA    [Held by provider] amLODIPine  10 mg Oral Daily    [Held by provider] apixaban  2.5 mg Oral BID    [Held by provider] furosemide  20 mg Oral Daily    [Held by provider] gabapentin  100 mg Oral BID    [Held by provider] lenalidomide  10 mg Oral Daily    therapeutic multivitamin-minerals  1 tablet Oral Daily    sodium chloride flush  5-40 mL Intravenous 2 times per day       PRN Meds: sodium chloride, anticoagulant sodium citrate, perflutren lipid microspheres, glucose, dextrose, glucagon (rDNA), dextrose, [Held by provider] heparin (porcine), [Held by provider] heparin (porcine), midazolam, sodium chloride flush, sodium chloride, polyethylene glycol, acetaminophen **OR** acetaminophen    Home Meds:   Prior to Admission medications    Medication Sig Start Date End Date Taking?  Authorizing Provider   docusate sodium (COLACE) 100 MG capsule Take 100 mg by mouth 2 times daily as needed for Constipation   Yes Historical Provider, MD   benzonatate (TESSALON) 100 MG capsule Take 100 mg by mouth 3 times daily as needed for Cough   Yes Historical Provider, MD   Dexamethasone 20 MG TABS Take 20 mg by mouth once a week 6/26/21  Yes Historical Provider, MD   mirtazapine (REMERON) 15 MG tablet Take 7.5 mg by mouth nightly   Yes Historical Provider, MD   apixaban (ELIQUIS) 2.5 MG TABS tablet Take 1 tablet by mouth 2 times daily 1/4/21  Yes Matthew Higgins MD   amLODIPine (NORVASC) 10 MG tablet Take 1 tablet by mouth daily 1/5/21  Yes Matthew Higgins MD   furosemide (LASIX) 20 MG tablet Take 40 mg by mouth daily    Yes Historical Provider, MD   gabapentin (NEURONTIN) 100 MG capsule Take 100 mg by mouth 2 times daily. For foot pain   Yes Historical Provider, MD   Multiple Vitamins-Minerals (THERAPEUTIC MULTIVITAMIN-MINERALS) tablet Take 1 tablet by mouth daily   Yes Historical Provider, MD   HYDROcodone-acetaminophen (NORCO)  MG per tablet Take 1 tablet by mouth every 4 hours as needed for Pain. Yes Historical Provider, MD   lenalidomide (REVLIMID) 10 MG chemo capsule Take 10 mg by mouth daily Give at bedtime for 21 days starting 12/12/20    Historical Provider, MD   Ascorbic Acid (VITAMIN C ADULT GUMMIES PO) Take 1,000 mg by mouth daily    Historical Provider, MD   Zinc 100 MG TABS Take by mouth daily    Historical Provider, MD   ipratropium-albuterol (DUONEB) 0.5-2.5 (3) MG/3ML SOLN nebulizer solution Inhale 1 vial into the lungs every 4 hours As needed    Historical Provider, MD   ondansetron (ZOFRAN) 8 MG tablet Take 8 mg by mouth every 8 hours as needed for Nausea or Vomiting    Historical Provider, MD        Nutrition:   NPO    Labs and Imaging Studies     CBC:   Recent Labs     06/29/21  0433 06/29/21  0433 06/29/21  1113 06/29/21  1739 06/29/21  2356 06/30/21  0500 06/30/21  1150   WBC 7.1  --  6.6  --   --  12.3*  --    HGB 7.1*   < > 6.9*   < > 8.4* 8.5* 8.2*   HCT 21.9*   < > 21.1*   < > 25.1* 25.0* 24.4*   MCV 95.2  --  95.0  --   --  91.2  --    PLT 51*  --  38*  --   --  26*  --     < > = values in this interval not displayed.        BMP:    Recent Labs     06/30/21  0500 06/30/21  0926 06/30/21  1150    143 142   K 4.0 4.2 3.9   CL 98 100 98   CO2 23 25 23   * 110* 112*   CREATININE 4.0* 4.0* 4.0*   GLUCOSE 141* 145* 160*       LIVER PROFILE:   Recent Labs 06/27/21  1903 06/28/21  0006 06/28/21  0514 06/29/21  0433 06/30/21  0500   AST   < >  --  1,530* 988* 401*   ALT   < >  --  661* 785* 553*   LIPASE  --  8*  --   --   --    BILIDIR   < >  --  0.4* 0.8* 1.3*   BILITOT   < >  --  0.5 1.2 1.9*   ALKPHOS   < >  --  44 38 48    < > = values in this interval not displayed. PT/INR:   Recent Labs     06/28/21  1433 06/29/21  0433 06/30/21  0500   PROTIME 16.8* 17.2* 17.8*   INR 1.5 1.6 1.6       APTT:   Recent Labs     06/28/21  2230 06/29/21  0433 06/30/21  0500   APTT 53.0* 54.2* 31.0       Fasting Lipid Panel:    No results found for: CHOL, TRIG, HDL    Cardiac Enzymes:    Lab Results   Component Value Date    CKTOTAL 132 06/27/2021    CKTOTAL 57 06/26/2021    CKMB 1.6 06/27/2021    TROPONINI 0.03 12/30/2020       Notable Cultures:      Blood cultures   Blood Culture, Routine   Date Value Ref Range Status   06/29/2021 24 Hours no growth  Preliminary     Respiratory cultures No results found for: RESPCULTURE No results found for: LABGRAM  Urine   Urine Culture, Routine   Date Value Ref Range Status   06/26/2021 Growth not present  Final     Legionella No results found for: LABLEGI  C Diff PCR No results found for: CDIFPCR  Wound culture/abscess: No results for input(s): WNDABS in the last 72 hours. Tip culture:No results for input(s): CXCATHTIP in the last 72 hours.      Antibiotic  Days  Day started   Piperacillintazobactam   6/27/2021   Vancomycin       6/27/2021                Oxygen:     Vent Information  $Ventilation: $Subsequent Day  Skin Assessment: Clean, dry, & intact  Suction Catheter Diameter:  (16)  Equipment ID: [id number]  Vent Type: [phone]  Vent Mode: AC/VC+  Vt Ordered: 350 mL  Rate Set: 24 bmp  Peak Flow: 0 L/min  Pressure Support: 0 cmH20  FiO2 : 90 %  SpO2: (!) 89 %  SpO2/FiO2 ratio: 98.89  Sensitivity: 3  PEEP/CPAP: 12  I Time/ I Time %: 0 s  Humidification Source: Heated wire  Humidification Temp: 37  Humidification Temp Measured: 37  Mask Type: Full face mask  Mask Size: Medium  Additional Respiratory  Assessments  Pulse: 133  Resp: (!) 32  SpO2: (!) 89 %  Humidification Source: Heated wire  Humidification Temp: 37  Oral Care: Mouth swabbed, Mouth moisturizer, Mouth suctioned, Suction toothette, Mouthwash  Subglottic Suction Done?: Yes  Cuff Pressure (cm H2O): 29 cm H2O     Nasal cannula L/min     Face mask %     Reservoirs mask %       ABG   6/28/2021 Vent Settings   6/20/2021   PH   7.239 Mode   AC/VC   PCO2   44.4 TV   400   PO2   100 RR   14   HCO3   18.6 PS      Sat%   95.9 PEEP   10   FIO2   100% FIO2   100     P/F   1       Lines:  Site  Day  Date inserted     TLC  right femoral     6/27/2021     PICC              Arterial line  right radial       6/27/2021     Peripheral line              HD cath            Urethral Catheter-Output (mL): 120 mL    Imaging Studies:  CT Head WO Contrast    Result Date: 6/26/2021  EXAMINATION: CT OF THE HEAD WITHOUT CONTRAST; CT OF THE CHEST WITHOUT CONTRAST; CT OF THE CERVICAL SPINE WITHOUT CONTRAST  6/26/2021 1:56 pm TECHNIQUE: CT of the head was performed without the administration of intravenous contrast. Dose modulation, iterative reconstruction, and/or weight based adjustment of the mA/kV was utilized to reduce the radiation dose to as low as reasonably achievable.; CT of the chest was performed without the administration of intravenous contrast. Multiplanar reformatted images are provided for review. Dose modulation, iterative reconstruction, and/or weight based adjustment of the mA/kV was utilized to reduce the radiation dose to as low as reasonably achievable.; CT of the cervical spine was performed without the administration of intravenous contrast. Multiplanar reformatted images are provided for review. Dose modulation, iterative reconstruction, and/or weight based adjustment of the mA/kV was utilized to reduce the radiation dose to as low as reasonably achievable. COMPARISON: December 30, 2020.  HISTORY: ORDERING SYSTEM PROVIDED HISTORY: unwitnessed fall TECHNOLOGIST PROVIDED HISTORY: Reason for exam:->unwitnessed fall Has a \"code stroke\" or \"stroke alert\" been called? ->No Decision Support Exception - unselect if not a suspected or confirmed emergency medical condition->Emergency Medical Condition (MA) What reading provider will be dictating this exam?->CRC FINDINGS: There is diffuse atrophy with dilated ventricles and prominence of the sulci. 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 There is no acute stroke, mass or hemorrhage. A redemonstrated are extensive is punctate lytic lesions in the calvarium with motht eaten pattern likely multiple myeloma. Stable atrophy with small vessel ischemic disease. There is no acute stroke or hemorrhage. Persistent multiple punctate lytic lesions the calvarium likely due to malignancy like multiple myeloma. CT cervical spine. There is a fracture of the base of the dens of C2 (type 2). There is no displacement. No other acute fractures are identified in the cervical spine. There is mild diffuse degenerative changes from C3-T1 with osteophytes and multilevel disc bulges. There is osteopenia and punctate lytic lesions concerning for multiple myeloma. Impression Type 2 fracture of the dens of C2. Diffuse degenerative changes from C3-T1. Multiple myeloma. CT chest. Comparison December 30, 2020. Findings There is borderline cardiac size. The great vessels are normal.  Trachea and major bronchi are patent. There is dense consolidation in the left upper lobe and left lower lobe concerning for pneumonia. There is minimal atelectasis in the right lung base. The liver is of normal architecture. There is diffuse demineralization of the thoracic spine with punctate lucencies. Compression fractures with vertebroplasty are noted. There is slight irregularity of the sternum probably artifact. There is kyphosis of the spine. Impression Dense consolidation in the left upper lobe and left lower lobe concerning for pneumonia. Surveillance after appropriate treatment is recommended with repeat CT scan in 6-8 weeks to see complete resolution and exclude underlying malignancy. Demineralization and the punctate lytic lucencies in the spine and ribs concerning for multiple myeloma. Critical results were called by Dr. Verónica Valenzuela to Dr. Tod Perez. on 6/26/2021 at 16:30.     CT CHEST WO CONTRAST    Result Date: 6/26/2021  EXAMINATION: CT OF THE HEAD WITHOUT CONTRAST; CT OF THE CHEST WITHOUT CONTRAST; CT OF THE CERVICAL SPINE WITHOUT CONTRAST  6/26/2021 1:56 pm TECHNIQUE: CT of the head was performed without the administration of intravenous contrast. Dose modulation, iterative reconstruction, and/or weight based adjustment of the mA/kV was utilized to reduce the radiation dose to as low as reasonably achievable.; CT of the chest was performed without the administration of intravenous contrast. Multiplanar reformatted images are provided for review. Dose modulation, iterative reconstruction, and/or weight based adjustment of the mA/kV was utilized to reduce the radiation dose to as low as reasonably achievable.; CT of the cervical spine was performed without the administration of intravenous contrast. Multiplanar reformatted images are provided for review. Dose modulation, iterative reconstruction, and/or weight based adjustment of the mA/kV was utilized to reduce the radiation dose to as low as reasonably achievable. COMPARISON: December 30, 2020. HISTORY: ORDERING SYSTEM PROVIDED HISTORY: unwitnessed fall TECHNOLOGIST PROVIDED HISTORY: Reason for exam:->unwitnessed fall Has a \"code stroke\" or \"stroke alert\" been called? ->No Decision Support Exception - unselect if not a suspected or confirmed pneumonia. Surveillance after appropriate treatment is recommended with repeat CT scan in 6-8 weeks to see complete resolution and exclude underlying malignancy. Demineralization and the punctate lytic lucencies in the spine and ribs concerning for multiple myeloma. Critical results were called by Dr. Ryann Doll to Dr. Loras Eisenmenger. on 6/26/2021 at 16:30.     CT Cervical Spine WO Contrast    Result Date: 6/26/2021  EXAMINATION: CT OF THE HEAD WITHOUT CONTRAST; CT OF THE CHEST WITHOUT CONTRAST; CT OF THE CERVICAL SPINE WITHOUT CONTRAST  6/26/2021 1:56 pm TECHNIQUE: CT of the head was performed without the administration of intravenous contrast. Dose modulation, iterative reconstruction, and/or weight based adjustment of the mA/kV was utilized to reduce the radiation dose to as low as reasonably achievable.; CT of the chest was performed without the administration of intravenous contrast. Multiplanar reformatted images are provided for review. Dose modulation, iterative reconstruction, and/or weight based adjustment of the mA/kV was utilized to reduce the radiation dose to as low as reasonably achievable.; CT of the cervical spine was performed without the administration of intravenous contrast. Multiplanar reformatted images are provided for review. Dose modulation, iterative reconstruction, and/or weight based adjustment of the mA/kV was utilized to reduce the radiation dose to as low as reasonably achievable. COMPARISON: December 30, 2020. HISTORY: ORDERING SYSTEM PROVIDED HISTORY: unwitnessed fall TECHNOLOGIST PROVIDED HISTORY: Reason for exam:->unwitnessed fall Has a \"code stroke\" or \"stroke alert\" been called? ->No Decision Support Exception - unselect if not a suspected or confirmed emergency medical condition->Emergency Medical Condition (MA) What reading provider will be dictating this exam?->CRC FINDINGS: There is diffuse atrophy with dilated ventricles and prominence of the sulci.  Punctate and confluent areas concerning for multiple myeloma. Critical results were called by Dr. Libertad West to Dr. Derick Akbar. on 6/26/2021 at 16:30. XR CHEST PORTABLE    Result Date: 6/28/2021  EXAMINATION: ONE XRAY VIEW OF THE CHEST 6/28/2021 8:21 am COMPARISON: June 26-June 27 CT chest of a June 26 HISTORY: ORDERING SYSTEM PROVIDED HISTORY: ET TECHNOLOGIST PROVIDED HISTORY: Reason for exam:->ET What reading provider will be dictating this exam?->CRC FINDINGS: Endotracheal tube in good position at the level of the arch of the aorta. NG tube in the area of the stomach. Heart is enlarged. There is increased density with the opacification/consolidation of the mid lower aspect of the left lung with some ground-glass density towards the upper lobes. Please see report of CT chest of June 26. There is also a component of mild left pleural effusion. Right lung is a expanded within normal range. The some artifacts are overlying the right chest.  No conspicuous infiltrates or consolidations are seen. There is no right-sided perihilar vascular congestion. There is no right-sided pleural effusion. The heart appears to be enlarged in mild-to-moderate degree. Patient had multiple levels of vertebroplasty. 1.  Persistent the infiltrate with pleural effusion in the left lung cause significant the opacification of the left image chest particular from the mid to the base. 2.  No significant changes since the previous examinations recently performed. 3.  Please see report of CT chest of June 26. XR CHEST PORTABLE    Result Date: 6/28/2021  EXAMINATION: ONE XRAY VIEW OF THE CHEST 6/27/2021 11:22 pm COMPARISON: 2142 hours on 06/27/2021.  HISTORY: ORDERING SYSTEM PROVIDED HISTORY: ett placement - post retraction TECHNOLOGIST PROVIDED HISTORY: Reason for exam:->ett placement - post retraction What reading provider will be dictating this exam?->CRC FINDINGS: Endotracheal tube tip is 1.7 cm from the paradise and could be backed out 5-10 mm for more optimal positioning. NGT again extends below the diaphragm and into at least the mid stomach, with tip not confidently visualized within the field of view. Overlying EKG leads and other multifocal extracorporeal artifact. No pneumothorax or visualized right pleural effusion. There is redemonstrated subtotal opacification of the left lung, with air bronchograms seen at left mid to upper lung zone levels, with relative sparing of airspace disease at the left apex. The right lung remains essentially clear as visualized. Cardiac silhouette is poorly evaluated but stable and nonenlarged as visualized. Stable densities projecting at 2 spinal levels presumably relate to prior vertebroplasty. No acute osseous abnormality is identified. Intervally placed NGT extends below the diaphragm into at least the mid stomach, with tip not confidently seen within the field of view. Endotracheal tube tip is 1.7 cm from the paradise and could be backed out 5-10 mm for more optimal positioning. Otherwise, stable chest with unchanged left lung opacities. XR CHEST PORTABLE    Result Date: 6/27/2021  EXAMINATION: ONE XRAY VIEW OF THE CHEST 6/27/2021 10:03 pm COMPARISON: Previous chest radiograph 06/27/2021 and CT scan 06/26/2021. HISTORY: ORDERING SYSTEM PROVIDED HISTORY: ET tube placement TECHNOLOGIST PROVIDED HISTORY: Reason for exam:->ET tube placement What reading provider will be dictating this exam?->CRC FINDINGS: There is borderline cardiac size. There is persistent the consolidation in the left upper lobe and left lower lobe. Minimal focal infiltrate  is developing in the right lung base. The endotracheal tube is approximately 1.5 cm above the paradise and could be retracted by 1 cm. Stable abnormal chest with persistent infiltrates and pleural effusion in the left lung base likely pneumonia. Surveillance recommended to see complete clearing. There is minimal infiltrates developing in the right lung base.  Endotracheal tube close to the paradise and could be retracted by 1 cm. XR CHEST PORTABLE    Result Date: 6/27/2021  EXAMINATION: ONE XRAY VIEW OF THE CHEST 6/27/2021 5:14 pm COMPARISON: Chest series from June 26, 2021 HISTORY: ORDERING SYSTEM PROVIDED HISTORY: worsening hypoxia TECHNOLOGIST PROVIDED HISTORY: Reason for exam:->worsening hypoxia What reading provider will be dictating this exam?->CRC FINDINGS: Persistent low lung volumes. Unchanged mid and lower lung dense consolidation. There are coarse interstitial markings in the un opacified portions of the lungs. No pneumothorax. Atherosclerotic disease in the thoracic aorta. Normal pulmonary vascularity. Osseous and thoracic soft tissue structures demonstrate no acute findings. Vertebroplasty changes in the spine. Stable chest series. Persistent dense consolidation in the left mid and lower lung. Stable atherosclerotic disease. No new cardiopulmonary pathology. XR CHEST PORTABLE    Result Date: 6/26/2021  EXAMINATION: ONE XRAY VIEW OF THE CHEST 6/26/2021 12:48 pm COMPARISON: December 30, 2020 HISTORY: ORDERING SYSTEM PROVIDED HISTORY: Shortness of breath TECHNOLOGIST PROVIDED HISTORY: Reason for exam:->Shortness of breath What reading provider will be dictating this exam?->CRC FINDINGS: Heart size is unable to be accurately assessed on this single portable view of the chest, but appears to be stable. There is a new hazy airspace opacity in the left mid to lower lung zone compared with the prior examination. Suspect at least a small left pleural effusion. Difficult to exclude a trace right pleural effusion. No pneumothorax is identified. Bone cement is noted at multiple locations in the thoracolumbar spine. No acute osseous abnormality is identified. Consolidative airspace opacities in the left mid to lower lung zone with a suspected small left pleural effusion. Findings may be on the basis of pneumonia or less likely asymmetric pulmonary edema. Consider correlation with CT of the chest.     XR CHEST ABDOMEN NG PLACEMENT    Result Date: 6/28/2021  EXAMINATION: ONE SUPINE XRAY VIEW(S) OF THE ABDOMEN 6/27/2021 11:26 pm COMPARISON: Portable chest x-ray obtained 1 minute previous. HISTORY: ORDERING SYSTEM PROVIDED HISTORY: NG placement TECHNOLOGIST PROVIDED HISTORY: Reason for exam:->NG placement What reading provider will be dictating this exam?->CRC FINDINGS: Focused radiograph is obtained at the upper abdomen for purpose of NGT tip localization. The NGT extends satisfactorily into the mid stomach, with side port projecting caudal to the GE junction. Paucity of abdominal bowel gas within the field of view, with the stomach appearing decompressed and gasless. Lung and osseous findings are unchanged. Satisfactory position of the NGT. Resident's Assessment and Plan      Jia Adhikari is 78 y.o. female was admitted on 6/26/2021 and subsequently transferred to the ICU on 6/27/2021 after presenting from a nursing home following an unwitnessed fall and unknown amount of downtime. CT cervical spine showed C2 fracture and CT chest showed consolidation left upper and lower lobe is concerning for pneumonia. Initially started on ceftriaxone and azithromycin. Trauma surgery neurosurgery on board placed on cervical collar. Found to have high-grade methicillin assistance to follow coccus (not aureus) bacteremia. The patient was subsequently transferred to the ICU on BiPAP and then intubated.     Patient has a history of multiple myeloma, DVT, polyneuropathy        Diagnosis Date    Abnormality of plasma protein     Anemia     Asthma     Collapsed vertebra, not elsewhere classified, thoracic region, subsequent encounter for fracture with routine healing     Constipation     Dependence on supplemental oxygen     Difficulty in walking     GERD without esophagitis     Hypertension     Low back pain     Monoclonal gammopathy     Multiple myeloma not having achieved remission ([facility] [address]     Other disorders of plasma-protein metabolism, not elsewhere classified     Secondary malignant neoplasm of bone ([facility] [address]     Thrombocytopenia ([facility] [address]     Unspecified fracture of first lumbar vertebra, subsequent encounter for fracture with routine healing     Unspecified fracture of fourth lumbar vertebra, subsequent encounter for fracture with routine healing        History reviewed. No pertinent surgical history. No Known Allergies     No family history on file. Social History     Socioeconomic History    Marital status:      Spouse name: None    Number of children: None    Years of education: None    Highest education level: None   Occupational History    None   Tobacco Use    Smoking status: Never Smoker   Substance and Sexual Activity    Alcohol use: Not Currently    Drug use: Not Currently    Sexual activity: None   Other Topics Concern    None   Social History Narrative    None     Social Determinants of Health     Financial Resource Strain:     Difficulty of Paying Living Expenses:    Food Insecurity:     Worried About Running Out of Food in the Last Year:     Ran Out of Food in the Last Year:    Transportation Needs:     Lack of Transportation (Medical):  Lack of Transportation (Non-Medical):    Physical Activity:     Days of Exercise per Week:     Minutes of Exercise per Session:    Stress:     Feeling of Stress :    Social Connections:     Frequency of Communication with Friends and Family:     Frequency of Social Gatherings with Friends and Family:     Attends Druze Services:     Active Member of Clubs or Organizations:     Attends Club or Organization Meetings:     Marital Status:    Intimate Partner Violence:     Fear of Current or Ex-Partner:     Emotionally Abused:     Physically Abused:     Sexually Abused:           Assessment:  1.  Acute encephalopathy 2/2 likely septic shock 2/2 left-sided pneumonia (CAP) and methicillin-resistant staph (not aureus) bacteremia  · Currently sedated and intubated. On the vent. Following commands however. 2. Septic shock 2/2 left-sided pneumonia (CAP) and methicillin-resistant staph (not aureus) bacteremia - Resolving  · ID on board. On piperacillintazobactam and vancomycin and flucanozale. Will need echo and KHUSHBOO at some point. · Blood cultures growing methicillin-resistant staph (not aureus)-staph warneri and staph epidermidis  · Underwent bronc on 6/28/2021 that did not show any mucous plugs - labs pending. · CT chest suggestive of left-sided pneumonia  · Procalcitonin >100  · Cortisol 29.35  · On hydrocortisone 100 mg every 8 hours  · WBC trending the up 12.3  · fungitell pending    3. Acute hypoxic respiratory failure 2/2 #2 s/p intubation  · Currently sedated on the vent  · CXR - no change compared to yesterday  · On breathing treatment     4. Methicillin-resistant staph bacteremia (Staph warneri and staph epidermidis)    5. AUDREY stage III on CKD likely 2/2 ischemic ATN 2/2 #2, possibly worsened with vancomycin in the setting of nephrosclerosis  · FENa 0.5% on day of admission  · Baseline creatinine 11.1  · 900~ Urine output in the last 24 hours. · Elevated creatinine and independent hemodialysis. · Nephrology on board. 6. Combined respiratory acidosis and HAG MA 2/2 hypercapnia 2/2 iatrogenic as well as lactic acidosis 2/2 likely #2 - Resolving   · Adjusting vent settings    7. Lactic acidosisresolved    8. A. fib with RVR 2/2 #2  · Rate  in the AM  · EP signed off    9. Elevated troponins 2/2 #2, #5 - Stable  · Cardio signed off    10. Transaminitis 2/2 shock liver 2/2 likely #2 - Resolving     11. Acute on Chronic normocytic anemia 2/2 #2, #22  · Received 1U PRBC yesterday AM.   · Hb stable since then. 12. Leukocytosis 2/2 #2, steroids    13. Thrombocytopenia 2/2 #2, #19, less likely HIT  · Platelets down to 26.   · 4T score is 5, HIT ab is pending though. 15. Fall with C2 # 2/2 likely precipitated by #1, #2, #3, #4 in the setting of #22  · Neurosurgery and trauma assessment done when patient initially presented. · Has a cervical collar per neurosurgery. 15. Normocytic anemia 2/2 ? Blood loss  · FOBT pending  · Has received 1U PRBC sine admission. 16. Hyperglycemia 2/2 possibly stress dose steroids  · On medium-dose sliding scale    17. Leukopenia 2/2 #2 -resolved    18. ?Hyperthyroidism  · TSH 0.127 but FT4 0.93 with FT3 1.1    19. Chronic LBBB    20. LLE nail ?fungal infection     21. History of DVT on apixaban  · Apixaban on hold  · Heparin on hold    22. Multiple Myeloma with hypocalcemia  · On lenalidomide for multiple myeloma - currently on hold. · Pending SPEP, UPEP, free light chain assay pending  · Pending urine creatinine and urine albumin    23. V. Fib with sinus pauses overnight  · EP signed off. 24. Hyperkalemia 2/2 #5 - Resolved        Plan:   Adjust ventilator and ABG 30 minutes after vent changes   Stop pressors   Wean steroids to hydrocortisone 50mg Q8H   F/U HIT panel   Chlorhexidine mouthwash and eye ointment   Replace calcium   For HD today.  Will likely need assessment for LTAC   Needs adjustment of hard C collar       #Diet: N.p.o. for now  # Peptic ulcer prophylaxis: Pantoprazole  # DVT Prophylaxis: PCD  # Disposition: Cont current care    Electronically signed by aHrini Soriano MD, PGY- 3 on 6/30/2021   ICU Attending: Dr. Sherren Plenty [address] [place] [address]  Department of Pulmonary, Critical Care and Sleep Medicine  [address] [facility]  Department of Internal Medicine      During multidisciplinary team rounds Domingo Abraham is a 78 y.o. female was seen, examined and discussed. This is confirmation that I have personally seen and examined the patient and that the key elements of the encounter were performed by me (> 85 % time).   The medications & laboratory data was discussed and adjusted where necessary. The radiographic images were reviewed or with radiologist or consultant if felt dis-concordant with the exam or history. The above findings were corroborated, plans confirmed and changes made if needed. Family is updated at the bedside as available. Key issues of the case were discussed among consultants. Critical Care time is documented if appropriate.       Juan Carlos Ag DO, FACP, FCCP, [facility],

## 2021-06-30 NOTE — PLAN OF CARE
Problem: Falls - Risk of:  Goal: Will remain free from falls  Description: Will remain free from falls  6/30/2021 1013 by Onofre Barahona RN  Outcome: Met This Shift  6/30/2021 0024 by Rajinder Bedolla RN  Outcome: Met This Shift  6/29/2021 2109 by Rajinder Bedolla RN  Outcome: Met This Shift  Goal: Absence of physical injury  Description: Absence of physical injury  6/30/2021 1013 by Onofre Barahona RN  Outcome: Met This Shift  6/30/2021 0024 by Rajinder Bedolla RN  Outcome: Met This Shift  6/29/2021 2109 by Rajinder Bedolla RN  Outcome: Met This Shift     Problem: Skin Integrity:  Goal: Will show no infection signs and symptoms  Description: Will show no infection signs and symptoms  6/30/2021 1013 by Onofre Barahona RN  Outcome: Met This Shift  6/30/2021 0024 by Rajinder Bedolla RN  Outcome: Met This Shift  6/29/2021 2109 by Rajinder Bedolla RN  Outcome: Met This Shift  Goal: Absence of new skin breakdown  Description: Absence of new skin breakdown  6/30/2021 1013 by Onofre Barahona RN  Outcome: Met This Shift  6/30/2021 0024 by Rajinder Bedolla RN  Outcome: Met This Shift  6/29/2021 2109 by Rajinder Bedolla RN  Outcome: Met This Shift     Problem: Confusion - Acute:  Goal: Absence of continued neurological deterioration signs and symptoms  Description: Absence of continued neurological deterioration signs and symptoms  Outcome: Met This Shift  Goal: Mental status will be restored to baseline  Description: Mental status will be restored to baseline  Outcome: Met This Shift     Problem: Discharge Planning:  Goal: Ability to perform activities of daily living will improve  Description: Ability to perform activities of daily living will improve  Outcome: Met This Shift  Goal: Participates in care planning  Description: Participates in care planning  Outcome: Met This Shift     Problem: Injury - Risk of, Physical Injury:  Goal: Will remain free from falls  Description: Will remain free from falls  6/30/2021 1013 by Yamilet Muñoz RN  Outcome: Met This Shift  6/30/2021 0024 by Joaquin Meraz RN  Outcome: Met This Shift  6/29/2021 2109 by Joaquin Meraz RN  Outcome: Met This Shift  Goal: Absence of physical injury  Description: Absence of physical injury  6/30/2021 1013 by Yamilet Muñoz RN  Outcome: Met This Shift  6/30/2021 0024 by Joaquin Meraz RN  Outcome: Met This Shift  6/29/2021 2109 by Joaquin Meraz RN  Outcome: Met This Shift     Problem: Mood - Altered:  Goal: Mood stable  Description: Mood stable  Outcome: Met This Shift  Goal: Absence of abusive behavior  Description: Absence of abusive behavior  Outcome: Met This Shift  Goal: Verbalizations of feeling emotionally comfortable while being cared for will increase  Description: Verbalizations of feeling emotionally comfortable while being cared for will increase  Outcome: Met This Shift     Problem: Psychomotor Activity - Altered:  Goal: Absence of psychomotor disturbance signs and symptoms  Description: Absence of psychomotor disturbance signs and symptoms  Outcome: Met This Shift     Problem: Sensory Perception - Impaired:  Goal: Demonstrations of improved sensory functioning will increase  Description: Demonstrations of improved sensory functioning will increase  Outcome: Met This Shift  Goal: Decrease in sensory misperception frequency  Description: Decrease in sensory misperception frequency  Outcome: Met This Shift  Goal: Able to refrain from responding to false sensory perceptions  Description: Able to refrain from responding to false sensory perceptions  Outcome: Met This Shift  Goal: Demonstrates accurate environmental perceptions  Description: Demonstrates accurate environmental perceptions  Outcome: Met This Shift  Goal: Able to distinguish between reality-based and nonreality-based thinking  Description: Able to distinguish between reality-based and nonreality-based thinking  Outcome:

## 2021-06-30 NOTE — PLAN OF CARE
Nutrition Problem #1: Inadequate protein-energy intake  Intervention: Food and/or Nutrient Delivery: Continue NPO  Nutritional Goals: Initiate EN as able

## 2021-06-30 NOTE — PROGRESS NOTES
Department of Internal Medicine  Nephrology Attending Progress Note    Events reviewed. Had SLED x2 hours yesterday. SUBJECTIVE:  We are following Mrs. Stewart for AUDREY. Patient remains intubated.     Physical Exam:    VITALS:  /62   Pulse 94   Temp 99 °F (37.2 °C) (Esophageal)   Resp 24   Ht 5' 4\" (1.626 m)   Wt 182 lb 15.7 oz (83 kg)   SpO2 94%   BMI 31.41 kg/m²   24HR INTAKE/OUTPUT:      Intake/Output Summary (Last 24 hours) at 6/30/2021 0953  Last data filed at 6/30/2021 0222  Gross per 24 hour   Intake 3646.05 ml   Output 2420 ml   Net 1226.05 ml     Access: Left femoral temporary dialysis catheter in place  Constitutional: intubate on mechanical ventilator  HEENT:  Cervical collar in place  Respiratory:  Intubated on vent  Cardiovascular/Edema:  Atrial fibrillation  Gastrointestinal:  Soft, non-tender, +BS  Neurologic:  Intubated and sedated  Skin:  Warm, dry, no rash or lesion  Other:   + edema    Scheduled Meds:   calcium gluconate IVPB  2,000 mg Intravenous Once    insulin lispro  0-12 Units Subcutaneous Q4H    mupirocin   Topical BID    hydrocortisone sodium succinate PF  100 mg Intravenous Q8H    artificial tears   Both Eyes BID    chlorhexidine  15 mL Mouth/Throat BID    vancomycin (VANCOCIN) intermittent dosing (placeholder)   Other RX Placeholder    pantoprazole  40 mg Intravenous Daily    And    sodium chloride (PF)  10 mL Intravenous Daily    piperacillin-tazobactam  3,375 mg Intravenous Q12H    And    sodium chloride   Intravenous Q12H    ipratropium-albuterol  1 ampule Inhalation Q4H WA    [Held by provider] amLODIPine  10 mg Oral Daily    [Held by provider] apixaban  2.5 mg Oral BID    [Held by provider] furosemide  20 mg Oral Daily    [Held by provider] gabapentin  100 mg Oral BID    [Held by provider] lenalidomide  10 mg Oral Daily    therapeutic multivitamin-minerals  1 tablet Oral Daily    sodium chloride flush  5-40 mL Intravenous 2 times per day Continuous Infusions:   fentaNYL 5 mcg/ml in 0.9%  ml infusion Stopped (06/30/21 0901)    sodium chloride      sodium chloride      sodium chloride      norepinephrine Stopped (06/30/21 0830)    dextrose      [Held by provider] heparin (PORCINE) Infusion Stopped (06/29/21 1000)    sodium chloride       PRN Meds:.sodium chloride, sodium chloride, sodium chloride, anticoagulant sodium citrate, perflutren lipid microspheres, glucose, dextrose, glucagon (rDNA), dextrose, [Held by provider] heparin (porcine), [Held by provider] heparin (porcine), midazolam, sodium chloride flush, sodium chloride, polyethylene glycol, acetaminophen **OR** acetaminophen    DATA:    CBC:   Lab Results   Component Value Date    WBC 12.3 06/30/2021    RBC 2.74 06/30/2021    HGB 8.5 06/30/2021    HCT 25.0 06/30/2021    MCV 91.2 06/30/2021    MCH 31.0 06/30/2021    MCHC 34.0 06/30/2021    RDW 15.9 06/30/2021    PLT 26 06/30/2021    MPV NOT CALC 06/30/2021     CMP:    Lab Results   Component Value Date     06/30/2021    K 4.0 06/30/2021    K 4.2 06/27/2021    CL 98 06/30/2021    CO2 23 06/30/2021     06/30/2021    CREATININE 4.0 06/30/2021    GFRAA 13 06/30/2021    AGRATIO 0.9 06/24/2021    AGRATIO 1.1 06/24/2021    LABGLOM 11 06/30/2021    GLUCOSE 141 06/30/2021    PROT 6.0 06/30/2021    LABALBU 3.5 06/30/2021    CALCIUM 7.9 06/30/2021    BILITOT 1.9 06/30/2021    ALKPHOS 48 06/30/2021     06/30/2021     06/30/2021     Magnesium:    Lab Results   Component Value Date    MG 2.3 06/30/2021     Phosphorus:    Lab Results   Component Value Date    PHOS 4.9 06/30/2021     Radiology Review:      Kidney ultrasound June 28, 2021   FINDINGS:       Kidneys:       The right kidney measures 9.2 cm in length and the left kidney measures 10.8   cm in length.       Kidneys demonstrate normal cortical echogenicity.  No evidence of   hydronephrosis or intrarenal stones.           Bladder:       The urinary bladder is decompressed around a Bennett catheter placed   Impression:  Unremarkable ultrasound of the kidneys.       The urinary bladder is decompressed around a Bennett catheter.         Chest x-ray June 29, 2021   1. Persistent near complete whiteout of the left lung unchanged when compared   to the prior study. 2. The right lung is clear. 3.         BRIEF SUMMARY OF INITIAL CONSULT:    Briefly Ms. John Paul Kumar is an 78year old  female with a PMH of HTN, lung cancer, multiple myeloma, thrombocytopenia, anemia, DVT on chronic anticoagulation, and asthma who was admitted to the MICU after presenting to the ER via EMS from [facility] following an unwitnessed fall resulting in a dense C-2 fracture and shortness of breath. Labs in ER were significant for sodium 138, potassium 4.5, chloride 102, bicarbonate 19, BUN 67, creatinine 2.7 mg/dl, anion gap 17, lactic acid 4.2, and procalcitonin 13.99. Dexamethasone, mirtazapine, apixaban, amlodipine, furosemide, gabapentin, and  lenalidomide are medications patient was taking prior to admission. We are consulted for AUDREY. Her baseline  creatinine is 1.1-1.2 mg/dL. IMPRESSION/RECOMMENDATIONS:      1. AUDREY stage III on CKD, ischemic ATN, oliguric. Start on renal replacement therapy on June 29, 2021. Had SLED x2 hours yesterday, tolerated well. Urine output has increased last shift. 2. CKD stage III, probably due to nephrosclerosis  3. Hemodynamic shock, on norepinephrine and antibiotics  4. Hypocalcemia, 2/2 MM therapy?,  Ionized calcium 1.05  ------------------------------------------  5. Respiratory failure status post intubation, normal pH normal PCO2  6. Multiple myeloma  7. Pneumonia, probably MRSA, on vancomycin and piperacillin-tazobactam  8. MRSA bacteremia, on vancomycin and piperacillin-tazobactam  9. Shock liver, LFTs improved  10. AF with RVR, heparin drip  11. History of DVT, on heparin drip  12. Status post fall  13. Normocytic anemia  14.  Nutrition, n.p.o.     Plan:    · SLED x4 hours today  · Replace calcium  · Continue to monitor renal function

## 2021-06-30 NOTE — FLOWSHEET NOTE
06/30/21 1805   Vital Signs   Temp 99.3 °F (37.4 °C)   Pulse 95   Resp 23   SpO2 95 %   Weight 181 lb 14.1 oz (82.5 kg)   Percent Weight Change -0.6   Post-Hemodialysis Assessment   Post-Treatment Procedures Blood returned;Catheter capped, clamped and heparinized x 2 ports   Machine Disinfection Process Acid/Vinegar Clean;Heat Disinfect; Exterior Machine Disinfection   Rinseback Volume (ml) 300 ml   Total Liters Processed (l/min) 800 l/min   Dialyzer Clearance Lightly streaked   Duration of Treatment (minutes) 240 minutes   Heparin amount administered during treatment (units) 0 units   Hemodialysis Intake (ml) 300 ml   Hemodialysis Output (ml) 800 ml   NET Removed (ml) 500 ml   Tolerated Treatment Fair   Edema Right lower extremity; Left lower extremity   RLE Edema +1;Non-pitting   LLE Edema +1;Non-pitting   Tolerated 4 hr tx well on 4K 2.5Ca bath per orders, 500 ml removed with out difficulty. HD CVC flushed, heparin to dwell, clamped and capped  post tx per policy. Report to floor RN, remains in care of staff.

## 2021-06-30 NOTE — PLAN OF CARE
Problem: Falls - Risk of:  Goal: Will remain free from falls  Description: Will remain free from falls  6/29/2021 2109 by Leighton Najjar, RN  Outcome: Met This Shift     Problem: Falls - Risk of:  Goal: Absence of physical injury  Description: Absence of physical injury  6/29/2021 2109 by Leighton Najjar, RN  Outcome: Met This Shift     Problem: Skin Integrity:  Goal: Will show no infection signs and symptoms  Description: Will show no infection signs and symptoms  6/29/2021 2109 by Leighton Najjar, RN  Outcome: Met This Shift     Problem: Skin Integrity:  Goal: Absence of new skin breakdown  Description: Absence of new skin breakdown  6/29/2021 2109 by Leighton Najjar, RN  Outcome: Met This Shift     Problem: Injury - Risk of, Physical Injury:  Goal: Will remain free from falls  Description: Will remain free from falls  6/29/2021 2109 by Leighton Najjar, RN  Outcome: Met This Shift     Problem: Injury - Risk of, Physical Injury:  Goal: Absence of physical injury  Description: Absence of physical injury  6/29/2021 2109 by Leighton Najjar, RN  Outcome: Met This Shift

## 2021-07-01 NOTE — PLAN OF CARE
Problem: Falls - Risk of:  Goal: Will remain free from falls  Description: Will remain free from falls  7/1/2021 0015 by Akin Sanchez RN  Outcome: Met This Shift     Problem: Falls - Risk of:  Goal: Absence of physical injury  Description: Absence of physical injury  7/1/2021 0015 by Akin Sanchez RN  Outcome: Met This Shift     Problem: Skin Integrity:  Goal: Will show no infection signs and symptoms  Description: Will show no infection signs and symptoms  7/1/2021 0015 by Akin Sanchez RN  Outcome: Met This Shift     Problem: Skin Integrity:  Goal: Absence of new skin breakdown  Description: Absence of new skin breakdown  7/1/2021 0015 by Akin Sanchez RN  Outcome: Met This Shift

## 2021-07-01 NOTE — PROGRESS NOTES
Hospitalist Progress Note      SYNOPSIS: Patient admitted on 2021 for Closed nondisplaced type II dens fracture ([facility] [address]  78years of age female patient presents from nursing home following a mechanical fall with results of fracture of C2 dens    SUBJECTIVE:    Patient has been tachycardic  Overnight multiple episodes of arrhythmias to include V. fib followed by sinus pauses followed by junctional bradycardia followed by sinus bradycardia    rn staff reports abd distension    Medical problems in the hospital  Acute kidney injury stage III with ischemic ATN oliguric  Hemodynamic shock  Respiratory failure requiring mechanical ventilation intubation  Bacterial pneumonia probable MRSA  MRSA bacteremia sepsis  Atrial fibrillation with RVR  Oral myasis    Shock liver  Multiple myeloma  Thrombocytopenia  Troponin-itis    Patient's chronic medical condition  Chronic kidney disease  Multiple myeloma  History of DVT  Normocytic anemia  Temp (24hrs), Av.4 °F (37.4 °C), Min:96.8 °F (36 °C), Max:102.4 °F (39.1 °C)    DIET: Diet NPO  CODE: DNR-CCA    Intake/Output Summary (Last 24 hours) at 2021 1252  Last data filed at 2021 1215  Gross per 24 hour   Intake 1409.45 ml   Output 1608 ml   Net -198.55 ml       OBJECTIVE:    /83   Pulse 97   Temp 96.8 °F (36 °C)   Resp 22   Ht 5' 4\" (1.626 m)   Wt 188 lb 4.4 oz (85.4 kg)   SpO2 92%   BMI 32.32 kg/m²     Gen= elderly  Intubated  HEENT=  cervical collar in place  Resp= on vent intubated clear lungs  ABD= distended but not rigid  CV= episodic tachycardia  114  ASSESSMENT:  resp failure req mechan ventila  Acute kidney injury stage III with ischemic ATN oliguric  Hemodynamic shock  Respiratory failure requiring mechanical ventilation intubation  Bacterial pneumonia probable MRSA  MRSA bacteremia sepsis  Atrial fibrillation with RVR  Oral myasis  Probable ileus    Shock liver  Multiple myeloma  Thrombocytopenia  Troponin-itis       PLAN:    As per crit care team  chantale has  Been ordered    I will not be following with this patient after today tomorrow patient will be reassigned to another member of the [facility] service    DISPOSITION: Patient continues to require inpatient ICU care    Medications:  REVIEWED DAILY    Infusion Medications    fentaNYL 5 mcg/ml in 0.9%  ml infusion 100 mg/hr (07/01/21 0557)    dextrose      [Held by provider] heparin (PORCINE) Infusion Stopped (06/29/21 1000)    sodium chloride       Scheduled Medications    hydrocortisone sodium succinate PF  50 mg Intravenous BID    vancomycin  1,000 mg Intravenous Once    insulin lispro  0-12 Units Subcutaneous Q4H    mupirocin   Topical BID    artificial tears   Both Eyes BID    chlorhexidine  15 mL Mouth/Throat BID    vancomycin (VANCOCIN) intermittent dosing (placeholder)   Other RX Placeholder    pantoprazole  40 mg Intravenous Daily    And    sodium chloride (PF)  10 mL Intravenous Daily    piperacillin-tazobactam  3,375 mg Intravenous Q12H    And    sodium chloride   Intravenous Q12H    ipratropium-albuterol  1 ampule Inhalation Q4H WA    [Held by provider] amLODIPine  10 mg Oral Daily    [Held by provider] apixaban  2.5 mg Oral BID    [Held by provider] furosemide  20 mg Oral Daily    [Held by provider] gabapentin  100 mg Oral BID    [Held by provider] lenalidomide  10 mg Oral Daily    therapeutic multivitamin-minerals  1 tablet Oral Daily    sodium chloride flush  5-40 mL Intravenous 2 times per day     PRN Meds: anticoagulant sodium citrate, perflutren lipid microspheres, glucose, dextrose, glucagon (rDNA), dextrose, [Held by provider] heparin (porcine), [Held by provider] heparin (porcine), midazolam, sodium chloride flush, sodium chloride, polyethylene glycol, acetaminophen **OR** acetaminophen    Labs:     Recent Labs     06/29/21  1113 06/29/21  1739 06/30/21  0500 06/30/21  1150 06/30/21  2356 07/01/21  0516 07/01/21  1005   WBC 6.6  --  12.3*  --   -- 12.2*  --    HGB 6.9*   < > 8.5*   < > 8.2* 8.6* 9.0*   HCT 21.1*   < > 25.0*   < > 23.6* 25.2* 26.9*   PLT 38*  --  26*  --   --  15*  --     < > = values in this interval not displayed. Recent Labs     06/29/21 2356 06/29/21  2356 06/30/21  0500 06/30/21  0926 06/30/21 2356 07/01/21  0516 07/01/21  1005      < > 142   < > 140 138 138   K 3.7   < > 4.0   < > 4.2 4.5 4.8   CL 94*   < > 98   < > 99 98 98   CO2 23   < > 23   < > 23 24 22   *   < > 109*   < > 61* 71* 81*   CREATININE 3.8*   < > 4.0*   < > 2.3* 2.6* 2.7*   CALCIUM 7.7*   < > 7.9*   < > 8.5* 8.8 8.6   PHOS 5.1*  --  4.9*  --   --  5.0*  --     < > = values in this interval not displayed. Recent Labs     06/29/21 0433 06/30/21  0500 07/01/21  0516   PROT 5.6* 6.0* 5.5*   ALKPHOS 38 48 58   * 553* 279*   * 401* 63*   BILITOT 1.2 1.9* 2.5*       Recent Labs     06/29/21 0433 06/30/21  0500 07/01/21  0516   INR 1.6 1.6 1.9       No results for input(s): Lorenso Favre in the last 72 hours. Chronic labs:    Lab Results   Component Value Date    TSH 0.127 (L) 06/30/2021    INR 1.9 07/01/2021       Radiology:   7/1/2021 cxr  There is partial interval clearing of the left lung airspace disease with   partial Reaeration of the left lung apex when compared with the patient's   prior study.       2.  The right lung is clear.         +++++++++++++++++++++++++++++++++++++++++++++++++  Cuong Sarah DO  [address]  +++++++++++++++++++++++++++++++++++++++++++++++++  NOTE: This report was transcribed using voice recognition software. Every effort was made to ensure accuracy; however, inadvertent computerized transcription errors may be present.

## 2021-07-01 NOTE — PROGRESS NOTES
Yfn Nichols Terry 1940    SUBJECTIVE:    Intubated  SLED x 4 hours today    OBJECTIVE  Physical Exam:  VITALS:  /64   Pulse 88   Temp 99.6 °F (37.6 °C) (Bladder)   Resp 24   Ht 5' 4\" (1.626 m)   Wt 186 lb 1.1 oz (84.4 kg)   SpO2 95%   BMI 31.94 kg/m²   General: Intubated and sedated on fentanyl  HEENT: PERRL, ETT intact, Cervical collar on   Respiratory: Vent synchronous, bilateral course breath sounds    Cardiac: irregular rate and rhythm.  Pulses palpable   Abdominal: soft, non distended, hypoactive bowel sounds  Extremities: No clubbing or cyanosis   Neuro: responds to commands     DIAGNOSTIC DATA  Lab Results   Component Value Date    WBC 12.2 (H) 07/01/2021    HGB 9.0 (L) 07/01/2021    HCT 26.9 (L) 07/01/2021    MCV 90.6 07/01/2021    PLT 15 (LL) 07/01/2021     Lab Results   Component Value Date     07/01/2021    K 4.1 07/01/2021    CL 96 (L) 07/01/2021    CO2 21 (L) 07/01/2021    BUN 40 (H) 07/01/2021    CREATININE 1.4 (H) 07/01/2021    GLUCOSE 138 (H) 07/01/2021    CALCIUM 8.7 07/01/2021    PROT 5.5 (L) 07/01/2021    LABALBU 3.2 (L) 07/01/2021    BILITOT 2.5 (H) 07/01/2021    ALKPHOS 58 07/01/2021    AST 63 (H) 07/01/2021     (H) 07/01/2021    LABGLOM 36 07/01/2021    GFRAA 43 07/01/2021    AGRATIO 0.9 (L) 06/24/2021    AGRATIO 1.1 06/24/2021    GLOB 3.5 06/24/2021    GLOB 2.9 06/24/2021     IMPRESSION:  Patient Active Problem List   Diagnosis    Multiple myeloma ([facility] [address]    Essential hypertension, benign    Localized edema    Thrombocytopenia, unspecified ([facility] [address]    Pneumonia due to COVID-19 virus    Elevated serum creatinine    Essential hypertension    Hypernatremia    Hypermagnesemia    Hypokalemia    Acute respiratory failure with hypoxia (HCC)    CAP (community acquired pneumonia) due to Pneumococcus ([facility] [address]    Closed nondisplaced type II dens fracture (HCC)    Red blood cell antibody positive     PLAN:  78year old female PMH of Multiple myeloma on lenalidomide, DVT on Eliquis, and polyneuropathy that presented to [facility] ER from nursing home via EMS for unwitnessed fall and SOB on 2021. Patient had complaint of SOB and left sided chest pain. At the nursing home patient is on room air and was found hypoxic, laced on NRB mask and sats improved. Upon arrival labs significant for BUN 67 Creatinine 2.7 LA: 4.2   WBC: Hgb: Hct: Plt:      Patient was given ceftriaxone and azithromycin      CT chest with persistent multiple punctate lytic lesions in the calvarium likely d/t multiple myeloma. CT head unremarkable    CT Cervical spine with C2 fracture     Patient developed worsening respiratory status and was intubated   Patient developed AFRVR and started on Low dose heparin and amio gtts. Underwent bronch 2021 with Dr Heaven Bueno with normal airway examination. 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 -Currently on Daratumumab, Revlimid and Dex since 2020, Revlimid added 10/17/2020   -Last office visit was 2021 for cycle 8 Day 1 of Alysha and Cycle 22 Day 2 for Rev/Dex  -Most recent labs prior to admission with hgb: 12 WBC: 7.4 plt: 239  Ig IgA:71 IgM: 30 Protein: 5.9 Alpha 1 globulin: 0.5 Alpha 2 globulin: 1.1 Beta Globulin: 1.0 Gamma Globulin: 0.4 M spike: 0.1 A/G ratio: 1.1 Globulin: 2.9  Free kappa light chains: 11.6 Free lambda light chain: 11.8  K/L ratio: 0.98     Neurosurgery consult; collar placement   Hold Myeloma treatment for now. Nephrology on board; SLED x4 hours today  ID consulted for gram + cocci bacteriemia with MRSA-Bactrim zosyn and vancomycin; 2D-ECHO   Anemia and Thrombocytopenia secondary to chemo, sepsis and ATB. Continue to monitor her CBCD. Hb 9.0  PLT 15.  Transfuse to keep hgb>7 and plt>10     2021  Tian Arriola MD

## 2021-07-01 NOTE — CARE COORDINATION
7/1 Care Coordination: Pt remains in MICU, Cont on vent, fio2 70%. . Pt is from [place], she is a long term medicaid bed hold. CM/SW will continue to follow for discharge planning.    Kathy WILLIAMSON,RN--BC  [phone]

## 2021-07-01 NOTE — PROGRESS NOTES
Senior Resident Addendum:  I have seen and examined the patient with the intern. I have discussed the case, including pertinent history and exam findings with the intern. I agree with the assessment, plan and orders as documented above with the following additions:      Assessment:    1. Acute Metabolic Encephalopathy   - 2/2 septic shock    2. Septic Shock   - 2/2 PNA   - MRSA nares +   - blood cultures with methicillin resistant CONS    3. Atrial Fibrillation   - 2/2 underlying sepsis and hypoxia   - CHADSVASC at least 4 (age, sex, HTN)    4. Acute Hypoxic Respiratory Failure   - 2/2 PNA    5. MRSA Colonization    6. AUDREY on CKD   - baseline creatinine 1.1-1.2   - FEUrea 33% suggests pre-renal etiology s/p volume resuscitation   - likely 2/2 ischemic ATN in setting of septic shock at this point    7. Acute on Chronic Normocytic Anemia   - h/o MM, no source of active bleeding    8. H/o Multiple Myeloma   - on lenalidomide as OP    9. Thrombocytopenia   - DIC panel negative, no active bleeding diatheses   - HIT score 5 suggests intermediate risk of HIT   - more likely related to sepsis, beta lactam antibiotics, MM    10. Chronic LBBB    11. Cervical Fracture   - 2/2 traumatic fall and underlying spinal lytic lesions    12.  H/o DVT        Plan:    · Wean pressors as able, maintain MAP > 65  · Wean hydrocortisone to 50mg BID  · Start midodrine 10mg TID  · Avoid additional IVF d/t volume overload  · Cover empirically with vancomycin and Zosyn  · ID on board, input appreciated  · Continue Bactroban BID x 5 days  · F/u echo reading  · SLEDD per nephrology  · Monitor CBC Q8H, maintain hgb > 7 and platelets > 22G  · Follow HIT antibody  · Duplex US ordered b/l upper and lower extremities, r/o DVT  · Would start argatroban if DVT detected  · Repeat dose of digoxin 500mcg x 1  · F/u digoxin level in 48 hours  · S/p bronch today, sample sent for analysis   · Wean FiO2 and PEEP as able      Ramona Siegel, MD  7/1/2021  6:10 PM    [place]  Department of Pulmonary, Critical Care and Sleep Medicine  [address] [facility]  Department of Internal Medicine  Attending Statement    This patient has a high probability of sudden clinically significant deterioration, which requires the highest level of physician preparedness to intervene urgently. I managed/supervised life or organ supporting interventions that required frequent physician assessment. I devoted my full attention to the direct care of this patient for the period of time indicated below. Time I spent with family of surrogate(s) is included only if the patient was incapable of providing the necessary information or participating in medical decision making. In addition to time devoted to teaching and to any procedure.  CCT 41 minutes    Deisy Smith DO

## 2021-07-01 NOTE — PLAN OF CARE
Problem: Falls - Risk of:  Goal: Will remain free from falls  Description: Will remain free from falls  6/30/2021 2140 by Asael Brand RN  Outcome: Met This Shift     Problem: Falls - Risk of:  Goal: Absence of physical injury  Description: Absence of physical injury  6/30/2021 2140 by Asael Brand RN  Outcome: Met This Shift     Problem: Skin Integrity:  Goal: Will show no infection signs and symptoms  Description: Will show no infection signs and symptoms  6/30/2021 2140 by Asael Brand RN  Outcome: Met This Shift     Problem: Skin Integrity:  Goal: Absence of new skin breakdown  Description: Absence of new skin breakdown  6/30/2021 2140 by Asael Brand RN  Outcome: Met This Shift

## 2021-07-01 NOTE — FLOWSHEET NOTE
07/01/21 1319   Vital Signs   /64   Temp 96.8 °F (36 °C)   Pulse 105   Resp 23   Weight 186 lb 1.1 oz (84.4 kg)   Weight Method Estimated   Percent Weight Change -1.17   Pain Assessment   Pain Assessment CPOT   Post-Hemodialysis Assessment   Post-Treatment Procedures Blood returned;Catheter Capped, clamped with Saline x2 ports   Machine Disinfection Process Acid/Vinegar Clean;Heat Disinfect   Rinseback Volume (ml) 400 ml   Total Liters Processed (l/min) 28.9 l/min   Dialyzer Clearance Moderately streaked   Duration of Treatment (minutes) 120 minutes   Heparin amount administered during treatment (units) 0 units   Hemodialysis Intake (ml) 400 ml   Hemodialysis Output (ml) 1400 ml   NET Removed (ml) 1000 ml   Tolerated Treatment Poor   Patient Response to Treatment Dialysis dc'd with 2 hrs remainig by Dr. Alejandro Foster due to change in Cardiac status. Dr. Vernetta Cogan notified, 1 liter removed. Bilateral Breath Sounds Diminished;Rhonchi   Edema Right lower extremity; Left lower extremity   RLE Edema +1;Pitting   LLE Edema +1;Pitting   Physician Notified?  Yes

## 2021-07-01 NOTE — CONSULTS
OTOLARYNGOLOGY  CONSULT NOTE  7/1/2021    Physician Consulted: Dr. George Roy  Reason for Consult: oral myiasis  Referring Physician: Dr. Jana Pelletier is a 78 y.o. female who presents for evaluation of oral maggots. Patient was brought in from nursing home for call, fx of C2 dens, with subsequent HAP, RITA requiring intubation 4 days ago. This morning during oral care maggots were found in patients mouth under tongue and removed. ENT consulted for oral evaluation. Patient intubated and sedated history taken from chart and staff      Past Medical History:   Diagnosis Date    Abnormality of plasma protein     Anemia     Asthma     Collapsed vertebra, not elsewhere classified, thoracic region, subsequent encounter for fracture with routine healing     Constipation     Dependence on supplemental oxygen     Difficulty in walking     GERD without esophagitis     Hypertension     Low back pain     Monoclonal gammopathy     Multiple myeloma not having achieved remission ([address]     Other disorders of plasma-protein metabolism, not elsewhere classified     Secondary malignant neoplasm of bone ([address]     Thrombocytopenia ([address]     Unspecified fracture of first lumbar vertebra, subsequent encounter for fracture with routine healing     Unspecified fracture of fourth lumbar vertebra, subsequent encounter for fracture with routine healing        History reviewed. No pertinent surgical history. Medications Prior to Admission:    Prior to Admission medications    Medication Sig Start Date End Date Taking?  Authorizing Provider   docusate sodium (COLACE) 100 MG capsule Take 100 mg by mouth 2 times daily as needed for Constipation   Yes Historical Provider, MD   benzonatate (TESSALON) 100 MG capsule Take 100 mg by mouth 3 times daily as needed for Cough   Yes Historical Provider, MD   Dexamethasone 20 MG TABS Take 20 mg by mouth once a week 6/26/21  Yes Historical Provider, MD   mirtazapine (Kiki Dukes) 15 MG tablet Take 7.5 mg by mouth nightly   Yes Historical Provider, MD   apixaban (ELIQUIS) 2.5 MG TABS tablet Take 1 tablet by mouth 2 times daily 1/4/21  Yes Jerrell kAins MD   amLODIPine (NORVASC) 10 MG tablet Take 1 tablet by mouth daily 1/5/21  Yes Jerrell Akins MD   furosemide (LASIX) 20 MG tablet Take 40 mg by mouth daily    Yes Historical Provider, MD   gabapentin (NEURONTIN) 100 MG capsule Take 100 mg by mouth 2 times daily. For foot pain   Yes Historical Provider, MD   Multiple Vitamins-Minerals (THERAPEUTIC MULTIVITAMIN-MINERALS) tablet Take 1 tablet by mouth daily   Yes Historical Provider, MD   HYDROcodone-acetaminophen (NORCO)  MG per tablet Take 1 tablet by mouth every 4 hours as needed for Pain. Yes Historical Provider, MD   lenalidomide (REVLIMID) 10 MG chemo capsule Take 10 mg by mouth daily Give at bedtime for 21 days starting 12/12/20    Historical Provider, MD   Ascorbic Acid (VITAMIN C ADULT GUMMIES PO) Take 1,000 mg by mouth daily    Historical Provider, MD   Zinc 100 MG TABS Take by mouth daily    Historical Provider, MD   ipratropium-albuterol (DUONEB) 0.5-2.5 (3) MG/3ML SOLN nebulizer solution Inhale 1 vial into the lungs every 4 hours As needed    Historical Provider, MD   ondansetron (ZOFRAN) 8 MG tablet Take 8 mg by mouth every 8 hours as needed for Nausea or Vomiting    Historical Provider, MD       No Known Allergies    No family history on file. Social History     Tobacco Use    Smoking status: Never Smoker   Substance Use Topics    Alcohol use: Not Currently    Drug use: Not Currently         Review of Systems   Unable to assess      PHYSICAL EXAM:    Vitals:    07/01/21 1032   BP: (!) 129/112   Pulse: 89   Resp: 24   Temp: 97.8 °F (36.6 °C)   SpO2:        General Appearance: intubated sedated   Head/face:  NC/AT  Eyes: PERRL, EOMI  ENT: ETT in place, difficult to evaluate oral cavity secondary to ETT. No obvious masses lesions or ulcerations noted in the oral cavity.  Poor called? ->No Decision Support Exception - unselect if not a suspected or confirmed emergency medical condition->Emergency Medical Condition (MA) What reading provider will be dictating this exam?->CRC FINDINGS: There is diffuse atrophy with dilated ventricles and prominence of the sulci. 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 There is no acute stroke, mass or hemorrhage. A redemonstrated are extensive is punctate lytic lesions in the calvarium with motht eaten pattern likely multiple myeloma. Stable atrophy with small vessel ischemic disease. There is no acute stroke or hemorrhage. Persistent multiple punctate lytic lesions the calvarium likely due to malignancy like multiple myeloma. CT cervical spine. There is a fracture of the base of the dens of C2 (type 2). There is no displacement. No other acute fractures are identified in the cervical spine. There is mild diffuse degenerative changes from C3-T1 with osteophytes and multilevel disc bulges. There is osteopenia and punctate lytic lesions concerning for multiple myeloma. Impression Type 2 fracture of the dens of C2. Diffuse degenerative changes from C3-T1. Multiple myeloma. CT chest. Comparison December 30, 2020. Findings There is borderline cardiac size. The great vessels are normal.  Trachea and major bronchi are patent. There is dense consolidation in the left upper lobe and left lower lobe concerning for pneumonia. There is minimal atelectasis in the right lung base. The liver is of normal architecture. There is diffuse demineralization of the thoracic spine with punctate lucencies. Compression fractures with vertebroplasty are noted. There is slight irregularity of the sternum probably artifact. There is kyphosis of the spine.  Impression Dense consolidation in the left upper lobe and left lower lobe concerning for pneumonia. Surveillance after appropriate treatment is recommended with repeat CT scan in 6-8 weeks to see complete resolution and exclude underlying malignancy. Demineralization and the punctate lytic lucencies in the spine and ribs concerning for multiple myeloma. Critical results were called by Dr. Reyna Cardona to Dr. Pam Lugo. on 6/26/2021 at 16:30.     CT CHEST WO CONTRAST    Result Date: 6/26/2021  EXAMINATION: CT OF THE HEAD WITHOUT CONTRAST; CT OF THE CHEST WITHOUT CONTRAST; CT OF THE CERVICAL SPINE WITHOUT CONTRAST  6/26/2021 1:56 pm TECHNIQUE: CT of the head was performed without the administration of intravenous contrast. Dose modulation, iterative reconstruction, and/or weight based adjustment of the mA/kV was utilized to reduce the radiation dose to as low as reasonably achievable.; CT of the chest was performed without the administration of intravenous contrast. Multiplanar reformatted images are provided for review. Dose modulation, iterative reconstruction, and/or weight based adjustment of the mA/kV was utilized to reduce the radiation dose to as low as reasonably achievable.; CT of the cervical spine was performed without the administration of intravenous contrast. Multiplanar reformatted images are provided for review. Dose modulation, iterative reconstruction, and/or weight based adjustment of the mA/kV was utilized to reduce the radiation dose to as low as reasonably achievable. COMPARISON: December 30, 2020. HISTORY: ORDERING SYSTEM PROVIDED HISTORY: unwitnessed fall TECHNOLOGIST PROVIDED HISTORY: Reason for exam:->unwitnessed fall Has a \"code stroke\" or \"stroke alert\" been called? ->No Decision Support Exception - unselect if not a suspected or confirmed emergency medical condition->Emergency Medical Condition (MA) What reading provider will be dictating this exam?->CRC FINDINGS: There is diffuse atrophy with dilated ventricles and prominence of the sulci. 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 There is no acute stroke, mass or hemorrhage. A redemonstrated are extensive is punctate lytic lesions in the calvarium with motht eaten pattern likely multiple myeloma. Stable atrophy with small vessel ischemic disease. There is no acute stroke or hemorrhage. Persistent multiple punctate lytic lesions the calvarium likely due to malignancy like multiple myeloma. CT cervical spine. There is a fracture of the base of the dens of C2 (type 2). There is no displacement. No other acute fractures are identified in the cervical spine. There is mild diffuse degenerative changes from C3-T1 with osteophytes and multilevel disc bulges. There is osteopenia and punctate lytic lesions concerning for multiple myeloma. Impression Type 2 fracture of the dens of C2. Diffuse degenerative changes from C3-T1. Multiple myeloma. CT chest. Comparison December 30, 2020. Findings There is borderline cardiac size. The great vessels are normal.  Trachea and major bronchi are patent. There is dense consolidation in the left upper lobe and left lower lobe concerning for pneumonia. There is minimal atelectasis in the right lung base. The liver is of normal architecture. There is diffuse demineralization of the thoracic spine with punctate lucencies. Compression fractures with vertebroplasty are noted. There is slight irregularity of the sternum probably artifact. There is kyphosis of the spine. Impression Dense consolidation in the left upper lobe and left lower lobe concerning for pneumonia. Surveillance after appropriate treatment is recommended with repeat CT scan in 6-8 weeks to see complete resolution and exclude underlying malignancy. Demineralization and the punctate lytic lucencies in the spine and ribs concerning for multiple myeloma. Critical results were called by Dr. López King to Dr. Dayna Riggs. on 6/26/2021 at 16:30.     CT Cervical Spine WO Contrast    Result Date: 6/26/2021  EXAMINATION: CT OF THE HEAD WITHOUT CONTRAST; CT OF THE CHEST WITHOUT CONTRAST; CT OF THE CERVICAL SPINE WITHOUT CONTRAST  6/26/2021 1:56 pm TECHNIQUE: CT of the head was performed without the administration of intravenous contrast. Dose modulation, iterative reconstruction, and/or weight based adjustment of the mA/kV was utilized to reduce the radiation dose to as low as reasonably achievable.; CT of the chest was performed without the administration of intravenous contrast. Multiplanar reformatted images are provided for review. Dose modulation, iterative reconstruction, and/or weight based adjustment of the mA/kV was utilized to reduce the radiation dose to as low as reasonably achievable.; CT of the cervical spine was performed without the administration of intravenous contrast. Multiplanar reformatted images are provided for review. Dose modulation, iterative reconstruction, and/or weight based adjustment of the mA/kV was utilized to reduce the radiation dose to as low as reasonably achievable. COMPARISON: December 30, 2020. HISTORY: ORDERING SYSTEM PROVIDED HISTORY: unwitnessed fall TECHNOLOGIST PROVIDED HISTORY: Reason for exam:->unwitnessed fall Has a \"code stroke\" or \"stroke alert\" been called? ->No Decision Support Exception - unselect if not a suspected or confirmed emergency medical condition->Emergency Medical Condition (MA) What reading provider will be dictating this exam?->CRC FINDINGS: There is diffuse atrophy with dilated ventricles and prominence of the sulci.  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 There is no acute stroke, mass or hemorrhage. A redemonstrated are extensive is punctate lytic lesions in the calvarium with motht eaten pattern likely multiple myeloma. Stable atrophy with small vessel ischemic disease. There is no acute stroke or hemorrhage. Persistent multiple punctate lytic lesions the calvarium likely due to malignancy like multiple myeloma. CT cervical spine. There is a fracture of the base of the dens of C2 (type 2). There is no displacement. No other acute fractures are identified in the cervical spine. There is mild diffuse degenerative changes from C3-T1 with osteophytes and multilevel disc bulges. There is osteopenia and punctate lytic lesions concerning for multiple myeloma. Impression Type 2 fracture of the dens of C2. Diffuse degenerative changes from C3-T1. Multiple myeloma. CT chest. Comparison December 30, 2020. Findings There is borderline cardiac size. The great vessels are normal.  Trachea and major bronchi are patent. There is dense consolidation in the left upper lobe and left lower lobe concerning for pneumonia. There is minimal atelectasis in the right lung base. The liver is of normal architecture. There is diffuse demineralization of the thoracic spine with punctate lucencies. Compression fractures with vertebroplasty are noted. There is slight irregularity of the sternum probably artifact. There is kyphosis of the spine. Impression Dense consolidation in the left upper lobe and left lower lobe concerning for pneumonia. Surveillance after appropriate treatment is recommended with repeat CT scan in 6-8 weeks to see complete resolution and exclude underlying malignancy. Demineralization and the punctate lytic lucencies in the spine and ribs concerning for multiple myeloma. Critical results were called by Dr. Jigna Blevins to Dr. Janine Pierre. on 6/26/2021 at 16:30.      XR CHEST PORTABLE    Result Date: 6/28/2021  EXAMINATION: ONE XRAY VIEW OF THE CHEST 6/27/2021 11:22 pm COMPARISON: 2142 hours on 06/27/2021. HISTORY: ORDERING SYSTEM PROVIDED HISTORY: ett placement - post retraction TECHNOLOGIST PROVIDED HISTORY: Reason for exam:->ett placement - post retraction What reading provider will be dictating this exam?->CRC FINDINGS: Endotracheal tube tip is 1.7 cm from the paradise and could be backed out 5-10 mm for more optimal positioning. NGT again extends below the diaphragm and into at least the mid stomach, with tip not confidently visualized within the field of view. Overlying EKG leads and other multifocal extracorporeal artifact. No pneumothorax or visualized right pleural effusion. There is redemonstrated subtotal opacification of the left lung, with air bronchograms seen at left mid to upper lung zone levels, with relative sparing of airspace disease at the left apex. The right lung remains essentially clear as visualized. Cardiac silhouette is poorly evaluated but stable and nonenlarged as visualized. Stable densities projecting at 2 spinal levels presumably relate to prior vertebroplasty. No acute osseous abnormality is identified. Intervally placed NGT extends below the diaphragm into at least the mid stomach, with tip not confidently seen within the field of view. Endotracheal tube tip is 1.7 cm from the paradise and could be backed out 5-10 mm for more optimal positioning. Otherwise, stable chest with unchanged left lung opacities. XR CHEST PORTABLE    Result Date: 6/27/2021  EXAMINATION: ONE XRAY VIEW OF THE CHEST 6/27/2021 10:03 pm COMPARISON: Previous chest radiograph 06/27/2021 and CT scan 06/26/2021. HISTORY: ORDERING SYSTEM PROVIDED HISTORY: ET tube placement TECHNOLOGIST PROVIDED HISTORY: Reason for exam:->ET tube placement What reading provider will be dictating this exam?->CRC FINDINGS: There is borderline cardiac size.   There is persistent the consolidation in the left upper lobe and left lower lobe. Minimal focal infiltrate  is developing in the right lung base. The endotracheal tube is approximately 1.5 cm above the paradise and could be retracted by 1 cm. Stable abnormal chest with persistent infiltrates and pleural effusion in the left lung base likely pneumonia. Surveillance recommended to see complete clearing. There is minimal infiltrates developing in the right lung base. Endotracheal tube close to the paradise and could be retracted by 1 cm. XR CHEST PORTABLE    Result Date: 6/27/2021  EXAMINATION: ONE XRAY VIEW OF THE CHEST 6/27/2021 5:14 pm COMPARISON: Chest series from June 26, 2021 HISTORY: ORDERING SYSTEM PROVIDED HISTORY: worsening hypoxia TECHNOLOGIST PROVIDED HISTORY: Reason for exam:->worsening hypoxia What reading provider will be dictating this exam?->CRC FINDINGS: Persistent low lung volumes. Unchanged mid and lower lung dense consolidation. There are coarse interstitial markings in the un opacified portions of the lungs. No pneumothorax. Atherosclerotic disease in the thoracic aorta. Normal pulmonary vascularity. Osseous and thoracic soft tissue structures demonstrate no acute findings. Vertebroplasty changes in the spine. Stable chest series. Persistent dense consolidation in the left mid and lower lung. Stable atherosclerotic disease. No new cardiopulmonary pathology. XR CHEST PORTABLE    Result Date: 6/26/2021  EXAMINATION: ONE XRAY VIEW OF THE CHEST 6/26/2021 12:48 pm COMPARISON: December 30, 2020 HISTORY: ORDERING SYSTEM PROVIDED HISTORY: Shortness of breath TECHNOLOGIST PROVIDED HISTORY: Reason for exam:->Shortness of breath What reading provider will be dictating this exam?->CRC FINDINGS: Heart size is unable to be accurately assessed on this single portable view of the chest, but appears to be stable. There is a new hazy airspace opacity in the left mid to lower lung zone compared with the prior examination.  Suspect at least a small left pleural effusion. Difficult to exclude a trace right pleural effusion. No pneumothorax is identified. Bone cement is noted at multiple locations in the thoracolumbar spine. No acute osseous abnormality is identified. Consolidative airspace opacities in the left mid to lower lung zone with a suspected small left pleural effusion. Findings may be on the basis of pneumonia or less likely asymmetric pulmonary edema. Consider correlation with CT of the chest.     XR CHEST ABDOMEN NG PLACEMENT    Result Date: 6/28/2021  EXAMINATION: ONE SUPINE XRAY VIEW(S) OF THE ABDOMEN 6/27/2021 11:26 pm COMPARISON: Portable chest x-ray obtained 1 minute previous. HISTORY: ORDERING SYSTEM PROVIDED HISTORY: NG placement TECHNOLOGIST PROVIDED HISTORY: Reason for exam:->NG placement What reading provider will be dictating this exam?->CRC FINDINGS: Focused radiograph is obtained at the upper abdomen for purpose of NGT tip localization. The NGT extends satisfactorily into the mid stomach, with side port projecting caudal to the GE junction. Paucity of abdominal bowel gas within the field of view, with the stomach appearing decompressed and gasless. Lung and osseous findings are unchanged. Satisfactory position of the NGT. ASSESSMENT:  78 y.o. female with oral myiasis, likely hospital acquired. No obvious mass lesion ulceration or tumor in the oral cavity. No obvious maggots seen on exam but extract maggots in jar at bedside      PLAN:  -strict oral care with peridex at least twice daily  -gentle suctioning and tooth brushing   -ivermectin indication per ID recommendations  -unable to evaluate rest of pharynx secondary to ETT  -continue local oral care   -contact ENT if worsening of symptoms     Patient seen and discussed with Attending.        Electronically signed by Lauren Braden DO on 7/1/21 at 10:42 AM EDT            hSira Oswald  [id number]      I have discussed the case, including pertinent history and exam

## 2021-07-01 NOTE — CARE COORDINATION
Maggots found in the mouth during routine oral care; possible Myiasis? . Multiple maggots removed. General surgery and Dentistry consulted. One dose of ivermectine 16.5 mg given x 1.  Discussed with

## 2021-07-01 NOTE — PROGRESS NOTES
Pharmacy Consultation Note  (Antibiotic Dosing and Monitoring)    Initial consult date: 2021  Consulting physician/provider: Dr. Juan José Gandhi  Drug(s): vancomycin  Indication: Staph species bacteremia; immunocompromised    Age/Gender IBW DW  Allergy Information   78 y.o. female; 162.6 cm, 68 kg 50.6 kg 57.6 kg  Patient has no known allergies. Date  WBC BUN/CR Drug/Dose Time   Given Level(s)   (Time) Comments    3.2 67/2.7 X X      2.7 78/2.9 Vancomycin 1250 mg x1 1247      6.3 91/3.1 Vancomycin 1000 mg IV x1 1143      7.1 98/3.5 x x Random 22.6 @0433     12.2 HD Vancomycin 1000 mg IVx1 1819      12.2 HD Vancomycin 1000 mg IVx1                 Estimated Creatinine Clearance: 17 mL/min (A) (based on SCr of 2.7 mg/dL (H)). Intake/Output Summary (Last 24 hours) at 2021 1148  Last data filed at 2021 1100  Gross per 24 hour   Intake 1409.45 ml   Output 1726 ml   Net -316.55 ml       Temp max: Temp (24hrs), Av.8 °F (37.7 °C), Min:96.8 °F (36 °C), Max:102.4 °F (39.1 °C)      Assessment:  · Consulted by Dr. Juan José Gandhi to dose/monitor vancomycin. · Goal trough level:  15-20 mCg/mL; AUC/ELEUTERIO = 400-600 mg/L-hr. · 79 yo/F admitted from SNF after fall and found down for unknown length of time. BC's from  +for [facility]; Blood PCR today revealed methicillin-resistant Staph species. · Febrile, tachycardic, and lymphopenic on admission. · PMH: MM on lenalinomide therapy. · Now receiving HD    Plan:  · Vancomycin 1000 mg IV x1 post HD if level <20  · Will check vancomycin level when steady state is attained if vanco continues. · Pharmacist will follow and monitor/adjust dosing as necessary.     Julio Guerra, PharmD, BCPS 2021 11:48 AM

## 2021-07-01 NOTE — PROGRESS NOTES
As ordered OET retracted 1 cm  From 22 top lip  to 21 top line .  Secured and breath sounds equal bilateral.

## 2021-07-01 NOTE — PROGRESS NOTES
Department of Internal Medicine  Nephrology Attending Progress Note    Events reviewed. SUBJECTIVE:  We are following Mrs. Stewart for AUDREY. Patient remains intubated.     Physical Exam:    VITALS:  /70   Pulse 86   Temp 100.1 °F (37.8 °C) (Bladder)   Resp 24   Ht 5' 4\" (1.626 m)   Wt 188 lb 4.4 oz (85.4 kg)   SpO2 96%   BMI 32.32 kg/m²   24HR INTAKE/OUTPUT:      Intake/Output Summary (Last 24 hours) at 7/1/2021 0840  Last data filed at 7/1/2021 0600  Gross per 24 hour   Intake 1409.45 ml   Output 1941 ml   Net -531.55 ml     Access: Left femoral temporary dialysis catheter in place  Constitutional: intubate on mechanical ventilator  HEENT:  Cervical collar in place  Respiratory:  Intubated on vent  Cardiovascular/Edema:  Atrial fibrillation  Gastrointestinal:  Soft, non-tender, +BS  Neurologic:  Intubated and sedated  Skin:  Warm, dry, no rash or lesion  Other:   + edema    Scheduled Meds:   hydrocortisone sodium succinate PF  50 mg Intravenous Q8H    insulin lispro  0-12 Units Subcutaneous Q4H    mupirocin   Topical BID    artificial tears   Both Eyes BID    chlorhexidine  15 mL Mouth/Throat BID    vancomycin (VANCOCIN) intermittent dosing (placeholder)   Other RX Placeholder    pantoprazole  40 mg Intravenous Daily    And    sodium chloride (PF)  10 mL Intravenous Daily    piperacillin-tazobactam  3,375 mg Intravenous Q12H    And    sodium chloride   Intravenous Q12H    ipratropium-albuterol  1 ampule Inhalation Q4H WA    [Held by provider] amLODIPine  10 mg Oral Daily    [Held by provider] apixaban  2.5 mg Oral BID    [Held by provider] furosemide  20 mg Oral Daily    [Held by provider] gabapentin  100 mg Oral BID    [Held by provider] lenalidomide  10 mg Oral Daily    therapeutic multivitamin-minerals  1 tablet Oral Daily    sodium chloride flush  5-40 mL Intravenous 2 times per day     Continuous Infusions:   phenylephrine (NOEMY-SYNEPHRINE) 50mg/250mL infusion Stopped (06/30/21 1600)    fentaNYL 5 mcg/ml in 0.9%  ml infusion 100 mg/hr (07/01/21 0557)    sodium chloride      dextrose      [Held by provider] heparin (PORCINE) Infusion Stopped (06/29/21 1000)    sodium chloride       PRN Meds:.sodium chloride, anticoagulant sodium citrate, perflutren lipid microspheres, glucose, dextrose, glucagon (rDNA), dextrose, [Held by provider] heparin (porcine), [Held by provider] heparin (porcine), midazolam, sodium chloride flush, sodium chloride, polyethylene glycol, acetaminophen **OR** acetaminophen    DATA:    CBC:   Lab Results   Component Value Date    WBC 12.2 07/01/2021    RBC 2.78 07/01/2021    HGB 8.6 07/01/2021    HCT 25.2 07/01/2021    MCV 90.6 07/01/2021    MCH 30.9 07/01/2021    MCHC 34.1 07/01/2021    RDW 15.8 07/01/2021    PLT 15 07/01/2021    MPV NOT CALC 07/01/2021     CMP:    Lab Results   Component Value Date     07/01/2021    K 4.5 07/01/2021    K 4.2 06/27/2021    CL 98 07/01/2021    CO2 24 07/01/2021    BUN 71 07/01/2021    CREATININE 2.6 07/01/2021    GFRAA 21 07/01/2021    AGRATIO 0.9 06/24/2021    AGRATIO 1.1 06/24/2021    LABGLOM 18 07/01/2021    GLUCOSE 172 07/01/2021    PROT 5.5 07/01/2021    LABALBU 3.2 07/01/2021    CALCIUM 8.8 07/01/2021    BILITOT 2.5 07/01/2021    ALKPHOS 58 07/01/2021    AST 63 07/01/2021     07/01/2021     Magnesium:    Lab Results   Component Value Date    MG 2.4 07/01/2021     Phosphorus:    Lab Results   Component Value Date    PHOS 5.0 07/01/2021     Radiology Review:      Kidney ultrasound June 28, 2021   FINDINGS:       Kidneys:       The right kidney measures 9.2 cm in length and the left kidney measures 10.8   cm in length.       Kidneys demonstrate normal cortical echogenicity.  No evidence of   hydronephrosis or intrarenal stones.           Bladder:       The urinary bladder is decompressed around a Bennett catheter placed   Impression:  Unremarkable ultrasound of the kidneys.       The urinary bladder is decompressed around a Bennett catheter.         Chest x-ray June 29, 2021   1. Persistent near complete whiteout of the left lung unchanged when compared   to the prior study. 2. The right lung is clear. 3.         BRIEF SUMMARY OF INITIAL CONSULT:    Briefly Ms. Mary Holguin is an 78year old  female with a PMH of HTN, lung cancer, multiple myeloma, thrombocytopenia, anemia, DVT on chronic anticoagulation, and asthma who was admitted to the MICU after presenting to the ER via EMS from [facility] following an unwitnessed fall resulting in a dense C-2 fracture and shortness of breath. Labs in ER were significant for sodium 138, potassium 4.5, chloride 102, bicarbonate 19, BUN 67, creatinine 2.7 mg/dl, anion gap 17, lactic acid 4.2, and procalcitonin 13.99. Dexamethasone, mirtazapine, apixaban, amlodipine, furosemide, gabapentin, and  lenalidomide are medications patient was taking prior to admission. We are consulted for AUDREY. Her baseline  creatinine is 1.1-1.2 mg/dL. IMPRESSION/RECOMMENDATIONS:      1. AUDREY stage III on CKD, ischemic ATN, oliguric. Start on renal replacement therapy on June 29, 2021. Had SLED x4 hours yesterday, tolerated well. 2. CKD stage III, probably due to nephrosclerosis  3. Hemodynamic shock, on norepinephrine and antibiotics  4. Hypocalcemia, 2/2 MM therapy?,  Ionized calcium 1.14  ------------------------------------------  5. Respiratory failure status post intubation, normal pH normal PCO2  6. Multiple myeloma  7. Pneumonia, probably MRSA, on vancomycin and piperacillin-tazobactam  8. MRSA bacteremia, on vancomycin and piperacillin-tazobactam  9. Shock liver, LFTs improved  10. AF with RVR, heparin drip  11. History of DVT, on heparin drip  12. Status post fall  13. Normocytic anemia  14.  Nutrition, n.p.o.    Plan:    · SLED x4 hours today  · Replace calcium  · Continue to monitor renal function

## 2021-07-01 NOTE — CONSULTS
ConsultationNote    Patient's Name/Date of Birth: Laci Bland / 1940 (19 y.o.)    Date: July 1, 2021     Reason for Consult:  Pt presents with maggots in the oral cavity    Requesting Physician:  Zhao Treadwell DO    HPI  Pt presented with maggots in her oral cavity beginning 24 hours prior to consult. Past Medical History:   Diagnosis Date    Abnormality of plasma protein     Anemia     Asthma     Collapsed vertebra, not elsewhere classified, thoracic region, subsequent encounter for fracture with routine healing     Constipation     Dependence on supplemental oxygen     Difficulty in walking     GERD without esophagitis     Hypertension     Low back pain     Monoclonal gammopathy     Multiple myeloma not having achieved remission (HCC)     Other disorders of plasma-protein metabolism, not elsewhere classified     Secondary malignant neoplasm of bone (HCC)     Thrombocytopenia (HCC)     Unspecified fracture of first lumbar vertebra, subsequent encounter for fracture with routine healing     Unspecified fracture of fourth lumbar vertebra, subsequent encounter for fracture with routine healing        History reviewed. No pertinent surgical history. Prior to Admission medications    Medication Sig Start Date End Date Taking?  Authorizing Provider   docusate sodium (COLACE) 100 MG capsule Take 100 mg by mouth 2 times daily as needed for Constipation   Yes Historical Provider, MD   benzonatate (TESSALON) 100 MG capsule Take 100 mg by mouth 3 times daily as needed for Cough   Yes Historical Provider, MD   Dexamethasone 20 MG TABS Take 20 mg by mouth once a week 6/26/21  Yes Historical Provider, MD   mirtazapine (REMERON) 15 MG tablet Take 7.5 mg by mouth nightly   Yes Historical Provider, MD   apixaban (ELIQUIS) 2.5 MG TABS tablet Take 1 tablet by mouth 2 times daily 1/4/21  Yes Layne Concepcion MD   amLODIPine (NORVASC) 10 MG tablet Take 1 tablet by mouth daily 1/5/21  Yes Layne Concepcion MD furosemide (LASIX) 20 MG tablet Take 40 mg by mouth daily    Yes Historical Provider, MD   gabapentin (NEURONTIN) 100 MG capsule Take 100 mg by mouth 2 times daily. For foot pain   Yes Historical Provider, MD   Multiple Vitamins-Minerals (THERAPEUTIC MULTIVITAMIN-MINERALS) tablet Take 1 tablet by mouth daily   Yes Historical Provider, MD   HYDROcodone-acetaminophen (NORCO)  MG per tablet Take 1 tablet by mouth every 4 hours as needed for Pain. Yes Historical Provider, MD   lenalidomide (REVLIMID) 10 MG chemo capsule Take 10 mg by mouth daily Give at bedtime for 21 days starting 12/12/20    Historical Provider, MD   Ascorbic Acid (VITAMIN C ADULT GUMMIES PO) Take 1,000 mg by mouth daily    Historical Provider, MD   Zinc 100 MG TABS Take by mouth daily    Historical Provider, MD   ipratropium-albuterol (DUONEB) 0.5-2.5 (3) MG/3ML SOLN nebulizer solution Inhale 1 vial into the lungs every 4 hours As needed    Historical Provider, MD   ondansetron (ZOFRAN) 8 MG tablet Take 8 mg by mouth every 8 hours as needed for Nausea or Vomiting    Historical Provider, MD       No Known Allergies    No family history on file. Social History     Socioeconomic History    Marital status:      Spouse name: Not on file    Number of children: Not on file    Years of education: Not on file    Highest education level: Not on file   Occupational History    Not on file   Tobacco Use    Smoking status: Never Smoker   Substance and Sexual Activity    Alcohol use: Not Currently    Drug use: Not Currently    Sexual activity: Not on file   Other Topics Concern    Not on file   Social History Narrative    Not on file     Social Determinants of Health     Financial Resource Strain:     Difficulty of Paying Living Expenses:    Food Insecurity:     Worried About Running Out of Food in the Last Year:     [address] in the Last Year:    Transportation Needs:     Lack of Transportation (Medical):      Lack of Transportation (Non-Medical):    Physical Activity:     Days of Exercise per Week:     Minutes of Exercise per Session:    Stress:     Feeling of Stress :    Social Connections:     Frequency of Communication with Friends and Family:     Frequency of Social Gatherings with Friends and Family:     Attends [employer]ah[employer] Services:     Active Member of Clubs or Organizations:     Attends Club or Organization Meetings:     Marital Status:    Intimate Partner Violence:     Fear of Current or Ex-Partner:     Emotionally Abused:     Physically Abused:     Sexually Abused:        Review of Systems      Vitals:    07/01/21 1200 07/01/21 1227 07/01/21 1319 07/01/21 1401   BP: 117/83  129/64    Pulse: 121 97 105 88   Resp: 23 22 23 24   Temp: 96.8 °F (36 °C)  96.8 °F (36 °C) 99.6 °F (37.6 °C)   TempSrc:    Bladder   SpO2:  92%  95%   Weight:   186 lb 1.1 oz (84.4 kg)    Height:         Physical Exam      Assessment:  Principal Problem:    Closed nondisplaced type II dens fracture (HCC)  Active Problems:    Acute respiratory failure with hypoxia (HCC)    CAP (community acquired pneumonia) due to Pneumococcus [facility])  Resolved Problems:    * No resolved hospital problems. *      Plan:  1. Exam was completed on the patient in the hospital room. Patient was intubated and barely coherent. Nurse showed dental residents a specimen jar of maggots that was suctioned from patient's oral cavity. Intraoral exam completed. Patient has poor dentition with gross buildup on all teeth. Specifically could not visualize maggots in oral cavity due to limited opening with intubation tube. The patient is to follow up with the dental clinic or her general dentist for comprehensive evaluation when healthy. Recommend Chlorhexidine mouth rinse swab if possible and monitor for future issues.     Electronically signed by Enriqueta Ma DMD on 7/1/21 at 2:11 PM EDT    I personally evaluated the patient with the above resident and agree with the assessment and proposed treatment plan.     Electronically signed by Carolina Spatz, DDS on 7/1/2021 at 2:25 PM

## 2021-07-01 NOTE — PLAN OF CARE
Problem: Cardiac:  Goal: Ability to maintain an adequate cardiac output will improve  Description: Ability to maintain an adequate cardiac output will improve  Outcome: Not Met This Shift     Problem: Health Behavior:  Goal: Ability to manage health-related needs will improve  Description: Ability to manage health-related needs will improve  Outcome: Not Met This Shift     Problem: Safety:  Goal: Will show no signs and symptoms of excessive bleeding  Description: Will show no signs and symptoms of excessive bleeding  Outcome: Met This Shift

## 2021-07-01 NOTE — PROGRESS NOTES
[address] [facility]   Department of Internal Medicine   Internal Medicine Residency  MICU Progress Note    Patient:  Lyndee Osgood 78 y.o. female   MRN: [id number]       Date of Service: 2021    Allergy: Patient has no known allergies. Subjective     Patient was seen and examined this morning at bedside in no acute distress. Overnight, BP dropped to 99/70 before SLEDD, was started on phenylphrine which stabilized BP to 117/62. Nurse noticed maggots in the patient's mouth. A sample was collected and pt was started on ivermectin 1 dose. Objective     TEMPERATURE:  Current - Temp: 99 °F (37.2 °C); Max - Temp  Av.9 °F (37.2 °C)  Min: 96.8 °F (36 °C)  Max: 102.4 °F (39.1 °C)  RESPIRATIONS RANGE: Resp  Av.1  Min: 17  Max: 28  PULSE RANGE: Pulse  Av.4  Min: 81  Max: 133  BLOOD PRESSURE RANGE:  Systolic (00SSB), WAY:743 , Min:99 , CHANTE:987   ; Diastolic (67RFF), BQF:49, Min:42, Max:112    PULSE OXIMETRY RANGE: SpO2  Av.6 %  Min: 87 %  Max: 100 %    I & O - 24hr:    Intake/Output Summary (Last 24 hours) at 2021 1717  Last data filed at 2021 1401  Gross per 24 hour   Intake 4.91 ml   Output 2803 ml   Net -778.09 ml     I/O last 3 completed shifts: In: .9 [I.V.:1324.9]  Out: 2442 [Urine:722; Stool:1] No intake/output data recorded.    Weight change: 18 lb 11.8 oz (8.5 kg)    Physical Exam:  Physical exam could not be performed due to patient being intubated                                      Medications     Continuous Infusions:   sodium chloride      fentaNYL 5 mcg/ml in 0.9%  ml infusion 200 mcg/hr (21 1548)    dextrose      [Held by provider] heparin (PORCINE) Infusion Stopped (21 1000)    sodium chloride       Scheduled Meds:   hydrocortisone sodium succinate PF  50 mg Intravenous BID    vancomycin  1,000 mg Intravenous Once    senna  1 tablet Oral Nightly    midodrine  10 mg Oral TID WC    insulin lispro  0-12 Units Subcutaneous Q4H    mupirocin   Topical BID    artificial tears   Both Eyes BID    chlorhexidine  15 mL Mouth/Throat BID    vancomycin (VANCOCIN) intermittent dosing (placeholder)   Other RX Placeholder    pantoprazole  40 mg Intravenous Daily    And    sodium chloride (PF)  10 mL Intravenous Daily    piperacillin-tazobactam  3,375 mg Intravenous Q12H    And    sodium chloride   Intravenous Q12H    ipratropium-albuterol  1 ampule Inhalation Q4H WA    [Held by provider] amLODIPine  10 mg Oral Daily    [Held by provider] apixaban  2.5 mg Oral BID    [Held by provider] furosemide  20 mg Oral Daily    [Held by provider] gabapentin  100 mg Oral BID    [Held by provider] lenalidomide  10 mg Oral Daily    therapeutic multivitamin-minerals  1 tablet Oral Daily    sodium chloride flush  5-40 mL Intravenous 2 times per day     PRN Meds: sodium chloride, anticoagulant sodium citrate, perflutren lipid microspheres, glucose, dextrose, glucagon (rDNA), dextrose, [Held by provider] heparin (porcine), [Held by provider] heparin (porcine), midazolam, sodium chloride flush, sodium chloride, polyethylene glycol, acetaminophen **OR** acetaminophen  Nutrition:   NPO    Labs and Imaging Studies     CBC:   Recent Labs     06/29/21  1113 06/29/21  1739 06/30/21  0500 06/30/21  1150 06/30/21  2356 07/01/21  0516 07/01/21  1005   WBC 6.6  --  12.3*  --   --  12.2*  --    HGB 6.9*   < > 8.5*   < > 8.2* 8.6* 9.0*   HCT 21.1*   < > 25.0*   < > 23.6* 25.2* 26.9*   MCV 95.0  --  91.2  --   --  90.6  --    PLT 38*  --  26*  --   --  15*  --     < > = values in this interval not displayed.        BMP:    Recent Labs     07/01/21  0516 07/01/21  1005 07/01/21  1236    138 135   K 4.5 4.8 4.1   CL 98 98 96*   CO2 24 22 21*   BUN 71* 81* 40*   CREATININE 2.6* 2.7* 1.4*   GLUCOSE 172* 180* 138*       LIVER PROFILE:   Recent Labs     06/29/21  0433 06/30/21  0500 07/01/21  0516   * 401* 63*   * 553* 279*   BILIDIR 0.8* 1.3* 1.8*   BILITOT 1.2 1.9* 2.5*   ALKPHOS 38 48 58       PT/INR:   Recent Labs     06/29/21  0433 06/30/21  0500 07/01/21  0516   PROTIME 17.2* 17.8* 21.1*   INR 1.6 1.6 1.9       APTT:   Recent Labs     06/29/21  0433 06/30/21  0500 07/01/21  0516   APTT 54.2* 31.0 28.1       Fasting Lipid Panel:    No results found for: CHOL, TRIG, HDL    Cardiac Enzymes:    Lab Results   Component Value Date    CKTOTAL 132 06/27/2021    CKTOTAL 57 06/26/2021    CKMB 1.6 06/27/2021    TROPONINI 0.03 12/30/2020       Notable Cultures:      Blood cultures   Blood Culture, Routine   Date Value Ref Range Status   06/29/2021 24 Hours no growth  Preliminary     Respiratory cultures No results found for: RESPCULTURE No results found for: LABGRAM  Urine   Urine Culture, Routine   Date Value Ref Range Status   06/26/2021 Growth not present  Final     Legionella No results found for: LABLEGI  C Diff PCR No results found for: CDIFPCR  Wound culture/abscess: No results for input(s): WNDABS in the last 72 hours. Tip culture:No results for input(s): CXCATHTIP in the last 72 hours.       Oxygen:     Vent Information  $Ventilation: $Subsequent Day  Skin Assessment: Clean, dry, & intact  Suction Catheter Diameter:  (16)  Equipment ID: [id number]  Equipment Changed: (S) Suction catheter  Vent Type: 980  Vent Mode: AC/VC+  Vt Ordered: 350 mL  Rate Set: 24 bmp  Peak Flow: 0 L/min  Pressure Support: 0 cmH20  FiO2 : 60 %  SpO2: 97 %  SpO2/FiO2 ratio: 161.67  Sensitivity: 2  PEEP/CPAP: 10  I Time/ I Time %: 0 s  Humidification Source: Heated wire  Humidification Temp: 35.5  Humidification Temp Measured: 35.5  Mask Type: Full face mask  Mask Size: Medium  Additional Respiratory  Assessments  Pulse: 81  Resp: 24  SpO2: 97 %  Position: Semi-Koch's  Humidification Source: Heated wire  Humidification Temp: 35.5  Oral Care: Mouth moisturizer, Mouth swabbed  Subglottic Suction Done?: Yes  Cuff Pressure (cm H2O): 29 cm H2O       Urethral Catheter-Output (mL): 5 mL    Imaging Studies:  US DUP LOWER EXTREMITIES BILATERAL VENOUS   Final Result   Within the visualized vessels there is no evidence for deep venous   thrombosis               US DUP UPPER EXTREMITIES BILATERAL VENOUS   Final Result   Within the visualized vessels there is no evidence for deep venous   thrombosis               XR ABDOMEN (KUB) (SINGLE AP VIEW)   Final Result   No evidence of bowel obstruction or ileus. XR CHEST PORTABLE   Final Result   1. There is partial interval clearing of the left lung airspace disease with   partial Reaeration of the left lung apex when compared with the patient's   prior study. 2.  The right lung is clear. XR CHEST PORTABLE   Final Result   1. Persistent near complete opacification of the left hemithorax   2. The right lung is clear. 3. Stable position of the support lines and tubes. XR ABDOMEN FOR NG/OG/NE TUBE PLACEMENT   Final Result   Satisfactory position of nasogastric tube. XR CHEST PORTABLE   Final Result   1. Persistent near complete whiteout of the left lung unchanged when compared   to the prior study. 2. The right lung is clear. 3.      XR CHEST PORTABLE   Final Result   1. Opacities throughout left lung. New opacities are present in previously   aerated left upper lung field. 2.  Endotracheal tube is 2.4 cm above the paradise. US RETROPERITONEAL COMPLETE   Final Result   Unremarkable ultrasound of the kidneys. The urinary bladder is decompressed around a Bennett catheter. XR CHEST PORTABLE   Final Result   1. Persistent the infiltrate with pleural effusion in the left lung cause   significant the opacification of the left image chest particular from the mid   to the base. 2.  No significant changes since the previous examinations recently performed. 3.  Please see report of CT chest of June 26. XR CHEST ABDOMEN NG PLACEMENT   Final Result   Satisfactory position of the NGT.          XR CHEST in the right lung base. The liver is of normal architecture. There is diffuse demineralization of the thoracic spine with punctate   lucencies. Compression fractures with vertebroplasty are noted. There is   slight irregularity of the sternum probably artifact. There is kyphosis of   the spine. Impression      Dense consolidation in the left upper lobe and left lower lobe concerning for   pneumonia. Surveillance after appropriate treatment is recommended with   repeat CT scan in 6-8 weeks to see complete resolution and exclude underlying   malignancy. Demineralization and the punctate lytic lucencies in the spine and ribs   concerning for multiple myeloma. Critical results were called by Dr. Corey Sheehan to Dr. Daisy Cantu. on   6/26/2021 at 16:30.         CT Cervical Spine WO Contrast   Final Result   Stable atrophy with small vessel ischemic disease. There is no acute stroke   or hemorrhage. Persistent multiple punctate lytic lesions the calvarium likely due to   malignancy like multiple myeloma. CT cervical spine. There is a fracture of the base of the dens of C2 (type 2). There is no   displacement. No other acute fractures are identified in the cervical spine. There is mild diffuse degenerative changes from C3-T1 with osteophytes and   multilevel disc bulges. There is osteopenia and punctate lytic lesions   concerning for multiple myeloma. Impression      Type 2 fracture of the dens of C2. Diffuse degenerative changes from C3-T1. Multiple myeloma. CT chest.      Comparison December 30, 2020. Findings      There is borderline cardiac size. The great vessels are normal.  Trachea and   major bronchi are patent. There is dense consolidation in the left upper   lobe and left lower lobe concerning for pneumonia. There is minimal   atelectasis in the right lung base. The liver is of normal architecture.    There is diffuse demineralization of the thoracic spine with punctate   lucencies. Compression fractures with vertebroplasty are noted. There is   slight irregularity of the sternum probably artifact. There is kyphosis of   the spine. Impression      Dense consolidation in the left upper lobe and left lower lobe concerning for   pneumonia. Surveillance after appropriate treatment is recommended with   repeat CT scan in 6-8 weeks to see complete resolution and exclude underlying   malignancy. Demineralization and the punctate lytic lucencies in the spine and ribs   concerning for multiple myeloma. Critical results were called by Dr. Maciej Martinez to Dr. Mateus Harrell. on   6/26/2021 at 16:30.         CT CHEST WO CONTRAST   Final Result   Stable atrophy with small vessel ischemic disease. There is no acute stroke   or hemorrhage. Persistent multiple punctate lytic lesions the calvarium likely due to   malignancy like multiple myeloma. CT cervical spine. There is a fracture of the base of the dens of C2 (type 2). There is no   displacement. No other acute fractures are identified in the cervical spine. There is mild diffuse degenerative changes from C3-T1 with osteophytes and   multilevel disc bulges. There is osteopenia and punctate lytic lesions   concerning for multiple myeloma. Impression      Type 2 fracture of the dens of C2. Diffuse degenerative changes from C3-T1. Multiple myeloma. CT chest.      Comparison December 30, 2020. Findings      There is borderline cardiac size. The great vessels are normal.  Trachea and   major bronchi are patent. There is dense consolidation in the left upper   lobe and left lower lobe concerning for pneumonia. There is minimal   atelectasis in the right lung base. The liver is of normal architecture. There is diffuse demineralization of the thoracic spine with punctate   lucencies. Compression fractures with vertebroplasty are noted.   There is   slight irregularity of the sternum probably artifact. There is kyphosis of   the spine. Impression      Dense consolidation in the left upper lobe and left lower lobe concerning for   pneumonia. Surveillance after appropriate treatment is recommended with   repeat CT scan in 6-8 weeks to see complete resolution and exclude underlying   malignancy. Demineralization and the punctate lytic lucencies in the spine and ribs   concerning for multiple myeloma. Critical results were called by Dr. Diane Munguia to Dr. Sharolyn Felty. on   6/26/2021 at 16:30. XR CHEST PORTABLE   Final Result   Consolidative airspace opacities in the left mid to lower lung zone with a   suspected small left pleural effusion. Findings may be on the basis of   pneumonia or less likely asymmetric pulmonary edema. Consider correlation   with CT of the chest.         XR CHEST PORTABLE    (Results Pending)        Resident's Assessment and Plan     Assessment and Plan:    1. Acute metabolic encephalopathy likely 2/2 septic shock  2. Septic Shock  a. 2/2 L. Sided Pneumonia  b. Today pt's MAP dropped to 55 and was started on Midodrine 10mg   c. MRSA nares cultures (+)  d. Wean pressors, maintain a MAP > 65  e. Start midodrine 10mg TID  f. Wean hydrocortisone to 50mg BID  3. Acute Hypoxic Respiratory Failure  a. 2/2 L. Sided Pneumonia  b. Wean FiO2 and PEEP as tolerated  4. AUDREY vs AUDREY on CKD  a. BUN/Cr 112/4 (6/30) to 40/1.4 (7/1) after SLEDD  b. Will hold off on dialysis today per nephrology  5. Thrombocytopenia  a. DIC was found to be negative  b. Pt has no active bleeding  c. Order CBC q8hr to monitor hemoglobin > 7 and platelet > 69,413  6. 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 Follow HIT antibody results  b. Duplex US ordered bilaterally upper and lower extremity to r/o DVT  c. If DVT is present, start agatroban  7. Atrial Fibrillation  a. 2/2 sepsis and hypoxia  b. Digoxen 500mcg given today  c. Repeat dose of Digoxin 500mcg x1  d.  Follow digoxin levels in 48hrs'  8. Myiasis (presence of maggots) of the mouth  a. S/p bronch today, sample was sent for analysis  9. Cervical Fracture  a. 2/2 traumatic fall and underlying lytic lesions of the spine  b. Pt currently has a neck brace  10. H/o Multiple Myeloma  a. Currently being managed w/ lenalidomide 10mg      # Peptic ulcer prophylaxis:  # DVT Prophylaxis:   # Disposition: Cont current care / Transfer to telemetry / general floor    Douglas Jean Baptiste MD, PGY-1    Attending Physician: Dr. Juan Phan  Department of Pulmonary, Critical Care and [facility]  Department of Internal Medicine      During multidisciplinary team rounds Iglesia Contreras is a 78 y.o. female was seen, examined and discussed. This is confirmation that I have personally seen and examined the patient and that the key elements of the encounter were performed by me (> 85 % time). The medications & laboratory data was discussed and adjusted where necessary. The radiographic images were reviewed or with radiologist or consultant if felt dis-concordant with the exam or history. The above findings were corroborated, plans confirmed and changes made if needed. Family is updated at the bedside as available. Key issues of the case were discussed among consultants. Critical Care time is documented if appropriate.       Grisel Rojo, DO, FACP, FCCP, Radha negrete,

## 2021-07-01 NOTE — PROGRESS NOTES
While doing oral care on patient, multiple maggots were noted in patient's oral cavity. Maggots were removed with forceps and transferred to a sterile container. New tube kramer and C collar was applied. MD aware and new orders received (see MAR). All visible maggots removed at this time. Will continue to monitor.

## 2021-07-01 NOTE — PROGRESS NOTES
Infectious Disease  Progress Note  [facility]    Chief Complaint:  On vent    Subjective: bacteremia  HAP    Scheduled Meds:   hydrocortisone sodium succinate PF  50 mg Intravenous BID    insulin lispro  0-12 Units Subcutaneous Q4H    mupirocin   Topical BID    artificial tears   Both Eyes BID    chlorhexidine  15 mL Mouth/Throat BID    vancomycin (VANCOCIN) intermittent dosing (placeholder)   Other RX Placeholder    pantoprazole  40 mg Intravenous Daily    And    sodium chloride (PF)  10 mL Intravenous Daily    piperacillin-tazobactam  3,375 mg Intravenous Q12H    And    sodium chloride   Intravenous Q12H    ipratropium-albuterol  1 ampule Inhalation Q4H WA    [Held by provider] amLODIPine  10 mg Oral Daily    [Held by provider] apixaban  2.5 mg Oral BID    [Held by provider] furosemide  20 mg Oral Daily    [Held by provider] gabapentin  100 mg Oral BID    [Held by provider] lenalidomide  10 mg Oral Daily    therapeutic multivitamin-minerals  1 tablet Oral Daily    sodium chloride flush  5-40 mL Intravenous 2 times per day     Continuous Infusions:   fentaNYL 5 mcg/ml in 0.9%  ml infusion 100 mg/hr (07/01/21 0557)    dextrose      [Held by provider] heparin (PORCINE) Infusion Stopped (06/29/21 1000)    sodium chloride       PRN Meds:anticoagulant sodium citrate, perflutren lipid microspheres, glucose, dextrose, glucagon (rDNA), dextrose, [Held by provider] heparin (porcine), [Held by provider] heparin (porcine), midazolam, sodium chloride flush, sodium chloride, polyethylene glycol, acetaminophen **OR** acetaminophen    Patient Vitals for the past 24 hrs:   BP Temp Temp src Pulse Resp SpO2 Height Weight   07/01/21 1130    121       07/01/21 1100 (!) 144/80 96.8 °F (36 °C)  108 24 100 %     07/01/21 1032 (!) 129/112 97.8 °F (36.6 °C) Bladder 89 24      07/01/21 1031      92 %  188 lb 4.4 oz (85.4 kg)   07/01/21 0930    87 24 96 %     07/01/21 0902 123/70 98 °F (36.7 °C) Bladder 84       07/01/21 0637    86 24 96 %     07/01/21 0600 130/70   99 24 96 %     07/01/21 0500 119/62   104 22 94 %     07/01/21 0400 130/74 100.1 °F (37.8 °C) Bladder 113 25 95 %  188 lb 4.4 oz (85.4 kg)   07/01/21 0300 117/62   108 27 96 %     07/01/21 0200 99/70   123 24 96 %     07/01/21 0100 123/79   115 28 93 %     07/01/21 0000 126/62 102.2 °F (39 °C) Esophageal 114 24 94 %     06/30/21 2300 123/68   118 22 95 %     06/30/21 2200 132/66   110 19 96 %     06/30/21 2100 120/60   124 21 96 %     06/30/21 2058    123  95 %     06/30/21 2000 117/64 102.4 °F (39.1 °C) Esophageal 128 20 95 %     06/30/21 1900 122/62   112 22 95 %     06/30/21 1845      92 %     06/30/21 1830    125 25 93 %     06/30/21 1815    128 22 95 %     06/30/21 1805  99.3 °F (37.4 °C)  95 23 95 %  181 lb 14.1 oz (82.5 kg)   06/30/21 1800    113 25 95 %     06/30/21 1745    120 24 94 %     06/30/21 1730    133 24 94 %     06/30/21 1715    129 25 94 %     06/30/21 1700    123 27 94 %     06/30/21 1645    126 23 90 %     06/30/21 1630    133 27 (!) 88 %     06/30/21 1629    121 28 (!) 88 %     06/30/21 1615    116 28 (!) 89 %     06/30/21 1600 112/62 101.3 °F (38.5 °C) Esophageal 116 29 91 %     06/30/21 1545    114 27 91 %     06/30/21 1530    122       06/30/21 1515    117       06/30/21 1500    112       06/30/21 1455       5' 4\" (1.626 m)    06/30/21 1445    110       06/30/21 1430    127       06/30/21 1415    123       06/30/21 1400    116       06/30/21 1345  101.3 °F (38.5 °C)  120 (!) 32 (!) 86 %     06/30/21 1313     20 94 %     06/30/21 1311    108 30 93 %     06/30/21 1308    109 (!) 32 92 %     06/30/21 1200    103 30 90 %     06/30/21 1159 112/62 98.9 °F (37.2 °C) Esophageal            CBC with Differential: close to the paradise and could be retracted by 1 cm. XR CHEST PORTABLE   Final Result   Stable chest series. Persistent dense consolidation in the left mid and   lower lung. Stable atherosclerotic disease. No new cardiopulmonary   pathology. CT Head WO Contrast   Final Result   Stable atrophy with small vessel ischemic disease. There is no acute stroke   or hemorrhage. Persistent multiple punctate lytic lesions the calvarium likely due to   malignancy like multiple myeloma. CT cervical spine. There is a fracture of the base of the dens of C2 (type 2). There is no   displacement. No other acute fractures are identified in the cervical spine. There is mild diffuse degenerative changes from C3-T1 with osteophytes and   multilevel disc bulges. There is osteopenia and punctate lytic lesions   concerning for multiple myeloma. Impression      Type 2 fracture of the dens of C2. Diffuse degenerative changes from C3-T1. Multiple myeloma. CT chest.      Comparison December 30, 2020. Findings      There is borderline cardiac size. The great vessels are normal.  Trachea and   major bronchi are patent. There is dense consolidation in the left upper   lobe and left lower lobe concerning for pneumonia. There is minimal   atelectasis in the right lung base. The liver is of normal architecture. There is diffuse demineralization of the thoracic spine with punctate   lucencies. Compression fractures with vertebroplasty are noted. There is   slight irregularity of the sternum probably artifact. There is kyphosis of   the spine. Impression      Dense consolidation in the left upper lobe and left lower lobe concerning for   pneumonia. Surveillance after appropriate treatment is recommended with   repeat CT scan in 6-8 weeks to see complete resolution and exclude underlying   malignancy.       Demineralization and the punctate lytic lucencies in the spine and ribs   concerning for multiple myeloma. Critical results were called by Dr. Linh Wilcox to Dr. Emperatriz Monreal on   6/26/2021 at 16:30.         CT Cervical Spine WO Contrast   Final Result   Stable atrophy with small vessel ischemic disease. There is no acute stroke   or hemorrhage. Persistent multiple punctate lytic lesions the calvarium likely due to   malignancy like multiple myeloma. CT cervical spine. There is a fracture of the base of the dens of C2 (type 2). There is no   displacement. No other acute fractures are identified in the cervical spine. There is mild diffuse degenerative changes from C3-T1 with osteophytes and   multilevel disc bulges. There is osteopenia and punctate lytic lesions   concerning for multiple myeloma. Impression      Type 2 fracture of the dens of C2. Diffuse degenerative changes from C3-T1. Multiple myeloma. CT chest.      Comparison December 30, 2020. Findings      There is borderline cardiac size. The great vessels are normal.  Trachea and   major bronchi are patent. There is dense consolidation in the left upper   lobe and left lower lobe concerning for pneumonia. There is minimal   atelectasis in the right lung base. The liver is of normal architecture. There is diffuse demineralization of the thoracic spine with punctate   lucencies. Compression fractures with vertebroplasty are noted. There is   slight irregularity of the sternum probably artifact. There is kyphosis of   the spine. Impression      Dense consolidation in the left upper lobe and left lower lobe concerning for   pneumonia. Surveillance after appropriate treatment is recommended with   repeat CT scan in 6-8 weeks to see complete resolution and exclude underlying   malignancy. Demineralization and the punctate lytic lucencies in the spine and ribs   concerning for multiple myeloma.       Critical results were called by Dr. Linh Wilcox to  Fifi Benoit. on   6/26/2021 at 16:30.         CT CHEST WO CONTRAST   Final Result   Stable atrophy with small vessel ischemic disease. There is no acute stroke   or hemorrhage. Persistent multiple punctate lytic lesions the calvarium likely due to   malignancy like multiple myeloma. CT cervical spine. There is a fracture of the base of the dens of C2 (type 2). There is no   displacement. No other acute fractures are identified in the cervical spine. There is mild diffuse degenerative changes from C3-T1 with osteophytes and   multilevel disc bulges. There is osteopenia and punctate lytic lesions   concerning for multiple myeloma. Impression      Type 2 fracture of the dens of C2. Diffuse degenerative changes from C3-T1. Multiple myeloma. CT chest.      Comparison December 30, 2020. Findings      There is borderline cardiac size. The great vessels are normal.  Trachea and   major bronchi are patent. There is dense consolidation in the left upper   lobe and left lower lobe concerning for pneumonia. There is minimal   atelectasis in the right lung base. The liver is of normal architecture. There is diffuse demineralization of the thoracic spine with punctate   lucencies. Compression fractures with vertebroplasty are noted. There is   slight irregularity of the sternum probably artifact. There is kyphosis of   the spine. Impression      Dense consolidation in the left upper lobe and left lower lobe concerning for   pneumonia. Surveillance after appropriate treatment is recommended with   repeat CT scan in 6-8 weeks to see complete resolution and exclude underlying   malignancy. Demineralization and the punctate lytic lucencies in the spine and ribs   concerning for multiple myeloma. Critical results were called by Dr. Janneth Concepcion to Dr. Fifi Benoit. on   6/26/2021 at 16:30.          XR CHEST PORTABLE   Final Result   Consolidative airspace opacities in the left mid to lower lung zone with a   suspected small left pleural effusion. Findings may be on the basis of   pneumonia or less likely asymmetric pulmonary edema. Consider correlation   with CT of the chest.         XR CHEST PORTABLE    (Results Pending)   XR ABDOMEN (KUB) (SINGLE AP VIEW)    (Results Pending)       Assessment  Trauma fall  resp failure  c2 fx  Pneumonia HAP  High grade bacteremia  MR staph species  On vent  DNR-CCA   CXR : persistent infiltrates with pleural effusion in the left lung base  significat opacification of the left  Bronch  Yesterday   cutlures pending   Acute myocardial injury in the setting of septic shock   Multiple myeloma  Talked to MICRO :  BC : staph warneii    Staph epidermidis both in same sets  3/4   Personally talked to micro   Temp dialysis cath yesterday   Subsequent BC are negative     Plan  On zosyn  But needs vanco also   Repeat BC  Wait for ID She will most likely need 2D echo and KHUSHBOO   Check cultures   Baseline ESR, CRP   Monitor labs   Will follow with you  ECHO REPORT PENDING ???  Repeat BC today    We need a 2D echo at the very least !! Maggots were found in the mouth ? ? The question is why   ENT did not find any mass lesion ulcertions or tumors in the oral cavity   Maggots in bedside jar     She is still on zosyn  Await bronch cultures  Continue vancomycin  Pharmacy help appreciated.          Electronically signed by Meli Love MD on 7/1/2021 at 11:45 AM

## 2021-07-02 NOTE — FLOWSHEET NOTE
07/02/21 1205   Treatment   Time On 1152   Treatment Goal 2000   Observations & Evaluations   Level of Consciousness Responds to Pain (2)   Respiratory Quality/Effort Unlabored   FiO2  60 %   O2 Device Ventilator   Bilateral Breath Sounds Rhonchi   Skin Condition/Temp Cool;Dry   Bowel Sounds (All Quadrants) Hypoactive   Edema Generalized   Edema Generalized +1   Comments Report given to Pinky   Vital Signs   BP (!) 137/47   Pulse 75   Resp 23   Weight 179 lb 10.8 oz (81.5 kg)   Percent Weight Change 0   Pain Assessment   Pain Level 0   Post-Hemodialysis Assessment   Post-Treatment Procedures Blood returned;Catheter capped, clamped with Citrate x 2 ports   Machine Disinfection Process Acid/Vinegar Clean;Heat Disinfect   Total Liters Processed (l/min) 60 l/min   Dialyzer Clearance Clear   Duration of Treatment (minutes) 240 minutes   Hemodialysis Intake (ml) 400 ml   Hemodialysis Output (ml) 2400 ml   NET Removed (ml) 2000 ml   Tolerated Treatment Fair   Patient Response to Treatment Albumin given for BP support. Levo started @ 4 mcg during tx, down to 1 mcg by end of tx.

## 2021-07-02 NOTE — PROGRESS NOTES
Spoke with ECHO department regarding the need for ECHO to be complete per Dr Winter Fuentes said she will put patient on list for today but cannot guarantee that it will be done as they have ECHOs pending discharge that need completed by today

## 2021-07-02 NOTE — PROGRESS NOTES
Spoke with patient's son, Adeel Tovar- he is coming into town and will be here around 1800 to discuss goals of care with ICU team

## 2021-07-02 NOTE — PROGRESS NOTES
Department of Internal Medicine  Nephrology Attending Progress Note    Events reviewed. SUBJECTIVE:  We are following Mrs. Stewart for AUDREY. Patient remains intubated. Physical Exam:    VITALS:  BP (!) 120/42   Pulse 76   Temp 98.4 °F (36.9 °C)   Resp 24   Ht 5' 4\" (1.626 m)   Wt 179 lb 10.8 oz (81.5 kg)   SpO2 96%   BMI 30.84 kg/m²   24HR INTAKE/OUTPUT:      Intake/Output Summary (Last 24 hours) at 7/2/2021 1028  Last data filed at 7/2/2021 0600  Gross per 24 hour   Intake 2048. 46 ml   Output 1517 ml   Net 531.46 ml     Access: Left femoral temporary dialysis catheter in place  Constitutional: intubate on mechanical ventilator  HEENT:  Cervical collar in place  Respiratory:  Intubated on vent  Cardiovascular/Edema:  Atrial fibrillation  Gastrointestinal:  Soft, non-tender, +BS  Neurologic:  Intubated and sedated  Skin:  Warm, dry, no rash or lesion  Other:   + edema    Scheduled Meds:   hydrocortisone sodium succinate PF  50 mg Intravenous BID    senna  1 tablet Oral Nightly    midodrine  10 mg Oral TID WC    insulin lispro  0-12 Units Subcutaneous Q4H    mupirocin   Topical BID    artificial tears   Both Eyes BID    chlorhexidine  15 mL Mouth/Throat BID    vancomycin (VANCOCIN) intermittent dosing (placeholder)   Other RX Placeholder    pantoprazole  40 mg Intravenous Daily    And    sodium chloride (PF)  10 mL Intravenous Daily    piperacillin-tazobactam  3,375 mg Intravenous Q12H    And    sodium chloride   Intravenous Q12H    ipratropium-albuterol  1 ampule Inhalation Q4H WA    [Held by provider] amLODIPine  10 mg Oral Daily    [Held by provider] apixaban  2.5 mg Oral BID    [Held by provider] furosemide  20 mg Oral Daily    [Held by provider] gabapentin  100 mg Oral BID    [Held by provider] lenalidomide  10 mg Oral Daily    therapeutic multivitamin-minerals  1 tablet Oral Daily    sodium chloride flush  5-40 mL Intravenous 2 times per day     Continuous Infusions:   norepinephrine 4 mcg/min (07/02/21 0837)    sodium chloride      fentaNYL 5 mcg/ml in 0.9%  ml infusion 150 mcg/hr (07/02/21 4928)    dextrose      [Held by provider] heparin (PORCINE) Infusion Stopped (06/29/21 1000)    sodium chloride       PRN Meds:.sodium chloride, anticoagulant sodium citrate, perflutren lipid microspheres, glucose, dextrose, glucagon (rDNA), dextrose, [Held by provider] heparin (porcine), [Held by provider] heparin (porcine), midazolam, sodium chloride flush, sodium chloride, polyethylene glycol, acetaminophen **OR** acetaminophen    DATA:    CBC:   Lab Results   Component Value Date    WBC 11.4 07/02/2021    RBC 2.82 07/02/2021    HGB 8.5 07/02/2021    HCT 25.4 07/02/2021    MCV 90.1 07/02/2021    MCH 30.1 07/02/2021    MCHC 33.5 07/02/2021    RDW 16.0 07/02/2021    PLT 19 07/02/2021    MPV NOT CALC 07/02/2021     CMP:    Lab Results   Component Value Date     07/02/2021    K 4.3 07/02/2021    K 4.2 06/27/2021    CL 98 07/02/2021    CO2 25 07/02/2021    BUN 59 07/02/2021    CREATININE 1.9 07/02/2021    GFRAA 31 07/02/2021    AGRATIO 0.9 06/24/2021    AGRATIO 1.1 06/24/2021    LABGLOM 25 07/02/2021    GLUCOSE 123 07/02/2021    PROT 5.1 07/02/2021    LABALBU 2.6 07/02/2021    CALCIUM 8.1 07/02/2021    BILITOT 2.5 07/02/2021    ALKPHOS 67 07/02/2021    AST 32 07/02/2021     07/02/2021     Magnesium:    Lab Results   Component Value Date    MG 2.5 07/02/2021     Phosphorus:    Lab Results   Component Value Date    PHOS 7.0 07/02/2021     Radiology Review:      Kidney ultrasound June 28, 2021   FINDINGS:       Kidneys:       The right kidney measures 9.2 cm in length and the left kidney measures 10.8   cm in length.       Kidneys demonstrate normal cortical echogenicity.  No evidence of   hydronephrosis or intrarenal stones.           Bladder:       The urinary bladder is decompressed around a Bennett catheter placed   Impression:  Unremarkable ultrasound of the kidneys.     The urinary bladder is decompressed around a Bennett catheter.         Chest x-ray June 29, 2021   1. Persistent near complete whiteout of the left lung unchanged when compared   to the prior study. 2. The right lung is clear. 3.         BRIEF SUMMARY OF INITIAL CONSULT:    Briefly Ms. Mariama Marlow is an 78year old  female with a PMH of HTN, lung cancer, multiple myeloma, thrombocytopenia, anemia, DVT on chronic anticoagulation, and asthma who was admitted to the MICU after presenting to the ER via EMS from [facility] following an unwitnessed fall resulting in a dense C-2 fracture and shortness of breath. Labs in ER were significant for sodium 138, potassium 4.5, chloride 102, bicarbonate 19, BUN 67, creatinine 2.7 mg/dl, anion gap 17, lactic acid 4.2, and procalcitonin 13.99. Dexamethasone, mirtazapine, apixaban, amlodipine, furosemide, gabapentin, and  lenalidomide are medications patient was taking prior to admission. We are consulted for AUDREY. Her baseline  creatinine is 1.1-1.2 mg/dL. IMPRESSION/RECOMMENDATIONS:      1. AUDREY stage III on CKD, ischemic ATN, oliguric. Started on renal replacement therapy on June 29, 2021. Had SLED x4 hours yesterday, tolerated well, chemistry improved. Remains anuric. 2. CKD stage III, probably due to nephrosclerosis  3. Hemodynamic shock, on norepinephrine and antibiotics  4. Hypocalcemia, 2/2 MM therapy?,  Ionized calcium 1.14  ------------------------------------------  5. Respiratory failure status post intubation, normal pH normal PCO2  6. Multiple myeloma  7. Pneumonia, probably MRSA, on vancomycin and piperacillin-tazobactam  8. MRSA bacteremia, on vancomycin and piperacillin-tazobactam  9. Shock liver, LFTs improved  10. AF with RVR, heparin drip  11. History of DVT, on heparin drip  12. Status post fall  13. Normocytic anemia  14.  Nutrition, n.p.o.    Plan:    · SLED x4 hours today  · Replace calcium  · Continue to monitor renal function

## 2021-07-02 NOTE — PROGRESS NOTES
Senior Resident Addendum:  I have seen and examined the patient with the intern. I have discussed the case, including pertinent history and exam findings with the intern. I agree with the assessment, plan and orders as documented above with the following additions:      Assessment:    1. Acute Metabolic Encephalopathy   - 2/2 septic shock    2. Septic Shock   - 2/2 PNA   - MRSA nares +   - blood cultures with methicillin resistant CONS    3. Atrial Fibrillation   - 2/2 underlying sepsis and hypoxia   - CHADSVASC at least 4 (age, sex, HTN)    4. Acute Hypoxic Respiratory Failure   - 2/2 PNA    5. MRSA Colonization    6. AUDREY on CKD   - baseline creatinine 1.1-1.2   - FEUrea 33% suggests pre-renal etiology s/p volume resuscitation   - likely 2/2 ischemic ATN in setting of septic shock at this point    7. Acute on Chronic Normocytic Anemia   - h/o MM, no source of active bleeding    8. H/o Multiple Myeloma   - on lenalidomide as OP   - multiple lytic lesions on CT    9. Thrombocytopenia   - DIC panel negative, no active bleeding diatheses   - HIT antibody negative, US negative for DVT   - more likely related to sepsis, beta lactam antibiotics, MM    10. Chronic LBBB    11. Cervical Fracture   - 2/2 traumatic fall and underlying spinal lytic lesions    12.  H/o DVT        Plan:    · Wean pressors as able, maintain MAP > 65  · Wean hydrocortisone once off pressors  · Increase midodrine to 15mg TID  · Avoid additional IVF d/t volume overload  · Cover empirically with vancomycin and Zosyn  · ID on board, input appreciated  · Continue Bactroban BID x 5 days and Peridex oral care  · F/u echo  · SLEDD per nephrology  · Monitor CBC Q8H, maintain hgb > 7 and platelets > 23D (32H if febrile)  · F/u digoxin level  · Check fibrinogen level  · S/p bronch today, sample sent for analysis   · Wean FiO2 and PEEP as able      Ramona Pearson MD  7/2/2021  7:00 PM    [facility]  Department of Pulmonary, Critical Care and Sleep Medicine  [address] [facility]  Department of Internal Medicine      During multidisciplinary team rounds Rickey Session is a 78 y.o. female was seen, examined and discussed. This is confirmation that I have personally seen and examined the patient and that the key elements of the encounter were performed by me (> 85 % time). The medications & laboratory data was discussed and adjusted where necessary. The radiographic images were reviewed or with radiologist or consultant if felt dis-concordant with the exam or history. The above findings were corroborated, plans confirmed and changes made if needed. Family is updated at the bedside as available. Key issues of the case were discussed among consultants. Critical Care time is documented if appropriate.       Naa Melo DO, FACP, FCCP, [facility],

## 2021-07-02 NOTE — PROGRESS NOTES
Pharmacy Consultation Note  (Antibiotic Dosing and Monitoring)    Initial consult date: 2021  Consulting physician/provider: Dr. Huseyin Cavazos  Drug(s): vancomycin  Indication: Staph species bacteremia; immunocompromised    Age/Gender IBW DW  Allergy Information   78 y.o. female; 162.6 cm, 68 kg 50.6 kg 57.6 kg  Patient has no known allergies. Date  WBC BUN/CR Drug/Dose Time   Given Level(s)   (Time) Comments    3.2 67/2.7 X X      2.7 78/2.9 Vancomycin 1250 mg x1 1247      6.3 91/3.1 Vancomycin 1000 mg IV x1 1143      7.1 98/3.5 x x Random 22.6 @0433     12.2 HD Vancomycin 1000 mg IVx1  12.2 HD Vancomycin 1000 mg IVx1  11.4 HD Vancomycin 1000 mg IV x1        Estimated Creatinine Clearance: 34 mL/min (A) (based on SCr of 1.3 mg/dL (H)). Intake/Output Summary (Last 24 hours) at 2021 1336  Last data filed at 2021 0600  Gross per 24 hour   Intake 1648.46 ml   Output 85 ml   Net 1563.46 ml       Temp max: Temp (24hrs), Av.8 °F (37.1 °C), Min:98.4 °F (36.9 °C), Max:99.6 °F (37.6 °C)      Assessment:  · Consulted by Dr. Huseyin Cavazos to dose/monitor vancomycin. · Goal trough level:  15-20 mCg/mL; AUC/ELEUTERIO = 400-600 mg/L-hr. · 79 yo/F admitted from SNF after fall and found down for unknown length of time. BC's from  +for [facility]; Blood PCR today revealed methicillin-resistant Staph species. · Febrile, tachycardic, and lymphopenic on admission. · PMH: MM on lenalinomide therapy. · Now receiving HD    Plan:  · Vancomycin 1000 mg IV x1 post HD if level <20  · Will check vancomycin level when steady state is attained if vanco continues. · Pharmacist will follow and monitor/adjust dosing as necessary.     Monse Savage, PharmD, BCPS 2021 1:36 PM

## 2021-07-02 NOTE — PROGRESS NOTES
Subcutaneous Q4H    mupirocin   Topical BID    artificial tears   Both Eyes BID    chlorhexidine  15 mL Mouth/Throat BID    vancomycin (VANCOCIN) intermittent dosing (placeholder)   Other RX Placeholder    pantoprazole  40 mg Intravenous Daily    And    sodium chloride (PF)  10 mL Intravenous Daily    piperacillin-tazobactam  3,375 mg Intravenous Q12H    And    sodium chloride   Intravenous Q12H    ipratropium-albuterol  1 ampule Inhalation Q4H WA    [Held by provider] amLODIPine  10 mg Oral Daily    [Held by provider] apixaban  2.5 mg Oral BID    [Held by provider] furosemide  20 mg Oral Daily    [Held by provider] gabapentin  100 mg Oral BID    [Held by provider] lenalidomide  10 mg Oral Daily    therapeutic multivitamin-minerals  1 tablet Oral Daily    sodium chloride flush  5-40 mL Intravenous 2 times per day     PRN Meds: anticoagulant sodium citrate, perflutren lipid microspheres, glucose, dextrose, glucagon (rDNA), dextrose, [Held by provider] heparin (porcine), [Held by provider] heparin (porcine), midazolam, sodium chloride flush, sodium chloride, polyethylene glycol, acetaminophen **OR** acetaminophen  Nutrition:   NPO    Labs and Imaging Studies     CBC:   Recent Labs     07/01/21  1706 07/02/21  0204 07/02/21  0846   WBC 11.9* 13.3* 11.4   HGB 8.9* 8.7* 8.5*   HCT 26.5* 26.3* 25.4*   MCV 91.4 90.4 90.1   PLT 38* 27* 19*       BMP:    Recent Labs     07/02/21  0455 07/02/21  0455 07/02/21  0508 07/02/21  0846 07/02/21  1241     --   --  138 135   K 5.0   < > 4.72 4.3 4.0   CL 98  --   --  98 96*   CO2 23  --   --  25 22   BUN 86*  --   --  59* 36*   CREATININE 2.6*  --   --  1.9* 1.3*   GLUCOSE 178*  --   --  123* 145*    < > = values in this interval not displayed.        LIVER PROFILE:   Recent Labs     06/30/21  0500 07/01/21  0516 07/02/21  0455   * 63* 32*   * 279* 171*   BILIDIR 1.3* 1.8* 1.9*   BILITOT 1.9* 2.5* 2.5*   ALKPHOS 48 58 67       PT/INR:   Recent Labs 06/30/21  0500 07/01/21  0516 07/02/21  0455   PROTIME 17.8* 21.1* 22.1*   INR 1.6 1.9 2.0       APTT:   Recent Labs     06/30/21  0500 07/01/21  0516 07/02/21  0455   APTT 31.0 28.1 27.0       Fasting Lipid Panel:    No results found for: CHOL, TRIG, HDL    Cardiac Enzymes:    Lab Results   Component Value Date    CKTOTAL 132 06/27/2021    CKTOTAL 57 06/26/2021    CKMB 1.6 06/27/2021    TROPONINI 0.03 12/30/2020       Notable Cultures:      Blood cultures   Blood Culture, Routine   Date Value Ref Range Status   06/29/2021 24 Hours no growth  Preliminary     Respiratory cultures No results found for: RESPCULTURE No results found for: LABGRAM  Urine   Urine Culture, Routine   Date Value Ref Range Status   06/26/2021 Growth not present  Final     Legionella No results found for: LABLEGI  C Diff PCR No results found for: CDIFPCR  Wound culture/abscess: No results for input(s): WNDABS in the last 72 hours. Tip culture:No results for input(s): CXCATHTIP in the last 72 hours.       Oxygen:     Vent Information  $Ventilation: $Subsequent Day  Skin Assessment: Clean, dry, & intact  Suction Catheter Diameter:  (16)  Equipment ID: [id number]  Equipment Changed: (S) Suction catheter  Vent Type: [id number]  Vent Mode: AC/VC+  Vt Ordered: 350 mL  Rate Set: 24 bmp  Peak Flow: 0 L/min  Pressure Support: 0 cmH20  FiO2 : 60 %  SpO2: 98 %  SpO2/FiO2 ratio: 163.33  Sensitivity: 2  PEEP/CPAP: 10  I Time/ I Time %: 0.7 s  Humidification Source: Heated wire  Humidification Temp: 37  Humidification Temp Measured: 36.9  Circuit Condensation: Drained  Mask Type: Full face mask  Mask Size: Medium  Additional Respiratory  Assessments  Pulse: 71  Resp: 24  SpO2: 98 %  Position: Semi-Koch's  Humidification Source: Heated wire  Humidification Temp: 37  Circuit Condensation: Drained  Oral Care: Mouth swabbed, Mouth moisturizer, Mouth suctioned, Suction toothette  Subglottic Suction Done?: Yes  Airway Type: ET  Airway Size: 8  Cuff Pressure (cm H2O): 29 cm H2O       Urethral Catheter-Output (mL): 20 mL    Imaging Studies:  XR CHEST PORTABLE   Final Result   Stable airspace disease on the left. XR CHEST PORTABLE   Final Result   ET tube tip 1.0 cm from the paradise. Stable left lung opacity. .         US DUP LOWER EXTREMITIES BILATERAL VENOUS   Final Result   Within the visualized vessels there is no evidence for deep venous   thrombosis               US DUP UPPER EXTREMITIES BILATERAL VENOUS   Final Result   Within the visualized vessels there is no evidence for deep venous   thrombosis               XR ABDOMEN (KUB) (SINGLE AP VIEW)   Final Result   No evidence of bowel obstruction or ileus. XR CHEST PORTABLE   Final Result   1. There is partial interval clearing of the left lung airspace disease with   partial Reaeration of the left lung apex when compared with the patient's   prior study. 2.  The right lung is clear. XR CHEST PORTABLE   Final Result   1. Persistent near complete opacification of the left hemithorax   2. The right lung is clear. 3. Stable position of the support lines and tubes. XR ABDOMEN FOR NG/OG/NE TUBE PLACEMENT   Final Result   Satisfactory position of nasogastric tube. XR CHEST PORTABLE   Final Result   1. Persistent near complete whiteout of the left lung unchanged when compared   to the prior study. 2. The right lung is clear. 3.      XR CHEST PORTABLE   Final Result   1. Opacities throughout left lung. New opacities are present in previously   aerated left upper lung field. 2.  Endotracheal tube is 2.4 cm above the paradise. US RETROPERITONEAL COMPLETE   Final Result   Unremarkable ultrasound of the kidneys. The urinary bladder is decompressed around a Bennett catheter. XR CHEST PORTABLE   Final Result   1.   Persistent the infiltrate with pleural effusion in the left lung cause   significant the opacification of the left image chest particular from the mid   to the base. 2.  No significant changes since the previous examinations recently performed. 3.  Please see report of CT chest of June 26. XR CHEST ABDOMEN NG PLACEMENT   Final Result   Satisfactory position of the NGT. XR CHEST PORTABLE   Final Result   Intervally placed NGT extends below the diaphragm into at least the mid   stomach, with tip not confidently seen within the field of view. Endotracheal tube tip is 1.7 cm from the paradise and could be backed out 5-10   mm for more optimal positioning. Otherwise, stable chest with unchanged left lung opacities. XR CHEST PORTABLE   Final Result   Stable abnormal chest with persistent infiltrates and pleural effusion in the   left lung base likely pneumonia. Surveillance recommended to see complete   clearing. There is minimal infiltrates developing in the right lung base. Endotracheal tube close to the paradise and could be retracted by 1 cm. XR CHEST PORTABLE   Final Result   Stable chest series. Persistent dense consolidation in the left mid and   lower lung. Stable atherosclerotic disease. No new cardiopulmonary   pathology. CT Head WO Contrast   Final Result   Stable atrophy with small vessel ischemic disease. There is no acute stroke   or hemorrhage. Persistent multiple punctate lytic lesions the calvarium likely due to   malignancy like multiple myeloma. CT cervical spine. There is a fracture of the base of the dens of C2 (type 2). There is no   displacement. No other acute fractures are identified in the cervical spine. There is mild diffuse degenerative changes from C3-T1 with osteophytes and   multilevel disc bulges. There is osteopenia and punctate lytic lesions   concerning for multiple myeloma. Impression      Type 2 fracture of the dens of C2. Diffuse degenerative changes from C3-T1. Multiple myeloma. CT chest.      Comparison December 30, 2020. Findings      There is borderline cardiac size. The great vessels are normal.  Trachea and   major bronchi are patent. There is dense consolidation in the left upper   lobe and left lower lobe concerning for pneumonia. There is minimal   atelectasis in the right lung base. The liver is of normal architecture. There is diffuse demineralization of the thoracic spine with punctate   lucencies. Compression fractures with vertebroplasty are noted. There is   slight irregularity of the sternum probably artifact. There is kyphosis of   the spine. Impression      Dense consolidation in the left upper lobe and left lower lobe concerning for   pneumonia. Surveillance after appropriate treatment is recommended with   repeat CT scan in 6-8 weeks to see complete resolution and exclude underlying   malignancy. Demineralization and the punctate lytic lucencies in the spine and ribs   concerning for multiple myeloma. Critical results were called by Dr. Liborio Trujillo to Dr. Susie Garcia on   6/26/2021 at 16:30.         CT Cervical Spine WO Contrast   Final Result   Stable atrophy with small vessel ischemic disease. There is no acute stroke   or hemorrhage. Persistent multiple punctate lytic lesions the calvarium likely due to   malignancy like multiple myeloma. CT cervical spine. There is a fracture of the base of the dens of C2 (type 2). There is no   displacement. No other acute fractures are identified in the cervical spine. There is mild diffuse degenerative changes from C3-T1 with osteophytes and   multilevel disc bulges. There is osteopenia and punctate lytic lesions   concerning for multiple myeloma. Impression      Type 2 fracture of the dens of C2. Diffuse degenerative changes from C3-T1. Multiple myeloma. CT chest.      Comparison December 30, 2020. Findings      There is borderline cardiac size.   The great vessels are normal.  Trachea and   major bronchi are patent. There is dense consolidation in the left upper   lobe and left lower lobe concerning for pneumonia. There is minimal   atelectasis in the right lung base. The liver is of normal architecture. There is diffuse demineralization of the thoracic spine with punctate   lucencies. Compression fractures with vertebroplasty are noted. There is   slight irregularity of the sternum probably artifact. There is kyphosis of   the spine. Impression      Dense consolidation in the left upper lobe and left lower lobe concerning for   pneumonia. Surveillance after appropriate treatment is recommended with   repeat CT scan in 6-8 weeks to see complete resolution and exclude underlying   malignancy. Demineralization and the punctate lytic lucencies in the spine and ribs   concerning for multiple myeloma. Critical results were called by Dr. Ko Dobbs to Dr. Sona Inman. on   6/26/2021 at 16:30.         CT CHEST WO CONTRAST   Final Result   Stable atrophy with small vessel ischemic disease. There is no acute stroke   or hemorrhage. Persistent multiple punctate lytic lesions the calvarium likely due to   malignancy like multiple myeloma. CT cervical spine. There is a fracture of the base of the dens of C2 (type 2). There is no   displacement. No other acute fractures are identified in the cervical spine. There is mild diffuse degenerative changes from C3-T1 with osteophytes and   multilevel disc bulges. There is osteopenia and punctate lytic lesions   concerning for multiple myeloma. Impression      Type 2 fracture of the dens of C2. Diffuse degenerative changes from C3-T1. Multiple myeloma. CT chest.      Comparison December 30, 2020. Findings      There is borderline cardiac size. The great vessels are normal.  Trachea and   major bronchi are patent. There is dense consolidation in the left upper   lobe and left lower lobe concerning for pneumonia. There is minimal   atelectasis in the right lung base. The liver is of normal architecture. There is diffuse demineralization of the thoracic spine with punctate   lucencies. Compression fractures with vertebroplasty are noted. There is   slight irregularity of the sternum probably artifact. There is kyphosis of   the spine. Impression      Dense consolidation in the left upper lobe and left lower lobe concerning for   pneumonia. Surveillance after appropriate treatment is recommended with   repeat CT scan in 6-8 weeks to see complete resolution and exclude underlying   malignancy. Demineralization and the punctate lytic lucencies in the spine and ribs   concerning for multiple myeloma. Critical results were called by Dr. Hemant Gomez to Dr. Arnulfo Blizzard. on   6/26/2021 at 16:30. XR CHEST PORTABLE   Final Result   Consolidative airspace opacities in the left mid to lower lung zone with a   suspected small left pleural effusion. Findings may be on the basis of   pneumonia or less likely asymmetric pulmonary edema. Consider correlation   with CT of the chest.         XR CHEST PORTABLE    (Results Pending)        Resident's Assessment and Plan     Assessment and Plan:    Neuro  1. Acute metabolic encephalopathy likely 2/2 septic shock  Cardiovascular  1. Atrial Fibrillation 2/2 sepsis and hypoxia  a. CHADSVASC score > 4   b. F/u ECHO  Pulmonary  2. Septic Shock 2/2 L. Sided Pneumonia  a. Pt had an episode where MAP has dropped < 65 today, pt on Midodrine 10mg and started on Levophed   b. Wean pressors as tolerated, maintain a MAP > 65  c. Maintain hydrocortisone to 50mg BID  3. MRSA nares cultures (+),   a. currently being managed w/ Bactroban  4. Acute Hypoxic Respiratory Failure 2/2 L. Sided Pneumonia  a. Wean FiO2 and PEEP as tolerated  GI  5. Myiasis (presence of maggots) of the oral cavity  a. S/p bronch, sample was sent for analysis. Awaiting results  Renal/  6.  AUDREY vs AUDREY on CKD  a. Baseline Cr: 1.1-1.2  b. Dialysis was done this morning, after dialysis BUN/Cr improved  Heme/Onc  7. Thrombocytopenia 2/2 sepsis 2/2 L. Sided Pneumonia  a. DIC was found to be negative  b. Pt has no active bleeding  c. Ordered Fibrinogen Test   d. Order CBC q8hr to monitor hemoglobin > 7 and platelet > 74,512  8. 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 HIT antibody results negative  b. Duplex US bilaterally upper and lower extremity negative for DVT  9. H/o Multiple Myeloma  a. Currently being managed w/ lenalidomide 10mg  Derm/MSK  10. Cervical Fracture  a. 2/2 traumatic fall and underlying lytic lesions of the spine  b. Pt currently has a neck brace      # Peptic ulcer prophylaxis: protonix  # DVT Prophylaxis: PCD  # Disposition: Cont current care / Transfer to telemetry / general floor    Dawna Toure MD, PGY-1    Attending Physician: Dr. Luisito Trejo  Department of Pulmonary, Critical Care and [facility]  Department of Internal Medicine      During multidisciplinary team rounds Eden Barry is a 78 y.o. female was seen, examined and discussed. This is confirmation that I have personally seen and examined the patient and that the key elements of the encounter were performed by me (> 85 % time). The medications & laboratory data was discussed and adjusted where necessary. The radiographic images were reviewed or with radiologist or consultant if felt dis-concordant with the exam or history. The above findings were corroborated, plans confirmed and changes made if needed. Family is updated at the bedside as available. Key issues of the case were discussed among consultants. Critical Care time is documented if appropriate.       Cleo Lopez, DO, FACP, FCCP, [facility],

## 2021-07-02 NOTE — PLAN OF CARE
Problem: Cardiac:  Goal: Hemodynamic stability will improve  Description: Hemodynamic stability will improve  Outcome: Met This Shift     Problem: Coping:  Goal: General experience of comfort will improve  Description: General experience of comfort will improve  Outcome: Met This Shift     Problem:  Activity:  Goal: Ability to tolerate increased activity will improve  Description: Ability to tolerate increased activity will improve  Outcome: Met This Shift     Problem: Sensory:  Goal: General experience of comfort will improve  Description: General experience of comfort will improve  Outcome: Met This Shift

## 2021-07-02 NOTE — PROGRESS NOTES
- continue supplementation   Hospitalist Progress Note      SYNOPSIS: Patient admitted on 2021 for Closed nondisplaced type II dens fracture ([address] after a fall at the nursing      SUBJECTIVE:    Patient seen and examined. She is intubated, per nursing at bedside maggots were found in her mouth and endoscopy was done and nothing more was found  Records reviewed. Stable overnight. No other overnight issues reported. Temp (24hrs), Av.7 °F (37.1 °C), Min:98.4 °F (36.9 °C), Max:99.5 °F (37.5 °C)    DIET: Diet NPO  CODE: DNR-CCA    Intake/Output Summary (Last 24 hours) at 2021 1444  Last data filed at 2021 1205  Gross per 24 hour   Intake 1833 ml   Output 2480 ml   Net -647 ml       OBJECTIVE:    BP (!) 137/47   Pulse 74   Temp 98.4 °F (36.9 °C) (Bladder)   Resp 25   Ht 5' 4\" (1.626 m)   Wt 179 lb 10.8 oz (81.5 kg)   SpO2 96%   BMI 30.84 kg/m²     General appearance: Intubated and sedated appears stated age  HEENT:  Conjunctivae/corneas clear. Neck: Supple. No jugular venous distention. Respiratory: Clear to auscultation bilaterally, normal respiratory effort  Cardiovascular: Regular rate rhythm, normal S1-S2  Abdomen: Soft, nontender, nondistended  Musculoskeletal: No clubbing, cyanosis, no bilateral lower extremity edema. Brisk capillary refill.    Skin:  No rashes  on visible skin  Neurologic: awake, alert and following commands     ASSESSMENT:  Cervical fracture  Septic shock  Respiratory failure with hypoxia on ventilator  Left-sided pneumonia suspected due to MRSA  MRSA bacteremia  Acute on chronic kidney failure status post temporary dialysis  Atrial fibrillation with RVR  Myiasis  History of multiple myeloma Currently on Daratumumab, Revlimid and Dex since 2020, Revlimid added 10/17/2020, hematology evaluated, chemo on hold  Thrombocytopenia, concern for HIT  Anemia  Shock liver  Elevated troponin    History of DVT     PLAN:  -Appreciate ICU management, based on norepinephrine  -Continue Zosyn and Vanco per ID, a KHUSHBOO is recommended  -Appreciate nephrology input patient receiving renal replacement therapy as needed  -A. fib with RVR started on heparin drip but this is on hold right now given low platelets due to concern for HIT, bilateral lower extremity Dopplers pending to rule out DVT      DISPOSITION:   To be determined    Medications:  REVIEWED DAILY    Infusion Medications    norepinephrine 1 mcg/min (07/02/21 0930)    sodium chloride      fentaNYL 5 mcg/ml in 0.9%  ml infusion 175 mcg/hr (07/02/21 1243)    dextrose      [Held by provider] heparin (PORCINE) Infusion Stopped (06/29/21 1000)    sodium chloride       Scheduled Medications    vancomycin  1,000 mg Intravenous Once    hydrocortisone sodium succinate PF  50 mg Intravenous BID    senna  1 tablet Oral Nightly    midodrine  10 mg Oral TID WC    insulin lispro  0-12 Units Subcutaneous Q4H    mupirocin   Topical BID    artificial tears   Both Eyes BID    chlorhexidine  15 mL Mouth/Throat BID    vancomycin (VANCOCIN) intermittent dosing (placeholder)   Other RX Placeholder    pantoprazole  40 mg Intravenous Daily    And    sodium chloride (PF)  10 mL Intravenous Daily    piperacillin-tazobactam  3,375 mg Intravenous Q12H    And    sodium chloride   Intravenous Q12H    ipratropium-albuterol  1 ampule Inhalation Q4H WA    [Held by provider] amLODIPine  10 mg Oral Daily    [Held by provider] apixaban  2.5 mg Oral BID    [Held by provider] furosemide  20 mg Oral Daily    [Held by provider] gabapentin  100 mg Oral BID    [Held by provider] lenalidomide  10 mg Oral Daily    therapeutic multivitamin-minerals  1 tablet Oral Daily    sodium chloride flush  5-40 mL Intravenous 2 times per day     PRN Meds: sodium chloride, anticoagulant sodium citrate, perflutren lipid microspheres, glucose, dextrose, glucagon (rDNA), dextrose, [Held by provider] heparin (porcine), [Held by provider] heparin (porcine), midazolam, sodium chloride flush, sodium chloride, polyethylene glycol, acetaminophen **OR** acetaminophen    Labs:     Recent Labs     07/01/21  1706 07/02/21  0204 07/02/21  0846   WBC 11.9* 13.3* 11.4   HGB 8.9* 8.7* 8.5*   HCT 26.5* 26.3* 25.4*   PLT 38* 27* 19*       Recent Labs     07/01/21  0516 07/01/21  1005 07/01/21  1236 07/01/21  1706 07/02/21  0455 07/02/21  0455 07/02/21  0508 07/02/21  0846 07/02/21  1241      < > 135   < > 137  --   --  138 135   K 4.5   < > 4.1   < > 5.0   < > 4.72 4.3 4.0   CL 98   < > 96*   < > 98  --   --  98 96*   CO2 24   < > 21*   < > 23  --   --  25 22   BUN 71*   < > 40*   < > 86*  --   --  59* 36*   CREATININE 2.6*   < > 1.4*   < > 2.6*  --   --  1.9* 1.3*   CALCIUM 8.8   < > 8.7   < > 8.3*  --   --  8.1* 8.4*   PHOS 5.0*  --  3.7  --  7.0*  --   --   --   --     < > = values in this interval not displayed. Recent Labs     06/30/21  0500 07/01/21  0516 07/02/21 0455   PROT 6.0* 5.5* 5.1*   ALKPHOS 48 58 67   * 279* 171*   * 63* 32*   BILITOT 1.9* 2.5* 2.5*       Recent Labs     06/30/21  0500 07/01/21  0516 07/02/21  0455   INR 1.6 1.9 2.0       No results for input(s): Aden Oiler in the last 72 hours. Chronic labs:    Lab Results   Component Value Date    TSH 0.127 (L) 06/30/2021    INR 2.0 07/02/2021       Radiology: REVIEWED DAILY    XR ABDOMEN (KUB) (SINGLE AP VIEW)    Result Date: 7/1/2021  EXAMINATION: ONE SUPINE XRAY VIEW(S) OF THE ABDOMEN 7/1/2021 2:43 pm COMPARISON: 06/29/2021 HISTORY: ORDERING SYSTEM PROVIDED HISTORY: distended abdomen TECHNOLOGIST PROVIDED HISTORY: Reason for exam:->distended abdomen What reading provider will be dictating this exam?->CRC FINDINGS: Nonspecific bowel gas pattern without evidence of obstruction. No abnormal calcifications. No acute osseous abnormality. Bilateral femoral vascular access catheter is. Rectal temperature probe noted. Evidence of multilevel vertebroplasties. Cardiomegaly.      No evidence of bowel obstruction or ileus. XR CHEST PORTABLE    Result Date: 2021  EXAMINATION: ONE XRAY VIEW OF THE CHEST 2021 6:41 am COMPARISON: 2021, 0105 hours HISTORY: ORDERING SYSTEM PROVIDED HISTORY: ET TECHNOLOGIST PROVIDED HISTORY: Reason for exam:->ET What reading provider will be dictating this exam?->CRC FINDINGS: Unchanged support lines. Stable airspace disease on the left with demonstrable air bronchograms. Thoracic kyphoplasty again noted. Stable airspace disease on the left. XR CHEST PORTABLE    Result Date: 2021  EXAMINATION: ONE XRAY VIEW OF THE CHEST 2021 1:10 am COMPARISON: 2021 HISTORY: ORDERING SYSTEM PROVIDED HISTORY: ET TECHNOLOGIST PROVIDED HISTORY: Reason for exam:->ET What reading provider will be dictating this exam?->CRC FINDINGS: Left lung infiltrates. There is no effusion or pneumothorax. Stable cardiomegaly. The osseous structures are without acute process. ETT tube 1.0 cm from the paradise. Enteric tube tip in the body of the stomach. ET tube tip 1.0 cm from the paradise. Stable left lung opacity. .     XR CHEST PORTABLE    Result Date: 2021  EXAMINATION: ONE XRAY VIEW OF THE CHEST 2021 7:31 am COMPARISON: 2021 and 2021 HISTORY: ORDERING SYSTEM PROVIDED HISTORY: ET TECHNOLOGIST PROVIDED HISTORY: Reason for exam:->ET What reading provider will be dictating this exam?->CRC FINDINGS: Again noted there is significant opacification of the left lung. The left lung apex is partially aerated. The right lung is grossly clear. There is no right pleural effusion or pulmonary infiltrate. 1.  There is partial interval clearing of the left lung airspace disease with partial Reaeration of the left lung apex when compared with the patient's prior study. 2.  The right lung is clear.      US DUP UPPER EXTREMITIES BILATERAL VENOUS    Result Date: 2021  Patient MRN:  [id number] : 1940 Age: 78 years Gender: Female Order Date:  2021 4:08

## 2021-07-02 NOTE — FLOWSHEET NOTE
HD x 4 hours completed as ordered. UF 2000mls, tolerated fairly well. Catheter closed with citrate. Dressing clean dry and intact . See flow sheet for more information  Report given to Felipe Ferreira

## 2021-07-02 NOTE — PROGRESS NOTES
°C) Bladder 71 24 91 %    07/02/21 0300    70 24 95 %    07/02/21 0200    91 24 93 %    07/02/21 0100    92 25 97 %    07/02/21 0040    80 25 97 %    07/02/21 0000  99.5 °F (37.5 °C) Bladder 91 22 97 %    07/01/21 2300    85 22 96 %    07/01/21 2200    89 24 96 %    07/01/21 2121    87 24 95 %    07/01/21 2100    70 24 97 %    07/01/21 2000  99.1 °F (37.3 °C) Bladder 74 24 97 %    07/01/21 1900    75 24 96 %    07/01/21 1800    74 24 97 %    07/01/21 1716  98.8 °F (37.1 °C) Bladder 73 24 96 %    07/01/21 1701    85 24 96 %    07/01/21 1700    81 24 96 %    07/01/21 1659 (!) 120/42 99 °F (37.2 °C) Bladder 81 24 97 %    07/01/21 1642     24 97 %    07/01/21 1641    94 24 99 %    07/01/21 1630    82 24 100 %    07/01/21 1628    90 24 99 %    07/01/21 1626    89 19 99 %    07/01/21 1624    87 21 99 %    07/01/21 1623    95 17 97 %    07/01/21 1622    84 27 100 %    07/01/21 1621    84 24 100 %    07/01/21 1401  99.6 °F (37.6 °C) Bladder 88 24 95 %    07/01/21 1319 129/64 96.8 °F (36 °C)  105 23  186 lb 1.1 oz (84.4 kg)   07/01/21 1300    115 21 94 %    07/01/21 1227    97 22 92 %    07/01/21 1200 117/83 96.8 °F (36 °C) Temporal 121 23 92 %    07/01/21 1130    121      07/01/21 1120    108 19 (!) 87 %    07/01/21 1100 (!) 144/80 96.8 °F (36 °C)  108 24 100 %    07/01/21 1032 (!) 129/112 97.8 °F (36.6 °C) Bladder 89 24     07/01/21 1031      92 % 188 lb 4.4 oz (85.4 kg)   07/01/21 1000    93 24 93 %    07/01/21 0930    87 24 96 %    07/01/21 0902 123/70 98 °F (36.7 °C) Bladder 84      07/01/21 0900    90 22 97 %        CBC with Differential:    Lab Results   Component Value Date    WBC 13.3 07/02/2021    RBC 2.91 07/02/2021    HGB 8.7 07/02/2021    HCT 26.3 07/02/2021    PLT 27 07/02/2021    MCV 90.4 07/02/2021    MCH 29.9 07/02/2021    MCHC 33.1 07/02/2021    RDW 16.1 07/02/2021 NRBC 0.9 07/02/2021    SEGSPCT 45.6 05/27/2021    BANDSPCT 12.0 06/24/2021    METASPCT 3.5 06/29/2021    LYMPHOPCT 0.5 07/02/2021    MONOPCT 0.9 07/02/2021    MYELOPCT 0.9 06/29/2021    BASOPCT 0.1 07/02/2021    MONOSABS 0.13 07/02/2021    LYMPHSABS 0.00 07/02/2021    EOSABS 0.00 07/02/2021    BASOSABS 0.00 07/02/2021     CMP:    Lab Results   Component Value Date     07/02/2021    K 4.72 07/02/2021    K 4.2 06/27/2021    CL 98 07/02/2021    CO2 23 07/02/2021    BUN 86 07/02/2021    CREATININE 2.6 07/02/2021    GFRAA 21 07/02/2021    AGRATIO 0.9 06/24/2021    AGRATIO 1.1 06/24/2021    LABGLOM 18 07/02/2021    GLUCOSE 178 07/02/2021    PROT 5.1 07/02/2021    LABALBU 2.6 07/02/2021    CALCIUM 8.3 07/02/2021    BILITOT 2.5 07/02/2021    ALKPHOS 67 07/02/2021    AST 32 07/02/2021     07/02/2021       BP (!) 120/42   Pulse 64   Temp 98.4 °F (36.9 °C) (Bladder)   Resp 24   Ht 5' 4\" (1.626 m)   Wt 179 lb 10.8 oz (81.5 kg)   SpO2 96%   BMI 30.84 kg/m²     Physical Exam  Const/Neuro- unchanged, no signs of acute distress, Alert  ENMT- Within Normal Limits, Normocephalic, mucous membranes pink/moist, No thrush  Neck: Neck supple  Heart- Regular, Rate, Rhythm- no murmur appreciated. Lungs- clear to ascultation. Respirations even and nonlabored. Abdomen- Soft, bowel sounds positive, non tender  Musculo/Extremities-  Equal and symmetrical, no edema. No tenderness. Skin:  Warm and dry, free from rashes. Cultures reviewed    Radiology reviewed  XR CHEST PORTABLE   Final Result   Stable airspace disease on the left. XR CHEST PORTABLE   Final Result   ET tube tip 1.0 cm from the paradise. Stable left lung opacity. .         US DUP LOWER EXTREMITIES BILATERAL VENOUS   Final Result   Within the visualized vessels there is no evidence for deep venous   thrombosis               US DUP UPPER EXTREMITIES BILATERAL VENOUS   Final Result   Within the visualized vessels there is no evidence for deep venous   thrombosis               XR ABDOMEN (KUB) (SINGLE AP VIEW)   Final Result   No evidence of bowel obstruction or ileus. XR CHEST PORTABLE   Final Result   1. There is partial interval clearing of the left lung airspace disease with   partial Reaeration of the left lung apex when compared with the patient's   prior study. 2.  The right lung is clear. XR CHEST PORTABLE   Final Result   1. Persistent near complete opacification of the left hemithorax   2. The right lung is clear. 3. Stable position of the support lines and tubes. XR ABDOMEN FOR NG/OG/NE TUBE PLACEMENT   Final Result   Satisfactory position of nasogastric tube. XR CHEST PORTABLE   Final Result   1. Persistent near complete whiteout of the left lung unchanged when compared   to the prior study. 2. The right lung is clear. 3.      XR CHEST PORTABLE   Final Result   1. Opacities throughout left lung. New opacities are present in previously   aerated left upper lung field. 2.  Endotracheal tube is 2.4 cm above the paradise. US RETROPERITONEAL COMPLETE   Final Result   Unremarkable ultrasound of the kidneys. The urinary bladder is decompressed around a Bennett catheter. XR CHEST PORTABLE   Final Result   1. Persistent the infiltrate with pleural effusion in the left lung cause   significant the opacification of the left image chest particular from the mid   to the base. 2.  No significant changes since the previous examinations recently performed. 3.  Please see report of CT chest of June 26. XR CHEST ABDOMEN NG PLACEMENT   Final Result   Satisfactory position of the NGT. XR CHEST PORTABLE   Final Result   Intervally placed NGT extends below the diaphragm into at least the mid   stomach, with tip not confidently seen within the field of view. Endotracheal tube tip is 1.7 cm from the paradise and could be backed out 5-10   mm for more optimal positioning. Otherwise, stable chest with unchanged left lung opacities. XR CHEST PORTABLE   Final Result   Stable abnormal chest with persistent infiltrates and pleural effusion in the   left lung base likely pneumonia. Surveillance recommended to see complete   clearing. There is minimal infiltrates developing in the right lung base. Endotracheal tube close to the paradise and could be retracted by 1 cm. XR CHEST PORTABLE   Final Result   Stable chest series. Persistent dense consolidation in the left mid and   lower lung. Stable atherosclerotic disease. No new cardiopulmonary   pathology. CT Head WO Contrast   Final Result   Stable atrophy with small vessel ischemic disease. There is no acute stroke   or hemorrhage. Persistent multiple punctate lytic lesions the calvarium likely due to   malignancy like multiple myeloma. CT cervical spine. There is a fracture of the base of the dens of C2 (type 2). There is no   displacement. No other acute fractures are identified in the cervical spine. There is mild diffuse degenerative changes from C3-T1 with osteophytes and   multilevel disc bulges. There is osteopenia and punctate lytic lesions   concerning for multiple myeloma. Impression      Type 2 fracture of the dens of C2. Diffuse degenerative changes from C3-T1. Multiple myeloma. CT chest.      Comparison December 30, 2020. Findings      There is borderline cardiac size. The great vessels are normal.  Trachea and   major bronchi are patent. There is dense consolidation in the left upper   lobe and left lower lobe concerning for pneumonia. There is minimal   atelectasis in the right lung base. The liver is of normal architecture. There is diffuse demineralization of the thoracic spine with punctate   lucencies. Compression fractures with vertebroplasty are noted. There is   slight irregularity of the sternum probably artifact.   There is kyphosis of   the spine. Impression      Dense consolidation in the left upper lobe and left lower lobe concerning for   pneumonia. Surveillance after appropriate treatment is recommended with   repeat CT scan in 6-8 weeks to see complete resolution and exclude underlying   malignancy. Demineralization and the punctate lytic lucencies in the spine and ribs   concerning for multiple myeloma. Critical results were called by Dr. Leonor Dawn to Dr. Cara Grey. on   6/26/2021 at 16:30.         CT Cervical Spine WO Contrast   Final Result   Stable atrophy with small vessel ischemic disease. There is no acute stroke   or hemorrhage. Persistent multiple punctate lytic lesions the calvarium likely due to   malignancy like multiple myeloma. CT cervical spine. There is a fracture of the base of the dens of C2 (type 2). There is no   displacement. No other acute fractures are identified in the cervical spine. There is mild diffuse degenerative changes from C3-T1 with osteophytes and   multilevel disc bulges. There is osteopenia and punctate lytic lesions   concerning for multiple myeloma. Impression      Type 2 fracture of the dens of C2. Diffuse degenerative changes from C3-T1. Multiple myeloma. CT chest.      Comparison December 30, 2020. Findings      There is borderline cardiac size. The great vessels are normal.  Trachea and   major bronchi are patent. There is dense consolidation in the left upper   lobe and left lower lobe concerning for pneumonia. There is minimal   atelectasis in the right lung base. The liver is of normal architecture. There is diffuse demineralization of the thoracic spine with punctate   lucencies. Compression fractures with vertebroplasty are noted. There is   slight irregularity of the sternum probably artifact. There is kyphosis of   the spine.       Impression      Dense consolidation in the left upper lobe and left lower lobe concerning for   pneumonia. Surveillance after appropriate treatment is recommended with   repeat CT scan in 6-8 weeks to see complete resolution and exclude underlying   malignancy. Demineralization and the punctate lytic lucencies in the spine and ribs   concerning for multiple myeloma. Critical results were called by Dr. Balwinder Hastings to Dr. Zeina Cornejo. on   6/26/2021 at 16:30.         CT CHEST WO CONTRAST   Final Result   Stable atrophy with small vessel ischemic disease. There is no acute stroke   or hemorrhage. Persistent multiple punctate lytic lesions the calvarium likely due to   malignancy like multiple myeloma. CT cervical spine. There is a fracture of the base of the dens of C2 (type 2). There is no   displacement. No other acute fractures are identified in the cervical spine. There is mild diffuse degenerative changes from C3-T1 with osteophytes and   multilevel disc bulges. There is osteopenia and punctate lytic lesions   concerning for multiple myeloma. Impression      Type 2 fracture of the dens of C2. Diffuse degenerative changes from C3-T1. Multiple myeloma. CT chest.      Comparison December 30, 2020. Findings      There is borderline cardiac size. The great vessels are normal.  Trachea and   major bronchi are patent. There is dense consolidation in the left upper   lobe and left lower lobe concerning for pneumonia. There is minimal   atelectasis in the right lung base. The liver is of normal architecture. There is diffuse demineralization of the thoracic spine with punctate   lucencies. Compression fractures with vertebroplasty are noted. There is   slight irregularity of the sternum probably artifact. There is kyphosis of   the spine. Impression      Dense consolidation in the left upper lobe and left lower lobe concerning for   pneumonia.   Surveillance after appropriate treatment is recommended with   repeat CT scan in 6-8 weeks to see complete resolution and exclude underlying   malignancy. Demineralization and the punctate lytic lucencies in the spine and ribs   concerning for multiple myeloma. Critical results were called by Dr. Letitia Almazan to Dr. Zaira Euceda. on   6/26/2021 at 16:30. XR CHEST PORTABLE   Final Result   Consolidative airspace opacities in the left mid to lower lung zone with a   suspected small left pleural effusion. Findings may be on the basis of   pneumonia or less likely asymmetric pulmonary edema. Consider correlation   with CT of the chest.         XR CHEST PORTABLE    (Results Pending)       Assessment  Trauma fall  resp failure  c2 fx  Pneumonia HAP  High grade bacteremia  MR staph species  On vent  DNR-CCA   CXR : persistent infiltrates with pleural effusion in the left lung base  significat opacification of the left  Bronch  Yesterday   cutlures pending   Acute myocardial injury in the setting of septic shock   Multiple myeloma  Talked to MICRO :  BC : staph warneii    Staph epidermidis both in same sets  3/4   Personally talked to micro   Temp dialysis cath yesterday   Subsequent BC are negative     Plan  On zosyn  But needs vanco also   Repeat BC  Wait for ID She will most likely need 2D echo and [id number]   Check cultures   Baseline ESR, CRP   Monitor labs   Will follow with you  2D echo was done on July 1   No report  Bronch yesterday July 1   Cultures pending  Maggots were found in the mouth ? ? The question is why   ENT did not find any mass lesion ulcertions or tumors in the oral cavity   Maggots in bedside jar     She is still on zosyn  Await bronch cultures  Continue vancomycin  Pharmacy help appreciated.    CXR  Stable airspace disease on the left        Electronically signed by Benito Palm MD on 7/2/2021 at 8:31 AM

## 2021-07-03 NOTE — PROGRESS NOTES
[address] [facility]   Department of Internal Medicine   Internal Medicine Residency  MICU Progress Note    Patient:  Eden Barry 78 y.o. female   MRN: [id number]       Date of Service: 7/3/2021    Allergy: Patient has no known allergies. Subjective     Patient was seen and examined this morning at bedside in no acute distress. Overnight, no significant events. Objective     TEMPERATURE:  Current - Temp: 101.7 °F (38.7 °C); Max - Temp  Av.9 °F (38.3 °C)  Min: 99 °F (37.2 °C)  Max: 101.7 °F (38.7 °C)  RESPIRATIONS RANGE: Resp  Av.8  Min: 22  Max: 37  PULSE RANGE: Pulse  Av.6  Min: 53  Max: 73  BLOOD PRESSURE RANGE:  Systolic (37EEN), XOX:917 , Min:133 , EGY:328   ; Diastolic (70JZY), GXK:94, Min:40, Max:50    ULSE OXIMETRY RANGE: SpO2  Av.9 %  Min: 93 %  Max: 99 %    I & O - 24hr:    Intake/Output Summary (Last 24 hours) at 7/3/2021 1419  Last data filed at 7/3/2021 1200  Gross per 24 hour   Intake 1421 ml   Output 25 ml   Net 1396 ml     I/O last 3 completed shifts: In: 8405 [I.V.:1301; NG/GT:120]  Out: 2425 [Urine:25] No intake/output data recorded. Weight change: -8 lb 9.6 oz (-3.9 kg)    Physical Exam:    General Appearance:   Sedated and intubated   HEENT:    NC/AT, PERRL, no pallor no icterus, moist mucous membrane   Neck:   Supple, Normal thyroid; no carotid bruit or JVD   Resp:    Decreased breath sounds   Heart:    RRR, S1 & S2 normal, no murmur, rub or gallop.    Abdomen:     Soft,, BS +, no organomegaly   Extremities:   Normal, atraumatic, no cyanosis or edema   Pulses:   2+ and symmetric all extremities   Skin:   Skin color, texture, turgor normal, no rashes or lesions   Neurologic:  Sedated and intubated       Medications     Continuous Infusions:   norepinephrine 2 mcg/min (21)    fentaNYL 5 mcg/ml in 0.9%  ml infusion 175 mcg/hr (21 1234)    dextrose      [Held by provider] heparin (PORCINE) Infusion Stopped (21 1000)    sodium chloride       Scheduled Meds:   vancomycin  1,000 mg Intravenous Once    midodrine  15 mg Oral TID WC    hydrocortisone sodium succinate PF  100 mg Intravenous Q8H    senna  1 tablet Oral Nightly    insulin lispro  0-12 Units Subcutaneous Q4H    mupirocin   Topical BID    artificial tears   Both Eyes BID    chlorhexidine  15 mL Mouth/Throat BID    vancomycin (VANCOCIN) intermittent dosing (placeholder)   Other RX Placeholder    pantoprazole  40 mg Intravenous Daily    And    sodium chloride (PF)  10 mL Intravenous Daily    piperacillin-tazobactam  3,375 mg Intravenous Q12H    And    sodium chloride   Intravenous Q12H    ipratropium-albuterol  1 ampule Inhalation Q4H WA    [Held by provider] amLODIPine  10 mg Oral Daily    [Held by provider] apixaban  2.5 mg Oral BID    [Held by provider] furosemide  20 mg Oral Daily    [Held by provider] gabapentin  100 mg Oral BID    [Held by provider] lenalidomide  10 mg Oral Daily    therapeutic multivitamin-minerals  1 tablet Oral Daily    sodium chloride flush  5-40 mL Intravenous 2 times per day     PRN Meds: anticoagulant sodium citrate, perflutren lipid microspheres, glucose, dextrose, glucagon (rDNA), dextrose, [Held by provider] heparin (porcine), [Held by provider] heparin (porcine), midazolam, sodium chloride flush, sodium chloride, polyethylene glycol, acetaminophen **OR** acetaminophen  Nutrition:   NPO    Labs and Imaging Studies     CBC:   Recent Labs     07/02/21  1731 07/03/21  0139 07/03/21  1022   WBC 14.5* 15.1* 16.9*   HGB 8.7* 8.4* 8.7*   HCT 26.2* 24.9* 25.4*   MCV 91.3 89.6 88.5   PLT 20* 23* 27*       BMP:    Recent Labs     07/03/21  0139 07/03/21  0446 07/03/21  1022    133 137   K 5.4* 5.7* 5.9*   CL 95* 95* 99   CO2 23 23 20*   BUN 71* 76* 89*   CREATININE 2.2* 2.3* 2.6*   GLUCOSE 188* 189* 169*       LIVER PROFILE:   Recent Labs     07/01/21  0516 07/02/21  0455 07/03/21  0446   AST 63* 32* 31   * 171* 121*   BILIDIR 1. 8* 1.9* 3.3*   BILITOT 2.5* 2.5* 4.0*   ALKPHOS 58 67 80       PT/INR:   Recent Labs     07/01/21  0516 07/02/21  0455 07/03/21  0446   PROTIME 21.1* 22.1* 24.5*   INR 1.9 2.0 2.3       APTT:   Recent Labs     07/01/21  0516 07/02/21  0455 07/03/21  0446   APTT 28.1 27.0 26.1       Fasting Lipid Panel:    No results found for: CHOL, TRIG, HDL    Cardiac Enzymes:    Lab Results   Component Value Date    CKTOTAL 132 06/27/2021    CKTOTAL 57 06/26/2021    CKMB 1.6 06/27/2021    TROPONINI 0.03 12/30/2020       Notable Cultures:      Blood cultures   Blood Culture, Routine   Date Value Ref Range Status   06/29/2021 24 Hours no growth  Preliminary     Respiratory cultures No results found for: RESPCULTURE No results found for: LABGRAM  Urine   Urine Culture, Routine   Date Value Ref Range Status   06/26/2021 Growth not present  Final     Legionella No results found for: LABLEGI  C Diff PCR No results found for: CDIFPCR  Wound culture/abscess: No results for input(s): WNDABS in the last 72 hours. Tip culture:No results for input(s): CXCATHTIP in the last 72 hours.       Oxygen:     Vent Information  $Ventilation: $Subsequent Day  Skin Assessment: Clean, dry, & intact  Suction Catheter Diameter:  (16)  Equipment ID: [id number]  Equipment Changed: (S) Suction catheter  Vent Type: 980  Vent Mode: AC/VC+  Vt Ordered: 350 mL  Rate Set: 24 bmp  Peak Flow: 0 L/min  Pressure Support: 0 cmH20  FiO2 : 40 %  SpO2: 93 %  SpO2/FiO2 ratio: 232.5  Sensitivity: 2  PEEP/CPAP: 10  I Time/ I Time %: 0 s  Humidification Source: Heated wire  Humidification Temp: 37  Humidification Temp Measured: 36.8  Circuit Condensation: Drained  Mask Type: Full face mask  Mask Size: Medium  Additional Respiratory  Assessments  Pulse: 70  Resp: 22  SpO2: 93 %  Position: Semi-Koch's  Humidification Source: Heated wire  Humidification Temp: 37  Circuit Condensation: Drained  Oral Care: Mouth swabbed, Mouth suctioned, Mouth moisturizer, Lip moisturizer applied  Subglottic Suction Done?: Yes  Airway Type: ET  Airway Size: 8  Cuff Pressure (cm H2O): 29 cm H2O       Urethral Catheter-Output (mL): 0 mL    Imaging Studies:  XR CHEST PORTABLE   Final Result   Stable left mid to lower lung airspace consolidation         XR CHEST PORTABLE   Final Result   Stable airspace disease on the left. XR CHEST PORTABLE   Final Result   ET tube tip 1.0 cm from the paradise. Stable left lung opacity. .         US DUP LOWER EXTREMITIES BILATERAL VENOUS   Final Result   Within the visualized vessels there is no evidence for deep venous   thrombosis               US DUP UPPER EXTREMITIES BILATERAL VENOUS   Final Result   Within the visualized vessels there is no evidence for deep venous   thrombosis               XR ABDOMEN (KUB) (SINGLE AP VIEW)   Final Result   No evidence of bowel obstruction or ileus. XR CHEST PORTABLE   Final Result   1. There is partial interval clearing of the left lung airspace disease with   partial Reaeration of the left lung apex when compared with the patient's   prior study. 2.  The right lung is clear. XR CHEST PORTABLE   Final Result   1. Persistent near complete opacification of the left hemithorax   2. The right lung is clear. 3. Stable position of the support lines and tubes. XR ABDOMEN FOR NG/OG/NE TUBE PLACEMENT   Final Result   Satisfactory position of nasogastric tube. XR CHEST PORTABLE   Final Result   1. Persistent near complete whiteout of the left lung unchanged when compared   to the prior study. 2. The right lung is clear. 3.      XR CHEST PORTABLE   Final Result   1. Opacities throughout left lung. New opacities are present in previously   aerated left upper lung field. 2.  Endotracheal tube is 2.4 cm above the paradise. US RETROPERITONEAL COMPLETE   Final Result   Unremarkable ultrasound of the kidneys. The urinary bladder is decompressed around a Bennett catheter. XR CHEST PORTABLE   Final Result   1. Persistent the infiltrate with pleural effusion in the left lung cause   significant the opacification of the left image chest particular from the mid   to the base. 2.  No significant changes since the previous examinations recently performed. 3.  Please see report of CT chest of June 26. XR CHEST ABDOMEN NG PLACEMENT   Final Result   Satisfactory position of the NGT. XR CHEST PORTABLE   Final Result   Intervally placed NGT extends below the diaphragm into at least the mid   stomach, with tip not confidently seen within the field of view. Endotracheal tube tip is 1.7 cm from the paradise and could be backed out 5-10   mm for more optimal positioning. Otherwise, stable chest with unchanged left lung opacities. XR CHEST PORTABLE   Final Result   Stable abnormal chest with persistent infiltrates and pleural effusion in the   left lung base likely pneumonia. Surveillance recommended to see complete   clearing. There is minimal infiltrates developing in the right lung base. Endotracheal tube close to the paradise and could be retracted by 1 cm. XR CHEST PORTABLE   Final Result   Stable chest series. Persistent dense consolidation in the left mid and   lower lung. Stable atherosclerotic disease. No new cardiopulmonary   pathology. CT Head WO Contrast   Final Result   Stable atrophy with small vessel ischemic disease. There is no acute stroke   or hemorrhage. Persistent multiple punctate lytic lesions the calvarium likely due to   malignancy like multiple myeloma. CT cervical spine. There is a fracture of the base of the dens of C2 (type 2). There is no   displacement. No other acute fractures are identified in the cervical spine. There is mild diffuse degenerative changes from C3-T1 with osteophytes and   multilevel disc bulges.   There is osteopenia and punctate lytic lesions   concerning for multiple myeloma. Impression      Type 2 fracture of the dens of C2. Diffuse degenerative changes from C3-T1. Multiple myeloma. CT chest.      Comparison December 30, 2020. Findings      There is borderline cardiac size. The great vessels are normal.  Trachea and   major bronchi are patent. There is dense consolidation in the left upper   lobe and left lower lobe concerning for pneumonia. There is minimal   atelectasis in the right lung base. The liver is of normal architecture. There is diffuse demineralization of the thoracic spine with punctate   lucencies. Compression fractures with vertebroplasty are noted. There is   slight irregularity of the sternum probably artifact. There is kyphosis of   the spine. Impression      Dense consolidation in the left upper lobe and left lower lobe concerning for   pneumonia. Surveillance after appropriate treatment is recommended with   repeat CT scan in 6-8 weeks to see complete resolution and exclude underlying   malignancy. Demineralization and the punctate lytic lucencies in the spine and ribs   concerning for multiple myeloma. Critical results were called by Dr. Lizy Austin to Dr. Warren Thomas. on   6/26/2021 at 16:30.         CT Cervical Spine WO Contrast   Final Result   Stable atrophy with small vessel ischemic disease. There is no acute stroke   or hemorrhage. Persistent multiple punctate lytic lesions the calvarium likely due to   malignancy like multiple myeloma. CT cervical spine. There is a fracture of the base of the dens of C2 (type 2). There is no   displacement. No other acute fractures are identified in the cervical spine. There is mild diffuse degenerative changes from C3-T1 with osteophytes and   multilevel disc bulges. There is osteopenia and punctate lytic lesions   concerning for multiple myeloma. Impression      Type 2 fracture of the dens of C2.       Diffuse degenerative changes from C3-T1. Multiple myeloma. CT chest.      Comparison December 30, 2020. Findings      There is borderline cardiac size. The great vessels are normal.  Trachea and   major bronchi are patent. There is dense consolidation in the left upper   lobe and left lower lobe concerning for pneumonia. There is minimal   atelectasis in the right lung base. The liver is of normal architecture. There is diffuse demineralization of the thoracic spine with punctate   lucencies. Compression fractures with vertebroplasty are noted. There is   slight irregularity of the sternum probably artifact. There is kyphosis of   the spine. Impression      Dense consolidation in the left upper lobe and left lower lobe concerning for   pneumonia. Surveillance after appropriate treatment is recommended with   repeat CT scan in 6-8 weeks to see complete resolution and exclude underlying   malignancy. Demineralization and the punctate lytic lucencies in the spine and ribs   concerning for multiple myeloma. Critical results were called by Dr. Paramjit Thapa to Dr. Maryam Simon. on   6/26/2021 at 16:30.         CT CHEST WO CONTRAST   Final Result   Stable atrophy with small vessel ischemic disease. There is no acute stroke   or hemorrhage. Persistent multiple punctate lytic lesions the calvarium likely due to   malignancy like multiple myeloma. CT cervical spine. There is a fracture of the base of the dens of C2 (type 2). There is no   displacement. No other acute fractures are identified in the cervical spine. There is mild diffuse degenerative changes from C3-T1 with osteophytes and   multilevel disc bulges. There is osteopenia and punctate lytic lesions   concerning for multiple myeloma. Impression      Type 2 fracture of the dens of C2. Diffuse degenerative changes from C3-T1. Multiple myeloma. CT chest.      Comparison December 30, 2020.       Findings      There is borderline cardiac size. The great vessels are normal.  Trachea and   major bronchi are patent. There is dense consolidation in the left upper   lobe and left lower lobe concerning for pneumonia. There is minimal   atelectasis in the right lung base. The liver is of normal architecture. There is diffuse demineralization of the thoracic spine with punctate   lucencies. Compression fractures with vertebroplasty are noted. There is   slight irregularity of the sternum probably artifact. There is kyphosis of   the spine. Impression      Dense consolidation in the left upper lobe and left lower lobe concerning for   pneumonia. Surveillance after appropriate treatment is recommended with   repeat CT scan in 6-8 weeks to see complete resolution and exclude underlying   malignancy. Demineralization and the punctate lytic lucencies in the spine and ribs   concerning for multiple myeloma. Critical results were called by Dr. Libertad West to Dr. Derick Akbar. on   6/26/2021 at 16:30. XR CHEST PORTABLE   Final Result   Consolidative airspace opacities in the left mid to lower lung zone with a   suspected small left pleural effusion. Findings may be on the basis of   pneumonia or less likely asymmetric pulmonary edema. Consider correlation   with CT of the chest.         XR CHEST PORTABLE    (Results Pending)        Resident's Assessment and Plan     Assessment and Plan:    Neuro  1. Acute metabolic encephalopathy likely 2/2 septic shock  Cardiovascular  1. Atrial Fibrillation 2/2 sepsis and hypoxia  a. CHADSVASC score > 4   b. F/u ECHO  2. Septic Shock 2/2 L. Sided Pneumonia  a. Wean pressors as tolerated, maintain a MAP > 65  b. BP Currently managed w/ Levophed and Midodrine 15mg TID  c. Maintain hydrocortisone to 50mg BID  d. Wean hydrocortisone once off pressors  Pulmonary  3. MRSA nares cultures (+),   a. currently being managed w/ Bactroban  b. And Peridex oral care  4.  Acute Hypoxic Respiratory Failure 2/2 L. Sided Pneumonia  a. Wean FiO2 and PEEP as tolerated  b. Repeat CXR shows no significant change from previous CXR; stable  GI  5. Myiasis (presence of maggots) of the oral cavity  a. S/p bronch, sample was sent for analysis. Awaiting results  Renal/  6. AUDREY vs AUDREY on CKD  a. Baseline Cr: 1.1-1.2  b. BUN/Cr levels improving  Heme/Onc  7. Thrombocytopenia 2/2 sepsis 2/2 L. Sided Pneumonia  a. DIC was found to be negative  b. Pt has no active bleeding  c. Fibrinogen results: > 700  d. Order CBC q8hr to monitor hemoglobin > 7 and platelet > 63,577  8. 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 HIT antibody results negative  b. Duplex US bilaterally upper and lower extremity negative for DVT  9. H/o Multiple Myeloma  a. Currently being managed w/ lenalidomide 10mg, currently held. Will continue as outpatient  Derm/MSK  10. Cervical Fracture  a. 2/2 traumatic fall and underlying lytic lesions of the spine  b. Pt currently has a neck brace      # Peptic ulcer prophylaxis: protonix  # DVT Prophylaxis: PCD  # Disposition: Cont current care / Transfer to telemetry / general floor    Dariana Salgado MD, PGY-1    Attending Physician: Dr. Ezekiel Branch  Department of Pulmonary, Critical Care and Sleep Medicine  [address] [facility]  Department of Internal Medicine  Attending Statement    This patient has a high probability of sudden clinically significant deterioration, which requires the highest level of physician preparedness to intervene urgently. I managed/supervised life or organ supporting interventions that required frequent physician assessment. I devoted my full attention to the direct care of this patient for the period of time indicated below. Time I spent with family of surrogate(s) is included only if the patient was incapable of providing the necessary information or participating in medical decision making.  In addition to time devoted to

## 2021-07-03 NOTE — CARE COORDINATION
Spoke with the patient's son and Eric Solis [phone] to discuss CODE STATUS. Explained that patient is currently in septic shock and multisystem organ failure with guarded overall prognosis. He states that he will arrive shortly to the hospital to see the patient and will discuss with his family regarding possible change of CODE STATUS. In the interim, patient will remain DNR CCA. All questions and concerns addressed.     Dallas Garcia MD  7/2/2021  8:08 PM

## 2021-07-03 NOTE — PROGRESS NOTES
Department of Internal Medicine  Nephrology Attending Progress Note    Events reviewed. SUBJECTIVE:  We are following Mrs. Stewart for AUDREY. Patient remains intubated.     Physical Exam:    VITALS:  BP (!) 140/50   Pulse 59   Temp 101.7 °F (38.7 °C) (Bladder)   Resp 27   Ht 5' 4\" (1.626 m)   Wt 174 lb 13.2 oz (79.3 kg)   SpO2 99%   BMI 30.01 kg/m²   24HR INTAKE/OUTPUT:      Intake/Output Summary (Last 24 hours) at 7/3/2021 1109  Last data filed at 7/3/2021 0600  Gross per 24 hour   Intake 1821 ml   Output 2425 ml   Net -604 ml     Access: Left femoral temporary dialysis catheter in place  Constitutional: intubate on mechanical ventilator  HEENT:  Cervical collar in place  Respiratory:  Intubated on vent  Cardiovascular/Edema:  Atrial fibrillation  Gastrointestinal:  Soft, non-tender, +BS  Neurologic:  Intubated and sedated  Skin:  Warm, dry, no rash or lesion  Other:   + edema    Scheduled Meds:   vancomycin  1,000 mg Intravenous Once    midodrine  15 mg Oral TID WC    hydrocortisone sodium succinate PF  100 mg Intravenous Q8H    senna  1 tablet Oral Nightly    insulin lispro  0-12 Units Subcutaneous Q4H    mupirocin   Topical BID    artificial tears   Both Eyes BID    chlorhexidine  15 mL Mouth/Throat BID    vancomycin (VANCOCIN) intermittent dosing (placeholder)   Other RX Placeholder    pantoprazole  40 mg Intravenous Daily    And    sodium chloride (PF)  10 mL Intravenous Daily    piperacillin-tazobactam  3,375 mg Intravenous Q12H    And    sodium chloride   Intravenous Q12H    ipratropium-albuterol  1 ampule Inhalation Q4H WA    [Held by provider] amLODIPine  10 mg Oral Daily    [Held by provider] apixaban  2.5 mg Oral BID    [Held by provider] furosemide  20 mg Oral Daily    [Held by provider] gabapentin  100 mg Oral BID    [Held by provider] lenalidomide  10 mg Oral Daily    therapeutic multivitamin-minerals  1 tablet Oral Daily    sodium chloride flush  5-40 mL Intravenous 2 times per day     Continuous Infusions:   norepinephrine 2 mcg/min (07/02/21 2131)    fentaNYL 5 mcg/ml in 0.9%  ml infusion 175 mcg/hr (07/03/21 0437)    dextrose      [Held by provider] heparin (PORCINE) Infusion Stopped (06/29/21 1000)    sodium chloride       PRN Meds:.anticoagulant sodium citrate, perflutren lipid microspheres, glucose, dextrose, glucagon (rDNA), dextrose, [Held by provider] heparin (porcine), [Held by provider] heparin (porcine), midazolam, sodium chloride flush, sodium chloride, polyethylene glycol, acetaminophen **OR** acetaminophen    DATA:    CBC:   Lab Results   Component Value Date    WBC 16.9 07/03/2021    RBC 2.87 07/03/2021    HGB 8.7 07/03/2021    HCT 25.4 07/03/2021    MCV 88.5 07/03/2021    MCH 30.3 07/03/2021    MCHC 34.3 07/03/2021    RDW 16.0 07/03/2021    PLT 27 07/03/2021    MPV NOT CALC 07/03/2021     CMP:    Lab Results   Component Value Date     07/03/2021    K 5.7 07/03/2021    K 4.2 06/27/2021    CL 95 07/03/2021    CO2 23 07/03/2021    BUN 76 07/03/2021    CREATININE 2.3 07/03/2021    GFRAA 25 07/03/2021    AGRATIO 0.9 06/24/2021    AGRATIO 1.1 06/24/2021    LABGLOM 20 07/03/2021    GLUCOSE 189 07/03/2021    PROT 5.3 07/03/2021    LABALBU 3.1 07/03/2021    CALCIUM 8.3 07/03/2021    BILITOT 4.0 07/03/2021    ALKPHOS 80 07/03/2021    AST 31 07/03/2021     07/03/2021     Magnesium:    Lab Results   Component Value Date    MG 2.3 07/03/2021     Phosphorus:    Lab Results   Component Value Date    PHOS 6.5 07/03/2021     Radiology Review:      Kidney ultrasound June 28, 2021   FINDINGS:       Kidneys:       The right kidney measures 9.2 cm in length and the left kidney measures 10.8   cm in length.       Kidneys demonstrate normal cortical echogenicity.  No evidence of   hydronephrosis or intrarenal stones.           Bladder:       The urinary bladder is decompressed around a Bennett catheter placed   Impression:  Unremarkable ultrasound of the kidneys. SLED  · Replace calcium  · Continue to monitor renal function  · Agree with DNR-CCA    Discussed with RN

## 2021-07-03 NOTE — FLOWSHEET NOTE
07/03/21 1647   Vital Signs   BP (!) 129/52   Temp 99.1 °F (37.3 °C)   Pulse 71   Resp 13   SpO2 92 %   Post-Hemodialysis Assessment   Post-Treatment Procedures Blood returned;Catheter capped, clamped and heparinized x 2 ports   Machine Disinfection Process Acid/Vinegar Clean;Heat Disinfect; Exterior Machine Disinfection   Rinseback Volume (ml) 300 ml   Total Liters Processed (l/min) 61.7 l/min   Dialyzer Clearance Clear   Heparin amount administered during treatment (units) 0 units   Hemodialysis Intake (ml) 300 ml   Hemodialysis Output (ml) 1800 ml   NET Removed (ml) 1500 ml   Tolerated Treatment Good   Patient Response to Treatment see note   Bilateral Breath Sounds Rhonchi   Edema Generalized   Tolerated 4 hr tx well on 2K 2.5Ca bath per orders, 1500 ml removed with out difficulty. HD CVC flushed, heparin to dwell, clamped and capped  post tx per policy. Report to floor RN, remains in care of staff.

## 2021-07-03 NOTE — CONSULTS
back, states that his brother is flying in tomorrow night and goal is for patient to survive until brother can arrive so that brother has a chance to say goodbye. Plan is compassionate extubation after brother says goodbye. Emotional support provided. Physical Function:  PPS: 10    History Of Present Illness:    Per H&P  \"Thepatient is a 78 y.o. female who was trans eddie from nursing facility due to mechanical fall substaining Dense C-2 Fracture, NS consulted and trauma applied neck collar for stability. The also found HAP with ARF and Bipap. She will be admitted to [facility] with pulmonary  in consult for HAP w/ ARF and Bipap. \"    Past Medical History:          Diagnosis Date    Abnormality of plasma protein     Anemia     Asthma     Collapsed vertebra, not elsewhere classified, thoracic region, subsequent encounter for fracture with routine healing     Constipation     Dependence on supplemental oxygen     Difficulty in walking     GERD without esophagitis     Hypertension     Low back pain     Monoclonal gammopathy     Multiple myeloma not having achieved remission ([facility] [address]     Other disorders of plasma-protein metabolism, not elsewhere classified     Secondary malignant neoplasm of bone (HCC)     Thrombocytopenia ([address]     Unspecified fracture of first lumbar vertebra, subsequent encounter for fracture with routine healing     Unspecified fracture of fourth lumbar vertebra, subsequent encounter for fracture with routine healing        Past Surgical History:      History reviewed. No pertinent surgical history. Medications Prior to Admission:      Prior to Admission medications    Medication Sig Start Date End Date Taking?  Authorizing Provider   docusate sodium (COLACE) 100 MG capsule Take 100 mg by mouth 2 times daily as needed for Constipation   Yes Historical Provider, MD   benzonatate (TESSALON) 100 MG capsule Take 100 mg by mouth 3 times daily as needed for Cough   Yes Historical Provider, MD   Dexamethasone 20 MG TABS Take 20 mg by mouth once a week 6/26/21  Yes Historical Provider, MD   mirtazapine (REMERON) 15 MG tablet Take 7.5 mg by mouth nightly   Yes Historical Provider, MD   apixaban (ELIQUIS) 2.5 MG TABS tablet Take 1 tablet by mouth 2 times daily 1/4/21  Yes Palmira Kurtz MD   amLODIPine (NORVASC) 10 MG tablet Take 1 tablet by mouth daily 1/5/21  Yes Palmira Kurtz MD   furosemide (LASIX) 20 MG tablet Take 40 mg by mouth daily    Yes Historical Provider, MD   gabapentin (NEURONTIN) 100 MG capsule Take 100 mg by mouth 2 times daily. For foot pain   Yes Historical Provider, MD   Multiple Vitamins-Minerals (THERAPEUTIC MULTIVITAMIN-MINERALS) tablet Take 1 tablet by mouth daily   Yes Historical Provider, MD   HYDROcodone-acetaminophen (NORCO)  MG per tablet Take 1 tablet by mouth every 4 hours as needed for Pain. Yes Historical Provider, MD   lenalidomide (REVLIMID) 10 MG chemo capsule Take 10 mg by mouth daily Give at bedtime for 21 days starting 12/12/20    Historical Provider, MD   Ascorbic Acid (VITAMIN C ADULT GUMMIES PO) Take 1,000 mg by mouth daily    Historical Provider, MD   Zinc 100 MG TABS Take by mouth daily    Historical Provider, MD   ipratropium-albuterol (DUONEB) 0.5-2.5 (3) MG/3ML SOLN nebulizer solution Inhale 1 vial into the lungs every 4 hours As needed    Historical Provider, MD   ondansetron (ZOFRAN) 8 MG tablet Take 8 mg by mouth every 8 hours as needed for Nausea or Vomiting    Historical Provider, MD       Allergies:  Patient has no known allergies. Social History:      The patient currently lives at [address] [place] [address]:   reports that she has never smoked. She does not have any smokeless tobacco history on file. ETOH:   reports previous alcohol use. Family History:      Patient unable to provide history    No family history on file. REVIEW OF SYSTEMS:   Pertinent positives as noted in the HPI.  All other systems reviewed and negative. OBJECTIVE:   Prognosis: Poor and Guarded    Physical Exam:  BP (!) 147/45   Pulse 57   Temp 101.5 °F (38.6 °C) (Bladder)   Resp (!) 37   Ht 5' 4\" (1.626 m)   Wt 174 lb 13.2 oz (79.3 kg)   SpO2 96%   BMI 30.01 kg/m²     Constitutional:  Elderly, obese, intubated  Eyes: no scleral icterus, normal lids, no discharge  ENMT:  Normocephalic, atraumatic, mucosa moist, EOMI  Neck:  trachea midline, no JVD  Lungs:  CTA bilaterally, no audible rhonchi or wheezes noted, respirations unlabored, no retractions  Heart[de-identified]  RRR, distant heart tones, no murmur, rub, or gallop noted during exam  Abd:  Soft, non tender, non distended, bowel sounds present  :  deferred  MSK: sarcopenia present  Ext:  Right groin HD catheter, + edema, pulses present  Skin:  Warm and dry, no rashes on visible skin  Psych: unable to assess  Neuro:  PERRL, not following commands    Objective data reviewed: labs, images, records, medication use, vitals and chart    Labs:     Recent Labs     07/02/21  1731 07/03/21  0139 07/03/21  1022   WBC 14.5* 15.1* 16.9*   HGB 8.7* 8.4* 8.7*   HCT 26.2* 24.9* 25.4*   PLT 20* 23* 27*     Recent Labs     07/01/21  1236 07/01/21  1706 07/02/21  0455 07/02/21  0508 07/03/21  0139 07/03/21  0446 07/03/21  1022      < > 137   < > 134 133 137   K 4.1   < > 5.0   < > 5.4* 5.7* 5.9*   CL 96*   < > 98   < > 95* 95* 99   CO2 21*   < > 23   < > 23 23 20*   BUN 40*   < > 86*   < > 71* 76* 89*   CREATININE 1.4*   < > 2.6*   < > 2.2* 2.3* 2.6*   CALCIUM 8.7   < > 8.3*   < > 8.4* 8.3* 8.1*   PHOS 3.7  --  7.0*  --   --  6.5*  --     < > = values in this interval not displayed.      Recent Labs     07/01/21  0516 07/02/21 0455 07/03/21 0446   AST 63* 32* 31   * 171* 121*   BILIDIR 1.8* 1.9* 3.3*   BILITOT 2.5* 2.5* 4.0*   ALKPHOS 58 67 80     Recent Labs     07/01/21 0516 07/02/21 0455 07/03/21 0446   INR 1.9 2.0 2.3     No results for input(s): Marsha Niño in the last 72 hours.    Urinalysis:      Lab Results   Component Value Date    NITRU Negative 06/26/2021    WBCUA 1-3 06/26/2021    BACTERIA MANY 06/26/2021    RBCUA 10-20 06/26/2021    BLOODU LARGE 06/26/2021    SPECGRAV 1.025 06/26/2021    GLUCOSEU Negative 06/26/2021         Discussed patient and the plan of care with the other IDT members: Palliative Medicine IDT Team, Floor Nurse, Patient and Family    Time/Communication  Greater than 50% of time spent, total 50 minutes in counseling and coordination of care at the bedside regarding goals of care, diagnosis and prognosis and see above. Thank you for allowing Palliative Medicine to participate in the care of Juan Luis Goel.

## 2021-07-03 NOTE — PROGRESS NOTES
Infectious Disease  Progress Note  [facility]    Chief Complaint:  On vent- fentanyl   Face to face encounter   Subjective: bacteremia  HAP   On vent- no response- sedated   Has been having fevers- tmax- 101.7   50% FiO2      Scheduled Meds:   vancomycin  1,000 mg Intravenous Once    midodrine  15 mg Oral TID WC    hydrocortisone sodium succinate PF  100 mg Intravenous Q8H    senna  1 tablet Oral Nightly    insulin lispro  0-12 Units Subcutaneous Q4H    mupirocin   Topical BID    artificial tears   Both Eyes BID    chlorhexidine  15 mL Mouth/Throat BID    vancomycin (VANCOCIN) intermittent dosing (placeholder)   Other RX Placeholder    pantoprazole  40 mg Intravenous Daily    And    sodium chloride (PF)  10 mL Intravenous Daily    piperacillin-tazobactam  3,375 mg Intravenous Q12H    And    sodium chloride   Intravenous Q12H    ipratropium-albuterol  1 ampule Inhalation Q4H WA    [Held by provider] amLODIPine  10 mg Oral Daily    [Held by provider] apixaban  2.5 mg Oral BID    [Held by provider] furosemide  20 mg Oral Daily    [Held by provider] gabapentin  100 mg Oral BID    [Held by provider] lenalidomide  10 mg Oral Daily    therapeutic multivitamin-minerals  1 tablet Oral Daily    sodium chloride flush  5-40 mL Intravenous 2 times per day     Continuous Infusions:   norepinephrine 2 mcg/min (07/02/21 2131)    fentaNYL 5 mcg/ml in 0.9%  ml infusion 175 mcg/hr (07/03/21 0437)    dextrose      [Held by provider] heparin (PORCINE) Infusion Stopped (06/29/21 1000)    sodium chloride       PRN Meds:anticoagulant sodium citrate, perflutren lipid microspheres, glucose, dextrose, glucagon (rDNA), dextrose, [Held by provider] heparin (porcine), [Held by provider] heparin (porcine), midazolam, sodium chloride flush, sodium chloride, polyethylene glycol, acetaminophen **OR** acetaminophen    Patient Vitals for the past 24 hrs:   BP Temp Temp src Pulse Resp SpO2 Weight   07/03/21 0900 (!) 140/50   59 27 99 %    07/03/21 0822    53 27 95 %    07/03/21 0821     (!) 33 96 %    07/03/21 0800 (!) 143/48 101.7 °F (38.7 °C) Bladder 53 28 97 %    07/03/21 0700 (!) 151/50   55 25 97 %    07/03/21 0600    56 28 96 % 174 lb 13.2 oz (79.3 kg)   07/03/21 0500    56 24 97 %    07/03/21 0400  101.3 °F (38.5 °C) Bladder 55 24 97 %    07/03/21 0300    56 24 98 %    07/03/21 0236    57 26 98 %    07/03/21 0200    56 25 98 %    07/03/21 0100    57 24 99 %    07/03/21 0000  100.6 °F (38.1 °C) Bladder 58 25 98 %    07/02/21 2300    64 25 97 %    07/02/21 2200    68 25 97 %    07/02/21 2100    67 24 98 %    07/02/21 2000  99.7 °F (37.6 °C) Bladder 64 24 98 %    07/02/21 1900    65 24 97 %    07/02/21 1800    72 24 95 %    07/02/21 1700    71 24 97 %    07/02/21 1619    69 24 98 %    07/02/21 1600  99 °F (37.2 °C) Bladder 69 24 98 %    07/02/21 1500    71 24 98 %    07/02/21 1400    74 25 96 %    07/02/21 1300    82 24 95 %    07/02/21 1205 (!) 137/47   75 23  179 lb 10.8 oz (81.5 kg)   07/02/21 1200  98.4 °F (36.9 °C) Bladder 81 23 100 %    07/02/21 1143    74 24 99 %    07/02/21 1100    75 24 95 %    07/02/21 1000    76 26 94 %        CBC with Differential:    Lab Results   Component Value Date    WBC 15.1 07/03/2021    RBC 2.78 07/03/2021    HGB 8.4 07/03/2021    HCT 24.9 07/03/2021    PLT 23 07/03/2021    MCV 89.6 07/03/2021    MCH 30.2 07/03/2021    MCHC 33.7 07/03/2021    RDW 16.1 07/03/2021    NRBC 0.9 07/02/2021    SEGSPCT 45.6 05/27/2021    BANDSPCT 12.0 06/24/2021    METASPCT 3.5 06/29/2021    LYMPHOPCT 0.4 07/03/2021    MONOPCT 1.8 07/03/2021    MYELOPCT 0.9 06/29/2021    BASOPCT 0.2 07/03/2021    MONOSABS 0.27 07/03/2021    LYMPHSABS 0.06 07/03/2021    EOSABS 0.01 07/03/2021    BASOSABS 0.03 07/03/2021     CMP:    Lab Results   Component Value Date     07/03/2021    K 5.7 07/03/2021    K 4.2 06/27/2021    CL 95 07/03/2021    CO2 23 07/03/2021    BUN 76 07/03/2021    CREATININE 2.3 07/03/2021    GFRAA 25 07/03/2021    AGRATIO 0.9 06/24/2021    AGRATIO 1.1 06/24/2021    LABGLOM 20 07/03/2021    GLUCOSE 189 07/03/2021    PROT 5.3 07/03/2021    LABALBU 3.1 07/03/2021    CALCIUM 8.3 07/03/2021    BILITOT 4.0 07/03/2021    ALKPHOS 80 07/03/2021    AST 31 07/03/2021     07/03/2021       BP (!) 140/50   Pulse 59   Temp 101.7 °F (38.7 °C) (Bladder)   Resp 27   Ht 5' 4\" (1.626 m)   Wt 174 lb 13.2 oz (79.3 kg)   SpO2 99%   BMI 30.01 kg/m²     Physical Exam  Const/Neuro- on vent- on fentanyl gtt- and levophed   ENMT- NG, ETT in place   Neck: Neck supple  Heart- Regular, Rate, Rhythm-  Lungs- diminished. On vent   Abdomen- Soft, bowel sounds hypoactive- softly distended. Musculo/Extremities-  Equal and symmetrical, no edema. No tenderness. Skin:  Warm and dry, free from rashes. Bennett- with minimal output  Right Groin TLC-6/27/2021-  Right wrist A-Line-6/28/2021   PIV  Left femoral HD cath    Cultures   6/27/2021-blood cx- no growth to date   6/28/2021-bronch cultures- no growth   6/26/2021- blood cx- staph epi and staph warneri     Radiology reviewed    Assessment  · Trauma-fall  · resp failure- on vent  · Pneumonia-HAP  · S/p Bronch   · Fevers    · Leukocytosis - secondary to above- also on solu-cortef   · Acute myocardial injury in the setting of septic shock   · Multiple myeloma    Plan  Continue Zosyn   Continue vancomycin   Pharmacy dosing  Check fungitell - check serum osmo   Repeat blood cultures today   May need to change lines   Echo done- ? Results    Monitor labs   Family to meet today to discuss code status- currently [facility]     Electronically signed by PETE Figueredo CNS on 7/3/2021 at 9:50 AM     Pt seen and examined. Above discussed agree with advanced practice nurse. Labs, cultures, and radiographs reviewed. Face to Face encounter occurred. Changes made as necessary.      Cassandra Steward

## 2021-07-03 NOTE — PROGRESS NOTES
Hospitalist Progress Note      SYNOPSIS: Patient admitted on 2021 for Closed nondisplaced type II dens fracture ([facility] [address] Hospital stay was complicated by HUSAM parikh with RVR and AUDREY/acute hypoxic respiratory failure. She is currently intubated. SUBJECTIVE:    Patient seen and examined. Son was by her bedside. He had no active complaints. Patient remains intubated and sedated. Shee is on low-dose Levophed. She is due for dialysis today. Records reviewed. Temp (24hrs), Av.9 °F (38.3 °C), Min:99 °F (37.2 °C), Max:101.7 °F (38.7 °C)    DIET: Diet NPO  CODE: DNR-CCA    Intake/Output Summary (Last 24 hours) at 7/3/2021 1246  Last data filed at 7/3/2021 1200  Gross per 24 hour   Intake 1421 ml   Output 25 ml   Net 1396 ml       OBJECTIVE:    BP (!) 134/40   Pulse 62   Temp 101.7 °F (38.7 °C)   Resp 24   Ht 5' 4\" (1.626 m)   Wt 174 lb 13.2 oz (79.3 kg)   SpO2 95%   BMI 30.01 kg/m²     General appearance: Intubated, sedated  HEENT:  Conjunctivae/corneas clear. Neck: Supple. No jugular venous distention. Respiratory: Diminished breath sounds bibasilarly. No wheezes or crackles. Intubated. Cardiovascular: Regular rate rhythm, normal S1-S2  Abdomen: Soft, nontender, nondistended  Musculoskeletal: No clubbing, cyanosis, no bilateral lower extremity edema. Brisk capillary refill. Skin:  No rashes  on visible skin  Neurologic: Intubated and sedated. RASS score is -4. ASSESSMENT:  #Acute hypoxic respiratory failure  -remains intubated and sedated. -vent management as per critical care  - is s/p bronch    #Septic shock due to MRSA pneumonia and multiorgan system failure  -on IV vancomycin and Zosyn  -ID on board  -Still on low-dose Levophed.  -to have repeat blood cultures today, per ID    #Afib with RVR  -Heparin drip held due to low platelets and concerns for HIT. #AUDREY on CKD  -on dialysis. To have dialysis today.  Nephrology on board  -on amiodarone cdrip    #History of multiple myeloma  -on daratumumab, revlimid. Chemotherapy on hold now  -hematology oncology on board    #Hyperkalemia: K is 5.9 today. Nephrology on board    #Thrombocytopenia  -Concerning for HIT. Platelets are 27 today.  -hematology reviewed patient and think this is due to chemotherapy, sepsis and antibiotics, and to continue monitoring    #Anemia  -Hb is 8.7 today. Transfuse if Hb <7     #Cervical spine fracture  -CT showed C2 fracture  -has neck collar in place. #Elevated liver enzymes  -Due to shock liver. Bilirubin is 4 today. We will continue to trend. DVT prophylaxis: SCDs    Prognosis:   -Very poor. Son is by her bedside and is understanding and is waiting for his brother to get in from [place] tomorrow evening so they decide about withdrawing care.     DISPOSITION: to be determined    Medications:  REVIEWED DAILY    Infusion Medications    norepinephrine 2 mcg/min (07/02/21 2131)    fentaNYL 5 mcg/ml in 0.9%  ml infusion 175 mcg/hr (07/03/21 1234)    dextrose      [Held by provider] heparin (PORCINE) Infusion Stopped (06/29/21 1000)    sodium chloride       Scheduled Medications    vancomycin  1,000 mg Intravenous Once    midodrine  15 mg Oral TID WC    hydrocortisone sodium succinate PF  100 mg Intravenous Q8H    senna  1 tablet Oral Nightly    insulin lispro  0-12 Units Subcutaneous Q4H    mupirocin   Topical BID    artificial tears   Both Eyes BID    chlorhexidine  15 mL Mouth/Throat BID    vancomycin (VANCOCIN) intermittent dosing (placeholder)   Other RX Placeholder    pantoprazole  40 mg Intravenous Daily    And    sodium chloride (PF)  10 mL Intravenous Daily    piperacillin-tazobactam  3,375 mg Intravenous Q12H    And    sodium chloride   Intravenous Q12H    ipratropium-albuterol  1 ampule Inhalation Q4H WA    [Held by provider] amLODIPine  10 mg Oral Daily    [Held by provider] apixaban  2.5 mg Oral BID    [Held by provider] furosemide  20 mg Oral Daily    [Held by provider] gabapentin  100 mg Oral BID    [Held by provider] lenalidomide  10 mg Oral Daily    therapeutic multivitamin-minerals  1 tablet Oral Daily    sodium chloride flush  5-40 mL Intravenous 2 times per day     PRN Meds: anticoagulant sodium citrate, perflutren lipid microspheres, glucose, dextrose, glucagon (rDNA), dextrose, [Held by provider] heparin (porcine), [Held by provider] heparin (porcine), midazolam, sodium chloride flush, sodium chloride, polyethylene glycol, acetaminophen **OR** acetaminophen    Labs:     Recent Labs     07/02/21  1731 07/03/21  0139 07/03/21  1022   WBC 14.5* 15.1* 16.9*   HGB 8.7* 8.4* 8.7*   HCT 26.2* 24.9* 25.4*   PLT 20* 23* 27*       Recent Labs     07/01/21  1236 07/01/21  1706 07/02/21  0455 07/02/21  0508 07/03/21  0139 07/03/21  0446 07/03/21  1022      < > 137   < > 134 133 137   K 4.1   < > 5.0   < > 5.4* 5.7* 5.9*   CL 96*   < > 98   < > 95* 95* 99   CO2 21*   < > 23   < > 23 23 20*   BUN 40*   < > 86*   < > 71* 76* 89*   CREATININE 1.4*   < > 2.6*   < > 2.2* 2.3* 2.6*   CALCIUM 8.7   < > 8.3*   < > 8.4* 8.3* 8.1*   PHOS 3.7  --  7.0*  --   --  6.5*  --     < > = values in this interval not displayed. Recent Labs     07/01/21  0516 07/02/21  0455 07/03/21  0446   PROT 5.5* 5.1* 5.3*   ALKPHOS 58 67 80   * 171* 121*   AST 63* 32* 31   BILITOT 2.5* 2.5* 4.0*       Recent Labs     07/01/21  0516 07/02/21  0455 07/03/21  0446   INR 1.9 2.0 2.3       No results for input(s): Lorenzo Alfaro in the last 72 hours. Chronic labs:    Lab Results   Component Value Date    TSH 0.127 (L) 06/30/2021    INR 2.3 07/03/2021       Radiology: REVIEWED DAILY    +++++++++++++++++++++++++++++++++++++++++++++++++  Jessika Chopra MD  [facility] - [address] [place] [address]  +++++++++++++++++++++++++++++++++++++++++++++++++  NOTE: This report was transcribed using voice recognition software.  Every effort was made to ensure accuracy; however, inadvertent computerized transcription errors may be present.

## 2021-07-03 NOTE — PROGRESS NOTES
Pharmacy Consultation Note  (Antibiotic Dosing and Monitoring)    Initial consult date: 2021  Consulting physician/provider: Dr. Candy Briscoe  Drug(s): vancomycin  Indication: Staph species bacteremia; immunocompromised    Age/Gender IBW DW  Allergy Information   78 y.o. female; 162.6 cm, 68 kg 50.6 kg 57.6 kg  Patient has no known allergies. Date  WBC BUN/CR Drug/Dose Time   Given Level(s)   (Time) Comments    3.2 67/2.7 X X      2.7 78/2.9 Vancomycin 1250 mg x1 1247      6.3 91/3.1 Vancomycin 1000 mg IV x1 1143      7.1 98/3.5 x x Random 22.6 @0433     12.2 HD Vancomycin 1000 mg IVx1  12.2 HD Vancomycin 1000 mg IVx1  11.4 HD X x     7/3 16.9 HD Vancomycin 750 mg IV x 1 <2000> Random @1022 = 21.5      Estimated Creatinine Clearance: 19 mL/min (A) (based on SCr of 2.3 mg/dL (H)). Intake/Output Summary (Last 24 hours) at 7/3/2021 1105  Last data filed at 7/3/2021 0600  Gross per 24 hour   Intake 1821 ml   Output 2425 ml   Net -604 ml       Temp max: Temp (24hrs), Av.1 °F (37.8 °C), Min:98.4 °F (36.9 °C), Max:101.7 °F (38.7 °C)      Assessment:  · Consulted by Dr. Candy Briscoe to dose/monitor vancomycin. · Goal trough level:  15-20 mCg/mL; AUC/ELEUTERIO = 400-600 mg/L-hr. · 79 yo/F admitted from SNF after fall and found down for unknown length of time. BC's from  +for [facility]; Blood PCR today revealed methicillin-resistant Staph species. · Febrile, tachycardic, and lymphopenic on admission. · PMH: MM on lenalinomide therapy. · Now receiving HD    Plan:  · Vancomycin 750 mg IV x1 post HD tonight  · Random level with AM labs tomorrow  · Will check vancomycin level when steady state is attained if vanco continues. · Pharmacist will follow and monitor/adjust dosing as necessary.       Bradley Oviedo, PharmD Candidate [id number]  7/3/2021 2:41 PM

## 2021-07-04 NOTE — PROGRESS NOTES
Department of Internal Medicine  Nephrology Attending Progress Note    Events reviewed. SUBJECTIVE:  We are following Mrs. Stewart for AUDREY. Patient remains intubated.     Physical Exam:    VITALS:  BP (!) 120/44   Pulse (!) 49   Temp 100.2 °F (37.9 °C) (Bladder)   Resp 24   Ht 5' 4\" (1.626 m)   Wt 174 lb 13.2 oz (79.3 kg)   SpO2 93%   BMI 30.01 kg/m²   24HR INTAKE/OUTPUT:      Intake/Output Summary (Last 24 hours) at 7/4/2021 1142  Last data filed at 7/4/2021 1100  Gross per 24 hour   Intake 2335.52 ml   Output 1835 ml   Net 500.52 ml     Access: Left femoral temporary dialysis catheter in place  Constitutional: intubate on mechanical ventilator  HEENT:  Cervical collar in place  Respiratory:  Intubated on vent  Cardiovascular/Edema:  Atrial fibrillation  Gastrointestinal:  Soft, non-tender, +BS  Neurologic:  Intubated and sedated  Skin:  Warm, dry, no rash or lesion  Other:   + edema    Scheduled Meds:   vancomycin  500 mg Intravenous Once    midodrine  15 mg Oral TID WC    hydrocortisone sodium succinate PF  100 mg Intravenous Q8H    senna  1 tablet Oral Nightly    insulin lispro  0-12 Units Subcutaneous Q4H    artificial tears   Both Eyes BID    chlorhexidine  15 mL Mouth/Throat BID    vancomycin (VANCOCIN) intermittent dosing (placeholder)   Other RX Placeholder    pantoprazole  40 mg Intravenous Daily    And    sodium chloride (PF)  10 mL Intravenous Daily    piperacillin-tazobactam  3,375 mg Intravenous Q12H    And    sodium chloride   Intravenous Q12H    ipratropium-albuterol  1 ampule Inhalation Q4H WA    [Held by provider] amLODIPine  10 mg Oral Daily    [Held by provider] apixaban  2.5 mg Oral BID    [Held by provider] furosemide  20 mg Oral Daily    [Held by provider] gabapentin  100 mg Oral BID    [Held by provider] lenalidomide  10 mg Oral Daily    therapeutic multivitamin-minerals  1 tablet Oral Daily    sodium chloride flush  5-40 mL Intravenous 2 times per day Continuous Infusions:   norepinephrine Stopped (07/04/21 0945)    fentaNYL 5 mcg/ml in 0.9%  ml infusion 175 mcg/hr (07/04/21 0412)    dextrose      [Held by provider] heparin (PORCINE) Infusion Stopped (06/29/21 1000)    sodium chloride       PRN Meds:.anticoagulant sodium citrate, perflutren lipid microspheres, glucose, dextrose, glucagon (rDNA), dextrose, [Held by provider] heparin (porcine), [Held by provider] heparin (porcine), midazolam, sodium chloride flush, sodium chloride, polyethylene glycol, acetaminophen **OR** acetaminophen    DATA:    CBC:   Lab Results   Component Value Date    WBC 14.9 07/04/2021    RBC 2.74 07/04/2021    HGB 8.5 07/04/2021    HCT 24.4 07/04/2021    MCV 89.1 07/04/2021    MCH 31.0 07/04/2021    MCHC 34.8 07/04/2021    RDW 16.4 07/04/2021    PLT 31 07/04/2021    MPV NOT CALC 07/04/2021     CMP:    Lab Results   Component Value Date     07/04/2021    K 4.6 07/04/2021    K 4.2 06/27/2021    CL 96 07/04/2021    CO2 22 07/04/2021    BUN 57 07/04/2021    CREATININE 1.8 07/04/2021    GFRAA 33 07/04/2021    AGRATIO 0.9 06/24/2021    AGRATIO 1.1 06/24/2021    LABGLOM 27 07/04/2021    GLUCOSE 229 07/04/2021    PROT 4.6 07/04/2021    LABALBU 2.3 07/04/2021    CALCIUM 8.2 07/04/2021    BILITOT 4.8 07/04/2021    ALKPHOS 85 07/04/2021    AST 46 07/04/2021    ALT 89 07/04/2021     Magnesium:    Lab Results   Component Value Date    MG 2.1 07/04/2021     Phosphorus:    Lab Results   Component Value Date    PHOS 6.1 07/04/2021     Radiology Review:      Kidney ultrasound June 28, 2021   FINDINGS:       Kidneys:       The right kidney measures 9.2 cm in length and the left kidney measures 10.8   cm in length.       Kidneys demonstrate normal cortical echogenicity.  No evidence of   hydronephrosis or intrarenal stones.           Bladder:       The urinary bladder is decompressed around a Bennett catheter placed   Impression:  Unremarkable ultrasound of the kidneys.       The urinary bladder is decompressed around a Bennett catheter.         Chest x-ray June 29, 2021   1. Persistent near complete whiteout of the left lung unchanged when compared   to the prior study. 2. The right lung is clear. 3.         BRIEF SUMMARY OF INITIAL CONSULT:    Briefly Ms. Dayne Ghosh is an 78year old  female with a PMH of HTN, lung cancer, multiple myeloma, thrombocytopenia, anemia, DVT on chronic anticoagulation, and asthma who was admitted to the MICU after presenting to the ER via EMS from [facility] following an unwitnessed fall resulting in a dense C-2 fracture and shortness of breath. Labs in ER were significant for sodium 138, potassium 4.5, chloride 102, bicarbonate 19, BUN 67, creatinine 2.7 mg/dl, anion gap 17, lactic acid 4.2, and procalcitonin 13.99. Dexamethasone, mirtazapine, apixaban, amlodipine, furosemide, gabapentin, and  lenalidomide are medications patient was taking prior to admission. We are consulted for AUDREY. Her baseline  creatinine is 1.1-1.2 mg/dL. IMPRESSION/RECOMMENDATIONS:      1. AUDREY stage III on CKD, ischemic ATN, oliguric. Started on renal replacement therapy on June 29, 2021. Had SLED x4 hours yesterday, tolerated well, chemistry improved. Remains anuric. 2. CKD stage III, probably due to nephrosclerosis  3. Hemodynamic shock, on norepinephrine and antibiotics  4. Hypocalcemia, 2/2 MM therapy?,  Ionized calcium 1.14  ------------------------------------------  5. Respiratory failure status post intubation, normal pH normal PCO2  6. Multiple myeloma  7. Pneumonia, probably MRSA, on vancomycin and piperacillin-tazobactam  8. MRSA bacteremia, on vancomycin and piperacillin-tazobactam  9. Shock liver, LFTs improved  10. AF with RVR, heparin drip  11. History of DVT, on heparin drip  12. Status post fall  13. Normocytic anemia  14.  Nutrition, n.p.o.    Plan:    · No need for HD today  · Keep MAP>70 mm hg with levo drip  · Replace calcium PRN  · Continue to monitor renal function  · Agree with DNR-CCA , await family decision regarding withdrawal of care    Discussed with RN

## 2021-07-04 NOTE — PROGRESS NOTES
Dani Shaffer [facility] 1940    SUBJECTIVE:    Intubated. Fever    OBJECTIVE  VITALS:  BP (!) 120/44   Pulse (!) 49   Temp 100.2 °F (37.9 °C) (Bladder)   Resp 24   Ht 5' 4\" (1.626 m)   Wt 174 lb 13.2 oz (79.3 kg)   SpO2 93%   BMI 30.01 kg/m²   General: Intubated   HEENT: PERRL, ETT intact, Cervical collar on   Respiratory: Vent synchronous, bilateral course breath sounds    Cardiac: irregular rate and rhythm.  Pulses palpable   Abdominal: soft, non distended  Extremities: No clubbing or cyanosis     DIAGNOSTIC DATA  Lab Results   Component Value Date    WBC 14.9 (H) 07/04/2021    HGB 8.5 (L) 07/04/2021    HCT 24.4 (L) 07/04/2021    MCV 89.1 07/04/2021    PLT 31 (L) 07/04/2021     Lab Results   Component Value Date     07/04/2021    K 4.6 07/04/2021    CL 96 (L) 07/04/2021    CO2 22 07/04/2021    BUN 57 (H) 07/04/2021    CREATININE 1.8 (H) 07/04/2021    GLUCOSE 229 (H) 07/04/2021    CALCIUM 8.2 (L) 07/04/2021    PROT 4.6 (L) 07/04/2021    LABALBU 2.3 (L) 07/04/2021    BILITOT 4.8 (H) 07/04/2021    ALKPHOS 85 07/04/2021    AST 46 (H) 07/04/2021    ALT 89 (H) 07/04/2021    LABGLOM 27 07/04/2021    GFRAA 33 07/04/2021    AGRATIO 0.9 (L) 06/24/2021    AGRATIO 1.1 06/24/2021    GLOB 3.5 06/24/2021    GLOB 2.9 06/24/2021     IMPRESSION:  Patient Active Problem List   Diagnosis    [facility]    Essential hypertension, benign    Localized edema    Thrombocytopenia, unspecified ([facility]    Pneumonia due to COVID-19 virus    Elevated serum creatinine    Essential hypertension    Hypernatremia    Hypermagnesemia    Hypokalemia    Acute respiratory failure with hypoxia (HCC)    CAP (community acquired pneumonia) due to Pneumococcus ([facility]    Closed nondisplaced type II dens fracture (HCC)    Red blood cell antibody positive     PLAN:  78year old female PMH of Multiple myeloma on lenalidomide, DVT on Eliquis, and polyneuropathy that presented to [facility] ER from nursing home via EMS for unwitnessed fall and SOB on 2021. Patient was given ceftriaxone and azithromycin      CT chest with persistent multiple punctate lytic lesions in the calvarium likely d/t multiple myeloma. CT head unremarkable    CT Cervical spine with C2 fracture     Patient developed worsening respiratory status and was intubated   Patient developed AFRVR and started on Low dose heparin and amio gtts. Underwent bronch 2021 with Dr Lauri Meade with normal airway examination. 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 -Currently on Daratumumab, Revlimid and Dex since 2020, Revlimid added 10/17/2020   -Last office visit was 2021 for cycle 8 Day 1 of Alysha and Cycle 22 Day 2 for Rev/Dex  -Most recent labs prior to admission with hgb: 12 WBC: 7.4 plt: 239  Ig IgA:71 IgM: 30 Protein: 5.9 Alpha 1 globulin: 0.5 Alpha 2 globulin: 1.1 Beta Globulin: 1.0 Gamma Globulin: 0.4 M spike: 0.1 A/G ratio: 1.1 Globulin: 2.9  Free kappa light chains: 11.6 Free lambda light chain: 11.8  K/L ratio: 0.98     Neurosurgery on board; collar placement   Hold Myeloma treatment for now. Nephrology on board;   ID on board; On Vancomycin Zosyn    Anemia and Thrombocytopenia secondary to chemo, sepsis and ATB. DIC negative HIT negative ultrasound negative. Continue to monitor her CBCD. Hb 8.5   PLT 31.  Transfuse to keep hgb>7 and plt>10     2021  Eleazar Jensen MD

## 2021-07-04 NOTE — PROGRESS NOTES
[address] [facility]   Department of Internal Medicine   Internal Medicine Residency  MICU Progress Note    Patient:  Laurie Mcmullen 78 y.o. female   MRN: [id number]       Date of Service: 2021    Allergy: Patient has no known allergies. Subjective     Patient was seen and examined this morning at bedside in no acute distress. Overnight, pt underwent SLEDD. BUN/Cr improved after dialysis. MAP consistently between 66-74. When attempting to wean pt off pressors, pt's MAP dropped to the 50s. Continuing pt on 0.5mcg Levophed. Pt's son (POA) and his brother arriving today to discuss further management options. Objective     TEMPERATURE:  Current - Temp: 100.2 °F (37.9 °C); Max - Temp  Av.6 °F (37.6 °C)  Min: 99.1 °F (37.3 °C)  Max: 100.4 °F (38 °C)  RESPIRATIONS RANGE: Resp  Av  Min: 13  Max: 31  PULSE RANGE: Pulse  Av.3  Min: 49  Max: 81  BLOOD PRESSURE RANGE:  Systolic (66WJA), IQX:058 , Min:105 , FQU:877   ; Diastolic (94QLR), QAE:09, Min:42, Max:79    ULSE OXIMETRY RANGE: SpO2  Av.5 %  Min: 89 %  Max: 99 %    I & O - 24hr:    Intake/Output Summary (Last 24 hours) at 2021 1243  Last data filed at 2021 1100  Gross per 24 hour   Intake 2335.52 ml   Output 1835 ml   Net 500.52 ml     I/O last 3 completed shifts: In: 2245.5 [I.V.:1525.5; NG/GT:420]  Out: 9537 [Urine:30] I/O this shift: In: 90 [NG/GT:90]  Out: 5 [Urine:5]   Weight change:     Physical Exam:    General Appearance:   Sedated and intubated   HEENT:    NC/AT, PERRL, no pallor no icterus, moist mucous membrane   Neck:   Supple, Normal thyroid; no carotid bruit or JVD   Resp:    Decreased breath sounds   Heart:    RRR, S1 & S2 normal, no murmur, rub or gallop.    Abdomen:     Soft,, BS +, no organomegaly   Extremities:   Normal, atraumatic, no cyanosis or edema   Pulses:   2+ and symmetric all extremities   Skin:   Skin color, texture, turgor normal, no rashes or lesions   Neurologic:  Sedated and intubated 94* 97* 96*   CO2 24 23 22   BUN 40* 52* 57*   CREATININE 1.4* 1.7* 1.8*   GLUCOSE 187* 230* 229*       LIVER PROFILE:   Recent Labs     07/02/21  0455 07/03/21 0446 07/04/21  0413   AST 32* 31 46*   * 121* 89*   BILIDIR 1.9* 3.3* 3.9*   BILITOT 2.5* 4.0* 4.8*   ALKPHOS 67 80 85       PT/INR:   Recent Labs     07/02/21  0455 07/03/21  0446 07/04/21  0413   PROTIME 22.1* 24.5* 31.3*   INR 2.0 2.3 2.9       APTT:   Recent Labs     07/02/21  0455 07/03/21 0446 07/04/21 0413   APTT 27.0 26.1 27.7       Fasting Lipid Panel:    No results found for: CHOL, TRIG, HDL    Cardiac Enzymes:    Lab Results   Component Value Date    CKTOTAL 132 06/27/2021    CKTOTAL 57 06/26/2021    CKMB 1.6 06/27/2021    TROPONINI 0.03 12/30/2020       Notable Cultures:      Blood cultures   Blood Culture, Routine   Date Value Ref Range Status   06/29/2021 24 Hours no growth  Preliminary     Respiratory cultures No results found for: RESPCULTURE No results found for: LABGRAM  Urine   Urine Culture, Routine   Date Value Ref Range Status   06/26/2021 Growth not present  Final     Legionella No results found for: LABLEGI  C Diff PCR No results found for: CDIFPCR  Wound culture/abscess: No results for input(s): WNDABS in the last 72 hours. Tip culture:No results for input(s): CXCATHTIP in the last 72 hours.       Oxygen:     Vent Information  $Ventilation: $Subsequent Day  Skin Assessment: Clean, dry, & intact  Suction Catheter Diameter:  (16)  Equipment ID: [id number]  Equipment Changed: Humidification  Vent Type: 980  Vent Mode: AC/VC+  Vt Ordered: 350 mL  Rate Set: 24 bmp  Peak Flow: 0 L/min  Pressure Support: 0 cmH20  FiO2 : 40 %  SpO2: 93 %  SpO2/FiO2 ratio: 232.5  Sensitivity: 2  PEEP/CPAP: 10  I Time/ I Time %: 0.7 s  Humidification Source: Heated wire  Humidification Temp: 37  Humidification Temp Measured: 36.4  Circuit Condensation: Drained  Mask Type: Full face mask  Mask Size: Medium  Additional Respiratory  Assessments  Pulse: (!) 49  Resp: 24  SpO2: 93 %  Position: Semi-Koch's  Humidification Source: Heated wire  Humidification Temp: 37  Circuit Condensation: Drained  Oral Care Completed?: Yes  Oral Care: Mouth suctioned, Mouth swabbed  Subglottic Suction Done?: Yes  Airway Type: ET  Airway Size: 8  Cuff Pressure (cm H2O): 29 cm H2O  Skin barrier applied: Yes       Urethral Catheter-Output (mL): 0 mL    Imaging Studies:  XR CHEST PORTABLE   Final Result   No interval change compared to immediate prior exam.  Consolidation in the   left lung is unchanged in appearance. Endotracheal tube is unchanged position. XR CHEST PORTABLE   Final Result   Stable left mid to lower lung airspace consolidation         XR CHEST PORTABLE   Final Result   Stable airspace disease on the left. XR CHEST PORTABLE   Final Result   ET tube tip 1.0 cm from the paradise. Stable left lung opacity. .         US DUP LOWER EXTREMITIES BILATERAL VENOUS   Final Result   Within the visualized vessels there is no evidence for deep venous   thrombosis               US DUP UPPER EXTREMITIES BILATERAL VENOUS   Final Result   Within the visualized vessels there is no evidence for deep venous   thrombosis               XR ABDOMEN (KUB) (SINGLE AP VIEW)   Final Result   No evidence of bowel obstruction or ileus. XR CHEST PORTABLE   Final Result   1. There is partial interval clearing of the left lung airspace disease with   partial Reaeration of the left lung apex when compared with the patient's   prior study. 2.  The right lung is clear. XR CHEST PORTABLE   Final Result   1. Persistent near complete opacification of the left hemithorax   2. The right lung is clear. 3. Stable position of the support lines and tubes. XR ABDOMEN FOR NG/OG/NE TUBE PLACEMENT   Final Result   Satisfactory position of nasogastric tube. XR CHEST PORTABLE   Final Result   1.  Persistent near complete whiteout of the left lung unchanged when compared   to the prior study. 2. The right lung is clear. 3.      XR CHEST PORTABLE   Final Result   1. Opacities throughout left lung. New opacities are present in previously   aerated left upper lung field. 2.  Endotracheal tube is 2.4 cm above the paradise. US RETROPERITONEAL COMPLETE   Final Result   Unremarkable ultrasound of the kidneys. The urinary bladder is decompressed around a Bennett catheter. XR CHEST PORTABLE   Final Result   1. Persistent the infiltrate with pleural effusion in the left lung cause   significant the opacification of the left image chest particular from the mid   to the base. 2.  No significant changes since the previous examinations recently performed. 3.  Please see report of CT chest of June 26. XR CHEST ABDOMEN NG PLACEMENT   Final Result   Satisfactory position of the NGT. XR CHEST PORTABLE   Final Result   Intervally placed NGT extends below the diaphragm into at least the mid   stomach, with tip not confidently seen within the field of view. Endotracheal tube tip is 1.7 cm from the paradise and could be backed out 5-10   mm for more optimal positioning. Otherwise, stable chest with unchanged left lung opacities. XR CHEST PORTABLE   Final Result   Stable abnormal chest with persistent infiltrates and pleural effusion in the   left lung base likely pneumonia. Surveillance recommended to see complete   clearing. There is minimal infiltrates developing in the right lung base. Endotracheal tube close to the paradise and could be retracted by 1 cm. XR CHEST PORTABLE   Final Result   Stable chest series. Persistent dense consolidation in the left mid and   lower lung. Stable atherosclerotic disease. No new cardiopulmonary   pathology. CT Head WO Contrast   Final Result   Stable atrophy with small vessel ischemic disease. There is no acute stroke   or hemorrhage.       Persistent multiple spine.      There is a fracture of the base of the dens of C2 (type 2). There is no   displacement. No other acute fractures are identified in the cervical spine. There is mild diffuse degenerative changes from C3-T1 with osteophytes and   multilevel disc bulges. There is osteopenia and punctate lytic lesions   concerning for multiple myeloma. Impression      Type 2 fracture of the dens of C2. Diffuse degenerative changes from C3-T1. Multiple myeloma. CT chest.      Comparison December 30, 2020. Findings      There is borderline cardiac size. The great vessels are normal.  Trachea and   major bronchi are patent. There is dense consolidation in the left upper   lobe and left lower lobe concerning for pneumonia. There is minimal   atelectasis in the right lung base. The liver is of normal architecture. There is diffuse demineralization of the thoracic spine with punctate   lucencies. Compression fractures with vertebroplasty are noted. There is   slight irregularity of the sternum probably artifact. There is kyphosis of   the spine. Impression      Dense consolidation in the left upper lobe and left lower lobe concerning for   pneumonia. Surveillance after appropriate treatment is recommended with   repeat CT scan in 6-8 weeks to see complete resolution and exclude underlying   malignancy. Demineralization and the punctate lytic lucencies in the spine and ribs   concerning for multiple myeloma. Critical results were called by Dr. Jocelyn Naqvi to Dr. Savanah Chandler. on   6/26/2021 at 16:30.         CT CHEST WO CONTRAST   Final Result   Stable atrophy with small vessel ischemic disease. There is no acute stroke   or hemorrhage. Persistent multiple punctate lytic lesions the calvarium likely due to   malignancy like multiple myeloma. CT cervical spine. There is a fracture of the base of the dens of C2 (type 2). There is no   displacement.   No other acute fractures are identified in the cervical spine. There is mild diffuse degenerative changes from C3-T1 with osteophytes and   multilevel disc bulges. There is osteopenia and punctate lytic lesions   concerning for multiple myeloma. Impression      Type 2 fracture of the dens of C2. Diffuse degenerative changes from C3-T1. Multiple myeloma. CT chest.      Comparison December 30, 2020. Findings      There is borderline cardiac size. The great vessels are normal.  Trachea and   major bronchi are patent. There is dense consolidation in the left upper   lobe and left lower lobe concerning for pneumonia. There is minimal   atelectasis in the right lung base. The liver is of normal architecture. There is diffuse demineralization of the thoracic spine with punctate   lucencies. Compression fractures with vertebroplasty are noted. There is   slight irregularity of the sternum probably artifact. There is kyphosis of   the spine. Impression      Dense consolidation in the left upper lobe and left lower lobe concerning for   pneumonia. Surveillance after appropriate treatment is recommended with   repeat CT scan in 6-8 weeks to see complete resolution and exclude underlying   malignancy. Demineralization and the punctate lytic lucencies in the spine and ribs   concerning for multiple myeloma. Critical results were called by Dr. Maciej Martinez to Dr. Mateus Harrell. on   6/26/2021 at 16:30. XR CHEST PORTABLE   Final Result   Consolidative airspace opacities in the left mid to lower lung zone with a   suspected small left pleural effusion. Findings may be on the basis of   pneumonia or less likely asymmetric pulmonary edema. Consider correlation   with CT of the chest.              Resident's Assessment and Plan     Assessment and Plan:    Neuro  1. Acute metabolic encephalopathy likely 2/2 septic shock  Cardiovascular  1.  Atrial Fibrillation 2/2 sepsis and hypoxia  a. CHADSVASC score > 4   b. F/u ECHO  2. Septic Shock 2/2 L. Sided Pneumonia  a. Wean pressors as tolerated, maintain a MAP > 65  b. BP Currently managed w/ Levophed and Midodrine 15mg TID  c. Maintain hydrocortisone to 50mg BID  d. Wean hydrocortisone once off pressors  Pulmonary  3. MRSA nares cultures (+),   a. currently being managed w/ Bactroban  b. And Peridex oral care  4. Acute Hypoxic Respiratory Failure 2/2 L. Sided Pneumonia  a. Wean FiO2 and PEEP as tolerated  b. Repeat CXR shows no significant change from previous CXR; stable  GI  5. Myiasis (presence of maggots) of the oral cavity  a. S/p bronch, sample was sent for analysis. Awaiting results  Renal/  6. AUDREY vs AUDREY on CKD  a. Baseline Cr: 1.1-1.2  b. BUN/Cr levels improving after dialysis  Heme/Onc  7. Thrombocytopenia 2/2 sepsis 2/2 L. Sided Pneumonia  a. DIC was found to be negative  b. Pt has no active bleeding  c. Fibrinogen results: > 700  d. Order CBC q8hr to monitor hemoglobin > 7 and platelet > 06,756  8. 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 HIT antibody results negative  b. Duplex US bilaterally upper and lower extremity negative for DVT  9. H/o Multiple Myeloma  a. Currently being managed w/ lenalidomide 10mg, currently held. Will continue as outpatient  Derm/MSK  10. Cervical Fracture  a. 2/2 traumatic fall and underlying lytic lesions of the spine  b. Pt currently has a neck brace      # Peptic ulcer prophylaxis: protonix  # DVT Prophylaxis: PCD  # Disposition: Cont current care / Transfer to telemetry / general floor    Juan Gregory MD, PGY-1    Attending Physician: Dr. Yobany Rutledge  Department of Pulmonary, Critical Care and [facility]  Department of Internal Medicine      During multidisciplinary team rounds Joi Card is a 78 y.o. female was seen, examined and discussed.  This is confirmation that I have personally seen and examined the

## 2021-07-04 NOTE — PROGRESS NOTES
Pharmacy Consultation Note  (Antibiotic Dosing and Monitoring)    Initial consult date: 2021  Consulting physician/provider: Dr. Elsi Palma  Drug(s): vancomycin  Indication: Staph species bacteremia; immunocompromised    Age/Gender IBW DW  Allergy Information   78 y.o. female; 162.6 cm, 68 kg 50.6 kg 57.6 kg  Patient has no known allergies. Date  WBC BUN/CR Drug/Dose Time   Given Level(s)   (Time) Comments    3.2 67/2.7 X X      2.7 78/2.9 Vancomycin 1250 mg x1 1247      6.3 91/3.1 Vancomycin 1000 mg IV x1 1143      7.1 98/3.5 x x Random 22.6 @0433     12.2 HD Vancomycin 1000 mg IVx1  12.2 HD Vancomycin 1000 mg IVx1  11.4 SLED X x     7/3 16.9 SLED Vancomycin 750 mg IV x 2019 Random @1022 = 21.5     15.3  Vancomycin 500 mg IV x1 <> Random 22.7 mcg/ml      Estimated Creatinine Clearance: 25 mL/min (A) (based on SCr of 1.8 mg/dL (H)). Intake/Output Summary (Last 24 hours) at 2021 1005  Last data filed at 2021 0900  Gross per 24 hour   Intake 2335.52 ml   Output 1835 ml   Net 500.52 ml       Temp max: Temp (24hrs), Av °F (37.8 °C), Min:99.1 °F (37.3 °C), Max:101.7 °F (38.7 °C)      Assessment:  · Consulted by Dr. Elsi Palma to dose/monitor vancomycin. · Goal trough level:  15-20 mCg/mL; AUC/ELEUTERIO = 400-600 mg/L-hr. · 79 yo/F admitted from SNF after fall and found down for unknown length of time. BC's from  +for [facility]; Blood PCR today revealed methicillin-resistant Staph species. · Febrile, tachycardic, and lymphopenic on admission. · PMH: MM on lenalinomide therapy. · Now receiving HD    Plan:  · Vancomycin 500 mg IV x1 tonight   · Pharmacist will follow and monitor/adjust dosing as necessary.       Kellie Rodriguez, PharmD 2021 10:07 AM  [phone]

## 2021-07-04 NOTE — PROGRESS NOTES
Senior Resident Statement  I have seen and examined the patient with the intern. I have discussed the case, including pertinent history and exam findings with the intern. I agree with the assessment, plan and orders as documented by the intern. I have also discussed the plan with the attending on call. Assessment:  1. Acute metabolic encephalopathy 2/2 septic shock  2. Septic shock-resolved, blood cultures with methicillin-resistant CO NS  3. A. fib 2/2 sepsis and hypoxia  4. Acute hypoxic respiratory failure 2/2 pneumonia  5. MRSA colonization  6. AUDREY on CKD on dialysis  7. Acute on chronic normocytic anemia  8. History of multiple myeloma 2/2 lenalidomide  9. Thrombocytopenia, DIC negative HIT negative ultrasound negative  10. Chronic LBBB  11. Cervical fracture 2/2 trauma fall and underlying spinal lytic lesions  12. History of DVT    Plan  On midodrine 15 TID daily  On hemodialysis/sled for 4 hrs per nephrology  On Vanco and Zosyn  ID following  Continue Bactroban for 5 days  Echo still pending  Monitor CBC every 8, hemoglobin greater than 7 and platelets greater than 10K  Follow BAL, so far negative  Talk to the son today, who is the POA, his brother will be coming in tomorrow  They might be thinking about, for comfort care for the patient  Still on 0.5, mcg of levo as pt drops her BP as soon as she is off levo    Remainder of medical problems as per intern note above. Liseth Colón, [address]  Department of Pulmonary, Critical Care and Sleep Medicine  [address] [facility]  Department of Internal Medicine      During multidisciplinary team rounds Dillon Laughlin is a 78 y.o. female was seen, examined and discussed. This is confirmation that I have personally seen and examined the patient and that the key elements of the encounter were performed by me (> 85 % time).   The medications & laboratory data was discussed and adjusted where necessary. The radiographic images were reviewed or with radiologist or consultant if felt dis-concordant with the exam or history. The above findings were corroborated, plans confirmed and changes made if needed. Family is updated at the bedside as available. Key issues of the case were discussed among consultants. Critical Care time is documented if appropriate.       Deya Marino,      Internal Medicine Resident, PGY 3    Attending physician: Dr. Sara Garrido

## 2021-07-04 NOTE — PROGRESS NOTES
Infectious Disease  Progress Note  [facility]    Chief Complaint:  On vent- fentanyl   Face to face encounter   Subjective: bacteremia  HAP   On vent- no response- sedated   Has been having fevers- tmax- 101.7   50% FiO2      Scheduled Meds:   vancomycin  500 mg Intravenous Once    midodrine  15 mg Oral TID WC    hydrocortisone sodium succinate PF  100 mg Intravenous Q8H    senna  1 tablet Oral Nightly    insulin lispro  0-12 Units Subcutaneous Q4H    artificial tears   Both Eyes BID    chlorhexidine  15 mL Mouth/Throat BID    vancomycin (VANCOCIN) intermittent dosing (placeholder)   Other RX Placeholder    pantoprazole  40 mg Intravenous Daily    And    sodium chloride (PF)  10 mL Intravenous Daily    piperacillin-tazobactam  3,375 mg Intravenous Q12H    And    sodium chloride   Intravenous Q12H    ipratropium-albuterol  1 ampule Inhalation Q4H WA    [Held by provider] amLODIPine  10 mg Oral Daily    [Held by provider] apixaban  2.5 mg Oral BID    [Held by provider] furosemide  20 mg Oral Daily    [Held by provider] gabapentin  100 mg Oral BID    [Held by provider] lenalidomide  10 mg Oral Daily    therapeutic multivitamin-minerals  1 tablet Oral Daily    sodium chloride flush  5-40 mL Intravenous 2 times per day     Continuous Infusions:   norepinephrine Stopped (07/04/21 0945)    fentaNYL 5 mcg/ml in 0.9%  ml infusion 150 mcg/hr (07/04/21 1156)    dextrose      [Held by provider] heparin (PORCINE) Infusion Stopped (06/29/21 1000)    sodium chloride       PRN Meds:anticoagulant sodium citrate, perflutren lipid microspheres, glucose, dextrose, glucagon (rDNA), dextrose, [Held by provider] heparin (porcine), [Held by provider] heparin (porcine), midazolam, sodium chloride flush, sodium chloride, polyethylene glycol, acetaminophen **OR** acetaminophen    Patient Vitals for the past 24 hrs:   BP Temp Temp src Pulse Resp SpO2 Height   07/04/21 1300 (!) 137/50  Axillary 51 24 93 %  07/04/21 1200 (!) 142/52 100 °F (37.8 °C) Bladder 56 24 94 %    07/04/21 1100 (!) 120/44   (!) 49 24 93 %    07/04/21 1000 (!) 123/47   51 24 93 %    07/04/21 0900 (!) 116/46   53 24 93 %    07/04/21 0800 (!) 128/52 100.2 °F (37.9 °C) Bladder 57 24 93 %    07/04/21 0700 (!) 113/48   55 24 94 %    07/04/21 0600 (!) 109/48   60 24 92 %    07/04/21 0506    72 25 93 %    07/04/21 0504     25 92 %    07/04/21 0500 (!) 120/52   66 24 92 %    07/04/21 0400 (!) 123/53 99.3 °F (37.4 °C) Bladder 71 26 93 %    07/04/21 0300 (!) 109/50   66 25 92 %    07/04/21 0200 (!) 147/58   74 27 92 %    07/04/21 0100 (!) 117/47   68 24 92 %    07/04/21 0057     24 92 %    07/04/21 0053    59 25 92 % 5' 4\" (1.626 m)   07/04/21 0000 (!) 126/47 99.5 °F (37.5 °C) Bladder 59 24 92 %    07/03/21 2300 (!) 119/46   67 24 91 %    07/03/21 2200 130/79   74 26 92 %    07/03/21 2100 (!) 125/51 99.3 °F (37.4 °C) Bladder 81 27 91 %    07/03/21 2045    72 24 93 %    07/03/21 2043     17 99 %    07/03/21 2000 (!) 139/55 99.5 °F (37.5 °C) Bladder 79 (!) 31 92 %    07/03/21 1900 (!) 122/51   78 25 (!) 89 %    07/03/21 1800 (!) 128/51 99.1 °F (37.3 °C) Bladder 75 24 90 %    07/03/21 1700 (!) 143/55   74 15 93 %    07/03/21 1656    70 25 93 %    07/03/21 1647 (!) 129/52 99.1 °F (37.3 °C)  71 13 92 %    07/03/21 1630    73      07/03/21 1615    71      07/03/21 1600 (!) 105/49 99.9 °F (37.7 °C)  71 21 95 %    07/03/21 1545    73      07/03/21 1530    72      07/03/21 1515    74      07/03/21 1500 (!) 119/54   69 (!) 31 92 %    07/03/21 1445    69      07/03/21 1430    70      07/03/21 1415    71          CBC with Differential:    Lab Results   Component Value Date    WBC 14.9 07/04/2021    RBC 2.74 07/04/2021    HGB 8.5 07/04/2021    HCT 24.4 07/04/2021    PLT 31 07/04/2021    MCV 89.1 07/04/2021    MCH 31.0 07/04/2021    MCHC 34.8 07/04/2021    RDW 16.4 07/04/2021    NRBC 1.7 07/04/2021    SEGSPCT 45.6 05/27/2021    BANDSPCT 12.0 06/24/2021    METASPCT 3.5 06/29/2021    LYMPHOPCT 0.5 07/04/2021    MONOPCT 0.9 07/04/2021    MYELOPCT 0.9 06/29/2021    BASOPCT 0.1 07/04/2021    MONOSABS 0.15 07/04/2021    LYMPHSABS 0.00 07/04/2021    EOSABS 0.00 07/04/2021    BASOSABS 0.00 07/04/2021     CMP:    Lab Results   Component Value Date     07/04/2021    K 4.6 07/04/2021    K 4.2 06/27/2021    CL 96 07/04/2021    CO2 22 07/04/2021    BUN 57 07/04/2021    CREATININE 1.8 07/04/2021    GFRAA 33 07/04/2021    AGRATIO 0.9 06/24/2021    AGRATIO 1.1 06/24/2021    LABGLOM 27 07/04/2021    GLUCOSE 229 07/04/2021    PROT 4.6 07/04/2021    LABALBU 2.3 07/04/2021    CALCIUM 8.2 07/04/2021    BILITOT 4.8 07/04/2021    ALKPHOS 85 07/04/2021    AST 46 07/04/2021    ALT 89 07/04/2021       BP (!) 137/50   Pulse 51   Temp 100 °F (37.8 °C) (Bladder)   Resp 24   Ht 5' 4\" (1.626 m)   Wt 174 lb 13.2 oz (79.3 kg)   SpO2 93%   BMI 30.01 kg/m²     Physical Exam  Const/Neuro- on vent- on fentanyl gtt- and levophed   ENMT- NG, ETT in place   Neck: Neck supple  Heart- Regular, Rate, Rhythm-  Lungs- diminished. On vent   Abdomen- Soft, bowel sounds hypoactive- softly distended. Musculo/Extremities-  Equal and symmetrical, no edema. No tenderness. Skin:  Warm and dry, free from rashes.     Bennett- with minimal output  Right Groin TLC-6/27/2021-  Right wrist A-Line-6/28/2021   PIV  Left femoral HD cath    Cultures   6/27/2021-blood cx- no growth to date   6/28/2021-bronch cultures- no growth   6/26/2021- blood cx- staph epi and staph warneri     Radiology reviewed    Assessment  · Trauma-fall  · resp failure- on vent  · Pneumonia-HAP  · S/p Bronch   · Fevers    · Leukocytosis - secondary to above- also on solu-cortef   · Acute myocardial injury in the setting of septic shock   · Multiple myeloma    Plan  Continue Zosyn   Continue vancomycin   Pharmacy dosing  Check fungitell - check serum osmo   Repeat blood cultures today   May need to change lines   Echo done- ?  Results    Monitor labs   Family to meet today to discuss code status- currently [facility]     Electronically signed by Abhijit Walker MD on 7/4/2021 at 2:05 PM

## 2021-07-04 NOTE — PLAN OF CARE
Called patient's son and Amelie Herrera [phone] to discuss patient and provide update. Rashawn Ramos states that he and his brother had discussed the patient's code status and wish to make her [facility], however his brother is from out of town and wishes to have visitation with the patient prior to comfort measures being enacted. Rashawn Ramos stated that his brother may not be able to get to [place] until Tuesday due to flight cancellations. In the interim, the patient is to remain DNR-CCA.        Mayte Fernandes MD  7/4/2021  3:09 PM

## 2021-07-04 NOTE — PROGRESS NOTES
Ventilator alarming disconnect, called by RN, SPO2 dropping. This RT could not get the suction to pass through the ETT. Patient taken off the ventilator and manually bagged with a PEEP valve attached. Secretions starting to come up the ETT. Patient suctioned for moderate amount of secretions/ mucus plugs. Patient placed back on ventilator.

## 2021-07-04 NOTE — PROGRESS NOTES
K is 5.9 today. Nephrology on board     #Thrombocytopenia  -Concerning for HIT. -hematology reviewed patient and think this is due to chemotherapy, sepsis and antibiotics, and to continue monitoring     #Anemia  -Hb is 8.7 today. Transfuse if Hb <7     #Cervical spine fracture  -CT showed C2 fracture  -has neck collar in place. #Elevated liver enzymes  -Due to shock liver. Bilirubin is up to 4.8  today. We will continue to trend. DVT prophylaxis: SCDs     Prognosis:   -Very poor. Family considering withdrawing care later today after her son flies in from [place] to say his goodbye to her.     DISPOSITION: to be determined       Medications:  REVIEWED DAILY    Infusion Medications    norepinephrine 0.5 mcg/min (07/03/21 1401)    fentaNYL 5 mcg/ml in 0.9%  ml infusion 175 mcg/hr (07/04/21 0152)    dextrose      [Held by provider] heparin (PORCINE) Infusion Stopped (06/29/21 1000)    sodium chloride       Scheduled Medications    midodrine  15 mg Oral TID WC    hydrocortisone sodium succinate PF  100 mg Intravenous Q8H    senna  1 tablet Oral Nightly    insulin lispro  0-12 Units Subcutaneous Q4H    artificial tears   Both Eyes BID    chlorhexidine  15 mL Mouth/Throat BID    vancomycin (VANCOCIN) intermittent dosing (placeholder)   Other RX Placeholder    pantoprazole  40 mg Intravenous Daily    And    sodium chloride (PF)  10 mL Intravenous Daily    piperacillin-tazobactam  3,375 mg Intravenous Q12H    And    sodium chloride   Intravenous Q12H    ipratropium-albuterol  1 ampule Inhalation Q4H WA    [Held by provider] amLODIPine  10 mg Oral Daily    [Held by provider] apixaban  2.5 mg Oral BID    [Held by provider] furosemide  20 mg Oral Daily    [Held by provider] gabapentin  100 mg Oral BID    [Held by provider] lenalidomide  10 mg Oral Daily    therapeutic multivitamin-minerals  1 tablet Oral Daily    sodium chloride flush  5-40 mL Intravenous 2 times per day     PRN Meds: anticoagulant sodium citrate, perflutren lipid microspheres, glucose, dextrose, glucagon (rDNA), dextrose, [Held by provider] heparin (porcine), [Held by provider] heparin (porcine), midazolam, sodium chloride flush, sodium chloride, polyethylene glycol, acetaminophen **OR** acetaminophen    Labs:     Recent Labs     07/03/21  1022 07/03/21  1840 07/04/21 0140   WBC 16.9* 16.8* 15.3*   HGB 8.7* 9.2* 8.7*   HCT 25.4* 26.8* 25.7*   PLT 27* 25* 30*       Recent Labs     07/02/21  0455 07/02/21  0508 07/03/21  0446 07/03/21  1022 07/03/21 2028 07/04/21  0140 07/04/21  0413      < > 133   < > 134 134 136   K 5.0   < > 5.7*   < > 3.9 4.4 4.6   CL 98   < > 95*   < > 94* 97* 96*   CO2 23   < > 23   < > 24 23 22   BUN 86*   < > 76*   < > 40* 52* 57*   CREATININE 2.6*   < > 2.3*   < > 1.4* 1.7* 1.8*   CALCIUM 8.3*   < > 8.3*   < > 8.1* 8.1* 8.2*   PHOS 7.0*  --  6.5*  --   --   --  6.1*    < > = values in this interval not displayed. Recent Labs     07/02/21 0455 07/03/21 0446 07/04/21 0413   PROT 5.1* 5.3* 4.6*   ALKPHOS 67 80 85   * 121* 89*   AST 32* 31 46*   BILITOT 2.5* 4.0* 4.8*       Recent Labs     07/02/21 0455 07/03/21 0446 07/04/21 0413   INR 2.0 2.3 2.9       No results for input(s): Faizan Seed in the last 72 hours. Chronic labs:    Lab Results   Component Value Date    TSH 0.127 (L) 06/30/2021    INR 2.9 07/04/2021       Radiology: REVIEWED DAILY    +++++++++++++++++++++++++++++++++++++++++++++++++  Claudean Dicker, MD  [facility] [place] Physician - [address] [place] [address]  +++++++++++++++++++++++++++++++++++++++++++++++++  NOTE: This report was transcribed using voice recognition software. Every effort was made to ensure accuracy; however, inadvertent computerized transcription errors may be present.

## 2021-07-05 NOTE — PROGRESS NOTES
Infectious Disease  Progress Note  [facility]    Chief Complaint:  On vent- fentanyl   Face to face encounter   Subjective: bacteremia  HAP   On vent- no response- sedated   Has been having fevers- tmax- 101.7   50% FiO2      Scheduled Meds:   midodrine  15 mg Oral TID WC    hydrocortisone sodium succinate PF  100 mg Intravenous Q8H    senna  1 tablet Oral Nightly    insulin lispro  0-12 Units Subcutaneous Q4H    artificial tears   Both Eyes BID    chlorhexidine  15 mL Mouth/Throat BID    vancomycin (VANCOCIN) intermittent dosing (placeholder)   Other RX Placeholder    pantoprazole  40 mg Intravenous Daily    And    sodium chloride (PF)  10 mL Intravenous Daily    piperacillin-tazobactam  3,375 mg Intravenous Q12H    And    sodium chloride   Intravenous Q12H    ipratropium-albuterol  1 ampule Inhalation Q4H WA    [Held by provider] amLODIPine  10 mg Oral Daily    [Held by provider] apixaban  2.5 mg Oral BID    [Held by provider] furosemide  20 mg Oral Daily    [Held by provider] gabapentin  100 mg Oral BID    [Held by provider] lenalidomide  10 mg Oral Daily    therapeutic multivitamin-minerals  1 tablet Oral Daily    sodium chloride flush  5-40 mL Intravenous 2 times per day     Continuous Infusions:   norepinephrine 2 mcg/min (07/05/21 0736)    fentaNYL 5 mcg/ml in 0.9%  ml infusion 125 mcg/hr (07/05/21 0854)    dextrose      [Held by provider] heparin (PORCINE) Infusion Stopped (06/29/21 1000)    sodium chloride       PRN Meds:anticoagulant sodium citrate, perflutren lipid microspheres, glucose, dextrose, glucagon (rDNA), dextrose, [Held by provider] heparin (porcine), [Held by provider] heparin (porcine), midazolam, sodium chloride flush, sodium chloride, polyethylene glycol, acetaminophen **OR** acetaminophen    Patient Vitals for the past 24 hrs:   BP Temp Temp src Pulse Resp SpO2   07/05/21 1118 (!) 141/62 98.1 °F (36.7 °C)  53 25 96 %   07/05/21 1045    52     07/05/21 1037  METASPCT 3.5 06/29/2021    LYMPHOPCT 1.7 07/05/2021    MONOPCT 0.9 07/05/2021    MYELOPCT 0.9 06/29/2021    BASOPCT 0.1 07/05/2021    MONOSABS 0.14 07/05/2021    LYMPHSABS 0.27 07/05/2021    EOSABS 0.00 07/05/2021    BASOSABS 0.00 07/05/2021     CMP:    Lab Results   Component Value Date     07/05/2021    K 5.9 07/05/2021    K 4.2 06/27/2021    CL 94 07/05/2021    CO2 21 07/05/2021     07/05/2021    CREATININE 2.9 07/05/2021    GFRAA 19 07/05/2021    AGRATIO 0.9 06/24/2021    AGRATIO 1.1 06/24/2021    LABGLOM 15 07/05/2021    GLUCOSE 180 07/05/2021    PROT 4.3 07/05/2021    LABALBU 2.1 07/05/2021    CALCIUM 8.2 07/05/2021    BILITOT 4.8 07/05/2021    ALKPHOS 92 07/05/2021    AST 51 07/05/2021    ALT 80 07/05/2021       BP (!) 141/62   Pulse 53   Temp 98.1 °F (36.7 °C)   Resp 25   Ht 5' 4\" (1.626 m)   Wt 174 lb 13.2 oz (79.3 kg)   SpO2 96%   BMI 30.01 kg/m²     Physical Exam  Const/Neuro- on vent- on fentanyl gtt- and levophed   ENMT- NG, ETT in place   Neck: Neck supple  Heart- Regular, Rate, Rhythm-  Lungs- diminished. On vent   Abdomen- Soft, bowel sounds hypoactive- softly distended. Musculo/Extremities-  Equal and symmetrical, no edema. No tenderness. Skin:  Warm and dry, free from rashes. Bennett- with minimal output  Right Groin TLC-6/27/2021-  Right wrist A-Line-6/28/2021   PIV  Left femoral HD cath    Cultures   6/27/2021-blood cx- no growth to date   6/28/2021-bronch cultures- no growth   6/26/2021- blood cx- staph epi and staph warkorini     Radiology reviewed    Assessment  · Trauma-fall  · resp failure- on vent  · Pneumonia-HAP  · S/p Bronch   · Fevers    · Leukocytosis - secondary to above- also on solu-cortef   · Acute myocardial injury in the setting of septic shock   · Multiple myeloma    Plan  Continue Zosyn   Continue vancomycin   Pharmacy dosing  Check fungitell - check serum osmo   Repeat blood cultures today   May need to change lines   Echo done- ?  Results  Still need results so we can proceed with [id number]   Monitor labs   Family to meet today to discuss code status- currently DNR-CCA   Talked to the family    Electronically signed by Kash Harrison MD on 7/5/2021 at 11:30 AM

## 2021-07-05 NOTE — PLAN OF CARE
Problem: Falls - Risk of:  Goal: Absence of physical injury  Description: Absence of physical injury  7/4/2021 2133 by Shaquille Abdi RN  Outcome: Met This Shift     Problem: Skin Integrity:  Goal: Absence of new skin breakdown  Description: Absence of new skin breakdown  7/4/2021 2133 by Shaquille Abdi RN  Outcome: Met This Shift

## 2021-07-05 NOTE — PROGRESS NOTES
[address] [facility]   Department of Internal Medicine   Internal Medicine Residency  MICU Progress Note    Patient:  Rickey Session 78 y.o. female   MRN: [id number]       Date of Service: 2021    Allergy: Patient has no known allergies. Subjective     Patient was seen and examined this morning at bedside in no acute distress. Overnight, no acute events. Today, pt undergoing dialysis today. Pt's son (POA) and his brother arriving today to discuss further management options. Objective     TEMPERATURE:  Current - Temp: 98.8 °F (37.1 °C); Max - Temp  Av.3 °F (36.8 °C)  Min: 97.3 °F (36.3 °C)  Max: 99.5 °F (37.5 °C)  RESPIRATIONS RANGE: Resp  Av.6  Min: 22  Max: 29  PULSE RANGE: Pulse  Av  Min: 38  Max: 66  BLOOD PRESSURE RANGE:  Systolic (91PKI), HX , Min:99 , WLX:767   ; Diastolic (28YET), SAY:53, Min:44, Max:74    ULSE OXIMETRY RANGE: SpO2  Av.6 %  Min: 91 %  Max: 97 %    I & O - 24hr:    Intake/Output Summary (Last 24 hours) at 2021 1336  Last data filed at 2021 1246  Gross per 24 hour   Intake 1853 ml   Output 2745 ml   Net -892 ml     I/O last 3 completed shifts: In: 0054 [I.V.:1253; NG/GT:240]  Out: 40 [Urine:40] I/O this shift: In: 450 [NG/GT:150]  Out: 0 [Urine:10]   Weight change:     Physical Exam:    General Appearance:   Sedated and intubated   HEENT:    NC/AT, PERRL, no pallor no icterus, moist mucous membrane   Neck:   Supple, Normal thyroid; no carotid bruit or JVD   Resp:    Decreased breath sounds   Heart:    RRR, S1 & S2 normal, no murmur, rub or gallop.    Abdomen:     Soft,, BS +, no organomegaly   Extremities:   Normal, atraumatic, no cyanosis or edema   Pulses:   2+ and symmetric all extremities   Skin:   Skin color, texture, turgor normal, no rashes or lesions   Neurologic:  Sedated and intubated       Medications     Continuous Infusions:   norepinephrine 1 mcg/min (21 1213)    fentaNYL 5 mcg/ml in 0.9%  ml infusion 150 mcg/hr (07/05/21 1331)    dextrose      [Held by provider] heparin (PORCINE) Infusion Stopped (06/29/21 1000)    sodium chloride       Scheduled Meds:   midodrine  15 mg Oral TID WC    hydrocortisone sodium succinate PF  100 mg Intravenous Q8H    senna  1 tablet Oral Nightly    insulin lispro  0-12 Units Subcutaneous Q4H    artificial tears   Both Eyes BID    chlorhexidine  15 mL Mouth/Throat BID    vancomycin (VANCOCIN) intermittent dosing (placeholder)   Other RX Placeholder    pantoprazole  40 mg Intravenous Daily    And    sodium chloride (PF)  10 mL Intravenous Daily    piperacillin-tazobactam  3,375 mg Intravenous Q12H    And    sodium chloride   Intravenous Q12H    ipratropium-albuterol  1 ampule Inhalation Q4H WA    [Held by provider] amLODIPine  10 mg Oral Daily    [Held by provider] apixaban  2.5 mg Oral BID    [Held by provider] furosemide  20 mg Oral Daily    [Held by provider] gabapentin  100 mg Oral BID    [Held by provider] lenalidomide  10 mg Oral Daily    therapeutic multivitamin-minerals  1 tablet Oral Daily    sodium chloride flush  5-40 mL Intravenous 2 times per day     PRN Meds: anticoagulant sodium citrate, perflutren lipid microspheres, glucose, dextrose, glucagon (rDNA), dextrose, [Held by provider] heparin (porcine), [Held by provider] heparin (porcine), midazolam, sodium chloride flush, sodium chloride, polyethylene glycol, acetaminophen **OR** acetaminophen  Nutrition:   NPO    Labs and Imaging Studies     CBC:   Recent Labs     07/04/21  1016 07/04/21  1759 07/05/21 0425   WBC 14.9* 15.3* 13.7*   HGB 8.5* 8.4* 8.3*   HCT 24.4* 24.3* 23.8*   MCV 89.1 88.4 87.8   PLT 31* 34* 35*       BMP:    Recent Labs     07/04/21  0413 07/05/21  0425 07/05/21  1224    134 133   K 4.6 5.9* 4.6   CL 96* 94* 93*   CO2 22 21* 21*   BUN 57* 101* 59*   CREATININE 1.8* 2.9* 1.9*   GLUCOSE 229* 180* 166*       LIVER PROFILE:   Recent Labs     07/03/21  0446 07/04/21  0413 07/05/21 0425 AST 31 46* 51*   * 89* 80*   BILIDIR 3.3* 3.9* 4.1*   BILITOT 4.0* 4.8* 4.8*   ALKPHOS 80 85 92       PT/INR:   Recent Labs     07/03/21  0446 07/04/21 0413 07/05/21  0425   PROTIME 24.5* 31.3* 35.9*   INR 2.3 2.9 3.2       APTT:   Recent Labs     07/03/21  0446 07/04/21  0413 07/05/21  0425   APTT 26.1 27.7 25.3       Fasting Lipid Panel:    No results found for: CHOL, TRIG, HDL    Cardiac Enzymes:    Lab Results   Component Value Date    CKTOTAL 132 06/27/2021    CKTOTAL 57 06/26/2021    CKMB 1.6 06/27/2021    TROPONINI 0.03 12/30/2020       Notable Cultures:      Blood cultures   Blood Culture, Routine   Date Value Ref Range Status   07/03/2021 24 Hours no growth  Preliminary     Respiratory cultures No results found for: RESPCULTURE No results found for: LABGRAM  Urine   Urine Culture, Routine   Date Value Ref Range Status   06/26/2021 Growth not present  Final     Legionella No results found for: LABLEGI  C Diff PCR No results found for: CDIFPCR  Wound culture/abscess: No results for input(s): WNDABS in the last 72 hours. Tip culture:No results for input(s): CXCATHTIP in the last 72 hours.       Oxygen:     Vent Information  $Ventilation: $Subsequent Day  Skin Assessment: Clean, dry, & intact  Suction Catheter Diameter:  (16)  Equipment ID: [id number]  Equipment Changed: Humidification  Vent Type: [id number]  Vent Mode: AC/VC+  Vt Ordered: 350 mL  Rate Set: 24 bmp  Peak Flow: 0 L/min  Pressure Support: 0 cmH20  FiO2 : 40 %  SpO2: 91 %  SpO2/FiO2 ratio: 227.5  Sensitivity: 2  PEEP/CPAP: 8  I Time/ I Time %: 0.7 s  Humidification Source: Heated wire  Humidification Temp: 37  Humidification Temp Measured: 37  Circuit Condensation: Drained  Mask Type: Full face mask  Mask Size: Medium  Additional Respiratory  Assessments  Pulse: 66  Resp: 29  SpO2: 91 %  Position: Semi-Koch's  Humidification Source: Heated wire  Humidification Temp: 37  Circuit Condensation: Drained  Oral Care Completed?: Yes  Oral Care: Mouth suctioned, Mouth swabbed  Subglottic Suction Done?: Yes  Airway Type: ET  Airway Size: 8  Cuff Pressure (cm H2O):  (MLT)  Skin barrier applied: Yes       Urethral Catheter-Output (mL): 5 mL    Imaging Studies:  XR CHEST PORTABLE   Final Result   No interval change compared to immediate prior exam.  Consolidation in the   left lung is unchanged in appearance. Endotracheal tube is unchanged position. XR CHEST PORTABLE   Final Result   Stable left mid to lower lung airspace consolidation         XR CHEST PORTABLE   Final Result   Stable airspace disease on the left. XR CHEST PORTABLE   Final Result   ET tube tip 1.0 cm from the paradise. Stable left lung opacity. .         US DUP LOWER EXTREMITIES BILATERAL VENOUS   Final Result   Within the visualized vessels there is no evidence for deep venous   thrombosis               US DUP UPPER EXTREMITIES BILATERAL VENOUS   Final Result   Within the visualized vessels there is no evidence for deep venous   thrombosis               XR ABDOMEN (KUB) (SINGLE AP VIEW)   Final Result   No evidence of bowel obstruction or ileus. XR CHEST PORTABLE   Final Result   1. There is partial interval clearing of the left lung airspace disease with   partial Reaeration of the left lung apex when compared with the patient's   prior study. 2.  The right lung is clear. XR CHEST PORTABLE   Final Result   1. Persistent near complete opacification of the left hemithorax   2. The right lung is clear. 3. Stable position of the support lines and tubes. XR ABDOMEN FOR NG/OG/NE TUBE PLACEMENT   Final Result   Satisfactory position of nasogastric tube. XR CHEST PORTABLE   Final Result   1. Persistent near complete whiteout of the left lung unchanged when compared   to the prior study. 2. The right lung is clear. 3.      XR CHEST PORTABLE   Final Result   1. Opacities throughout left lung.   New opacities are present in previously aerated left upper lung field. 2.  Endotracheal tube is 2.4 cm above the paradise. US RETROPERITONEAL COMPLETE   Final Result   Unremarkable ultrasound of the kidneys. The urinary bladder is decompressed around a Bennett catheter. XR CHEST PORTABLE   Final Result   1. Persistent the infiltrate with pleural effusion in the left lung cause   significant the opacification of the left image chest particular from the mid   to the base. 2.  No significant changes since the previous examinations recently performed. 3.  Please see report of CT chest of June 26. XR CHEST ABDOMEN NG PLACEMENT   Final Result   Satisfactory position of the NGT. XR CHEST PORTABLE   Final Result   Intervally placed NGT extends below the diaphragm into at least the mid   stomach, with tip not confidently seen within the field of view. Endotracheal tube tip is 1.7 cm from the paradise and could be backed out 5-10   mm for more optimal positioning. Otherwise, stable chest with unchanged left lung opacities. XR CHEST PORTABLE   Final Result   Stable abnormal chest with persistent infiltrates and pleural effusion in the   left lung base likely pneumonia. Surveillance recommended to see complete   clearing. There is minimal infiltrates developing in the right lung base. Endotracheal tube close to the paradise and could be retracted by 1 cm. XR CHEST PORTABLE   Final Result   Stable chest series. Persistent dense consolidation in the left mid and   lower lung. Stable atherosclerotic disease. No new cardiopulmonary   pathology. CT Head WO Contrast   Final Result   Stable atrophy with small vessel ischemic disease. There is no acute stroke   or hemorrhage. Persistent multiple punctate lytic lesions the calvarium likely due to   malignancy like multiple myeloma. CT cervical spine. There is a fracture of the base of the dens of C2 (type 2).   There is no   displacement. No other acute fractures are identified in the cervical spine. There is mild diffuse degenerative changes from C3-T1 with osteophytes and   multilevel disc bulges. There is osteopenia and punctate lytic lesions   concerning for multiple myeloma. Impression      Type 2 fracture of the dens of C2. Diffuse degenerative changes from C3-T1. Multiple myeloma. CT chest.      Comparison December 30, 2020. Findings      There is borderline cardiac size. The great vessels are normal.  Trachea and   major bronchi are patent. There is dense consolidation in the left upper   lobe and left lower lobe concerning for pneumonia. There is minimal   atelectasis in the right lung base. The liver is of normal architecture. There is diffuse demineralization of the thoracic spine with punctate   lucencies. Compression fractures with vertebroplasty are noted. There is   slight irregularity of the sternum probably artifact. There is kyphosis of   the spine. Impression      Dense consolidation in the left upper lobe and left lower lobe concerning for   pneumonia. Surveillance after appropriate treatment is recommended with   repeat CT scan in 6-8 weeks to see complete resolution and exclude underlying   malignancy. Demineralization and the punctate lytic lucencies in the spine and ribs   concerning for multiple myeloma. Critical results were called by Dr. Balwinder Hastings to Dr. Zeina Cornejo. on   6/26/2021 at 16:30.         CT Cervical Spine WO Contrast   Final Result   Stable atrophy with small vessel ischemic disease. There is no acute stroke   or hemorrhage. Persistent multiple punctate lytic lesions the calvarium likely due to   malignancy like multiple myeloma. CT cervical spine. There is a fracture of the base of the dens of C2 (type 2). There is no   displacement. No other acute fractures are identified in the cervical spine.    There is mild diffuse degenerative changes from C3-T1 with osteophytes and   multilevel disc bulges. There is osteopenia and punctate lytic lesions   concerning for multiple myeloma. Impression      Type 2 fracture of the dens of C2. Diffuse degenerative changes from C3-T1. Multiple myeloma. CT chest.      Comparison December 30, 2020. Findings      There is borderline cardiac size. The great vessels are normal.  Trachea and   major bronchi are patent. There is dense consolidation in the left upper   lobe and left lower lobe concerning for pneumonia. There is minimal   atelectasis in the right lung base. The liver is of normal architecture. There is diffuse demineralization of the thoracic spine with punctate   lucencies. Compression fractures with vertebroplasty are noted. There is   slight irregularity of the sternum probably artifact. There is kyphosis of   the spine. Impression      Dense consolidation in the left upper lobe and left lower lobe concerning for   pneumonia. Surveillance after appropriate treatment is recommended with   repeat CT scan in 6-8 weeks to see complete resolution and exclude underlying   malignancy. Demineralization and the punctate lytic lucencies in the spine and ribs   concerning for multiple myeloma. Critical results were called by Dr. Leonor Dawn to Dr. Cara Grey. on   6/26/2021 at 16:30.         CT CHEST WO CONTRAST   Final Result   Stable atrophy with small vessel ischemic disease. There is no acute stroke   or hemorrhage. Persistent multiple punctate lytic lesions the calvarium likely due to   malignancy like multiple myeloma. CT cervical spine. There is a fracture of the base of the dens of C2 (type 2). There is no   displacement. No other acute fractures are identified in the cervical spine. There is mild diffuse degenerative changes from C3-T1 with osteophytes and   multilevel disc bulges.   There is osteopenia and punctate lytic lesions   concerning for multiple myeloma. Impression      Type 2 fracture of the dens of C2. Diffuse degenerative changes from C3-T1. Multiple myeloma. CT chest.      Comparison December 30, 2020. Findings      There is borderline cardiac size. The great vessels are normal.  Trachea and   major bronchi are patent. There is dense consolidation in the left upper   lobe and left lower lobe concerning for pneumonia. There is minimal   atelectasis in the right lung base. The liver is of normal architecture. There is diffuse demineralization of the thoracic spine with punctate   lucencies. Compression fractures with vertebroplasty are noted. There is   slight irregularity of the sternum probably artifact. There is kyphosis of   the spine. Impression      Dense consolidation in the left upper lobe and left lower lobe concerning for   pneumonia. Surveillance after appropriate treatment is recommended with   repeat CT scan in 6-8 weeks to see complete resolution and exclude underlying   malignancy. Demineralization and the punctate lytic lucencies in the spine and ribs   concerning for multiple myeloma. Critical results were called by Dr. Janneth Concepcion to Dr. Fifi Benoit. on   6/26/2021 at 16:30. XR CHEST PORTABLE   Final Result   Consolidative airspace opacities in the left mid to lower lung zone with a   suspected small left pleural effusion. Findings may be on the basis of   pneumonia or less likely asymmetric pulmonary edema. Consider correlation   with CT of the chest.              Resident's Assessment and Plan     Assessment and Plan:    Neuro  1. Acute metabolic encephalopathy likely 2/2 septic shock  Cardiovascular  1. Atrial Fibrillation 2/2 sepsis and hypoxia  a. CHADSVASC score > 4   b. Echo results showed no vegetations. 2. Septic Shock 2/2 L. Sided Pneumonia  a. Maintain a MAP > 65  b. BP Currently managed w/ Levophed and Midodrine 15mg TID  c.  Maintain hydrocortisone to 50mg BID  d. Legionella PCR test  Pulmonary  3. MRSA nares cultures (+),   a. Currently being managed w/ Bactroban  b. And Peridex oral care  4. Acute Hypoxic Respiratory Failure 2/2 L. Sided Pneumonia  a. Wean FiO2 and PEEP as tolerated  b. Repeat CXR shows no significant change from previous CXR; stable  GI  5. Myiasis (presence of maggots) of the oral cavity  a. S/p bronch, results were negative  Renal/  6. AUDREY vs AUDREY on CKD  a. Baseline Cr: 1.1-1.2  b. BUN/Cr levels improving after dialysis  Heme/Onc  7. Thrombocytopenia 2/2 sepsis 2/2 L. Sided Pneumonia  a. DIC was found to be negative  b. Pt has no active bleeding  c. Fibrinogen results: > 700  d. Order CBC q8hr to monitor hemoglobin > 7 and platelet > 40,173  8. 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 HIT antibody results negative  b. Duplex US bilaterally upper and lower extremity negative for DVT  9. H/o Multiple Myeloma  a. Currently being managed w/ lenalidomide 10mg, currently held. Will continue as outpatient  Derm/MSK  10.  Cervical Fracture  a. 2/2 traumatic fall and underlying lytic lesions of the spine  b. Pt currently has a neck brace      # Peptic ulcer prophylaxis: protonix  # DVT Prophylaxis: PCD  # Disposition: Cont current care / Transfer to telemetry / general floor    Irving Rojas MD, PGY-1

## 2021-07-05 NOTE — PROGRESS NOTES
Pharmacy Consultation Note  (Antibiotic Dosing and Monitoring)    Initial consult date: 2021  Consulting physician/provider: Dr. Maynor Gutierrez  Drug(s): vancomycin  Indication: Staph species bacteremia; immunocompromised    Age/Gender IBW DW  Allergy Information   78 y.o. female; 162.6 cm, 68 kg 50.6 kg 57.6 kg  Patient has no known allergies. Date  WBC BUN/CR Drug/Dose Time   Given Level(s)   (Time) Comments    3.2 67/2.7 X X      2.7 78/2.9 Vancomycin 1250 mg x1 1247      6.3 91/3.1 Vancomycin 1000 mg IV x1 1143      7.1 98/3.5 x x Random 22.6 @0433     12.2 HD Vancomycin 1000 mg IVx1  12.2 HD Vancomycin 1000 mg IVx1  11.4 SLED X x     7/3 16.9 SLED Vancomycin 750 mg IV x 2019 Random @1022 = 21.5     15.3 --- Vancomycin 500 mg IV x1  Random @ 0413 = 22.7 mcg/ml     13.7 HD x 240 minutes X x          Random      Estimated Creatinine Clearance: 23 mL/min (A) (based on SCr of 1.9 mg/dL (H)). Intake/Output Summary (Last 24 hours) at 2021 1354  Last data filed at 2021 1246  Gross per 24 hour   Intake 1853 ml   Output 2745 ml   Net -892 ml       Temp max: Temp (24hrs), Av.3 °F (36.8 °C), Min:97.3 °F (36.3 °C), Max:99.5 °F (37.5 °C)      Assessment:  · Consulted by Dr. Maynor Gutierrez to dose/monitor vancomycin. · Goal trough level:  15-20 mCg/mL; AUC/ELEUTERIO = 400-600 mg/L-hr. · 79 yo/F admitted from [facility] after fall and found down for unknown length of time. BC's from  +for GPC; Blood PCR today revealed methicillin-resistant Staph species. · Febrile, tachycardic, and lymphopenic on admission. · PMH: MM on lenalinomide therapy. · Now receiving HD    Plan:  · Random level in AM   · Pharmacist will follow and monitor/adjust dosing as necessary.       Snow Singh, PharmD, BCPS 2021 1:56 PM

## 2021-07-05 NOTE — PLAN OF CARE
Problem: Falls - Risk of:  Goal: Will remain free from falls  Description: Will remain free from falls  Outcome: Met This Shift     Problem: Falls - Risk of:  Goal: Absence of physical injury  Description: Absence of physical injury  7/5/2021 1712 by Sharmaine Wolfe RN  Outcome: Met This Shift     Problem: Skin Integrity:  Goal: Will show no infection signs and symptoms  Description: Will show no infection signs and symptoms  Outcome: Met This Shift     Problem: Skin Integrity:  Goal: Absence of new skin breakdown  Description: Absence of new skin breakdown  7/5/2021 1712 by Sharmaine Wolfe RN  Outcome: Met This Shift     Problem: Injury - Risk of, Physical Injury:  Goal: Will remain free from falls  Description: Will remain free from falls  Outcome: Met This Shift     Problem: Injury - Risk of, Physical Injury:  Goal: Absence of physical injury  Description: Absence of physical injury  7/5/2021 1712 by Sharmaine Wolfe RN  Outcome: Met This Shift     Problem: Sleep Pattern Disturbance:  Goal: Appears well-rested  Description: Appears well-rested  Outcome: Met This Shift     Problem: Cardiac:  Goal: Hemodynamic stability will improve  Description: Hemodynamic stability will improve  Outcome: Met This Shift     Problem: Cardiac:  Goal: Complications related to the disease process, condition or treatment will be avoided or minimized  Description: Complications related to the disease process, condition or treatment will be avoided or minimized  Outcome: Met This Shift     Problem: Coping:  Goal: Level of anxiety will decrease  Description: Level of anxiety will decrease  Outcome: Met This Shift     Problem: Coping:  Goal: General experience of comfort will improve  Description: General experience of comfort will improve  Outcome: Met This Shift     Problem: Respiratory:  Goal: Complications related to the disease process, condition or treatment will be avoided or minimized  Description: Complications related to the disease process, condition or treatment will be avoided or minimized  Outcome: Met This Shift     Problem: Respiratory:  Goal: Ability to maintain adequate ventilation will improve  Description: Ability to maintain adequate ventilation will improve  7/5/2021 1712 by Kathy Granado RN  Outcome: Met This Shift     Problem: Safety:  Goal: Ability to remain free from injury will improve  Description: Ability to remain free from injury will improve  Outcome: Met This Shift     Problem: Safety:  Goal: Will show no signs and symptoms of excessive bleeding  Description: Will show no signs and symptoms of excessive bleeding  Outcome: Met This Shift     Problem: Sensory:  Goal: General experience of comfort will improve  Description: General experience of comfort will improve  Outcome: Met This Shift   Plan of care reviewed with patient and family, as available.

## 2021-07-05 NOTE — PROGRESS NOTES
Department of Internal Medicine  Nephrology Attending Progress Note    Events reviewed. Patient seen during dialysis. SUBJECTIVE:  We are following Mrs. Stewart for AUDREY. Patient remains intubated.     Physical Exam:    VITALS:  BP (!) 203/74   Pulse (!) 45   Temp 97.3 °F (36.3 °C) (Bladder)   Resp 22   Ht 5' 4\" (1.626 m)   Wt 174 lb 13.2 oz (79.3 kg)   SpO2 95%   BMI 30.01 kg/m²   24HR INTAKE/OUTPUT:      Intake/Output Summary (Last 24 hours) at 7/5/2021 9416  Last data filed at 7/5/2021 0750  Gross per 24 hour   Intake 1463 ml   Output 35 ml   Net 1428 ml     Access: Left femoral temporary dialysis catheter in place  Constitutional: intubate on mechanical ventilator  HEENT:  Cervical collar in place  Respiratory:  Intubated on vent  Cardiovascular/Edema:  Atrial fibrillation  Gastrointestinal:  Soft, non-tender, +BS  Neurologic:  Intubated and sedated  Skin:  Warm, dry, no rash or lesion  Other:   + edema    Scheduled Meds:   midodrine  15 mg Oral TID WC    hydrocortisone sodium succinate PF  100 mg Intravenous Q8H    senna  1 tablet Oral Nightly    insulin lispro  0-12 Units Subcutaneous Q4H    artificial tears   Both Eyes BID    chlorhexidine  15 mL Mouth/Throat BID    vancomycin (VANCOCIN) intermittent dosing (placeholder)   Other RX Placeholder    pantoprazole  40 mg Intravenous Daily    And    sodium chloride (PF)  10 mL Intravenous Daily    piperacillin-tazobactam  3,375 mg Intravenous Q12H    And    sodium chloride   Intravenous Q12H    ipratropium-albuterol  1 ampule Inhalation Q4H WA    [Held by provider] amLODIPine  10 mg Oral Daily    [Held by provider] apixaban  2.5 mg Oral BID    [Held by provider] furosemide  20 mg Oral Daily    [Held by provider] gabapentin  100 mg Oral BID    [Held by provider] lenalidomide  10 mg Oral Daily    therapeutic multivitamin-minerals  1 tablet Oral Daily    sodium chloride flush  5-40 mL Intravenous 2 times per day     Continuous Infusions:   norepinephrine 2 mcg/min (07/05/21 0736)    fentaNYL 5 mcg/ml in 0.9%  ml infusion 125 mcg/hr (07/05/21 0854)    dextrose      [Held by provider] heparin (PORCINE) Infusion Stopped (06/29/21 1000)    sodium chloride       PRN Meds:.anticoagulant sodium citrate, perflutren lipid microspheres, glucose, dextrose, glucagon (rDNA), dextrose, [Held by provider] heparin (porcine), [Held by provider] heparin (porcine), midazolam, sodium chloride flush, sodium chloride, polyethylene glycol, acetaminophen **OR** acetaminophen    DATA:    CBC:   Lab Results   Component Value Date    WBC 13.7 07/05/2021    RBC 2.71 07/05/2021    HGB 8.3 07/05/2021    HCT 23.8 07/05/2021    MCV 87.8 07/05/2021    MCH 30.6 07/05/2021    MCHC 34.9 07/05/2021    RDW 16.1 07/05/2021    PLT 35 07/05/2021    MPV NOT CALC 07/05/2021     CMP:    Lab Results   Component Value Date     07/05/2021    K 5.9 07/05/2021    K 4.2 06/27/2021    CL 94 07/05/2021    CO2 21 07/05/2021     07/05/2021    CREATININE 2.9 07/05/2021    GFRAA 19 07/05/2021    AGRATIO 0.9 06/24/2021    AGRATIO 1.1 06/24/2021    LABGLOM 15 07/05/2021    GLUCOSE 180 07/05/2021    PROT 4.3 07/05/2021    LABALBU 2.1 07/05/2021    CALCIUM 8.2 07/05/2021    BILITOT 4.8 07/05/2021    ALKPHOS 92 07/05/2021    AST 51 07/05/2021    ALT 80 07/05/2021     Magnesium:    Lab Results   Component Value Date    MG 2.4 07/05/2021     Phosphorus:    Lab Results   Component Value Date    PHOS 8.5 07/05/2021     Radiology Review:      Kidney ultrasound June 28, 2021   FINDINGS:       Kidneys:       The right kidney measures 9.2 cm in length and the left kidney measures 10.8   cm in length.       Kidneys demonstrate normal cortical echogenicity.  No evidence of   hydronephrosis or intrarenal stones.           Bladder:       The urinary bladder is decompressed around a Bennett catheter placed   Impression:  Unremarkable ultrasound of the kidneys.       The urinary bladder is decompressed around a Bennett catheter.         Chest x-ray June 29, 2021   1. Persistent near complete whiteout of the left lung unchanged when compared   to the prior study. 2. The right lung is clear. 3.         BRIEF SUMMARY OF INITIAL CONSULT:    Briefly Ms. India Lane is an 78year old  female with a PMH of HTN, lung cancer, multiple myeloma, thrombocytopenia, anemia, DVT on chronic anticoagulation, and asthma who was admitted to the MICU after presenting to the ER via EMS from [facility] following an unwitnessed fall resulting in a dense C-2 fracture and shortness of breath. Labs in ER were significant for sodium 138, potassium 4.5, chloride 102, bicarbonate 19, BUN 67, creatinine 2.7 mg/dl, anion gap 17, lactic acid 4.2, and procalcitonin 13.99. Dexamethasone, mirtazapine, apixaban, amlodipine, furosemide, gabapentin, and  lenalidomide are medications patient was taking prior to admission. We are consulted for AUDREY. Her baseline  creatinine is 1.1-1.2 mg/dL. IMPRESSION/RECOMMENDATIONS:      1. AUDREY stage III on CKD, ischemic ATN, oliguric. Started on renal replacement therapy on June 29, 2021. Remains anuric. Hyperkalemic today, for HD x4 hours. 2. CKD stage III, probably due to nephrosclerosis  3. Hemodynamic shock, on norepinephrine and antibiotics  4. Hypocalcemia, 2/2 MM therapy?  ------------------------------------------  5. Respiratory failure status post intubation   6. Multiple myeloma  7. Pneumonia, probably MRSA, on vancomycin and piperacillin-tazobactam  8. MRSA bacteremia, on vancomycin and piperacillin-tazobactam  9. Shock liver, LFTs improved  10. AF with RVR, heparin drip  11. History of DVT,  heparin drip on hold   12. Status post fall  13. Normocytic anemia  14.  Nutrition, n.p.o.    Plan:    · HD x4 hours today  · Continue to monitor renal function  · Agree with DNR-CCA , await family decision regarding withdrawal of care

## 2021-07-05 NOTE — FLOWSHEET NOTE
07/05/21 1118   Vital Signs   BP (!) 141/62   Temp 98.1 °F (36.7 °C)   Pulse 53   Resp 25   SpO2 96 %   Post-Hemodialysis Assessment   Post-Treatment Procedures Blood returned;Catheter capped, clamped with Citrate x 2 ports   Machine Disinfection Process Acid/Vinegar Clean;Heat Disinfect; Exterior Machine Disinfection   Rinseback Volume (ml) 300 ml   Total Liters Processed (l/min) 78.1 l/min   Dialyzer Clearance Moderately streaked   Duration of Treatment (minutes) 240 minutes   Hemodialysis Intake (ml) 300 ml   Hemodialysis Output (ml) 2700 ml   NET Removed (ml) 2400 ml   Tolerated Treatment Good   Patient Response to Treatment pt james well with pressor support   Bilateral Breath Sounds Diminished

## 2021-07-05 NOTE — PLAN OF CARE
Problem: Falls - Risk of:  Goal: Absence of physical injury  Description: Absence of physical injury  7/5/2021 0603 by Hung Arce RN  Outcome: Met This Shift     Problem: Skin Integrity:  Goal: Absence of new skin breakdown  Description: Absence of new skin breakdown  7/5/2021 0603 by Hung Arce RN  Outcome: Met This Shift     Problem: Injury - Risk of, Physical Injury:  Goal: Absence of physical injury  Description: Absence of physical injury  7/5/2021 0603 by Hung Arce RN  Outcome: Met This Shift     Problem: Respiratory:  Goal: Ability to maintain adequate ventilation will improve  Description: Ability to maintain adequate ventilation will improve  Outcome: Met This Shift

## 2021-07-05 NOTE — PROGRESS NOTES
[address] [facility]   Department of Internal Medicine   Internal Medicine Residency  MICU Progress Note    Patient:  Chaitanya Nicole 78 y.o. female   MRN: [id number]       Date of Service: 2021    Allergy: Patient has no known allergies. Subjective     Patient was seen and examined this morning at bedside in no acute distress. Overnight, no acute events. Today, pt undergoing dialysis today. Pt's son (POA) and his brother arriving today to discuss further management options. Objective     TEMPERATURE:  Current - Temp: 98.8 °F (37.1 °C); Max - Temp  Av.2 °F (36.8 °C)  Min: 97.3 °F (36.3 °C)  Max: 99 °F (37.2 °C)  RESPIRATIONS RANGE: Resp  Av.9  Min: 22  Max: 29  PULSE RANGE: Pulse  Av.9  Min: 38  Max: 66  BLOOD PRESSURE RANGE:  Systolic (25EFU), DMW:344 , Min:99 , VEK:985   ; Diastolic (99RCE), THEA:00, Min:44, Max:74    ULSE OXIMETRY RANGE: SpO2  Av.7 %  Min: 91 %  Max: 97 %    I & O - 24hr:    Intake/Output Summary (Last 24 hours) at 2021 1613  Last data filed at 2021 1433  Gross per 24 hour   Intake 1723 ml   Output 2748 ml   Net -1025 ml     I/O last 3 completed shifts: In: 0776 [I.V.:1213; NG/GT:210]  Out: 2748 [Urine:48] No intake/output data recorded. Weight change:     Physical Exam:    General Appearance:   Sedated and intubated   HEENT:    NC/AT, PERRL, no pallor no icterus, moist mucous membrane   Neck:   Supple, Normal thyroid; no carotid bruit or JVD   Resp:    Decreased breath sounds   Heart:    RRR, S1 & S2 normal, no murmur, rub or gallop.    Abdomen:     Soft,, BS +, no organomegaly   Extremities:   Normal, atraumatic, no cyanosis or edema   Pulses:   2+ and symmetric all extremities   Skin:   Skin color, texture, turgor normal, no rashes or lesions   Neurologic:  Sedated and intubated       Medications     Continuous Infusions:   norepinephrine 1 mcg/min (21 1213)    fentaNYL 5 mcg/ml in 0.9%  ml infusion 150 mcg/hr (21 1331)    dextrose      [Held by provider] heparin (PORCINE) Infusion Stopped (06/29/21 1000)    sodium chloride       Scheduled Meds:   midodrine  15 mg Oral TID WC    hydrocortisone sodium succinate PF  100 mg Intravenous Q8H    senna  1 tablet Oral Nightly    insulin lispro  0-12 Units Subcutaneous Q4H    artificial tears   Both Eyes BID    chlorhexidine  15 mL Mouth/Throat BID    vancomycin (VANCOCIN) intermittent dosing (placeholder)   Other RX Placeholder    pantoprazole  40 mg Intravenous Daily    And    sodium chloride (PF)  10 mL Intravenous Daily    piperacillin-tazobactam  3,375 mg Intravenous Q12H    And    sodium chloride   Intravenous Q12H    ipratropium-albuterol  1 ampule Inhalation Q4H WA    [Held by provider] amLODIPine  10 mg Oral Daily    [Held by provider] apixaban  2.5 mg Oral BID    [Held by provider] furosemide  20 mg Oral Daily    [Held by provider] gabapentin  100 mg Oral BID    [Held by provider] lenalidomide  10 mg Oral Daily    therapeutic multivitamin-minerals  1 tablet Oral Daily    sodium chloride flush  5-40 mL Intravenous 2 times per day     PRN Meds: anticoagulant sodium citrate, perflutren lipid microspheres, glucose, dextrose, glucagon (rDNA), dextrose, [Held by provider] heparin (porcine), [Held by provider] heparin (porcine), midazolam, sodium chloride flush, sodium chloride, polyethylene glycol, acetaminophen **OR** acetaminophen  Nutrition:   NPO    Labs and Imaging Studies     CBC:   Recent Labs     07/04/21  1016 07/04/21  1759 07/05/21  0425   WBC 14.9* 15.3* 13.7*   HGB 8.5* 8.4* 8.3*   HCT 24.4* 24.3* 23.8*   MCV 89.1 88.4 87.8   PLT 31* 34* 35*       BMP:    Recent Labs     07/04/21  0413 07/05/21  0425 07/05/21  1224    134 133   K 4.6 5.9* 4.6   CL 96* 94* 93*   CO2 22 21* 21*   BUN 57* 101* 59*   CREATININE 1.8* 2.9* 1.9*   GLUCOSE 229* 180* 166*       LIVER PROFILE:   Recent Labs     07/03/21  0446 07/04/21  0413 07/05/21  0425   AST 31 46* 51* * 89* 80*   BILIDIR 3.3* 3.9* 4.1*   BILITOT 4.0* 4.8* 4.8*   ALKPHOS 80 85 92       PT/INR:   Recent Labs     07/03/21  0446 07/04/21  0413 07/05/21  0425   PROTIME 24.5* 31.3* 35.9*   INR 2.3 2.9 3.2       APTT:   Recent Labs     07/03/21  0446 07/04/21  0413 07/05/21  0425   APTT 26.1 27.7 25.3       Fasting Lipid Panel:    No results found for: CHOL, TRIG, HDL    Cardiac Enzymes:    Lab Results   Component Value Date    CKTOTAL 132 06/27/2021    CKTOTAL 57 06/26/2021    CKMB 1.6 06/27/2021    TROPONINI 0.03 12/30/2020       Notable Cultures:      Blood cultures   Blood Culture, Routine   Date Value Ref Range Status   07/03/2021 24 Hours no growth  Preliminary     Respiratory cultures No results found for: RESPCULTURE No results found for: LABGRAM  Urine   Urine Culture, Routine   Date Value Ref Range Status   06/26/2021 Growth not present  Final     Legionella No results found for: LABLEGI  C Diff PCR No results found for: CDIFPCR  Wound culture/abscess: No results for input(s): WNDABS in the last 72 hours. Tip culture:No results for input(s): CXCATHTIP in the last 72 hours.       Oxygen:     Vent Information  $Ventilation: $Subsequent Day  Skin Assessment: Clean, dry, & intact  Suction Catheter Diameter:  (16)  Equipment ID: [id number]  Equipment Changed: Humidification  Vent Type: [id number]  Vent Mode: AC/VC+  Vt Ordered: 350 mL  Rate Set: 24 bmp  Peak Flow: 0 L/min  Pressure Support: 0 cmH20  FiO2 : 40 %  SpO2: 93 %  SpO2/FiO2 ratio: 232.5  Sensitivity: 2  PEEP/CPAP: 8  I Time/ I Time %: 0.7 s  Humidification Source: Heated wire  Humidification Temp: 37  Humidification Temp Measured: 37  Circuit Condensation: Drained  Mask Type: Full face mask  Mask Size: Medium  Additional Respiratory  Assessments  Pulse: 58  Resp: 29  SpO2: 93 %  Position: Semi-Koch's  Humidification Source: Heated wire  Humidification Temp: 37  Circuit Condensation: Drained  Oral Care Completed?: Yes  Oral Care: Mouth suctioned, Mouth other acute fractures are identified in the cervical spine. There is mild diffuse degenerative changes from C3-T1 with osteophytes and   multilevel disc bulges. There is osteopenia and punctate lytic lesions   concerning for multiple myeloma. Impression      Type 2 fracture of the dens of C2. Diffuse degenerative changes from C3-T1. Multiple myeloma. CT chest.      Comparison December 30, 2020. Findings      There is borderline cardiac size. The great vessels are normal.  Trachea and   major bronchi are patent. There is dense consolidation in the left upper   lobe and left lower lobe concerning for pneumonia. There is minimal   atelectasis in the right lung base. The liver is of normal architecture. There is diffuse demineralization of the thoracic spine with punctate   lucencies. Compression fractures with vertebroplasty are noted. There is   slight irregularity of the sternum probably artifact. There is kyphosis of   the spine. Impression      Dense consolidation in the left upper lobe and left lower lobe concerning for   pneumonia. Surveillance after appropriate treatment is recommended with   repeat CT scan in 6-8 weeks to see complete resolution and exclude underlying   malignancy. Demineralization and the punctate lytic lucencies in the spine and ribs   concerning for multiple myeloma. Critical results were called by Dr. Libertad West to Dr. Derick Akbar. on   6/26/2021 at 16:30.         CT Cervical Spine WO Contrast   Final Result   Stable atrophy with small vessel ischemic disease. There is no acute stroke   or hemorrhage. Persistent multiple punctate lytic lesions the calvarium likely due to   malignancy like multiple myeloma. CT cervical spine. There is a fracture of the base of the dens of C2 (type 2). There is no   displacement. No other acute fractures are identified in the cervical spine.    There is mild diffuse degenerative changes from C3-T1 with osteophytes and   multilevel disc bulges. There is osteopenia and punctate lytic lesions   concerning for multiple myeloma. Impression      Type 2 fracture of the dens of C2. Diffuse degenerative changes from C3-T1. Multiple myeloma. CT chest.      Comparison December 30, 2020. Findings      There is borderline cardiac size. The great vessels are normal.  Trachea and   major bronchi are patent. There is dense consolidation in the left upper   lobe and left lower lobe concerning for pneumonia. There is minimal   atelectasis in the right lung base. The liver is of normal architecture. There is diffuse demineralization of the thoracic spine with punctate   lucencies. Compression fractures with vertebroplasty are noted. There is   slight irregularity of the sternum probably artifact. There is kyphosis of   the spine. Impression      Dense consolidation in the left upper lobe and left lower lobe concerning for   pneumonia. Surveillance after appropriate treatment is recommended with   repeat CT scan in 6-8 weeks to see complete resolution and exclude underlying   malignancy. Demineralization and the punctate lytic lucencies in the spine and ribs   concerning for multiple myeloma. Critical results were called by Dr. Maciej Martinez to Dr. Mateus Harrell. on   6/26/2021 at 16:30.         CT CHEST WO CONTRAST   Final Result   Stable atrophy with small vessel ischemic disease. There is no acute stroke   or hemorrhage. Persistent multiple punctate lytic lesions the calvarium likely due to   malignancy like multiple myeloma. CT cervical spine. There is a fracture of the base of the dens of C2 (type 2). There is no   displacement. No other acute fractures are identified in the cervical spine. There is mild diffuse degenerative changes from C3-T1 with osteophytes and   multilevel disc bulges.   There is osteopenia and punctate lytic lesions   concerning for multiple myeloma. Impression      Type 2 fracture of the dens of C2. Diffuse degenerative changes from C3-T1. Multiple myeloma. CT chest.      Comparison December 30, 2020. Findings      There is borderline cardiac size. The great vessels are normal.  Trachea and   major bronchi are patent. There is dense consolidation in the left upper   lobe and left lower lobe concerning for pneumonia. There is minimal   atelectasis in the right lung base. The liver is of normal architecture. There is diffuse demineralization of the thoracic spine with punctate   lucencies. Compression fractures with vertebroplasty are noted. There is   slight irregularity of the sternum probably artifact. There is kyphosis of   the spine. Impression      Dense consolidation in the left upper lobe and left lower lobe concerning for   pneumonia. Surveillance after appropriate treatment is recommended with   repeat CT scan in 6-8 weeks to see complete resolution and exclude underlying   malignancy. Demineralization and the punctate lytic lucencies in the spine and ribs   concerning for multiple myeloma. Critical results were called by Dr. Barry Stein to Dr. Belle Estrella on   6/26/2021 at 16:30. XR CHEST PORTABLE   Final Result   Consolidative airspace opacities in the left mid to lower lung zone with a   suspected small left pleural effusion. Findings may be on the basis of   pneumonia or less likely asymmetric pulmonary edema. Consider correlation   with CT of the chest.              Resident's Assessment and Plan     Assessment and Plan:    Neuro  1. Acute metabolic encephalopathy likely 2/2 septic shock  Cardiovascular  1. Atrial Fibrillation 2/2 sepsis and hypoxia  a. CHADSVASC score > 4   b. Echo results showed no vegetations. 2. Septic Shock 2/2 L. Sided Pneumonia  a. Maintain a MAP > 65  b. BP Currently managed w/ Levophed and Midodrine 15mg TID  c.  Maintain hydrocortisone to 100mg TID  d. Legionella PCR test  Pulmonary  3. MRSA nares cultures (+),   a. S/p Bactroban x 5 days  b. And Peridex oral care  4. Acute Hypoxic Respiratory Failure 2/2 L. Sided Pneumonia  a. Wean FiO2 and PEEP as tolerated  b. Repeat CXR shows no significant change from previous CXR; stable  GI  5. Myiasis (presence of maggots) of the oral cavity  a. S/p bronch, results were negative  Renal/  6. AUDREY vs AUDREY on CKD  a. Baseline Cr: 1.1-1.2  b. BUN/Cr levels improving after dialysis  Heme/Onc  7. Thrombocytopenia 2/2 sepsis 2/2 L. Sided Pneumonia  a. DIC was found to be negative  b. Pt has no active bleeding  c. Fibrinogen results: > 700  d. Order CBC q8hr to monitor hemoglobin > 7 and platelet > 98,495  8. 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 HIT antibody results negative  b. Duplex US bilaterally upper and lower extremity negative for DVT  9. H/o Multiple Myeloma  a. Currently being managed w/ lenalidomide 10mg, currently held. Will continue as outpatient  Derm/MSK  10. Cervical Fracture  a. 2/2 traumatic fall and underlying lytic lesions of the spine  b. Pt currently has a neck brace      # Peptic ulcer prophylaxis: protonix  # DVT Prophylaxis: PCD  # Disposition: Cont current care        Senior Resident Addendum:  I have seen and examined the patient with the intern. I have discussed the case, including pertinent history and exam findings with the intern. I agree with the assessment, plan and orders as documented above with the following additions:      Assessment:    1. Acute Metabolic Encephalopathy   - 2/2 septic shock    2. Septic Shock   - 2/2 PNA   - MRSA nares +   - blood cultures with methicillin resistant CONS   - echo negative    3. Atrial Fibrillation   - 2/2 underlying sepsis and hypoxia   - CHADSVASC at least 4 (age, sex, HTN)    4. Acute Hypoxic Respiratory Failure   - 2/2 PNA    5. MRSA Colonization    6.  AUDREY on CKD   - baseline creatinine 1.1-1.2   - FEUrea 33% suggests pre-renal etiology s/p volume resuscitation   - likely 2/2 ischemic ATN in setting of septic shock at this point    7. Acute on Chronic Normocytic Anemia   - h/o MM, no source of active bleeding    8. H/o Multiple Myeloma   - on lenalidomide as OP   - multiple lytic lesions on CT    9. Thrombocytopenia   - DIC panel negative, no active bleeding diatheses   - HIT antibody negative, US negative for DVT   - more likely related to sepsis, beta lactam antibiotics, MM    10. Chronic LBBB    11. Cervical Fracture   - 2/2 traumatic fall and underlying spinal lytic lesions    12. H/o DVT        Plan:    · Wean pressors as able, maintain MAP > 65  · Wean hydrocortisone once off pressors  · Continue midodrine to 15mg TID  · Avoid additional IVF d/t volume overload  · Cover empirically with vancomycin and Zosyn per ID  · Send Legionella PCR  · ID on board, input appreciated  · SLEDD per nephrology  · Overall poor prognosis, family to meet in AM to discuss withdrawal of care      Jan Mcgregor MD  7/5/2021  4:10 PM      I personally saw, examined and provided care for the patient. Radiographs, labs and medication list were reviewed by me independently. I spoke with bedside nursing, therapists and consultants. Critical care services and times documented are independent of procedures and multidisciplinary rounds with Residents. Additionally comprehensive, multidisciplinary rounds were conducted with the MICU team. The case was discussed in detail and plans for care were established. Review of Residents documentation was conducted and revisions were made as appropriate. I agree with the above documented exam, problem list and plan of care.   Barbara Briseno MD   CCT excluding procedures: 28'

## 2021-07-05 NOTE — PROGRESS NOTES
Hospitalist Progress Note      SYNOPSIS: Patient admitted on 2021 for Closed nondisplaced type II dens fracture [facility]) Hospital stay was complicated by A. jl with RVR and AUDREY/acute hypoxic respiratory failure and septic shock.  She is currently intubated. SUBJECTIVE:    Patient seen and examined  Records reviewed. Intubated sedated      Temp (24hrs), Av.8 °F (37.1 °C), Min:97.3 °F (36.3 °C), Max:100 °F (37.8 °C)    DIET: Diet NPO  CODE: DNR-CCA    Intake/Output Summary (Last 24 hours) at 2021 0807  Last data filed at 2021 0600  Gross per 24 hour   Intake 1493 ml   Output 40 ml   Net 1453 ml       Review of Systems  Unable to be obtained      OBJECTIVE:    /67   Pulse (!) 44   Temp 97.3 °F (36.3 °C)   Resp 24   Ht 5' 4\" (1.626 m)   Wt 174 lb 13.2 oz (79.3 kg)   SpO2 95%   BMI 30.01 kg/m²     General appearance: intubated and sedated   HEENT:  Conjunctivae/corneas clear. Neck: Supple. No jugular venous distention. Respiratory: symmetrical; clear to auscultation bilaterally; no wheezes; no rhonchi; no rales  Cardiovascular: rhythm regular; rate controlled; no murmurs  Abdomen: Soft, nontender, nondistended  Extremities:  peripheral pulses present; no peripheral edema; no ulcers  Musculoskeletal: No clubbing, cyanosis, no bilateral lower extremity edema. Brisk capillary refill. Skin:  No rashes  on visible skin  Neurologic: intubated and sedated    ASSESSMENT and PLAN:  · Acute hypoxic respiratory failure-remains intubated and sedated. Vent management as per critical care. · Septic shock due to MRSA pneumonia and multiorgan system failure on IV vancomycin and Zosyn. ID on board. Still on low-dose Levophed   ·  Afib with RVR-Heparin drip held due to low platelets and concerns for HIT. ·  AUDREY on CKD- on dialysis. Nephrology on board  ·  History of multiple myeloma-on daratumumab, revlimid.  Chemotherapy on hold now, Hematology oncology on board  ·  Hyperkalemia- Nephrology on board  ·  Thrombocytopenia  ·  Anemia- Hb is 8.7 today. Transfuse if Hb <7  ·  Cervical spine fracture- CT showed C2 fracture, has neck collar in place.   ·  Elevated liver enzymes- Due to shock liver.  Bilirubin is up to 4.8  today. Leisa Toure will continue to trend.         DISPOSITION: Continue current plan of care    Medications:  REVIEWED DAILY    Infusion Medications    norepinephrine 2 mcg/min (07/05/21 0736)    fentaNYL 5 mcg/ml in 0.9%  ml infusion 150 mcg/hr (07/05/21 0014)    dextrose      [Held by provider] heparin (PORCINE) Infusion Stopped (06/29/21 1000)    sodium chloride       Scheduled Medications    midodrine  15 mg Oral TID WC    hydrocortisone sodium succinate PF  100 mg Intravenous Q8H    senna  1 tablet Oral Nightly    insulin lispro  0-12 Units Subcutaneous Q4H    artificial tears   Both Eyes BID    chlorhexidine  15 mL Mouth/Throat BID    vancomycin (VANCOCIN) intermittent dosing (placeholder)   Other RX Placeholder    pantoprazole  40 mg Intravenous Daily    And    sodium chloride (PF)  10 mL Intravenous Daily    piperacillin-tazobactam  3,375 mg Intravenous Q12H    And    sodium chloride   Intravenous Q12H    ipratropium-albuterol  1 ampule Inhalation Q4H WA    [Held by provider] amLODIPine  10 mg Oral Daily    [Held by provider] apixaban  2.5 mg Oral BID    [Held by provider] furosemide  20 mg Oral Daily    [Held by provider] gabapentin  100 mg Oral BID    [Held by provider] lenalidomide  10 mg Oral Daily    therapeutic multivitamin-minerals  1 tablet Oral Daily    sodium chloride flush  5-40 mL Intravenous 2 times per day     PRN Meds: anticoagulant sodium citrate, perflutren lipid microspheres, glucose, dextrose, glucagon (rDNA), dextrose, [Held by provider] heparin (porcine), [Held by provider] heparin (porcine), midazolam, sodium chloride flush, sodium chloride, polyethylene glycol, acetaminophen **OR** acetaminophen    Labs:     Recent Labs 07/04/21  1016 07/04/21  1759 07/05/21 0425   WBC 14.9* 15.3* 13.7*   HGB 8.5* 8.4* 8.3*   HCT 24.4* 24.3* 23.8*   PLT 31* 34* 35*       Recent Labs     07/03/21  0446 07/03/21  1022 07/04/21  0140 07/04/21 0413 07/05/21 0425      < > 134 136 134   K 5.7*   < > 4.4 4.6 5.9*   CL 95*   < > 97* 96* 94*   CO2 23   < > 23 22 21*   BUN 76*   < > 52* 57* 101*   CREATININE 2.3*   < > 1.7* 1.8* 2.9*   CALCIUM 8.3*   < > 8.1* 8.2* 8.2*   PHOS 6.5*  --   --  6.1* 8.5*    < > = values in this interval not displayed. Recent Labs     07/03/21  0446 07/04/21 0413 07/05/21 0425   PROT 5.3* 4.6* 4.3*   ALKPHOS 80 85 92   * 89* 80*   AST 31 46* 51*   BILITOT 4.0* 4.8* 4.8*       Recent Labs     07/03/21  0446 07/04/21 0413 07/05/21 0425   INR 2.3 2.9 3.2       No results for input(s): Shellia Buglatonia in the last 72 hours. Chronic labs:    Lab Results   Component Value Date    TSH 0.127 (L) 06/30/2021    INR 3.2 07/05/2021       Radiology: REVIEWED DAILY    +++++++++++++++++++++++++++++++++++++++++++++++++  Sami Pfeiffer, DO DO  [facility] [address], [place]  +++++++++++++++++++++++++++++++++++++++++++++++++  NOTE: This report was transcribed using voice recognition software. Every effort was made to ensure accuracy; however, inadvertent computerized transcription errors may be present.

## 2021-07-06 PROBLEM — N18.6 ENCOUNTER REGARDING VASCULAR ACCESS FOR DIALYSIS FOR ESRD (HCC): Status: ACTIVE | Noted: 2021-01-01

## 2021-07-06 PROBLEM — Z99.2 ENCOUNTER REGARDING VASCULAR ACCESS FOR DIALYSIS FOR ESRD (HCC): Status: ACTIVE | Noted: 2021-01-01

## 2021-07-06 NOTE — CARE COORDINATION
7/6 Care Coordination: Pt remains in MICU, On Vent. Pt made DNR-CC today. Palliative Care following. Plan to withdraw life support today. CM/SW will continue to follow for discharge planning.    Russel SONGN,RN--BC  [phone]

## 2021-07-06 NOTE — FLOWSHEET NOTE
07/06/21 1200   Vital Signs   BP - Standing 144/66   Pulse 67   Resp 20   SpO2 95 %   Post-Hemodialysis Assessment   Post-Treatment Procedures Blood returned;Catheter Capped, clamped with Saline x2 ports   Machine Disinfection Process Acid/Vinegar Clean;Exterior Machine Disinfection; Heat Disinfect   Rinseback Volume (ml) 300 ml   Total Liters Processed (l/min) 66.5 l/min   Dialyzer Clearance Lightly streaked   Duration of Treatment (minutes) 240 minutes   Hemodialysis Intake (ml) 300 ml   Hemodialysis Output (ml) 2000 ml   NET Removed (ml) 1700 ml   Tolerated Treatment Good   Patient Response to Treatment james tx with pressor support. Post catheter care complete. Report to floor RN   Bilateral Breath Sounds Diminished   Edema Generalized   Edema Generalized +2   LUE Edema +2   RLE Edema +2   LLE Edema +1   Physician Notified?  No

## 2021-07-06 NOTE — PROGRESS NOTES
Hospitalist Progress Note      SYNOPSIS: Patient admitted on 2021 for Closed nondisplaced type II dens fracture [facility]) Hospital stay was complicated by WALESKA. jl with RVR and AUDREY/acute hypoxic respiratory failure and septic shock.  She is currently intubated. SUBJECTIVE:    Patient seen and examined  Records reviewed. Intubated sedated      Temp (24hrs), Av.2 °F (37.3 °C), Min:98.1 °F (36.7 °C), Max:99.8 °F (37.7 °C)    DIET: Diet NPO  CODE: DNR-CCA    Intake/Output Summary (Last 24 hours) at 2021 0805  Last data filed at 2021 0600  Gross per 24 hour   Intake 1467 ml   Output 2720 ml   Net -1253 ml       Review of Systems  Unable to be obtained      OBJECTIVE:    BP (!) 120/48   Pulse (!) 44   Temp 99.8 °F (37.7 °C) (Temporal)   Resp 24   Ht 5' 4\" (1.626 m)   Wt 174 lb 13.2 oz (79.3 kg)   SpO2 92%   BMI 30.01 kg/m²     General appearance: intubated and sedated   HEENT:  Conjunctivae/corneas clear. Neck: Supple. No jugular venous distention. Respiratory: symmetrical; clear to auscultation bilaterally; no wheezes; no rhonchi; no rales  Cardiovascular: rhythm regular; rate controlled; no murmurs  Abdomen: Soft, nontender, nondistended  Extremities:  peripheral pulses present; no peripheral edema; no ulcers  Musculoskeletal: No clubbing, cyanosis, no bilateral lower extremity edema. Brisk capillary refill. Skin:  No rashes  on visible skin  Neurologic: intubated and sedated    ASSESSMENT and PLAN:  · Acute hypoxic respiratory failure-remains intubated and sedated. Vent management as per critical care. · Septic shock due to MRSA pneumonia and multiorgan system failure on IV vancomycin and Zosyn. ID on board. ·  Afib with RVR-Heparin drip held due to low platelets and concerns for HIT. ·  AUDREY on CKD- on dialysis. Nephrology on board  ·  History of multiple myeloma-on daratumumab, revlimid.  Chemotherapy on hold now, Hematology oncology on board  · Drake Moyer 12- Nephrology on board  ·  Thrombocytopenia  ·  Anemia- Hb is 8.5 today. Transfuse if Hb <7  ·  Cervical spine fracture- CT showed C2 fracture, has neck collar in place.   ·  Elevated liver enzymes- Due to shock liver.  Bilirubin is up to 6.2 today.  We will continue to trend.         DISPOSITION: Prognosis poor    Medications:  REVIEWED DAILY    Infusion Medications    norepinephrine Stopped (07/05/21 0917)    fentaNYL 5 mcg/ml in 0.9%  ml infusion 150 mcg/hr (07/06/21 0356)    dextrose      [Held by provider] heparin (PORCINE) Infusion Stopped (06/29/21 1000)    sodium chloride       Scheduled Medications    midodrine  15 mg Oral TID WC    hydrocortisone sodium succinate PF  100 mg Intravenous Q8H    senna  1 tablet Oral Nightly    insulin lispro  0-12 Units Subcutaneous Q4H    artificial tears   Both Eyes BID    chlorhexidine  15 mL Mouth/Throat BID    vancomycin (VANCOCIN) intermittent dosing (placeholder)   Other RX Placeholder    pantoprazole  40 mg Intravenous Daily    And    sodium chloride (PF)  10 mL Intravenous Daily    piperacillin-tazobactam  3,375 mg Intravenous Q12H    And    sodium chloride   Intravenous Q12H    ipratropium-albuterol  1 ampule Inhalation Q4H WA    [Held by provider] amLODIPine  10 mg Oral Daily    [Held by provider] apixaban  2.5 mg Oral BID    [Held by provider] furosemide  20 mg Oral Daily    [Held by provider] gabapentin  100 mg Oral BID    [Held by provider] lenalidomide  10 mg Oral Daily    therapeutic multivitamin-minerals  1 tablet Oral Daily    sodium chloride flush  5-40 mL Intravenous 2 times per day     PRN Meds: anticoagulant sodium citrate, perflutren lipid microspheres, glucose, dextrose, glucagon (rDNA), dextrose, [Held by provider] heparin (porcine), [Held by provider] heparin (porcine), midazolam, sodium chloride flush, sodium chloride, polyethylene glycol, acetaminophen **OR** acetaminophen    Labs:     Recent Labs     07/04/21  9142 07/05/21  3180 07/06/21  0405   WBC 15.3* 13.7* 14.9*   HGB 8.4* 8.3* 8.5*   HCT 24.3* 23.8* 24.4*   PLT 34* 35* 54*       Recent Labs     07/04/21 0413 07/04/21  0413 07/05/21  0425 07/05/21  1224 07/06/21  0404      < > 134 133 132   K 4.6   < > 5.9* 4.6 6.1*   CL 96*   < > 94* 93* 93*   CO2 22   < > 21* 21* 20*   BUN 57*   < > 101* 59* 86*   CREATININE 1.8*   < > 2.9* 1.9* 2.6*   CALCIUM 8.2*   < > 8.2* 8.3* 8.1*   PHOS 6.1*  --  8.5*  --  8.4*    < > = values in this interval not displayed. Recent Labs     07/04/21 0413 07/05/21 0425 07/06/21  0404   PROT 4.6* 4.3* 4.6*   ALKPHOS 85 92 116*   ALT 89* 80* 99*   AST 46* 51* 65*   BILITOT 4.8* 4.8* 6.2*       Recent Labs     07/04/21 0413 07/05/21  0425 07/06/21  0405   INR 2.9 3.2 3.8       No results for input(s): Rita Oden in the last 72 hours. Chronic labs:    Lab Results   Component Value Date    TSH 0.127 (L) 06/30/2021    INR 3.8 07/06/2021       Radiology: REVIEWED DAILY    +++++++++++++++++++++++++++++++++++++++++++++++++  Yesenia Do, DO DO  [facility], [place]  +++++++++++++++++++++++++++++++++++++++++++++++++  NOTE: This report was transcribed using voice recognition software. Every effort was made to ensure accuracy; however, inadvertent computerized transcription errors may be present.

## 2021-07-06 NOTE — ACP (ADVANCE CARE PLANNING)
Change of Code Status Documentation    Discussed with patient's family and patient's Shira Points [phone], regarding patient's code status in detail. At this time, it has been agreed upon that code status will be changed to the following:    Code Status: DNR-CC     Change of code status confirmed by bedside RN. ACP documents signed by POA and placed in patient's soft chart.          Nate Armas MD PGY-3  7/6/2021  11:49 AM

## 2021-07-06 NOTE — PROGRESS NOTES
Lilaim Pino [facility] 1940    SUBJECTIVE:  Sons at bedside, compassionate extubation today per RN  Code status change to [facility]  Intubated. Fever, t-max 101.7    OBJECTIVE  VITALS:  BP (!) 112/49   Pulse 67   Temp 97.3 °F (36.3 °C) (Temporal)   Resp 26   Ht 5' 4\" (1.626 m)   Wt 174 lb 13.2 oz (79.3 kg)   SpO2 95%   BMI 30.01 kg/m²   General: Intubated, non responsive   HEENT: PERRL, ETT intact, Cervical collar on   Respiratory: Vent synchronous, bilateral course breath sounds    Cardiac: irregular rate and rhythm. Pulses palpable.  Pedal edema   Abdominal: soft, non distended  Extremities: No clubbing or cyanosis     DIAGNOSTIC DATA  Lab Results   Component Value Date    WBC 14.9 (H) 07/06/2021    HGB 8.5 (L) 07/06/2021    HCT 24.4 (L) 07/06/2021    MCV 88.1 07/06/2021    PLT 54 (L) 07/06/2021     Lab Results   Component Value Date     07/06/2021    K 6.1 (H) 07/06/2021    CL 93 (L) 07/06/2021    CO2 20 (L) 07/06/2021    BUN 86 (H) 07/06/2021    CREATININE 2.6 (H) 07/06/2021    GLUCOSE 173 (H) 07/06/2021    CALCIUM 8.1 (L) 07/06/2021    PROT 4.6 (L) 07/06/2021    LABALBU 2.4 (L) 07/06/2021    BILITOT 6.2 (H) 07/06/2021    ALKPHOS 116 (H) 07/06/2021    AST 65 (H) 07/06/2021    ALT 99 (H) 07/06/2021    LABGLOM 18 07/06/2021    GFRAA 21 07/06/2021    AGRATIO 0.9 (L) 06/24/2021    AGRATIO 1.1 06/24/2021    GLOB 3.5 06/24/2021    GLOB 2.9 06/24/2021     IMPRESSION:  Patient Active Problem List   Diagnosis    Multiple myeloma ([facility] [address]    Essential hypertension, benign    Localized edema    Thrombocytopenia, unspecified (HCC)    Pneumonia due to COVID-19 virus    Elevated serum creatinine    Essential hypertension    Hypernatremia    Hypermagnesemia    Hypokalemia    Acute respiratory failure with hypoxia (HCC)    CAP (community acquired pneumonia) due to Pneumococcus [facility])    Closed nondisplaced type II dens fracture ([facility] [address]    Red blood cell antibody positive    Encounter regarding vascular access for dialysis for ESRD [facility])     PLAN:  78year old female PMH of Multiple myeloma on lenalidomide, DVT on Eliquis, and polyneuropathy that presented to [facility] ER from nursing home via EMS for unwitnessed fall and SOB on 2021. Patient was given ceftriaxone and azithromycin      CT chest with persistent multiple punctate lytic lesions in the calvarium likely d/t multiple myeloma. CT head unremarkable    CT Cervical spine with C2 fracture     Patient developed worsening respiratory status and was intubated   Patient developed AFRVR and started on Low dose heparin and amio gtts. Underwent bronch 2021 with Dr Patsy Carranza with normal airway examination. 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 -Currently on Daratumumab, Revlimid and Dex since 2020, Revlimid added 10/17/2020   -Last office visit was 2021 for cycle 8 Day 1 of Alysha and Cycle 22 Day 2 for Rev/Dex  -Most recent labs prior to admission with hgb: 12 WBC: 7.4 plt: 239  Ig IgA:71 IgM: 30 Protein: 5.9 Alpha 1 globulin: 0.5 Alpha 2 globulin: 1.1 Beta Globulin: 1.0 Gamma Globulin: 0.4 M spike: 0.1 A/G ratio: 1.1 Globulin: 2.9  Free kappa light chains: 11.6 Free lambda light chain: 11.8  K/L ratio: 0.98     Neurosurgery on board; collar placement   Hold Myeloma treatment for now. Nephrology on board;   ID on board; On Vancomycin Zosyn    Anemia and Thrombocytopenia secondary to chemo, sepsis and ATB. DIC negative HIT negative ultrasound negative. Continue to monitor her CBCD. Hb 8.5   PLT 54. Transfuse to keep hgb>7 and plt>10  Code status change to [address] Valentín Said   [phone]    The patient was seen and examined, I agree with RAMAN Domingo's findings, assessment and plan as outlined.       Patrick Hoff MD   HEMATOLOGY/MEDICAL [address] [facility], [address]  [address] [facility]  [address] [facility] [address]  Dept: [phone]

## 2021-07-06 NOTE — PROGRESS NOTES
Infectious Disease  Progress Note  [facility]    Chief Complaint:  On vent- fentanyl   Face to face encounter   Subjective: bacteremia  HAP   On vent- no response- sedated   Has been having fevers- tmax- 101.7   50% FiO2      Scheduled Meds:   midodrine  15 mg Oral TID WC    hydrocortisone sodium succinate PF  100 mg Intravenous Q8H    senna  1 tablet Oral Nightly    insulin lispro  0-12 Units Subcutaneous Q4H    artificial tears   Both Eyes BID    chlorhexidine  15 mL Mouth/Throat BID    vancomycin (VANCOCIN) intermittent dosing (placeholder)   Other RX Placeholder    pantoprazole  40 mg Intravenous Daily    And    sodium chloride (PF)  10 mL Intravenous Daily    piperacillin-tazobactam  3,375 mg Intravenous Q12H    And    sodium chloride   Intravenous Q12H    ipratropium-albuterol  1 ampule Inhalation Q4H WA    [Held by provider] amLODIPine  10 mg Oral Daily    [Held by provider] apixaban  2.5 mg Oral BID    [Held by provider] furosemide  20 mg Oral Daily    [Held by provider] gabapentin  100 mg Oral BID    [Held by provider] lenalidomide  10 mg Oral Daily    therapeutic multivitamin-minerals  1 tablet Oral Daily    sodium chloride flush  5-40 mL Intravenous 2 times per day     Continuous Infusions:   norepinephrine 2 mcg/min (07/06/21 0844)    fentaNYL 5 mcg/ml in 0.9%  ml infusion 150 mcg/hr (07/06/21 0356)    dextrose      [Held by provider] heparin (PORCINE) Infusion Stopped (06/29/21 1000)    sodium chloride       PRN Meds:anticoagulant sodium citrate, perflutren lipid microspheres, glucose, dextrose, glucagon (rDNA), dextrose, [Held by provider] heparin (porcine), [Held by provider] heparin (porcine), midazolam, sodium chloride flush, sodium chloride, polyethylene glycol, acetaminophen **OR** acetaminophen    Patient Vitals for the past 24 hrs:   BP Temp Temp src Pulse Resp SpO2 Height   07/06/21 1000    71      07/06/21 0945    77      07/06/21 0930    52    07/06/21 0915    48      07/06/21 0906    50 24 94 %    07/06/21 0900 (!) 138/57   (!) 49 25 94 %    07/06/21 0845    51      07/06/21 0830    (!) 46      07/06/21 0815    (!) 45      07/06/21 0800 (!) 120/42 98.4 °F (36.9 °C) Temporal (!) 44 24 93 %    07/06/21 0600    (!) 44 24 92 %    07/06/21 0500    (!) 49 25 95 %    07/06/21 0400  99.8 °F (37.7 °C) Temporal 57 24 95 %    07/06/21 0300    (!) 45 24 94 %    07/06/21 0200    (!) 44 24 95 %    07/06/21 0100    50 24 95 %    07/06/21 0005    (!) 47 24 93 %    07/06/21 0000  99.6 °F (37.6 °C) Temporal (!) 47 24 93 %    07/05/21 2300    54 24 93 %    07/05/21 2200    64 24 94 %    07/05/21 2103    57 25 90 %    07/05/21 2100    58 24 91 %    07/05/21 2030    54 24 94 %    07/05/21 2000  99.5 °F (37.5 °C) Bladder 55 24 96 %    07/05/21 1800 (!) 120/48   58 24 96 %    07/05/21 1714     24 96 %    07/05/21 1711    55 24 95 %    07/05/21 1700 (!) 107/47   53 24 95 %    07/05/21 1600 (!) 116/51 99.1 °F (37.3 °C) Bladder 58 24 93 %    07/05/21 1500 (!) 120/52   55 24 93 %    07/05/21 1400 (!) 154/60   58 29 93 %    07/05/21 1300 (!) 116/51   66 29 91 %    07/05/21 1204       5' 4\" (1.626 m)   07/05/21 1203     26 97 %    07/05/21 1201    58 25 97 %    07/05/21 1200 (!) 142/57 98.8 °F (37.1 °C) Bladder (!) 48 25 96 %    07/05/21 1118 (!) 141/62 98.1 °F (36.7 °C)  53 25 96 %    07/05/21 1100 (!) 99/57   61 29 95 %    07/05/21 1045    52      07/05/21 1037    52      07/05/21 1024    51          CBC with Differential:    Lab Results   Component Value Date    WBC 14.9 07/06/2021    RBC 2.77 07/06/2021    HGB 8.5 07/06/2021    HCT 24.4 07/06/2021    PLT 54 07/06/2021    MCV 88.1 07/06/2021    MCH 30.7 07/06/2021    MCHC 34.8 07/06/2021    RDW 16.7 07/06/2021    NRBC 1.7 07/04/2021    SEGSPCT 45.6 05/27/2021    BANDSPCT 12.0 06/24/2021    BLASTSPCT 0.9 07/04/2021    METASPCT 3.5 06/29/2021    LYMPHOPCT 0.7 07/06/2021    MONOPCT 1.1 07/06/2021    MYELOPCT 0.9 06/29/2021    BASOPCT 0.1 07/06/2021    MONOSABS 0.17 07/06/2021    LYMPHSABS 0.10 07/06/2021    EOSABS 0.00 07/06/2021    BASOSABS 0.02 07/06/2021     CMP:    Lab Results   Component Value Date     07/06/2021    K 6.1 07/06/2021    K 4.2 06/27/2021    CL 93 07/06/2021    CO2 20 07/06/2021    BUN 86 07/06/2021    CREATININE 2.6 07/06/2021    GFRAA 21 07/06/2021    AGRATIO 0.9 06/24/2021    AGRATIO 1.1 06/24/2021    LABGLOM 18 07/06/2021    GLUCOSE 173 07/06/2021    PROT 4.6 07/06/2021    LABALBU 2.4 07/06/2021    CALCIUM 8.1 07/06/2021    BILITOT 6.2 07/06/2021    ALKPHOS 116 07/06/2021    AST 65 07/06/2021    ALT 99 07/06/2021       BP (!) 138/57   Pulse 71   Temp 98.4 °F (36.9 °C) (Temporal)   Resp 24   Ht 5' 4\" (1.626 m)   Wt 174 lb 13.2 oz (79.3 kg)   SpO2 94%   BMI 30.01 kg/m²     Physical Exam  Const/Neuro- on vent- on fentanyl gtt- and levophed   ENMT- NG, ETT in place   Neck: Neck supple  Heart- Regular, Rate, Rhythm-  Lungs- diminished. On vent   Abdomen- Soft, bowel sounds hypoactive- softly distended. Musculo/Extremities-  Equal and symmetrical, no edema. No tenderness. Skin:  Warm and dry, free from rashes.     Bennett- with minimal output  Right Groin TLC-6/27/2021-  Right wrist A-Line-6/28/2021   PIV  Left femoral HD cath    Cultures   6/27/2021-blood cx- no growth to date   6/28/2021-bronch cultures- no growth   6/26/2021- blood cx- staph epi and staph warneri     Radiology reviewed    Assessment  · Trauma-fall  · resp failure- on vent  · Pneumonia-HAP  · S/p Bronch   · Fevers    · Leukocytosis - secondary to above- also on solu-cortef   · Acute myocardial injury in the setting of septic shock   · Multiple myeloma    Plan  Continue Zosyn   Continue vancomycin   Pharmacy dosing  Check fungitell - check serum osmo   Repeat blood cultures today   May need to change lines   Echo done- ? Results  Still need results so we can proceed with KHUSHBOO   Monitor labs   ECHo no overt vegetations    She still needs a KHUSHBOO if family wants continued aggressive management.       Electronically signed by Virgil Forrest MD on 7/6/2021 at 10:14 AM

## 2021-07-06 NOTE — PLAN OF CARE
Problem: Falls - Risk of:  Goal: Will remain free from falls  Description: Will remain free from falls  7/6/2021 0138 by Ty Hallman RN  Outcome: Met This Shift  7/5/2021 1712 by Vilma Arias RN  Outcome: Met This Shift  Goal: Absence of physical injury  Description: Absence of physical injury  7/6/2021 0138 by Ty Hallman RN  Outcome: Met This Shift  7/5/2021 1712 by Vilma Arias RN  Outcome: Met This Shift     Problem: Skin Integrity:  Goal: Absence of new skin breakdown  Description: Absence of new skin breakdown  7/6/2021 0138 by Ty Hallman RN  Outcome: Met This Shift  7/5/2021 1712 by Vilma Arias RN  Outcome: Met This Shift     Problem: Injury - Risk of, Physical Injury:  Goal: Will remain free from falls  Description: Will remain free from falls  7/6/2021 0138 by Ty Hallman RN  Outcome: Met This Shift  7/5/2021 1712 by Vilma Arias RN  Outcome: Met This Shift  Goal: Absence of physical injury  Description: Absence of physical injury  7/6/2021 0138 by Ty Hallman RN  Outcome: Met This Shift  7/5/2021 1712 by Vilma Arias RN  Outcome: Met This Shift     Problem: Mood - Altered:  Goal: Mood stable  Description: Mood stable  Outcome: Met This Shift  Goal: Absence of abusive behavior  Description: Absence of abusive behavior  Outcome: Met This Shift     Problem: Psychomotor Activity - Altered:  Goal: Absence of psychomotor disturbance signs and symptoms  Description: Absence of psychomotor disturbance signs and symptoms  Outcome: Met This Shift     Problem: Sleep Pattern Disturbance:  Goal: Appears well-rested  Description: Appears well-rested  7/5/2021 1712 by Vilma Arias RN  Outcome: Met This Shift     Problem: Cardiac:  Goal: Hemodynamic stability will improve  Description: Hemodynamic stability will improve  7/6/2021 0138 by Ty Hallman RN  Outcome: Met This Shift  7/5/2021 1712 by Vilma Arias RN  Outcome:  Met This Shift  Goal: Complications related to the disease process, condition or treatment will be avoided or minimized  Description: Complications related to the disease process, condition or treatment will be avoided or minimized  7/6/2021 0138 by Nasrin Mcdonough RN  Outcome: Met This Shift  7/5/2021 1712 by Eugene Aquino RN  Outcome: Met This Shift  Goal: Ability to maintain an adequate cardiac output will improve  Description: Ability to maintain an adequate cardiac output will improve  Outcome: Met This Shift     Problem: Coping:  Goal: Level of anxiety will decrease  Description: Level of anxiety will decrease  7/6/2021 0138 by Nasrin Mcdonough RN  Outcome: Met This Shift  7/5/2021 1712 by Eugene Aquino RN  Outcome: Met This Shift  Goal: Ability to cope will improve  Description: Ability to cope will improve  Outcome: Met This Shift  Goal: Ability to establish a method of communication will improve  Description: Ability to establish a method of communication will improve  Outcome: Met This Shift  Goal: General experience of comfort will improve  Description: General experience of comfort will improve  7/6/2021 0138 by Nasrin Mcdonough RN  Outcome: Met This Shift  7/5/2021 1712 by Eugene Aquino RN  Outcome: Met This Shift     Problem: Respiratory:  Goal: Complications related to the disease process, condition or treatment will be avoided or minimized  Description: Complications related to the disease process, condition or treatment will be avoided or minimized  7/6/2021 0138 by Nasrin Mcdonough RN  Outcome: Met This Shift  7/5/2021 1712 by Eugene Aquino RN  Outcome: Met This Shift  Goal: Ability to maintain a clear airway will improve  Description: Ability to maintain a clear airway will improve  Outcome: Met This Shift  Goal: Ability to maintain adequate ventilation will improve  Description: Ability to maintain adequate ventilation will improve  7/6/2021 0138 by Nasrin Mcdonough RN  Outcome: Met This Shift  7/5/2021 1712 by May Jackman RN  Outcome: Met This Shift     Problem: Health Behavior:  Goal: Ability to manage health-related needs will improve  Description: Ability to manage health-related needs will improve  Outcome: Met This Shift     Problem: Safety:  Goal: Ability to remain free from injury will improve  Description: Ability to remain free from injury will improve  7/6/2021 0138 by Adelina Colon RN  Outcome: Met This Shift  7/5/2021 1712 by May Jackman RN  Outcome: Met This Shift  Goal: Will show no signs and symptoms of excessive bleeding  Description: Will show no signs and symptoms of excessive bleeding  7/6/2021 0138 by Adelina Colon RN  Outcome: Met This Shift  7/5/2021 1712 by May Jackman RN  Outcome: Met This Shift     Problem: Sensory:  Goal: General experience of comfort will improve  Description: General experience of comfort will improve  7/6/2021 0138 by Adelina Colon RN  Outcome: Met This Shift  7/5/2021 1712 by May Jackman RN  Outcome: Met This Shift  Goal: Ability to identify factors that increase the pain will improve  Description: Ability to identify factors that increase the pain will improve  Outcome: Met This Shift  Goal: Ability to demonstrate ways to enhance comfort will improve  Description: Ability to demonstrate ways to enhance comfort will improve  Outcome: Met This Shift

## 2021-07-06 NOTE — PLAN OF CARE
Problem: Falls - Risk of:  Goal: Will remain free from falls  Description: Will remain free from falls  7/6/2021 0947 by Lelo Mart RN  Outcome: Met This Shift     Problem: Falls - Risk of:  Goal: Absence of physical injury  Description: Absence of physical injury  7/6/2021 0947 by Lelo Mart RN  Outcome: Met This Shift     Problem: Skin Integrity:  Goal: Will show no infection signs and symptoms  Description: Will show no infection signs and symptoms  7/6/2021 0947 by Lelo Mart RN  Outcome: Met This Shift     Problem: Skin Integrity:  Goal: Absence of new skin breakdown  Description: Absence of new skin breakdown  7/6/2021 0947 by Lelo Mart RN  Outcome: Met This Shift     Problem: Injury - Risk of, Physical Injury:  Goal: Will remain free from falls  Description: Will remain free from falls  7/6/2021 0947 by Lelo Mart RN  Outcome: Met This Shift     Problem: Injury - Risk of, Physical Injury:  Goal: Absence of physical injury  Description: Absence of physical injury  7/6/2021 0947 by Lelo Mart RN  Outcome: Met This Shift     Problem: Safety:  Goal: Ability to remain free from injury will improve  Description: Ability to remain free from injury will improve  7/6/2021 0947 by Lelo Mart RN  Outcome: Met This Shift     Problem: Safety:  Goal: Will show no signs and symptoms of excessive bleeding  Description: Will show no signs and symptoms of excessive bleeding  7/6/2021 0947 by Lelo Mart RN  Outcome: Met This Shift

## 2021-07-06 NOTE — PROGRESS NOTES
Department of Internal Medicine  Nephrology Attending Progress Note    Events reviewed. Patient seen during dialysis. SUBJECTIVE:  We are following Mrs. Stewart for AUDREY. Patient remains intubated.     Physical Exam:    VITALS:  BP (!) 109/46   Pulse 73   Temp 97.2 °F (36.2 °C) (Temporal)   Resp 20   Ht 5' 4\" (1.626 m)   Wt 174 lb 13.2 oz (79.3 kg)   SpO2 (!) 81%   BMI 30.01 kg/m²   24HR INTAKE/OUTPUT:      Intake/Output Summary (Last 24 hours) at 7/6/2021 1738  Last data filed at 7/6/2021 1600  Gross per 24 hour   Intake 1040 ml   Output 2005 ml   Net -965 ml     Access: Left femoral temporary dialysis catheter in place  Constitutional: intubate on mechanical ventilator  HEENT:  Cervical collar in place  Respiratory:  Intubated on vent  Cardiovascular/Edema:  Atrial fibrillation  Gastrointestinal:  Soft, non-tender, +BS  Neurologic:  Intubated and sedated  Skin:  Warm, dry, no rash or lesion  Other:   + edema    Scheduled Meds:   artificial tears   Both Eyes BID    sodium chloride flush  5-40 mL Intravenous 2 times per day     Continuous Infusions:   fentaNYL 5 mcg/ml in 0.9%  ml infusion Stopped (07/06/21 1343)    sodium chloride       PRN Meds:.fentanNYL, LORazepam, glycopyrrolate, midazolam, sodium chloride flush, sodium chloride, acetaminophen **OR** acetaminophen    DATA:    CBC:   Lab Results   Component Value Date    WBC 14.9 07/06/2021    RBC 2.77 07/06/2021    HGB 8.5 07/06/2021    HCT 24.4 07/06/2021    MCV 88.1 07/06/2021    MCH 30.7 07/06/2021    MCHC 34.8 07/06/2021    RDW 16.7 07/06/2021    PLT 54 07/06/2021    MPV NOT CALC 07/06/2021     CMP:    Lab Results   Component Value Date     07/06/2021    K 6.1 07/06/2021    K 4.2 06/27/2021    CL 93 07/06/2021    CO2 20 07/06/2021    BUN 86 07/06/2021    CREATININE 2.6 07/06/2021    GFRAA 21 07/06/2021    AGRATIO 0.9 06/24/2021    AGRATIO 1.1 06/24/2021    LABGLOM 18 07/06/2021    GLUCOSE 173 07/06/2021    PROT 4.6 07/06/2021 LABALBU 2.4 07/06/2021    CALCIUM 8.1 07/06/2021    BILITOT 6.2 07/06/2021    ALKPHOS 116 07/06/2021    AST 65 07/06/2021    ALT 99 07/06/2021     Magnesium:    Lab Results   Component Value Date    MG 2.2 07/06/2021     Phosphorus:    Lab Results   Component Value Date    PHOS 8.4 07/06/2021     Radiology Review:      Kidney ultrasound June 28, 2021   FINDINGS:       Kidneys:       The right kidney measures 9.2 cm in length and the left kidney measures 10.8   cm in length.       Kidneys demonstrate normal cortical echogenicity.  No evidence of   hydronephrosis or intrarenal stones.           Bladder:       The urinary bladder is decompressed around a Bennett catheter placed   Impression:  Unremarkable ultrasound of the kidneys.       The urinary bladder is decompressed around a Bennett catheter.         Chest x-ray June 29, 2021   1. Persistent near complete whiteout of the left lung unchanged when compared   to the prior study. 2. The right lung is clear. 3.         BRIEF SUMMARY OF INITIAL CONSULT:    Briefly Ms. Kristie Walker is an 78year old  female with a PMH of HTN, lung cancer, multiple myeloma, thrombocytopenia, anemia, DVT on chronic anticoagulation, and asthma who was admitted to the MICU after presenting to the ER via EMS from [facility] following an unwitnessed fall resulting in a dense C-2 fracture and shortness of breath. Labs in ER were significant for sodium 138, potassium 4.5, chloride 102, bicarbonate 19, BUN 67, creatinine 2.7 mg/dl, anion gap 17, lactic acid 4.2, and procalcitonin 13.99. Dexamethasone, mirtazapine, apixaban, amlodipine, furosemide, gabapentin, and  lenalidomide are medications patient was taking prior to admission. We are consulted for AUDREY. Her baseline  creatinine is 1.1-1.2 mg/dL. IMPRESSION/RECOMMENDATIONS:      1. AUDREY stage III on CKD, ischemic ATN, oliguric. Started on renal replacement therapy on June 29, 2021. Remains anuric.   Hyperkalemic today, for HD x4 hours.    2. CKD stage III, probably due to nephrosclerosis  3. Hemodynamic shock, on norepinephrine and antibiotics  4. Hypocalcemia, 2/2 MM therapy?  ------------------------------------------  5. Respiratory failure status post intubation   6. Multiple myeloma  7. Pneumonia, probably MRSA, on vancomycin and piperacillin-tazobactam  8. MRSA bacteremia, on vancomycin and piperacillin-tazobactam  9. Shock liver, LFTs improved  10. AF with RVR, heparin drip  11. History of DVT,  heparin drip on hold   12. Status post fall  13. Normocytic anemia  14.  Nutrition, n.p.o.    Plan:    · HD x4 hours today  · Continue to monitor renal function

## 2021-07-06 NOTE — PROGRESS NOTES
[address] [facility]   Department of Internal Medicine   Internal Medicine Residency  MICU Progress Note    Patient:  Domingo Abraham 78 y.o. female   MRN: [id number]       Date of Service: 2021    Allergy: Patient has no known allergies. Subjective     Patient was seen and examined this morning at bedside in no acute distress. Overnight, no acute events. Pt's son (POA) and his brother arrived today. Discussed further management with POA, pt's code status changed to [facility]. Objective     TEMPERATURE:  Current - Temp: 97.3 °F (36.3 °C); Max - Temp  Av °F (37.2 °C)  Min: 97.3 °F (36.3 °C)  Max: 99.8 °F (37.7 °C)  RESPIRATIONS RANGE: Resp  Av.4  Min: 21  Max: 29  PULSE RANGE: Pulse  Av.1  Min: 44  Max: 77  BLOOD PRESSURE RANGE:  Systolic (41LNQ), AMT:202 , Min:106 , COD:646   ; Diastolic (76CTG), VGT:63, Min:42, Max:67    ULSE OXIMETRY RANGE: SpO2  Av %  Min: 90 %  Max: 96 %    I & O - 24hr:    Intake/Output Summary (Last 24 hours) at 2021 1535  Last data filed at 2021 1200  Gross per 24 hour   Intake 1040 ml   Output 2007 ml   Net -967 ml     I/O last 3 completed shifts: In: 9682 [I.V.:640; NG/GT:100]  Out:  [Urine:7] No intake/output data recorded. Weight change:     Physical Exam:    General Appearance:   Sedated   HEENT:    NC/AT, PERRL, no pallor no icterus, moist mucous membrane   Neck:   Supple, Normal thyroid; no carotid bruit or JVD   Resp:    Decreased breath sounds   Heart:    RRR, S1 & S2 normal, no murmur, rub or gallop.    Abdomen:     Soft,, BS +, no organomegaly   Extremities:   Normal, atraumatic, no cyanosis or edema   Pulses:   2+ and symmetric all extremities   Skin:   Skin color, texture, turgor normal, no rashes or lesions   Neurologic:  Sedated       Medications     Continuous Infusions:   fentaNYL 5 mcg/ml in 0.9%  ml infusion Stopped (21 3963)    sodium chloride       Scheduled Meds:   artificial tears   Both Eyes BID    sodium chloride flush  5-40 mL Intravenous 2 times per day     PRN Meds: fentanNYL, LORazepam, glycopyrrolate, midazolam, sodium chloride flush, sodium chloride, acetaminophen **OR** acetaminophen  Nutrition:   NPO    Labs and Imaging Studies     CBC:   Recent Labs     07/04/21  1759 07/05/21  0425 07/06/21  0405   WBC 15.3* 13.7* 14.9*   HGB 8.4* 8.3* 8.5*   HCT 24.3* 23.8* 24.4*   MCV 88.4 87.8 88.1   PLT 34* 35* 54*       BMP:    Recent Labs     07/05/21  0425 07/05/21  1224 07/06/21  0404    133 132   K 5.9* 4.6 6.1*   CL 94* 93* 93*   CO2 21* 21* 20*   * 59* 86*   CREATININE 2.9* 1.9* 2.6*   GLUCOSE 180* 166* 173*       LIVER PROFILE:   Recent Labs     07/04/21  0413 07/05/21  0425 07/06/21  0404   AST 46* 51* 65*   ALT 89* 80* 99*   BILIDIR 3.9* 4.1* 5.5*   BILITOT 4.8* 4.8* 6.2*   ALKPHOS 85 92 116*       PT/INR:   Recent Labs     07/04/21  0413 07/05/21  0425 07/06/21  0405   PROTIME 31.3* 35.9* 42.2*   INR 2.9 3.2 3.8       APTT:   Recent Labs     07/04/21  0413 07/05/21  0425 07/06/21  0405   APTT 27.7 25.3 28.9       Fasting Lipid Panel:    No results found for: CHOL, TRIG, HDL    Cardiac Enzymes:    Lab Results   Component Value Date    CKTOTAL 132 06/27/2021    CKTOTAL 57 06/26/2021    CKMB 1.6 06/27/2021    TROPONINI 0.03 12/30/2020       Notable Cultures:      Blood cultures   Blood Culture, Routine   Date Value Ref Range Status   07/03/2021 24 Hours no growth  Preliminary     Respiratory cultures No results found for: RESPCULTURE No results found for: LABGRAM  Urine   Urine Culture, Routine   Date Value Ref Range Status   06/26/2021 Growth not present  Final     Legionella No results found for: LABLEGI  C Diff PCR No results found for: CDIFPCR  Wound culture/abscess: No results for input(s): WNDABS in the last 72 hours. Tip culture:No results for input(s): CXCATHTIP in the last 72 hours.       Oxygen:     Vent Information  $Ventilation: $Subsequent Day  Skin Assessment: Clean, dry, & intact  Suction Catheter Diameter:  (16)  Equipment ID: [id number]  Equipment Changed: Humidification  Vent Type: 980  Vent Mode: AC/VC  Vt Ordered: 350 mL  Rate Set: 24 bmp  Peak Flow: 0 L/min  Pressure Support: 0 cmH20  FiO2 : 40 %  SpO2: 92 %  SpO2/FiO2 ratio: 230  Sensitivity: 2  PEEP/CPAP: 8  I Time/ I Time %: 0.7 s  Humidification Source: Heated wire  Humidification Temp: 37  Humidification Temp Measured: 37  Circuit Condensation: Drained  Mask Type: Full face mask  Mask Size: Medium  Additional Respiratory  Assessments  Pulse: 56  Resp: 24  SpO2: 92 %  Position: Semi-Koch's  Humidification Source: Heated wire  Humidification Temp: 37  Circuit Condensation: Drained  Oral Care Completed?: Yes  Oral Care: Mouthwash, Mouth swabbed, Mouth suctioned, Suction toothette  Subglottic Suction Done?: Yes  Airway Type: ET  Airway Size: 8 (21)  Cuff Pressure (cm H2O): 29 cm H2O  Skin barrier applied: Yes       [REMOVED] Urethral Catheter-Output (mL): 5 mL    Imaging Studies:  XR CHEST PORTABLE   Final Result   No interval change compared to immediate prior exam.  Consolidation in the   left lung is unchanged in appearance. Endotracheal tube is unchanged position. XR CHEST PORTABLE   Final Result   Stable left mid to lower lung airspace consolidation         XR CHEST PORTABLE   Final Result   Stable airspace disease on the left. XR CHEST PORTABLE   Final Result   ET tube tip 1.0 cm from the paradise. Stable left lung opacity. .         US DUP LOWER EXTREMITIES BILATERAL VENOUS   Final Result   Within the visualized vessels there is no evidence for deep venous   thrombosis               US DUP UPPER EXTREMITIES BILATERAL VENOUS   Final Result   Within the visualized vessels there is no evidence for deep venous   thrombosis               XR ABDOMEN (KUB) (SINGLE AP VIEW)   Final Result   No evidence of bowel obstruction or ileus. XR CHEST PORTABLE   Final Result   1.   There is partial interval clearing of the left lung airspace disease with   partial Reaeration of the left lung apex when compared with the patient's   prior study. 2.  The right lung is clear. XR CHEST PORTABLE   Final Result   1. Persistent near complete opacification of the left hemithorax   2. The right lung is clear. 3. Stable position of the support lines and tubes. XR ABDOMEN FOR NG/OG/NE TUBE PLACEMENT   Final Result   Satisfactory position of nasogastric tube. XR CHEST PORTABLE   Final Result   1. Persistent near complete whiteout of the left lung unchanged when compared   to the prior study. 2. The right lung is clear. 3.      XR CHEST PORTABLE   Final Result   1. Opacities throughout left lung. New opacities are present in previously   aerated left upper lung field. 2.  Endotracheal tube is 2.4 cm above the paradise. US RETROPERITONEAL COMPLETE   Final Result   Unremarkable ultrasound of the kidneys. The urinary bladder is decompressed around a Bennett catheter. XR CHEST PORTABLE   Final Result   1. Persistent the infiltrate with pleural effusion in the left lung cause   significant the opacification of the left image chest particular from the mid   to the base. 2.  No significant changes since the previous examinations recently performed. 3.  Please see report of CT chest of June 26. XR CHEST ABDOMEN NG PLACEMENT   Final Result   Satisfactory position of the NGT. XR CHEST PORTABLE   Final Result   Intervally placed NGT extends below the diaphragm into at least the mid   stomach, with tip not confidently seen within the field of view. Endotracheal tube tip is 1.7 cm from the paradise and could be backed out 5-10   mm for more optimal positioning. Otherwise, stable chest with unchanged left lung opacities.          XR CHEST PORTABLE   Final Result   Stable abnormal chest with persistent infiltrates and pleural effusion in the   left lung base likely pneumonia. Surveillance recommended to see complete   clearing. There is minimal infiltrates developing in the right lung base. Endotracheal tube close to the paradise and could be retracted by 1 cm. XR CHEST PORTABLE   Final Result   Stable chest series. Persistent dense consolidation in the left mid and   lower lung. Stable atherosclerotic disease. No new cardiopulmonary   pathology. CT Head WO Contrast   Final Result   Stable atrophy with small vessel ischemic disease. There is no acute stroke   or hemorrhage. Persistent multiple punctate lytic lesions the calvarium likely due to   malignancy like multiple myeloma. CT cervical spine. There is a fracture of the base of the dens of C2 (type 2). There is no   displacement. No other acute fractures are identified in the cervical spine. There is mild diffuse degenerative changes from C3-T1 with osteophytes and   multilevel disc bulges. There is osteopenia and punctate lytic lesions   concerning for multiple myeloma. Impression      Type 2 fracture of the dens of C2. Diffuse degenerative changes from C3-T1. Multiple myeloma. CT chest.      Comparison December 30, 2020. Findings      There is borderline cardiac size. The great vessels are normal.  Trachea and   major bronchi are patent. There is dense consolidation in the left upper   lobe and left lower lobe concerning for pneumonia. There is minimal   atelectasis in the right lung base. The liver is of normal architecture. There is diffuse demineralization of the thoracic spine with punctate   lucencies. Compression fractures with vertebroplasty are noted. There is   slight irregularity of the sternum probably artifact. There is kyphosis of   the spine. Impression      Dense consolidation in the left upper lobe and left lower lobe concerning for   pneumonia.   Surveillance after appropriate treatment is recommended with repeat CT scan in 6-8 weeks to see complete resolution and exclude underlying   malignancy. Demineralization and the punctate lytic lucencies in the spine and ribs   concerning for multiple myeloma. Critical results were called by Dr. Nilesh Barker to Dr. Gypsy Louie on   6/26/2021 at 16:30.         CT Cervical Spine WO Contrast   Final Result   Stable atrophy with small vessel ischemic disease. There is no acute stroke   or hemorrhage. Persistent multiple punctate lytic lesions the calvarium likely due to   malignancy like multiple myeloma. CT cervical spine. There is a fracture of the base of the dens of C2 (type 2). There is no   displacement. No other acute fractures are identified in the cervical spine. There is mild diffuse degenerative changes from C3-T1 with osteophytes and   multilevel disc bulges. There is osteopenia and punctate lytic lesions   concerning for multiple myeloma. Impression      Type 2 fracture of the dens of C2. Diffuse degenerative changes from C3-T1. Multiple myeloma. CT chest.      Comparison December 30, 2020. Findings      There is borderline cardiac size. The great vessels are normal.  Trachea and   major bronchi are patent. There is dense consolidation in the left upper   lobe and left lower lobe concerning for pneumonia. There is minimal   atelectasis in the right lung base. The liver is of normal architecture. There is diffuse demineralization of the thoracic spine with punctate   lucencies. Compression fractures with vertebroplasty are noted. There is   slight irregularity of the sternum probably artifact. There is kyphosis of   the spine. Impression      Dense consolidation in the left upper lobe and left lower lobe concerning for   pneumonia. Surveillance after appropriate treatment is recommended with   repeat CT scan in 6-8 weeks to see complete resolution and exclude underlying   malignancy. Demineralization and the punctate lytic lucencies in the spine and ribs   concerning for multiple myeloma. Critical results were called by Dr. Mara Landa to Dr. Kristin Abrams. on   6/26/2021 at 16:30.         CT CHEST WO CONTRAST   Final Result   Stable atrophy with small vessel ischemic disease. There is no acute stroke   or hemorrhage. Persistent multiple punctate lytic lesions the calvarium likely due to   malignancy like multiple myeloma. CT cervical spine. There is a fracture of the base of the dens of C2 (type 2). There is no   displacement. No other acute fractures are identified in the cervical spine. There is mild diffuse degenerative changes from C3-T1 with osteophytes and   multilevel disc bulges. There is osteopenia and punctate lytic lesions   concerning for multiple myeloma. Impression      Type 2 fracture of the dens of C2. Diffuse degenerative changes from C3-T1. Multiple myeloma. CT chest.      Comparison December 30, 2020. Findings      There is borderline cardiac size. The great vessels are normal.  Trachea and   major bronchi are patent. There is dense consolidation in the left upper   lobe and left lower lobe concerning for pneumonia. There is minimal   atelectasis in the right lung base. The liver is of normal architecture. There is diffuse demineralization of the thoracic spine with punctate   lucencies. Compression fractures with vertebroplasty are noted. There is   slight irregularity of the sternum probably artifact. There is kyphosis of   the spine. Impression      Dense consolidation in the left upper lobe and left lower lobe concerning for   pneumonia. Surveillance after appropriate treatment is recommended with   repeat CT scan in 6-8 weeks to see complete resolution and exclude underlying   malignancy. Demineralization and the punctate lytic lucencies in the spine and ribs   concerning for multiple myeloma. Critical results were called by Dr. Mara Landa to Dr. Kristin Abrams. on   6/26/2021 at 16:30. XR CHEST PORTABLE   Final Result   Consolidative airspace opacities in the left mid to lower lung zone with a   suspected small left pleural effusion. Findings may be on the basis of   pneumonia or less likely asymmetric pulmonary edema. Consider correlation   with CT of the chest.              Resident's Assessment and Plan     Assessment and Plan:    Neuro  1. Acute metabolic encephalopathy likely 2/2 septic shock  Cardiovascular  1. Atrial Fibrillation 2/2 sepsis and hypoxia  2. Septic Shock 2/2 L. Sided Pneumonia  Pulmonary  3. MRSA nares cultures (+),   4. Acute Hypoxic Respiratory Failure 2/2 L. Sided Pneumonia  GI  5. Myiasis (presence of maggots) of the oral cavity  Renal/  6. AUDREY vs AUDREY on CKD  Heme/Onc  7. Thrombocytopenia 2/2 sepsis 2/2 L. Sided Pneumonia  8. 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 H/o Multiple Myeloma  Derm/MSK  10. Cervical Fracture      Family members and POA have discussed further management of pt. Code status changed to [facility]. Terminal Extubation performed today.   Transfer pt to med/surg        # Peptic ulcer prophylaxis: N/A  # DVT Prophylaxis: N/A  # Disposition: Transfer to Kayden King MD PGY-1

## 2021-07-07 NOTE — PLAN OF CARE
Called to bedside to pronounce death after patient was noted to be in asystole at 2356 7/7/2021. NO pupillary, corneal, doll's eye or gag reflex noted. NO heart sounds or pulse noted. NO Chest rise or Lung sounds noted.    Time of death declared at Davis Fajardo MD  PGY-3  7/7/2021  12:24 AM

## 2021-07-07 NOTE — PROGRESS NOTES
Report called to [address] [place] [address]. While on phone, patient had pause and oxygen saturation dropped to 40%. Patient is moaning. Medicated at this time - see MAR. Patient is having periods of apnea. Around 11 pm, son Keyanna Carmichael) was notified and en route.

## 2021-07-07 NOTE — PROGRESS NOTES
Dr. Erika Pool covering for Dr. Lolis Pringle. Left vm with information of pt death, need for discharge order and to verify who will sign the death certificate.

## 2021-07-08 LAB
BLOOD CULTURE, ROUTINE: NORMAL
CULTURE, BLOOD 2: NORMAL

## 2021-07-08 NOTE — DISCHARGE SUMMARY
Discharge Summary    Admit date: 2021    Discharge date and time: 2021 11:56 PM     Admitting Physician: Nitza Edge MD     Consultants: Palliative care, dentistry, ENT, vascular, heme onc, cardiology, nephrology, EP, critical care, neurosurgery, ID, trauma    Admission Diagnoses:  C-2 fracture, HAP    Discharge Diagnoses and Hospital Course:    [address] [place] [address] initially was admitted from nursing facility due to mechanical fall substaining Dens C-2 Fracture, NS consulted and trauma applied neck collar for stability. The also found HAP with ARF. Patient respiratory status clinically worsened throughout stay and she was intubated. Patient into multisystem organ failure. Family decided to make patient comfort care and she was compassionately extubated.      · Acute hypoxic respiratory failure   · Septic shock due to MRSA pneumonia and multiorgan system failure     ·  Afib with RVR   ·  AUDREY on CKD   ·  History of multiple myeloma-   ·  Hyperkalemia   ·  Thrombocytopenia  ·  Anemia   ·  Cervical spine fracture   ·  Elevated liver enzymes         Disposition:  [address] [facility], DO   2:53 PM  2021

## 2021-07-09 LAB
Lab: NORMAL
REPORT: NORMAL
THIS TEST SENT TO: NORMAL

## 2021-07-13 LAB
BLOOD BANK REFERENCE LAB TEST: NORMAL
BLOOD BANK REFERENCE LAB TEST: NORMAL

## 2021-08-02 LAB
FUNGUS (MYCOLOGY) CULTURE: NORMAL
FUNGUS STAIN: NORMAL